# Patient Record
Sex: FEMALE | Race: WHITE | Employment: OTHER | URBAN - METROPOLITAN AREA
[De-identification: names, ages, dates, MRNs, and addresses within clinical notes are randomized per-mention and may not be internally consistent; named-entity substitution may affect disease eponyms.]

---

## 2018-05-31 ENCOUNTER — DOCTOR'S OFFICE (OUTPATIENT)
Dept: URBAN - METROPOLITAN AREA CLINIC 137 | Facility: CLINIC | Age: 78
Setting detail: OPHTHALMOLOGY
End: 2018-05-31
Payer: COMMERCIAL

## 2018-05-31 DIAGNOSIS — H35.3131: ICD-10-CM

## 2018-05-31 DIAGNOSIS — H25.12: ICD-10-CM

## 2018-05-31 DIAGNOSIS — Z96.1: ICD-10-CM

## 2018-05-31 PROCEDURE — 92004 COMPRE OPH EXAM NEW PT 1/>: CPT | Performed by: OPHTHALMOLOGY

## 2018-05-31 ASSESSMENT — REFRACTION_MANIFEST
OD_VA3: 20/
OD_VA1: 20/
OD_VA2: 20/
OS_VA1: 20/
OS_VA2: 20/
OD_VA3: 20/
OD_VA1: 20/
OU_VA: 20/
OS_VA2: 20/
OD_VA3: 20/
OD_VA2: 20/
OU_VA: 20/
OD_VA2: 20/
OS_VA3: 20/
OS_VA1: 20/
OS_VA3: 20/
OS_VA3: 20/
OU_VA: 20/
OD_VA1: 20/
OS_VA1: 20/
OS_VA2: 20/

## 2018-05-31 ASSESSMENT — REFRACTION_CURRENTRX
OD_OVR_VA: 20/
OD_OVR_VA: 20/
OS_OVR_VA: 20/
OD_OVR_VA: 20/

## 2018-05-31 ASSESSMENT — VISUAL ACUITY
OS_BCVA: 20/30+3
OD_BCVA: 20/200

## 2018-05-31 ASSESSMENT — CONFRONTATIONAL VISUAL FIELD TEST (CVF)
OD_FINDINGS: FULL
OS_FINDINGS: FULL

## 2018-10-11 RX ORDER — MULTIVIT-MIN/IRON/FOLIC ACID/K 18-600-40
CAPSULE ORAL 3 TIMES WEEKLY
COMMUNITY

## 2018-10-11 RX ORDER — CHLORAL HYDRATE 500 MG
1000 CAPSULE ORAL DAILY
COMMUNITY

## 2018-10-11 RX ORDER — METOPROLOL TARTRATE 50 MG/1
25 TABLET, FILM COATED ORAL EVERY 12 HOURS SCHEDULED
COMMUNITY
End: 2020-12-17

## 2018-10-11 RX ORDER — DIPHENOXYLATE HYDROCHLORIDE AND ATROPINE SULFATE 2.5; .025 MG/1; MG/1
1 TABLET ORAL DAILY
COMMUNITY

## 2018-10-11 RX ORDER — ESOMEPRAZOLE MAGNESIUM 40 MG/1
40 CAPSULE, DELAYED RELEASE ORAL AS NEEDED
COMMUNITY
End: 2021-01-04 | Stop reason: SDUPTHER

## 2018-10-11 RX ORDER — FOLIC ACID 0.8 MG
TABLET ORAL
COMMUNITY

## 2018-10-11 RX ORDER — LISINOPRIL 10 MG/1
10 TABLET ORAL EVERY MORNING
COMMUNITY
End: 2020-08-18 | Stop reason: SDUPTHER

## 2018-10-11 RX ORDER — ASPIRIN 81 MG/1
81 TABLET ORAL AS NEEDED
COMMUNITY
End: 2022-01-26

## 2018-10-11 RX ORDER — NIACIN 500 MG
500 TABLET ORAL
COMMUNITY

## 2018-10-11 RX ORDER — ANTIARTHRITIC COMBINATION NO.2 900 MG
TABLET ORAL EVERY MORNING
COMMUNITY

## 2018-10-11 NOTE — PRE-PROCEDURE INSTRUCTIONS
Pre-Surgery Instructions:   Medication Instructions    aspirin (ECOTRIN LOW STRENGTH) 81 mg EC tablet Patient was instructed by Physician and understands   Biotin 5000 MCG TABS Instructed patient per Anesthesia Guidelines   CALCIUM PO Instructed patient per Anesthesia Guidelines   Cholecalciferol (VITAMIN D) 2000 units CAPS Instructed patient per Anesthesia Guidelines   esomeprazole (NexIUM) 40 MG capsule Instructed patient per Anesthesia Guidelines   GLUCOSAMINE HCL PO Instructed patient per Anesthesia Guidelines   lisinopril (ZESTRIL) 10 mg tablet Instructed patient per Anesthesia Guidelines   Magnesium 500 MG CAPS Instructed patient per Anesthesia Guidelines   metoprolol tartrate (LOPRESSOR) 50 mg tablet Instructed patient per Anesthesia Guidelines   multivitamin (THERAGRAN) TABS Instructed patient per Anesthesia Guidelines   niacin 500 mg tablet Instructed patient per Anesthesia Guidelines   Omega-3 Fatty Acids (FISH OIL) 1,000 mg Instructed patient per Anesthesia Guidelines   Zolpidem Tartrate (AMBIEN PO) Instructed patient per Anesthesia Guidelines  Pre op instructions givens  Pt to take metoprolol tartrate, lisinopril,may take nexium if needed   ángel Coto [phone] Surgical Experience    The following information was developed to assist you to prepare for your operation  What do I need to do before coming to the hospital?   Arrange for a responsible person to drive you to and from the hospital    Arrange care for your children at home  Children are not allowed in the recovery areas of the hospital   Plan to wear clothing that is easy to put on and take off  If you are having shoulder surgery, wear a shirt that buttons or zippers in the front  Bathing  o Shower the evening before and the morning of your surgery with an antibacterial soap   Please refer to the Pre Op Showering Instructions for Surgery Patients Sheet   o Remove nail polish and all body piercing jewelry  o Do not shave any body part for at least 24 hours before surgery-this includes face, arms, legs and upper body  Food  o Nothing to eat or drink after midnight the night before your surgery  This includes candy and chewing gum  o Exception: If your surgery is after 12:00pm (noon), you may have clear liquids such as 7-Up®, ginger ale, apple or cranberry juice, Jell-O®, water, or clear broth until 8:00 am  o Do not drink milk or juice with pulp on the morning before surgery  o Do not drink alcohol 24 hours before surgery  Medicine  o Follow instructions you received from your surgeon about which medicines you may take on the day of surgery  o If instructed to take medicine on the morning of surgery, take pills with just a small sip of water  Call your prescribing doctor for specific infroamtion on what to do if you take insulin    What should I bring to the hospital?    Bring:  Kathee Friday or a walker, if you have them, for foot or knee surgery   A list of the daily medicines, vitamins, minerals, herbals and nutritional supplements you take  Include the dosages of medicines and the time you take them each day   Glasses, dentures or hearing aids   Minimal clothing; you will be wearing hospital sleepwear   Photo ID; required to verify your identity   If you have a Living Will or Power of , bring a copy of the documents   If you have an ostomy, bring an extra pouch and any supplies you use    Do not bring   Medicines or inhalers   Money, valuables or jewelry    What other information should I know about the day of surgery?  Notify your surgeons if you develop a cold, sore throat, cough, fever, rash or any other illness     Report to the Ambulatory Surgical/Same Day Surgery Unit   You will be instructed to stop at Registration only if you have not been pre-registered   Inform your  fi they do not stay that they will be asked by the staff to leave a phone number where they can be reached   Be available to be reached before surgery  In the event the operating room schedule changes, you may be asked to come in earlier or later than expected    *It is important to tell your doctor and others involved in your health care if you are taking or have been taking any non-prescription drugs, vitamins, minerals, herbals or other nutritional supplements   Any of these may interact with some food or medicines and cause a reaction

## 2018-10-17 ENCOUNTER — ANESTHESIA EVENT (OUTPATIENT)
Dept: PERIOP | Facility: AMBULARY SURGERY CENTER | Age: 78
End: 2018-10-17
Payer: MEDICARE

## 2018-10-17 PROBLEM — H25.812 COMBINED FORMS OF AGE-RELATED CATARACT OF LEFT EYE: Status: ACTIVE | Noted: 2018-10-17

## 2018-10-17 NOTE — H&P
Admit Note     Khushbu Gandara    The above female patient   [de-identified]y o   years old has been followed for cataract development in their Left eye  Due to the decrease in vision and the affect on their lifestyle, they have elected to have cataract surgery  The risks and benefits were discussed with the patient using verbal discussion and education material  The IOL option were also discussed  The patient was cleared by  their medical doctor and had an interview with the anesthesia department  No contraindications to the surgery were found  Informed consent was obtained for cataract surgery and possible intraocular lens implantation  Ophthalmic Examination:     A complete ophthalmic exam was performed  The best corrected visual acuity in each eye was  OD 20/25     and OS 20/100     The Snellen chart was used as well as glare testing if indicated to determine the level of vision function  Slit lamp was used to examine the cornea and anterior structures of the eye  No significant pathology was found as a contraindication to surgery  Intraocular pressures were checked and found to be within normal limits  Dilated fundus exam was performed and no significant pathology was found that would attribute to the decreased vision  Examination of the crystalline lens revealed significant cataract formation and the cataract was a significant cause of the patient's decrease in vision  The potential benefits of the cataract surgery out weighed the risks of the procedure and were reviewed with the patient during the pre-operative visit  In summary, it was determined that the patient's decrease in visual acuity was mainly attributable to the cataract formation  Cataract surgery was recommended to for visual rehabilitation and improvement in the patient's  lifestyle  The decision included the patient's ability to safely drive a motor vehicle as well as live independently   The patient had an A-scan to determine the power of the lens to be placed into the eye at the time of cataract surgery  The risks and benefits were also review again at this visit including total loss of the eye on rare occassions  The IOL options were also reviewed based on the patients lifestyle and ocular findings  The above patient was informed of the need to be cleared by their medical doctor prior to surgery  Any pre-operative labs would be determined by their primary care doctor and/or cardiologist      A potential Vision was checked and found to be  20/20 in the operative eye  The post operative plan and visits were reviewed with the patient and scheduled  Admitting Diagnosis:    Cataract Left Eye  Procedure Planned:  Cataract Extraction Left Eye with possible Intraocular lens Implant      Anesthesia: Local MAC    Surgeon: Jose Ozuna MD

## 2018-10-18 ENCOUNTER — HOSPITAL ENCOUNTER (OUTPATIENT)
Facility: AMBULARY SURGERY CENTER | Age: 78
Setting detail: OUTPATIENT SURGERY
Discharge: HOME | End: 2018-10-18
Attending: OPHTHALMOLOGY | Admitting: OPHTHALMOLOGY
Payer: MEDICARE

## 2018-10-18 ENCOUNTER — ANESTHESIA (OUTPATIENT)
Dept: PERIOP | Facility: AMBULARY SURGERY CENTER | Age: 78
End: 2018-10-18
Payer: MEDICARE

## 2018-10-18 VITALS
OXYGEN SATURATION: 97 % | BODY MASS INDEX: 23.99 KG/M2 | RESPIRATION RATE: 18 BRPM | HEART RATE: 75 BPM | TEMPERATURE: 97.7 F | WEIGHT: 144 LBS | DIASTOLIC BLOOD PRESSURE: 89 MMHG | SYSTOLIC BLOOD PRESSURE: 151 MMHG | HEIGHT: 65 IN

## 2018-10-18 PROBLEM — H25.812 COMBINED FORMS OF AGE-RELATED CATARACT OF LEFT EYE: Status: RESOLVED | Noted: 2018-10-17 | Resolved: 2018-10-18

## 2018-10-18 PROCEDURE — V2632 POST CHMBR INTRAOCULAR LENS: HCPCS | Performed by: OPHTHALMOLOGY

## 2018-10-18 DEVICE — IOL SN60WF 16.5: Type: IMPLANTABLE DEVICE | Site: EYE | Status: FUNCTIONAL

## 2018-10-18 RX ORDER — OFLOXACIN 3 MG/ML
SOLUTION/ DROPS OPHTHALMIC AS NEEDED
Status: DISCONTINUED | OUTPATIENT
Start: 2018-10-18 | End: 2018-10-18 | Stop reason: HOSPADM

## 2018-10-18 RX ORDER — TETRACAINE HYDROCHLORIDE 5 MG/ML
SOLUTION OPHTHALMIC AS NEEDED
Status: DISCONTINUED | OUTPATIENT
Start: 2018-10-18 | End: 2018-10-18 | Stop reason: HOSPADM

## 2018-10-18 RX ORDER — LIDOCAINE HYDROCHLORIDE 10 MG/ML
0.5 INJECTION, SOLUTION EPIDURAL; INFILTRATION; INTRACAUDAL; PERINEURAL ONCE AS NEEDED
Status: DISCONTINUED | OUTPATIENT
Start: 2018-10-18 | End: 2018-10-18 | Stop reason: HOSPADM

## 2018-10-18 RX ORDER — LIDOCAINE HYDROCHLORIDE 20 MG/ML
1 JELLY TOPICAL
Status: COMPLETED | OUTPATIENT
Start: 2018-10-18 | End: 2018-10-18

## 2018-10-18 RX ORDER — OFLOXACIN 3 MG/ML
1 SOLUTION/ DROPS OPHTHALMIC
Status: COMPLETED | OUTPATIENT
Start: 2018-10-18 | End: 2018-10-18

## 2018-10-18 RX ORDER — BALANCED SALT SOLUTION 6.4; .75; .48; .3; 3.9; 1.7 MG/ML; MG/ML; MG/ML; MG/ML; MG/ML; MG/ML
SOLUTION OPHTHALMIC AS NEEDED
Status: DISCONTINUED | OUTPATIENT
Start: 2018-10-18 | End: 2018-10-18 | Stop reason: HOSPADM

## 2018-10-18 RX ORDER — SODIUM CHLORIDE, SODIUM LACTATE, POTASSIUM CHLORIDE, CALCIUM CHLORIDE 600; 310; 30; 20 MG/100ML; MG/100ML; MG/100ML; MG/100ML
100 INJECTION, SOLUTION INTRAVENOUS CONTINUOUS
Status: DISCONTINUED | OUTPATIENT
Start: 2018-10-18 | End: 2018-10-18 | Stop reason: HOSPADM

## 2018-10-18 RX ORDER — TROPICAMIDE 10 MG/ML
1 SOLUTION/ DROPS OPHTHALMIC
Status: COMPLETED | OUTPATIENT
Start: 2018-10-18 | End: 2018-10-18

## 2018-10-18 RX ORDER — PHENYLEPHRINE HCL 2.5 %
1 DROPS OPHTHALMIC (EYE)
Status: COMPLETED | OUTPATIENT
Start: 2018-10-18 | End: 2018-10-18

## 2018-10-18 RX ORDER — PROPOFOL 10 MG/ML
INJECTION, EMULSION INTRAVENOUS AS NEEDED
Status: DISCONTINUED | OUTPATIENT
Start: 2018-10-18 | End: 2018-10-18 | Stop reason: SURG

## 2018-10-18 RX ORDER — CYCLOPENTOLATE HYDROCHLORIDE 10 MG/ML
1 SOLUTION/ DROPS OPHTHALMIC
Status: COMPLETED | OUTPATIENT
Start: 2018-10-18 | End: 2018-10-18

## 2018-10-18 RX ADMIN — CYCLOPENTOLATE HYDROCHLORIDE 1 DROP: 10 SOLUTION/ DROPS OPHTHALMIC at 09:05

## 2018-10-18 RX ADMIN — CYCLOPENTOLATE HYDROCHLORIDE 1 DROP: 10 SOLUTION/ DROPS OPHTHALMIC at 08:46

## 2018-10-18 RX ADMIN — PHENYLEPHRINE HYDROCHLORIDE 1 DROP: 25 SOLUTION/ DROPS OPHTHALMIC at 08:55

## 2018-10-18 RX ADMIN — OFLOXACIN 1 DROP: 3 SOLUTION OPHTHALMIC at 08:55

## 2018-10-18 RX ADMIN — OFLOXACIN 1 DROP: 3 SOLUTION OPHTHALMIC at 09:15

## 2018-10-18 RX ADMIN — PHENYLEPHRINE HYDROCHLORIDE 1 DROP: 25 SOLUTION/ DROPS OPHTHALMIC at 09:15

## 2018-10-18 RX ADMIN — LIDOCAINE HYDROCHLORIDE 1 APPLICATION: 20 JELLY TOPICAL at 09:05

## 2018-10-18 RX ADMIN — PROPOFOL 80 MG: 10 INJECTION, EMULSION INTRAVENOUS at 09:52

## 2018-10-18 RX ADMIN — LIDOCAINE HYDROCHLORIDE 1 APPLICATION: 20 JELLY TOPICAL at 08:45

## 2018-10-18 RX ADMIN — OFLOXACIN 1 DROP: 3 SOLUTION OPHTHALMIC at 08:45

## 2018-10-18 RX ADMIN — LIDOCAINE HYDROCHLORIDE 1 APPLICATION: 20 JELLY TOPICAL at 08:55

## 2018-10-18 RX ADMIN — OFLOXACIN 1 DROP: 3 SOLUTION OPHTHALMIC at 09:05

## 2018-10-18 RX ADMIN — LIDOCAINE HYDROCHLORIDE 1 APPLICATION: 20 JELLY TOPICAL at 09:15

## 2018-10-18 RX ADMIN — CYCLOPENTOLATE HYDROCHLORIDE 1 DROP: 10 SOLUTION/ DROPS OPHTHALMIC at 09:15

## 2018-10-18 RX ADMIN — PHENYLEPHRINE HYDROCHLORIDE 1 DROP: 25 SOLUTION/ DROPS OPHTHALMIC at 08:45

## 2018-10-18 RX ADMIN — TROPICAMIDE 1 DROP: 10 SOLUTION/ DROPS OPHTHALMIC at 08:45

## 2018-10-18 RX ADMIN — TROPICAMIDE 1 DROP: 10 SOLUTION/ DROPS OPHTHALMIC at 09:15

## 2018-10-18 RX ADMIN — TROPICAMIDE 1 DROP: 10 SOLUTION/ DROPS OPHTHALMIC at 08:55

## 2018-10-18 RX ADMIN — SODIUM CHLORIDE, SODIUM LACTATE, POTASSIUM CHLORIDE, AND CALCIUM CHLORIDE 100 ML/HR: .6; .31; .03; .02 INJECTION, SOLUTION INTRAVENOUS at 09:08

## 2018-10-18 RX ADMIN — CYCLOPENTOLATE HYDROCHLORIDE 1 DROP: 10 SOLUTION/ DROPS OPHTHALMIC at 08:55

## 2018-10-18 RX ADMIN — PHENYLEPHRINE HYDROCHLORIDE 1 DROP: 25 SOLUTION/ DROPS OPHTHALMIC at 09:05

## 2018-10-18 RX ADMIN — TROPICAMIDE 1 DROP: 10 SOLUTION/ DROPS OPHTHALMIC at 09:05

## 2018-10-18 NOTE — OP NOTE
20/100 Postoperative Note  PATIENT NAME: Jimmy Kaba  : 1940  MRN: [id number]  [facility] OR ROOM 01    Surgery Date: 10/18/2018    Combined forms of age-related cataract of left eye [H25 812]    Post-Op Diagnosis Codes:     * Combined forms of age-related cataract of left eye [H25 812]    Procedure(s):  EXTRACTION EXTRACAPSULAR CATARACT PHACO INTRAOCULAR LENS (IOL) LEFT EYE    Surgeon(s) and Role:     * Sean Pedersen MD - Primary    Assistants:  Circulator: Jimmy Ortiz RN; Jens Weahters RN  Scrub Person: Sharyle Lapping    Anesthesia Type:   IV Sedation with Anesthesia     Anesthesiologist: Murray Brown MD    Operative Indication:  Vision reduced to 20/100 in the left eye  secondary to progressive cataract formation  The cataract was interfering with the patient's daily activities  All risks and benefits to the surgical procedure were explained to the patient and family members that were present during the pre-operative visit  The risks included but were not limited to blindness, infection, choroidal hemorrhage, loss of the eye, glaucoma, corneal edema, macular edema, retinal detachment, the need for a corneal transplant and the need to wear glasses postoperatively  The rare possibility of having to remove/excahnage the IOL due to incorrect power or off axis (toric) was explained pre-oper to the patient  Educational materials, eye model and a video were all used to explain cataract surgery  The option of speciality IOL-lenses (Multifocal, toric IOL) were discussed pre-operatively as well as the risks and benefit of using the speciality IOL if indicated in this particular patient  The patient understood and signed the appropriate consent form  Operative Technique:  The patient was brought back to the operating suite and placed in the supine position  The patient was identified in from of the surgeon as well as the OR staff  The operative eye was confirmed and marked   The patient  was given a peribulbar block using  2% Lidocaine  The pressure of the eye was checked by digital massage  The operative eye was prepped and draped in the usual sterile fashion  A wire lid speculum was inserted  A clear corneal, temporal approach was used  A 2 75 Phaco blade was used to tunnel and enter the  cornea from the temporal side  The anterior chamber was entered and visco-elastic was used to deepen the anterior chamber  Side port paracentesis tracts were performed using a 1 0 mm paracentesis blade at approximately 12 and 6 position  A circular tear capsulotomy was performed using a 25 gauge bent needle and Utrata capsulotomy forceps  Goode dissection was carried out with balanced salt solution using a 27 gauge flat irrigating cannula  The nucleus was freely rotatable with in the capsular bag  The Nucleus was phacoemulsified   Using the Everardo CenturionPhaco machine and the phaco chop method  The cortical shell was removed using the bimanual I/A  The posterior capsule was polished as indicated  The posterior capsular bag was inflated using Provisc  The posterior chamber intraocular lens power+16 50 [id number] was taken from the package an  inspected and the power confirmed  The lens was then inserted  into the posterior capsular bag using the appropriate IOL folder and   The lens was well centered using the Sinsky hook  The Provisc was removed using the I/A unit  The side ports and the temporal incision were stromal hydrated until they were water tight  A 10-vicryl suture was placed to further secure the temporal incision if indicated after testing the incision  The pressure of the eye was adjusted accordingly using balance salt solution through the side ports  At the end of the case the patient was given a drop of Iopidine to prevent a pressure spike  Also, the patient was given a drop of antibiotic drop and antibiotic ointment to prevent infection   The patient's eye was then patched with an eye pad and covered with a plastic shield  The Patient was then taken to the recovery room  After vital signs were stable the patient was discharged with family/friend in satisfactory condition  Addendum:  Difficult cortical clean-up very adherent to PC  Capsule polished  No complications    Lorrie Lin MD

## 2018-10-18 NOTE — DISCHARGE INSTRUCTIONS
[facility]  Discharge Instructions    Dr Mansoor Guerrero and Dr Lázaro Esteban discharged in satisfactory condition  Post operative instructions were given  Follow-up Appointment was given for 9 AM on Friday 10/19/18 at the [facility]  Normal Symptoms: Foreign body sensation, scratchy, picky feeling  Try Extra Strength Tylenol for headache  Call Office if severe pain or discomfort  Keep patch on operative eye until 4 pm today  Bring sunglasses to office in the morning  Do not bring the 3 eye drops  NEW Instructions on how to use the 3 drops will be given in AM     Activity: Avoid any heavy,  bending, lifting or excessive activity until instructed  May walk around home with assistance  OK to watch TV  Avoid any excessive reading  No driving until told (Normally 2-4 days after surgery)  Avoid sleeping on operative eye  No water in the eye    Diet: Resume normal diet as tolerated  If experiencing nausea, try bland foods or liquid diet first      Resume normal medications unless otherwise instructed     If any Questions or Problems Please Call: [phone] [facility] take off [facility] and patch and start drops , Do not put shield back on during the day  Blurry Vision normal today  Pink/White Top                     [employer]               4:00PM                         4:05 PM               4:10 PM               8:00PM                         8: 05PM                8:10PM    BEDTIME:    Take Tan Top ONLY and then replace the plastic shield over eye  No gauze pad needed at bedtime    TOMORROW MORNING , before coming to office, take all THREE drops, then put on glasses  Example of times below  7:00AM                            7: 05AM                  7:10 AM            YOU MAY EXPERIENCE "DOUBLE VISION" - "Blurry Vision"  ONCE YOU TAKE OFF EYE PATCH/SHIELD THIS IS NORMAL

## 2018-10-18 NOTE — ANESTHESIA PREPROCEDURE EVALUATION
Review of Systems/Medical History  Patient summary reviewed  Chart reviewed      Cardiovascular  EKG reviewed, Exercise tolerance (METS): >4,  Hypertension on > 1 medication, Dysrhythmias , elton dysrhythmia,    Pulmonary  Negative pulmonary ROS        GI/Hepatic  Negative GI/hepatic ROS          Negative  ROS        Endo/Other  Negative endo/other ROS      GYN  Negative gynecology ROS          Hematology   Musculoskeletal    Arthritis     Neurology  Negative neurology ROS      Psychology   Negative psychology ROS              Physical Exam    Airway    Mallampati score: II  TM Distance: >3 FB  Neck ROM: full     Dental       Cardiovascular  Rhythm: regular, Rate: normal,     Pulmonary  Breath sounds clear to auscultation,     Other Findings        Anesthesia Plan  ASA Score- 2     Anesthesia Type- IV sedation with anesthesia with ASA Monitors  Additional Monitors:   Airway Plan:         Plan Factors-    Induction- intravenous  Postoperative Plan-     Informed Consent- Anesthetic plan and risks discussed with patient

## 2018-10-18 NOTE — DISCHARGE SUMMARY
Khushbu Gandara was discharged in satisfactory condition  Discharge instructions were reviewed and then given to the patient   Arrangements were made for follow up the next day with Francesca Ashley MD

## 2020-08-03 ENCOUNTER — TELEPHONE (OUTPATIENT)
Dept: FAMILY MEDICINE CLINIC | Facility: CLINIC | Age: 80
End: 2020-08-03

## 2020-08-03 NOTE — TELEPHONE ENCOUNTER
Patient requesting a routine lab order to have completed prior to appt, also requesting a med refill for the Zolpidem Tartrate (AMBIEN PO) 2 5mg takes one daily at bedtime   ND

## 2020-08-04 DIAGNOSIS — I10 HYPERTENSION, UNSPECIFIED TYPE: Primary | ICD-10-CM

## 2020-08-04 DIAGNOSIS — E78.5 HYPERLIPIDEMIA, UNSPECIFIED HYPERLIPIDEMIA TYPE: ICD-10-CM

## 2020-08-04 DIAGNOSIS — G47.00 INSOMNIA, UNSPECIFIED TYPE: Primary | ICD-10-CM

## 2020-08-04 DIAGNOSIS — E03.9 HYPOTHYROIDISM, UNSPECIFIED TYPE: ICD-10-CM

## 2020-08-04 RX ORDER — ZOLPIDEM TARTRATE 5 MG/1
5 TABLET ORAL
Qty: 30 TABLET | Refills: 1 | Status: SHIPPED | OUTPATIENT
Start: 2020-08-04 | End: 2020-09-02 | Stop reason: SDUPTHER

## 2020-08-05 ENCOUNTER — TELEPHONE (OUTPATIENT)
Dept: FAMILY MEDICINE CLINIC | Facility: CLINIC | Age: 80
End: 2020-08-05

## 2020-08-18 DIAGNOSIS — Z76.0 MEDICATION REFILL: Primary | ICD-10-CM

## 2020-08-18 RX ORDER — LISINOPRIL 10 MG/1
10 TABLET ORAL EVERY MORNING
Qty: 90 TABLET | Refills: 3 | Status: SHIPPED | OUTPATIENT
Start: 2020-08-18 | End: 2021-03-15

## 2020-08-19 RX ORDER — FUROSEMIDE 40 MG/1
40 TABLET ORAL DAILY
COMMUNITY
Start: 2020-08-02 | End: 2020-12-17

## 2020-09-02 ENCOUNTER — TELEPHONE (OUTPATIENT)
Dept: FAMILY MEDICINE CLINIC | Facility: CLINIC | Age: 80
End: 2020-09-02

## 2020-09-02 DIAGNOSIS — G47.00 INSOMNIA, UNSPECIFIED TYPE: ICD-10-CM

## 2020-09-02 RX ORDER — ZOLPIDEM TARTRATE 5 MG/1
5 TABLET ORAL
Qty: 30 TABLET | Refills: 1 | Status: SHIPPED | OUTPATIENT
Start: 2020-09-02 | End: 2020-09-10 | Stop reason: SDUPTHER

## 2020-09-10 DIAGNOSIS — G47.00 INSOMNIA, UNSPECIFIED TYPE: ICD-10-CM

## 2020-09-10 RX ORDER — ZOLPIDEM TARTRATE 5 MG/1
5 TABLET ORAL
Qty: 90 TABLET | Refills: 0 | Status: SHIPPED | OUTPATIENT
Start: 2020-09-10 | End: 2020-12-14 | Stop reason: SDUPTHER

## 2020-09-15 ENCOUNTER — OFFICE VISIT (OUTPATIENT)
Dept: FAMILY MEDICINE CLINIC | Facility: CLINIC | Age: 80
End: 2020-09-15
Payer: COMMERCIAL

## 2020-09-15 VITALS
WEIGHT: 142 LBS | SYSTOLIC BLOOD PRESSURE: 118 MMHG | TEMPERATURE: 97.9 F | DIASTOLIC BLOOD PRESSURE: 72 MMHG | OXYGEN SATURATION: 97 % | HEIGHT: 65 IN | BODY MASS INDEX: 23.66 KG/M2 | HEART RATE: 76 BPM | RESPIRATION RATE: 16 BRPM

## 2020-09-15 DIAGNOSIS — G47.09 OTHER INSOMNIA: ICD-10-CM

## 2020-09-15 DIAGNOSIS — Z78.9 HEAVY ALCOHOL USE: ICD-10-CM

## 2020-09-15 DIAGNOSIS — I10 ESSENTIAL HYPERTENSION: Primary | ICD-10-CM

## 2020-09-15 DIAGNOSIS — F41.9 ANXIETY: ICD-10-CM

## 2020-09-15 DIAGNOSIS — E78.5 DYSLIPIDEMIA: ICD-10-CM

## 2020-09-15 PROBLEM — F10.90 HEAVY ALCOHOL USE: Status: ACTIVE | Noted: 2020-09-15

## 2020-09-15 PROCEDURE — 1036F TOBACCO NON-USER: CPT | Performed by: INTERNAL MEDICINE

## 2020-09-15 PROCEDURE — 3288F FALL RISK ASSESSMENT DOCD: CPT | Performed by: INTERNAL MEDICINE

## 2020-09-15 PROCEDURE — 1101F PT FALLS ASSESS-DOCD LE1/YR: CPT | Performed by: INTERNAL MEDICINE

## 2020-09-15 PROCEDURE — 99214 OFFICE O/P EST MOD 30 MIN: CPT | Performed by: INTERNAL MEDICINE

## 2020-09-15 PROCEDURE — 1160F RVW MEDS BY RX/DR IN RCRD: CPT | Performed by: INTERNAL MEDICINE

## 2020-09-15 PROCEDURE — 3725F SCREEN DEPRESSION PERFORMED: CPT | Performed by: INTERNAL MEDICINE

## 2020-09-15 PROCEDURE — 3078F DIAST BP <80 MM HG: CPT | Performed by: INTERNAL MEDICINE

## 2020-09-15 NOTE — PROGRESS NOTES
Assessment/Plan:         Problem List Items Addressed This Visit        Cardiovascular and Mediastinum    Essential hypertension - Primary       Other    Other insomnia    Heavy alcohol use    Anxiety    Dyslipidemia      Went over labs done recently- showed dl and some impaired fasting glucose-discussed alcohol use and medications-she declines mammogram and flu shot at this point in time      Subjective:      Patient ID: Luzmaria Cisneros is a 78 y o  female  Khushbu here with a hx of heavy alcohol use, insomnia, HTN, HL, anxiety-doing well, no complaints-still drinks quite a bit of wine on a daily basis, does not smoke-declines flu shot and mammogram at this point in time      The following portions of the patient's history were reviewed and updated as appropriate:   She has a past medical history of Anxiety, Arthritis, Prajapati esophagus, Cataract, Continuous chronic alcoholism ([address] ), GERD (gastroesophageal reflux disease), Hyperlipidemia, Hypertension, Insomnia, and Palpitations  ,  does not have any pertinent problems on file  ,   has a past surgical history that includes Cataract extraction (Right); LASIK (Bilateral); Joint replacement (Bilateral); Colonoscopy; Tonsillectomy; Tubal ligation; Hysterectomy; Cholecystectomy; pr xcapsl ctrc rmvl insj io lens prosth w/o ecp (Left, 10/18/2018); and BREAST IMPLANT (Bilateral)  ,  family history includes Cancer in her mother and sister; Diabetes in her daughter; Heart disease (age of onset: 61) in her father; No Known Problems in her sister  ,   reports that she has never smoked  She has never used smokeless tobacco  She reports current alcohol use of about 12 0 standard drinks of alcohol per week  She reports that she does not use drugs  ,  has No Known Allergies     Current Outpatient Medications   Medication Sig Dispense Refill    aspirin (ECOTRIN LOW STRENGTH) 81 mg EC tablet Take 81 mg by mouth once a week Once a week      Biotin 5000 MCG TABS Take by mouth every morning  CALCIUM PO Take by mouth once a week      Cholecalciferol (VITAMIN D) 2000 units CAPS Take by mouth 3 (three) times a week      esomeprazole (NexIUM) 40 MG capsule Take 40 mg by mouth as needed      furosemide (LASIX) 40 mg tablet Take 40 mg by mouth daily       GLUCOSAMINE HCL PO Take 1,200 mg by mouth daily       IBUPROFEN IB PO 1 po daily      lisinopril (ZESTRIL) 10 mg tablet Take 1 tablet (10 mg total) by mouth every morning 90 tablet 3    Magnesium 500 MG CAPS Take by mouth 3 (three) times a week      metoprolol tartrate (LOPRESSOR) 50 mg tablet Take 25 mg by mouth every 12 (twelve) hours      multivitamin (THERAGRAN) TABS Take 1 tablet by mouth 3 (three) times a week      niacin 500 mg tablet Take 500 mg by mouth daily with breakfast      Omega-3 Fatty Acids (FISH OIL) 1,000 mg Take 1,000 mg by mouth once a week      zolpidem (AMBIEN) 5 mg tablet Take 1 tablet (5 mg total) by mouth daily at bedtime as needed for sleep 90 tablet 0     No current facility-administered medications for this visit  Review of Systems   Constitutional: Negative for fatigue  Respiratory: Negative for chest tightness and shortness of breath  Cardiovascular: Negative for chest pain  Endocrine: Negative for cold intolerance  Neurological: Negative for dizziness  Psychiatric/Behavioral: Positive for sleep disturbance  Objective:  Vitals:    09/15/20 1447   BP: 118/72   BP Location: Left arm   Patient Position: Sitting   Cuff Size: Adult   Pulse: 76   Resp: 16   Temp: 97 9 °F (36 6 °C)   TempSrc: Temporal   SpO2: 97%   Weight: 64 4 kg (142 lb)   Height: 5' 5" (1 651 m)     Body mass index is 23 63 kg/m²  Physical Exam  Vitals signs and nursing note reviewed  Constitutional:       Appearance: She is well-developed  HENT:      Head: Normocephalic and atraumatic        Right Ear: External ear normal       Left Ear: External ear normal       Nose: Nose normal    Eyes:      Conjunctiva/sclera: Conjunctivae normal       Pupils: Pupils are equal, round, and reactive to light  Neck:      Musculoskeletal: Normal range of motion  Thyroid: No thyromegaly  Cardiovascular:      Rate and Rhythm: Normal rate and regular rhythm  Heart sounds: Normal heart sounds  No murmur  Pulmonary:      Effort: Pulmonary effort is normal  No respiratory distress  Breath sounds: Normal breath sounds  Abdominal:      General: Bowel sounds are normal       Palpations: Abdomen is soft  Musculoskeletal: Normal range of motion  Lymphadenopathy:      Cervical: No cervical adenopathy  Skin:     General: Skin is warm  Findings: No rash  Neurological:      Mental Status: She is alert and oriented to person, place, and time  Psychiatric:         Behavior: Behavior normal          Thought Content:  Thought content normal          Judgment: Judgment normal

## 2020-10-07 DIAGNOSIS — I10 ESSENTIAL HYPERTENSION: Primary | ICD-10-CM

## 2020-10-07 RX ORDER — AMLODIPINE BESYLATE 2.5 MG/1
TABLET ORAL
Qty: 90 TABLET | Refills: 3 | Status: SHIPPED | OUTPATIENT
Start: 2020-10-07 | End: 2021-11-29

## 2020-11-18 ENCOUNTER — TELEPHONE (OUTPATIENT)
Dept: FAMILY MEDICINE CLINIC | Facility: CLINIC | Age: 80
End: 2020-11-18

## 2020-11-18 DIAGNOSIS — J01.80 OTHER ACUTE SINUSITIS, RECURRENCE NOT SPECIFIED: Primary | ICD-10-CM

## 2020-11-18 RX ORDER — AZITHROMYCIN 250 MG/1
TABLET, FILM COATED ORAL
Qty: 6 TABLET | Refills: 0 | Status: SHIPPED | OUTPATIENT
Start: 2020-11-18 | End: 2020-11-22

## 2020-12-14 ENCOUNTER — TELEPHONE (OUTPATIENT)
Dept: FAMILY MEDICINE CLINIC | Facility: CLINIC | Age: 80
End: 2020-12-14

## 2020-12-14 DIAGNOSIS — G47.00 INSOMNIA, UNSPECIFIED TYPE: ICD-10-CM

## 2020-12-14 RX ORDER — ZOLPIDEM TARTRATE 5 MG/1
5 TABLET ORAL
Qty: 90 TABLET | Refills: 3 | Status: SHIPPED | OUTPATIENT
Start: 2020-12-14 | End: 2021-01-17

## 2020-12-16 DIAGNOSIS — Z76.0 MEDICATION REFILL: Primary | ICD-10-CM

## 2020-12-17 RX ORDER — METOPROLOL TARTRATE 50 MG/1
TABLET, FILM COATED ORAL
Qty: 180 TABLET | Refills: 3 | Status: SHIPPED | OUTPATIENT
Start: 2020-12-17 | End: 2021-11-10

## 2020-12-17 RX ORDER — FUROSEMIDE 40 MG/1
TABLET ORAL
Qty: 90 TABLET | Refills: 3 | Status: SHIPPED | OUTPATIENT
Start: 2020-12-17 | End: 2021-11-10

## 2021-01-04 DIAGNOSIS — K21.9 GASTROESOPHAGEAL REFLUX DISEASE WITHOUT ESOPHAGITIS: Primary | ICD-10-CM

## 2021-01-04 NOTE — TELEPHONE ENCOUNTER
Patient would like to switch to the generic brand of Nexium 90 day supply   (Nexium brand is no longer covered under her insurance)  [address]ell

## 2021-01-05 RX ORDER — ESOMEPRAZOLE MAGNESIUM 40 MG/1
40 CAPSULE, DELAYED RELEASE ORAL DAILY
Qty: 90 CAPSULE | Refills: 3 | Status: SHIPPED | OUTPATIENT
Start: 2021-01-05 | End: 2022-01-13

## 2021-01-16 DIAGNOSIS — G47.00 INSOMNIA, UNSPECIFIED TYPE: ICD-10-CM

## 2021-01-17 RX ORDER — ZOLPIDEM TARTRATE 5 MG/1
TABLET ORAL
Qty: 30 TABLET | Refills: 1 | Status: SHIPPED | OUTPATIENT
Start: 2021-01-17 | End: 2021-05-26

## 2021-02-24 ENCOUNTER — TELEPHONE (OUTPATIENT)
Dept: FAMILY MEDICINE CLINIC | Facility: CLINIC | Age: 81
End: 2021-02-24

## 2021-02-24 DIAGNOSIS — I10 ESSENTIAL HYPERTENSION: Primary | ICD-10-CM

## 2021-02-24 DIAGNOSIS — E78.5 HYPERLIPIDEMIA, UNSPECIFIED HYPERLIPIDEMIA TYPE: ICD-10-CM

## 2021-02-24 DIAGNOSIS — E55.9 VITAMIN D DEFICIENCY: ICD-10-CM

## 2021-02-24 NOTE — TELEPHONE ENCOUNTER
Patient has an appt  Set up with Dr Livier Carreon on [address] but would like to do bloodwork before her appt  Please send normal routine bloodwork script to:  [facility] and there fax number is [phone]      Please call patient after you fax over the lab slip so she can go get her bloodwork done        Patient ph # [phone]

## 2021-03-08 ENCOUNTER — RA CDI HCC (OUTPATIENT)
Dept: OTHER | Facility: HOSPITAL | Age: 81
End: 2021-03-08

## 2021-03-08 NOTE — PROGRESS NOTES
[facility]  coding opportunities             Chart reviewed, (number of) suggestions sent to provider: 1         * Provider did not respond to IB suggestion and did not use suggestion based on flag           [place] [address]  coding oppertunities             Chart reviewed, (number of) suggestions sent to provider: 1                 F10  20 Alcohol dependence, uncomplicated ([place] [id number] )   * Based on 9/15/20 documentation     If this is correct, please document and assess at your next visit 3/15/21

## 2021-03-10 ENCOUNTER — TELEPHONE (OUTPATIENT)
Dept: FAMILY MEDICINE CLINIC | Facility: CLINIC | Age: 81
End: 2021-03-10

## 2021-03-10 NOTE — TELEPHONE ENCOUNTER
Pt called and left a VM stating she wants to get her blood work done before her apt Mon       FAX to [phone]

## 2021-03-11 ENCOUNTER — TELEPHONE (OUTPATIENT)
Dept: FAMILY MEDICINE CLINIC | Facility: CLINIC | Age: 81
End: 2021-03-11

## 2021-03-15 ENCOUNTER — OFFICE VISIT (OUTPATIENT)
Dept: FAMILY MEDICINE CLINIC | Facility: CLINIC | Age: 81
End: 2021-03-15
Payer: COMMERCIAL

## 2021-03-15 VITALS
TEMPERATURE: 96.8 F | HEIGHT: 65 IN | BODY MASS INDEX: 24.49 KG/M2 | WEIGHT: 147 LBS | SYSTOLIC BLOOD PRESSURE: 180 MMHG | DIASTOLIC BLOOD PRESSURE: 90 MMHG

## 2021-03-15 DIAGNOSIS — E78.5 DYSLIPIDEMIA: ICD-10-CM

## 2021-03-15 DIAGNOSIS — I10 ESSENTIAL HYPERTENSION: ICD-10-CM

## 2021-03-15 DIAGNOSIS — F41.9 ANXIETY: ICD-10-CM

## 2021-03-15 DIAGNOSIS — G47.09 OTHER INSOMNIA: ICD-10-CM

## 2021-03-15 DIAGNOSIS — Z78.9 HEAVY ALCOHOL USE: ICD-10-CM

## 2021-03-15 DIAGNOSIS — Z76.0 MEDICATION REFILL: ICD-10-CM

## 2021-03-15 DIAGNOSIS — Z00.00 MEDICARE ANNUAL WELLNESS VISIT, SUBSEQUENT: Primary | ICD-10-CM

## 2021-03-15 PROBLEM — K21.9 GASTROESOPHAGEAL REFLUX DISEASE WITHOUT ESOPHAGITIS: Status: ACTIVE | Noted: 2021-03-15

## 2021-03-15 PROCEDURE — 1170F FXNL STATUS ASSESSED: CPT | Performed by: INTERNAL MEDICINE

## 2021-03-15 PROCEDURE — 1036F TOBACCO NON-USER: CPT | Performed by: INTERNAL MEDICINE

## 2021-03-15 PROCEDURE — 3288F FALL RISK ASSESSMENT DOCD: CPT | Performed by: INTERNAL MEDICINE

## 2021-03-15 PROCEDURE — 1100F PTFALLS ASSESS-DOCD GE2>/YR: CPT | Performed by: INTERNAL MEDICINE

## 2021-03-15 PROCEDURE — 3725F SCREEN DEPRESSION PERFORMED: CPT | Performed by: INTERNAL MEDICINE

## 2021-03-15 PROCEDURE — 1160F RVW MEDS BY RX/DR IN RCRD: CPT | Performed by: INTERNAL MEDICINE

## 2021-03-15 PROCEDURE — 3077F SYST BP >= 140 MM HG: CPT | Performed by: INTERNAL MEDICINE

## 2021-03-15 PROCEDURE — 3080F DIAST BP >= 90 MM HG: CPT | Performed by: INTERNAL MEDICINE

## 2021-03-15 PROCEDURE — 1125F AMNT PAIN NOTED PAIN PRSNT: CPT | Performed by: INTERNAL MEDICINE

## 2021-03-15 PROCEDURE — G0439 PPPS, SUBSEQ VISIT: HCPCS | Performed by: INTERNAL MEDICINE

## 2021-03-15 PROCEDURE — 99214 OFFICE O/P EST MOD 30 MIN: CPT | Performed by: INTERNAL MEDICINE

## 2021-03-15 RX ORDER — LISINOPRIL 20 MG/1
20 TABLET ORAL EVERY MORNING
Qty: 90 TABLET | Refills: 3 | Status: SHIPPED | OUTPATIENT
Start: 2021-03-15 | End: 2022-01-12

## 2021-03-15 NOTE — PROGRESS NOTES
Assessment and Plan:     Problem List Items Addressed This Visit        Cardiovascular and Mediastinum    Essential hypertension       Other    Heavy alcohol use      Other Visit Diagnoses     Medicare annual wellness visit, subsequent    -  Primary    BMI 24 0-24 9, adult               Preventive health issues were discussed with patient, and age appropriate screening tests were ordered as noted in patient's After Visit Summary  Personalized health advice and appropriate referrals for health education or preventive services given if needed, as noted in patient's After Visit Summary  History of Present Illness:     Patient presents for Medicare Annual Wellness visit    Patient Care Team:  Rajesh Macias MD as PCP - General (Internal Medicine)     Problem List:     Patient Active Problem List   Diagnosis    Other insomnia    Heavy alcohol use    Anxiety    Essential hypertension    Dyslipidemia    Vitamin D deficiency      Past Medical and Surgical History:     Past Medical History:   Diagnosis Date    Anxiety     Arthritis     Prajapati esophagus     Cataract     Continuous chronic alcoholism ([address] )     GERD (gastroesophageal reflux disease)     Hyperlipidemia     Hypertension     Insomnia     Palpitations      Past Surgical History:   Procedure Laterality Date    BREAST IMPLANT Bilateral     CATARACT EXTRACTION Right     CHOLECYSTECTOMY      LAP    COLONOSCOPY      HYSTERECTOMY      age 67[place]    JOINT REPLACEMENT Bilateral     hips-2010 and 2017-left leg is shorter    LASIK Bilateral     in her 50's    SC XCAPSL CTRC RMVL INSJ IO LENS PROSTH W/O ECP Left 10/18/2018    Procedure: EXTRACTION EXTRACAPSULAR CATARACT PHACO INTRAOCULAR LENS (IOL);   Surgeon: Mary Smith MD;  Location: [facility] MAIN OR;  Service: Ophthalmology    TONSILLECTOMY      TUBAL LIGATION        Family History:     Family History   Problem Relation Age of Onset    Cancer Mother         breast,kidney,lung (smoker)    Heart disease Father 61        MI    Cancer Sister         liver,pancreatic(smoker,ETOH)    Diabetes Daughter     No Known Problems Sister       Social History:     E-Cigarette/Vaping    E-Cigarette Use Never User      E-Cigarette/Vaping Substances    Nicotine No     THC No     CBD No     Flavoring No     Other No     Unknown No      Social History     Socioeconomic History    Marital status: /Civil Union     Spouse name: None    Number of children: None    Years of education: None    Highest education level: None   Occupational History    Occupation: Retired   Social Needs    Financial resource strain: None    Food insecurity     Worry: None     Inability: None    Transportation needs     Medical: None     Non-medical: None   Tobacco Use    Smoking status: Never Smoker    Smokeless tobacco: Never Used   Substance and Sexual Activity    Alcohol use:  Yes     Alcohol/week: 12 0 standard drinks     Types: 12 Glasses of wine per week     Frequency: 4 or more times a week     Drinks per session: 7 to 9     Binge frequency: Daily or almost daily    Drug use: No    Sexual activity: None   Lifestyle    Physical activity     Days per week: None     Minutes per session: None    Stress: None   Relationships    Social connections     Talks on phone: None     Gets together: None     Attends [employer] service: None     Active member of club or organization: None     Attends meetings of clubs or organizations: None     Relationship status: None    Intimate partner violence     Fear of current or ex partner: None     Emotionally abused: None     Physically abused: None     Forced sexual activity: None   Other Topics Concern    None   Social History Narrative    Occupation: retired    Marital status:     Exercise level: Occasional    Diet: Regular    General stress level: Low    Alcohol intake: Occasional    Caffeine intake: Occasional    Seat belts used routinely: Yes    Sunscreen used routinely: Yes    Smoke alarm in home: Yes    Advance directive: Yes      Medications and Allergies:     Current Outpatient Medications   Medication Sig Dispense Refill    amLODIPine (NORVASC) 2 5 mg tablet TAKE 1 TABLET BY MOUTH EVERY DAY IF BP GOES OVER 150 90 tablet 3    aspirin (ECOTRIN LOW STRENGTH) 81 mg EC tablet Take 81 mg by mouth once a week Once a week      Biotin 5000 MCG TABS Take by mouth every morning      CALCIUM PO Take by mouth once a week      Cholecalciferol (VITAMIN D) 2000 units CAPS Take by mouth 3 (three) times a week      esomeprazole (NexIUM) 40 MG capsule Take 1 capsule (40 mg total) by mouth daily 90 capsule 3    furosemide (LASIX) 40 mg tablet TAKE 1 TABLET BY MOUTH  DAILY AS NEEDED FOR EDEMA 90 tablet 3    GLUCOSAMINE HCL PO Take 1,200 mg by mouth daily       IBUPROFEN IB PO 1 po daily      lisinopril (ZESTRIL) 10 mg tablet Take 1 tablet (10 mg total) by mouth every morning 90 tablet 3    Magnesium 500 MG CAPS Take by mouth 3 (three) times a week      metoprolol tartrate (LOPRESSOR) 50 mg tablet TAKE 1 TABLET BY MOUTH  TWICE A  tablet 3    multivitamin (THERAGRAN) TABS Take 1 tablet by mouth 3 (three) times a week      niacin 500 mg tablet Take 500 mg by mouth daily with breakfast      Omega-3 Fatty Acids (FISH OIL) 1,000 mg Take 1,000 mg by mouth once a week      zolpidem (AMBIEN) 5 mg tablet TAKE 1 TABLET BY MOUTH DAILY AT BEDTIME AS NEEDED FOR SLEEP 30 tablet 1     No current facility-administered medications for this visit  No Known Allergies   Immunizations:     Immunization History   Administered Date(s) Administered    INFLUENZA 10/06/2011, 10/16/2011, 10/10/2012, 10/09/2013, 10/21/2014, 10/15/2015, 10/01/2016, 11/06/2017, 10/09/2020    Pneumococcal Conjugate 13-Valent 10/15/2015    Pneumococcal Polysaccharide PPV23 10/09/2016    SARS-CoV-2 / COVID-19 mRNA IM (Bria Byrd) 02/11/2021, 03/11/2021      Health Maintenance:      There are no preventive care reminders to display for this patient  Topic Date Due    DTaP,Tdap,and Td Vaccines (1 - Tdap) 03/07/1961      Medicare Health Risk Assessment:     BP (!) 180/90   Temp (!) 96 8 °F (36 °C) (Temporal)   Ht 5' 5" (1 651 m)   Wt 66 7 kg (147 lb)   BMI 24 46 kg/m²      Last Medicare Wellness visit information reviewed, patient interviewed and updates made to the record today  Health Risk Assessment:   Patient rates overall health as good  Patient feels that their physical health rating is slightly worse  Patient is satisfied with their life  Eyesight was rated as same  Hearing was rated as same  Patient feels that their emotional and mental health rating is slightly worse  Patients states they are never, rarely angry  Patient states they are always unusually tired/fatigued  Pain experienced in the last 7 days has been some  Patient's pain rating has been 3/10  Patient states that she has experienced no weight loss or gain in last 6 months  Depression Screening:   PHQ-2 Score: 0      Fall Risk Screening: In the past year, patient has experienced: history of falling in past year    Number of falls: 1  Injured during fall?: Yes    Feels unsteady when standing or walking?: Yes    Worried about falling?: Yes      Urinary Incontinence Screening:   Patient has not leaked urine accidently in the last six months  Home Safety:  Patient does not have trouble with stairs inside or outside of their home  Patient has no working smoke alarms Home safety hazards include: none  Nutrition:   Current diet is Regular  Medications:   Patient is currently taking over-the-counter supplements  OTC medications include: see medication list  Patient is able to manage medications  Activities of Daily Living (ADLs)/Instrumental Activities of Daily Living (IADLs):   Walk and transfer into and out of bed and chair?: Yes  Dress and groom yourself?: Yes    Bathe or shower yourself?: Yes    Feed yourself?  Yes  Do your laundry/housekeeping?: Yes  Manage your money, pay your bills and track your expenses?: Yes  Make your own meals?: Yes    Do your own shopping?: Yes    Previous Hospitalizations:   Any hospitalizations or ED visits within the last 12 months?: No      Advance Care Planning:   Living will: Yes    Durable POA for healthcare: No    Advanced directive: Yes    Five wishes given: No    End of Life Decisions reviewed with patient: Yes    Provider agrees with end of life decisions: Yes      Cognitive Screening:   Provider or family/friend/caregiver concerned regarding cognition?: No    PREVENTIVE SCREENINGS      Cardiovascular Screening:    General: Screening Not Indicated and History Lipid Disorder      Diabetes Screening:     General: Screening Not Indicated      Colorectal Cancer Screening:     General: Patient Declines      Breast Cancer Screening:     General: Patient Declines      Cervical Cancer Screening:    General: Screening Not Indicated      Abdominal Aortic Aneurysm (AAA) Screening:        General: Screening Not Indicated      Lung Cancer Screening:     General: Screening Not Indicated      Hepatitis C Screening:    General: Patient Declines    Hep C Screening Accepted: Yes      Screening, Brief Intervention, and Referral to Treatment (SBIRT)    Screening  Typical number of drinks in a day: 6  Typical number of drinks in a week: 42  Interpretation: Risky drinking behavior  Single Item Drug Screening:  How often have you used an illegal drug (including marijuana) or a prescription medication for non-medical reasons in the past year? never    Single Item Drug Screen Score: 0  Interpretation: Negative screen for possible drug use disorder    Brief Intervention  Healthy alcohol use/limits discussed  Alcohol cessation counseling given  Juan Still MD     Falls Plan of Care: Balance, strength, and gait training instructions were provided

## 2021-03-15 NOTE — PATIENT INSTRUCTIONS
Medicare Preventive Visit Patient Instructions  Thank you for completing your Welcome to Medicare Visit or Medicare Annual Wellness Visit today  Your next wellness visit will be due in one year (3/16/2022)  The screening/preventive services that you may require over the next 5-10 years are detailed below  Some tests may not apply to you based off risk factors and/or age  Screening tests ordered at today's visit but not completed yet may show as past due  Also, please note that scanned in results may not display below  Preventive Screenings:  Service Recommendations Previous Testing/Comments   Colorectal Cancer Screening  * Colonoscopy    * Fecal Occult Blood Test (FOBT)/Fecal Immunochemical Test (FIT)  * Fecal DNA/Cologuard Test  * Flexible Sigmoidoscopy Age: 54-65 years old   Colonoscopy: every 10 years (may be performed more frequently if at higher risk)  OR  FOBT/FIT: every 1 year  OR  Cologuard: every 3 years  OR  Sigmoidoscopy: every 5 years  Screening may be recommended earlier than age 48 if at higher risk for colorectal cancer  Also, an individualized decision between you and your healthcare provider will decide whether screening between the ages of 74-80 would be appropriate  Colonoscopy: Not on file  FOBT/FIT: Not on file  Cologuard: Not on file  Sigmoidoscopy: Not on file          Breast Cancer Screening Age: 36 years old  Frequency: every 1-2 years  Not required if history of left and right mastectomy Mammogram: Not on file        Cervical Cancer Screening Between the ages of 21-29, pap smear recommended once every 3 years  Between the ages of 33-67, can perform pap smear with HPV co-testing every 5 years     Recommendations may differ for women with a history of total hysterectomy, cervical cancer, or abnormal pap smears in past  Pap Smear: Not on file        Hepatitis C Screening Once for adults born between [facility]  More frequently in patients at high risk for Hepatitis C Hep C Antibody: Not on file        Diabetes Screening 1-2 times per year if you're at risk for diabetes or have pre-diabetes Fasting glucose: No results in last 5 years   A1C: No results in last 5 years        Cholesterol Screening Once every 5 years if you don't have a lipid disorder  May order more often based on risk factors  Lipid panel: 03/12/2021          Other Preventive Screenings Covered by Medicare:  1  Abdominal Aortic Aneurysm (AAA) Screening: covered once if your at risk  You're considered to be at risk if you have a family history of AAA  2  Lung Cancer Screening: covers low dose CT scan once per year if you meet all of the following conditions: (1) Age 50-69; (2) No signs or symptoms of lung cancer; (3) Current smoker or have quit smoking within the last 15 years; (4) You have a tobacco smoking history of at least 30 pack years (packs per day multiplied by number of years you smoked); (5) You get a written order from a healthcare provider  3  Glaucoma Screening: covered annually if you're considered high risk: (1) You have diabetes OR (2) Family history of glaucoma OR (3)  aged 48 and older OR (3)  American aged 72 and older  3  Osteoporosis Screening: covered every 2 years if you meet one of the following conditions: (1) You're estrogen deficient and at risk for osteoporosis based off medical history and other findings; (2) Have a vertebral abnormality; (3) On glucocorticoid therapy for more than 3 months; (4) Have primary hyperparathyroidism; (5) On osteoporosis medications and need to assess response to drug therapy  · Last bone density test (DXA Scan): Not on file  5  HIV Screening: covered annually if you're between the age of 12-76  Also covered annually if you are younger than 13 and older than 72 with risk factors for HIV infection  For pregnant patients, it is covered up to 3 times per pregnancy      Immunizations:  Immunization Recommendations   Influenza Vaccine Annual influenza vaccination during flu season is recommended for all persons aged >= 6 months who do not have contraindications   Pneumococcal Vaccine (Prevnar and Pneumovax)  * Prevnar = PCV13  * Pneumovax = PPSV23   Adults 25-60 years old: 1-3 doses may be recommended based on certain risk factors  Adults 72 years old: Prevnar (PCV13) vaccine recommended followed by Pneumovax (PPSV23) vaccine  If already received PPSV23 since turning 65, then PCV13 recommended at least one year after PPSV23 dose  Hepatitis B Vaccine 3 dose series if at intermediate or high risk (ex: diabetes, end stage renal disease, liver disease)   Tetanus (Td) Vaccine - COST NOT COVERED BY MEDICARE PART B Following completion of primary series, a booster dose should be given every 10 years to maintain immunity against tetanus  Td may also be given as tetanus wound prophylaxis  Tdap Vaccine - COST NOT COVERED BY MEDICARE PART B Recommended at least once for all adults  For pregnant patients, recommended with each pregnancy  Shingles Vaccine (Shingrix) - COST NOT COVERED BY MEDICARE PART B  2 shot series recommended in those aged 48 and above     Health Maintenance Due:  There are no preventive care reminders to display for this patient  Immunizations Due:      Topic Date Due    COVID-19 Vaccine (1 of 2) 03/07/1956    DTaP,Tdap,and Td Vaccines (1 - Tdap) 03/[birth year]/1959     Advance Directives   What are advance directives? Advance directives are legal documents that state your wishes and plans for medical care  These plans are made ahead of time in case you lose your ability to make decisions for yourself  Advance directives can apply to any medical decision, such as the treatments you want, and if you want to donate organs  What are the types of advance directives? There are many types of advance directives, and each state has rules about how to use them  You may choose a combination of any of the following:  · Living will:   This is a written record of the treatment you want  You can also choose which treatments you do not want, which to limit, and which to stop at a certain time  This includes surgery, medicine, IV fluid, and tube feedings  · Durable power of  for healthcare [facility]): This is a written record that states who you want to make healthcare choices for you when you are unable to make them for yourself  This person, called a proxy, is usually a family member or a friend  You may choose more than 1 proxy  · Do not resuscitate (DNR) order:  A DNR order is used in case your heart stops beating or you stop breathing  It is a request not to have certain forms of treatment, such as CPR  A DNR order may be included in other types of advance directives  · Medical directive: This covers the care that you want if you are in a coma, near death, or unable to make decisions for yourself  You can list the treatments you want for each condition  Treatment may include pain medicine, surgery, blood transfusions, dialysis, IV or tube feedings, and a ventilator (breathing machine)  · Values history: This document has questions about your views, beliefs, and how you feel and think about life  This information can help others choose the care that you would choose  Why are advance directives important? An advance directive helps you control your care  Although spoken wishes may be used, it is better to have your wishes written down  Spoken wishes can be misunderstood, or not followed  Treatments may be given even if you do not want them  An advance directive may make it easier for your family to make difficult choices about your care  © Copyright [employer] 2018 Information is for End User's use only and may not be sold, redistributed or otherwise used for commercial purposes   All illustrations and images included in CareNotes® are the copyrighted property of [employer]  or [facility] Guilherme Madera     Fall Prevention   AMBULATORY CARE:   Fall prevention  includes ways to make your home and other areas safer  It also includes ways you can move more carefully to prevent a fall  Health conditions that cause changes in your blood pressure, vision, or muscle strength and coordination may increase your risk for falls  Medicines may also increase your risk for falls if they make you dizzy, weak, or sleepy  Call 911 or have someone else call if:   · You have fallen and are unconscious  · You have fallen and cannot move part of your body  Contact your healthcare provider if:   · You have fallen and have pain or a headache  · You have questions or concerns about your condition or care  Fall prevention tips:   · Stand or sit up slowly  This may help you keep your balance and prevent falls  · Use assistive devices as directed  Your healthcare provider may suggest that you use a cane or walker to help you keep your balance  You may need to have grab bars put in your bathroom near the toilet or in the shower  · Wear shoes that fit well and have soles that   Wear shoes both inside and outside  Use slippers with good   Do not wear shoes with high heels  · Wear a personal alarm  This is a device that allows you to call 911 if you fall and need help  Ask your healthcare provider for more information  · Stay active  Exercise can help strengthen your muscles and improve your balance  Your healthcare provider may recommend water aerobics or walking  He or she may also recommend physical therapy to improve your coordination  Never start an exercise program without talking to your healthcare provider first          · Manage your medical conditions  Keep all appointments with your healthcare providers  Visit your eye doctor as directed  Home safety tips:       · Add items to prevent falls in the bathroom  Put nonslip strips on your bath or shower floor to prevent you from slipping   Use a bath mat if you do not have carpet in the bathroom  This will prevent you from falling when you step out of the bath or shower  Use a shower seat so you do not need to stand while you shower  Sit on the toilet or a chair in your bathroom to dry yourself and put on clothing  This will prevent you from losing your balance from drying or dressing yourself while you are standing  · Keep paths clear  Remove books, shoes, and other objects from walkways and stairs  Place cords for telephones and lamps out of the way so that you do not need to walk over them  Tape them down if you cannot move them  Remove small rugs  If you cannot remove a rug, secure it with double-sided tape  This will prevent you from tripping  · Install bright lights in your home  Use night lights to help light paths to the bathroom or kitchen  Always turn on the light before you start walking  · Keep items you use often on shelves within reach  Do not use a step stool to help you reach an item  · Paint or place reflective tape on the edges of your stairs  This will help you see the stairs better  Follow up with your healthcare provider as directed:  Write down your questions so you remember to ask them during your visits  © Copyright [address] Information is for End User's use only and may not be sold, redistributed or otherwise used for commercial purposes  All illustrations and images included in CareNotes® are the copyrighted property of [employer]  or [employer]  The above information is an  only  It is not intended as medical advice for individual conditions or treatments  Talk to your doctor, nurse or pharmacist before following any medical regimen to see if it is safe and effective for you

## 2021-03-15 NOTE — PROGRESS NOTES
Assessment/Plan:         Problem List Items Addressed This Visit        Cardiovascular and Mediastinum    Essential hypertension     On metoprolol and lisinopril-will bump up lisinopril to 20 mg daily-counseled on low sodium diet and cutting way back on alcohol consumption         Relevant Medications    lisinopril (ZESTRIL) 20 mg tablet       Other    Other insomnia     Told Khushbu I don't love the [place] she's on for insomnia but for now she wants to keep taking it          Heavy alcohol use     Counseled yet again on cessation         Anxiety    Dyslipidemia      Other Visit Diagnoses     Medicare annual wellness visit, subsequent    -  Primary    BMI 24 0-24 9, adult        Medication refill        Relevant Medications    lisinopril (ZESTRIL) 20 mg tablet            Subjective:      Patient ID: Margy Soto is a 78 y o  female  Humberto Fountain is here for her Medicare AWV but also has a history of HTN, insomnia, heavy alcohol use, just had labwork done and it was ok-still drinking about 6 glasses of wine daily-her bp is running high at home and here      The following portions of the patient's history were reviewed and updated as appropriate:   Past Medical History:  She has a past medical history of Anxiety, Arthritis, Prajapati esophagus, Cataract, Continuous chronic alcoholism ([address] ), GERD (gastroesophageal reflux disease), Hyperlipidemia, Hypertension, Insomnia, and Palpitations  ,  _______________________________________________________________________  Medical Problems:  does not have any pertinent problems on file ,  _______________________________________________________________________  Past Surgical History:   has a past surgical history that includes Cataract extraction (Right); LASIK (Bilateral); Joint replacement (Bilateral); Colonoscopy; Tonsillectomy; Tubal ligation; Hysterectomy;  Cholecystectomy; pr xcapsl ctrc rmvl insj io lens prosth w/o ecp (Left, 10/18/2018); and BREAST IMPLANT (Bilateral)  ,  _______________________________________________________________________  Family History:  family history includes Cancer in her mother and sister; Diabetes in her daughter; Heart disease (age of onset: 61) in her father; No Known Problems in her sister  ,  _______________________________________________________________________  Social History:   reports that she has never smoked  She has never used smokeless tobacco  She reports current alcohol use of about 12 0 standard drinks of alcohol per week  She reports that she does not use drugs  ,  _______________________________________________________________________  Allergies:  has No Known Allergies     _______________________________________________________________________  Current Outpatient Medications   Medication Sig Dispense Refill    amLODIPine (NORVASC) 2 5 mg tablet TAKE 1 TABLET BY MOUTH EVERY DAY IF BP GOES OVER 150 90 tablet 3    aspirin (ECOTRIN LOW STRENGTH) 81 mg EC tablet Take 81 mg by mouth once a week Once a week      Biotin 5000 MCG TABS Take by mouth every morning      CALCIUM PO Take by mouth once a week      Cholecalciferol (VITAMIN D) 2000 units CAPS Take by mouth 3 (three) times a week      esomeprazole (NexIUM) 40 MG capsule Take 1 capsule (40 mg total) by mouth daily 90 capsule 3    furosemide (LASIX) 40 mg tablet TAKE 1 TABLET BY MOUTH  DAILY AS NEEDED FOR EDEMA 90 tablet 3    GLUCOSAMINE HCL PO Take 1,200 mg by mouth daily       IBUPROFEN IB PO 1 po daily      lisinopril (ZESTRIL) 20 mg tablet Take 1 tablet (20 mg total) by mouth every morning 90 tablet 3    Magnesium 500 MG CAPS Take by mouth 3 (three) times a week      metoprolol tartrate (LOPRESSOR) 50 mg tablet TAKE 1 TABLET BY MOUTH  TWICE A  tablet 3    multivitamin (THERAGRAN) TABS Take 1 tablet by mouth 3 (three) times a week      niacin 500 mg tablet Take 500 mg by mouth daily with breakfast      Omega-3 Fatty Acids (FISH OIL) 1,000 mg Take 1,000 mg by mouth once a week      zolpidem (AMBIEN) 5 mg tablet TAKE 1 TABLET BY MOUTH DAILY AT BEDTIME AS NEEDED FOR SLEEP 30 tablet 1     No current facility-administered medications for this visit       _______________________________________________________________________  Review of Systems   Constitutional: Positive for fatigue  Respiratory: Negative  Cardiovascular: Negative  Gastrointestinal:        Burps alot   Genitourinary: Negative  Neurological: Positive for dizziness  Objective:  Vitals:    03/15/21 1509   BP: (!) 180/90   Temp: (!) 96 8 °F (36 °C)   TempSrc: Temporal   Weight: 66 7 kg (147 lb)   Height: 5' 5" (1 651 m)     Body mass index is 24 46 kg/m²  Physical Exam  Vitals signs and nursing note reviewed  Constitutional:       Appearance: She is well-developed  HENT:      Head: Normocephalic and atraumatic  Right Ear: External ear normal       Left Ear: External ear normal       Nose: Nose normal    Eyes:      General: No scleral icterus  Conjunctiva/sclera: Conjunctivae normal       Pupils: Pupils are equal, round, and reactive to light  Neck:      Musculoskeletal: Normal range of motion  Thyroid: No thyromegaly  Cardiovascular:      Rate and Rhythm: Normal rate and regular rhythm  Heart sounds: Normal heart sounds  No murmur  Pulmonary:      Effort: Pulmonary effort is normal  No respiratory distress  Breath sounds: Normal breath sounds  Abdominal:      General: Bowel sounds are normal       Palpations: Abdomen is soft  Musculoskeletal: Normal range of motion  Lymphadenopathy:      Cervical: No cervical adenopathy  Skin:     General: Skin is warm  Findings: No rash  Neurological:      Mental Status: She is alert and oriented to person, place, and time  Psychiatric:         Behavior: Behavior normal          Thought Content:  Thought content normal          Judgment: Judgment normal

## 2021-03-15 NOTE — ASSESSMENT & PLAN NOTE
On metoprolol and lisinopril-will bump up lisinopril to 20 mg daily-counseled on low sodium diet and cutting way back on alcohol consumption

## 2021-05-26 DIAGNOSIS — G47.00 INSOMNIA, UNSPECIFIED TYPE: ICD-10-CM

## 2021-05-26 RX ORDER — ZOLPIDEM TARTRATE 5 MG/1
TABLET ORAL
Qty: 90 TABLET | Refills: 0 | Status: SHIPPED | OUTPATIENT
Start: 2021-05-26 | End: 2021-06-08 | Stop reason: SDUPTHER

## 2021-06-08 DIAGNOSIS — G47.00 INSOMNIA, UNSPECIFIED TYPE: ICD-10-CM

## 2021-06-08 RX ORDER — ZOLPIDEM TARTRATE 5 MG/1
5 TABLET ORAL
Qty: 90 TABLET | Refills: 0 | Status: SHIPPED | OUTPATIENT
Start: 2021-06-08 | End: 2022-03-14 | Stop reason: SDUPTHER

## 2021-06-08 RX ORDER — ZOLPIDEM TARTRATE 5 MG/1
5 TABLET ORAL
Qty: 30 TABLET | Refills: 0 | Status: SHIPPED | OUTPATIENT
Start: 2021-06-08 | End: 2022-01-26 | Stop reason: SDUPTHER

## 2021-09-21 DIAGNOSIS — Z76.0 MEDICATION REFILL: Primary | ICD-10-CM

## 2021-09-21 RX ORDER — ZOLPIDEM TARTRATE 5 MG/1
TABLET ORAL
Qty: 90 TABLET | Refills: 0 | Status: SHIPPED | OUTPATIENT
Start: 2021-09-21 | End: 2022-01-26 | Stop reason: SDUPTHER

## 2021-10-22 ENCOUNTER — TELEPHONE (OUTPATIENT)
Dept: FAMILY MEDICINE CLINIC | Facility: CLINIC | Age: 81
End: 2021-10-22

## 2021-10-25 DIAGNOSIS — E78.5 DYSLIPIDEMIA: ICD-10-CM

## 2021-10-25 DIAGNOSIS — I10 ESSENTIAL HYPERTENSION: Primary | ICD-10-CM

## 2021-11-01 ENCOUNTER — TELEPHONE (OUTPATIENT)
Dept: FAMILY MEDICINE CLINIC | Facility: CLINIC | Age: 81
End: 2021-11-01

## 2021-11-03 ENCOUNTER — OFFICE VISIT (OUTPATIENT)
Dept: FAMILY MEDICINE CLINIC | Facility: CLINIC | Age: 81
End: 2021-11-03
Payer: COMMERCIAL

## 2021-11-03 VITALS
DIASTOLIC BLOOD PRESSURE: 80 MMHG | OXYGEN SATURATION: 99 % | SYSTOLIC BLOOD PRESSURE: 132 MMHG | HEART RATE: 49 BPM | RESPIRATION RATE: 16 BRPM | BODY MASS INDEX: 23.66 KG/M2 | TEMPERATURE: 97.8 F | WEIGHT: 142 LBS | HEIGHT: 65 IN

## 2021-11-03 DIAGNOSIS — G47.09 OTHER INSOMNIA: ICD-10-CM

## 2021-11-03 DIAGNOSIS — Z78.9 HEAVY ALCOHOL USE: ICD-10-CM

## 2021-11-03 DIAGNOSIS — E78.5 DYSLIPIDEMIA: ICD-10-CM

## 2021-11-03 DIAGNOSIS — Z53.20 MAMMOGRAM DECLINED: ICD-10-CM

## 2021-11-03 DIAGNOSIS — I10 ESSENTIAL HYPERTENSION: ICD-10-CM

## 2021-11-03 DIAGNOSIS — H53.9 VISION CHANGES: Primary | ICD-10-CM

## 2021-11-03 DIAGNOSIS — F41.9 ANXIETY: ICD-10-CM

## 2021-11-03 PROCEDURE — 3075F SYST BP GE 130 - 139MM HG: CPT | Performed by: INTERNAL MEDICINE

## 2021-11-03 PROCEDURE — 99214 OFFICE O/P EST MOD 30 MIN: CPT | Performed by: INTERNAL MEDICINE

## 2021-11-03 PROCEDURE — 1036F TOBACCO NON-USER: CPT | Performed by: INTERNAL MEDICINE

## 2021-11-03 PROCEDURE — 3079F DIAST BP 80-89 MM HG: CPT | Performed by: INTERNAL MEDICINE

## 2021-11-03 PROCEDURE — 1160F RVW MEDS BY RX/DR IN RCRD: CPT | Performed by: INTERNAL MEDICINE

## 2021-11-09 DIAGNOSIS — Z76.0 MEDICATION REFILL: ICD-10-CM

## 2021-11-10 RX ORDER — FUROSEMIDE 40 MG/1
TABLET ORAL
Qty: 90 TABLET | Refills: 3 | Status: SHIPPED | OUTPATIENT
Start: 2021-11-10

## 2021-11-10 RX ORDER — METOPROLOL TARTRATE 50 MG/1
TABLET, FILM COATED ORAL
Qty: 180 TABLET | Refills: 3 | Status: SHIPPED | OUTPATIENT
Start: 2021-11-10

## 2021-11-27 DIAGNOSIS — I10 ESSENTIAL HYPERTENSION: ICD-10-CM

## 2021-11-29 RX ORDER — AMLODIPINE BESYLATE 2.5 MG/1
TABLET ORAL
Qty: 90 TABLET | Refills: 3 | Status: SHIPPED | OUTPATIENT
Start: 2021-11-29 | End: 2022-01-26

## 2021-12-04 DIAGNOSIS — Z76.0 MEDICATION REFILL: Primary | ICD-10-CM

## 2021-12-06 RX ORDER — ZOLPIDEM TARTRATE 5 MG/1
TABLET ORAL
Qty: 90 TABLET | Refills: 0 | Status: SHIPPED | OUTPATIENT
Start: 2021-12-06 | End: 2022-01-26 | Stop reason: SDUPTHER

## 2021-12-28 ENCOUNTER — VBI (OUTPATIENT)
Dept: ADMINISTRATIVE | Facility: OTHER | Age: 81
End: 2021-12-28

## 2022-01-11 DIAGNOSIS — Z76.0 MEDICATION REFILL: ICD-10-CM

## 2022-01-12 RX ORDER — LISINOPRIL 20 MG/1
TABLET ORAL
Qty: 90 TABLET | Refills: 3 | Status: SHIPPED | OUTPATIENT
Start: 2022-01-12

## 2022-01-13 DIAGNOSIS — K21.9 GASTROESOPHAGEAL REFLUX DISEASE WITHOUT ESOPHAGITIS: ICD-10-CM

## 2022-01-13 RX ORDER — ESOMEPRAZOLE MAGNESIUM 40 MG/1
CAPSULE, DELAYED RELEASE ORAL
Qty: 90 CAPSULE | Refills: 3 | Status: SHIPPED | OUTPATIENT
Start: 2022-01-13

## 2022-01-20 ENCOUNTER — RA CDI HCC (OUTPATIENT)
Dept: OTHER | Facility: HOSPITAL | Age: 82
End: 2022-01-20

## 2022-01-20 NOTE — PROGRESS NOTES
[facility]  coding opportunities             Chart Reviewed * (Number of) Inbasket suggestions sent to Provider: 1      F10 20 Alcohol dependence, uncomplicated            Patients insurance company: [address]  (Medicare Advantage and Commercial)

## 2022-01-26 ENCOUNTER — OFFICE VISIT (OUTPATIENT)
Dept: FAMILY MEDICINE CLINIC | Facility: CLINIC | Age: 82
End: 2022-01-26
Payer: COMMERCIAL

## 2022-01-26 VITALS
HEART RATE: 59 BPM | HEIGHT: 65 IN | WEIGHT: 141 LBS | SYSTOLIC BLOOD PRESSURE: 116 MMHG | BODY MASS INDEX: 23.49 KG/M2 | RESPIRATION RATE: 16 BRPM | TEMPERATURE: 97.1 F | OXYGEN SATURATION: 99 % | DIASTOLIC BLOOD PRESSURE: 80 MMHG

## 2022-01-26 DIAGNOSIS — G47.09 OTHER INSOMNIA: ICD-10-CM

## 2022-01-26 DIAGNOSIS — Z78.9 HEAVY ALCOHOL USE: ICD-10-CM

## 2022-01-26 DIAGNOSIS — E78.5 DYSLIPIDEMIA: ICD-10-CM

## 2022-01-26 DIAGNOSIS — F10.20 UNCOMPLICATED ALCOHOL DEPENDENCE (HCC): ICD-10-CM

## 2022-01-26 DIAGNOSIS — I10 ESSENTIAL HYPERTENSION: ICD-10-CM

## 2022-01-26 DIAGNOSIS — D22.9 NEVUS: Primary | ICD-10-CM

## 2022-01-26 DIAGNOSIS — F41.9 ANXIETY: ICD-10-CM

## 2022-01-26 PROCEDURE — 3079F DIAST BP 80-89 MM HG: CPT | Performed by: INTERNAL MEDICINE

## 2022-01-26 PROCEDURE — 3074F SYST BP LT 130 MM HG: CPT | Performed by: INTERNAL MEDICINE

## 2022-01-26 PROCEDURE — 1160F RVW MEDS BY RX/DR IN RCRD: CPT | Performed by: INTERNAL MEDICINE

## 2022-01-26 PROCEDURE — 99214 OFFICE O/P EST MOD 30 MIN: CPT | Performed by: INTERNAL MEDICINE

## 2022-01-26 PROCEDURE — 1036F TOBACCO NON-USER: CPT | Performed by: INTERNAL MEDICINE

## 2022-01-26 NOTE — PROGRESS NOTES
Assessment/Plan:         Problem List Items Addressed This Visit        Other    Uncomplicated alcohol dependence ([facility] )      Other Visit Diagnoses     Nevus    -  Primary    Looks like keratoses on hand and wrist but are dark and have gotten bigger-will refer to Derm    Relevant Orders    Ambulatory Referral to Dermatology            Subjective:      Patient ID: Daquan Matute is a 78 y o  female  Ethyl Mary here today to touch base-hx of heavy alcohol use, htn, cataracts, insomnia-her daughter was mentioning to her that she seems a bit forgetful at times-she's not driving any more-we did the MMSE and clock draw today and she scored 30/30 on the MMSE and did a decent job on the clock draw-she did start to tell me that she has "ghosts" in her house that she sees walking through walls and doors etc so I don't know if she is really seeing spirits or if they are hallucinations  She has been on [place] for years and doesn't want to go off of it-she is still a heavy drinker on a daily basis, having several glasses of wine      The following portions of the patient's history were reviewed and updated as appropriate:   Past Medical History:  She has a past medical history of Anxiety, Arthritis, Prajapati esophagus, Cataract, Continuous chronic alcoholism ([facility] ), GERD (gastroesophageal reflux disease), Hyperlipidemia, Hypertension, Insomnia, and Palpitations  ,  _______________________________________________________________________  Medical Problems:  does not have any pertinent problems on file ,  _______________________________________________________________________  Past Surgical History:   has a past surgical history that includes Cataract extraction (Right); LASIK (Bilateral); Joint replacement (Bilateral); Colonoscopy; Tonsillectomy; Tubal ligation; Hysterectomy;  Cholecystectomy; pr xcapsl ctrc rmvl insj io lens prosth w/o ecp (Left, 10/18/2018); and BREAST IMPLANT (Bilateral)  ,  _______________________________________________________________________  Family History:  family history includes Cancer in her mother and sister; Diabetes in her daughter; Heart disease (age of onset: 61) in her father; No Known Problems in her sister  ,  _______________________________________________________________________  Social History:   reports that she has never smoked  She has never used smokeless tobacco  She reports current alcohol use of about 12 0 standard drinks of alcohol per week  She reports that she does not use drugs  ,  _______________________________________________________________________  Allergies:  has No Known Allergies     _______________________________________________________________________  Current Outpatient Medications   Medication Sig Dispense Refill    Biotin 5000 MCG TABS Take by mouth every morning      CALCIUM PO Take by mouth once a week      Cholecalciferol (VITAMIN D) 2000 units CAPS Take by mouth 3 (three) times a week      esomeprazole (NexIUM) 40 MG capsule TAKE 1 CAPSULE BY MOUTH  DAILY 90 capsule 3    furosemide (LASIX) 40 mg tablet TAKE 1 TABLET BY MOUTH  DAILY AS NEEDED FOR EDEMA 90 tablet 3    GLUCOSAMINE HCL PO Take 1,200 mg by mouth daily       IBUPROFEN IB PO 1 po daily PRN      lisinopril (ZESTRIL) 20 mg tablet TAKE 1 TABLET BY MOUTH IN  THE MORNING 90 tablet 3    Magnesium 500 MG CAPS TAKES HERE AND THERE      metoprolol tartrate (LOPRESSOR) 50 mg tablet TAKE 1 TABLET BY MOUTH  TWICE DAILY 180 tablet 3    multivitamin (THERAGRAN) TABS Take 1 tablet by mouth daily        niacin 500 mg tablet Take 500 mg by mouth daily with breakfast      Omega-3 Fatty Acids (FISH OIL) 1,000 mg Take 1,000 mg by mouth daily        zolpidem (AMBIEN) 5 mg tablet Take 1 tablet (5 mg total) by mouth daily at bedtime as needed for sleep 90 tablet 0     No current facility-administered medications for this visit  _______________________________________________________________________  Review of Systems   HENT: Negative  Respiratory: Negative  Cardiovascular: Negative  Gastrointestinal: Negative  Musculoskeletal: Negative  Neurological: Negative  Psychiatric/Behavioral: Positive for decreased concentration  Objective:  Vitals:    01/26/22 1308   BP: 116/80   BP Location: Left arm   Patient Position: Sitting   Cuff Size: Adult   Pulse: 59   Resp: 16   Temp: (!) 97 1 °F (36 2 °C)   TempSrc: Temporal   SpO2: 99%   Weight: 64 kg (141 lb)   Height: 5' 5" (1 651 m)     Body mass index is 23 46 kg/m²  Physical Exam  Vitals reviewed  Constitutional:       Appearance: Normal appearance  HENT:      Head: Normocephalic and atraumatic  Right Ear: External ear normal       Left Ear: External ear normal       Nose: Nose normal       Mouth/Throat:      Mouth: Mucous membranes are moist    Eyes:      Pupils: Pupils are equal, round, and reactive to light  Neck:      Vascular: No carotid bruit  Cardiovascular:      Rate and Rhythm: Normal rate and regular rhythm  Heart sounds: Normal heart sounds  Pulmonary:      Effort: Pulmonary effort is normal       Breath sounds: Normal breath sounds  Abdominal:      General: Abdomen is flat  Palpations: Abdomen is soft  Musculoskeletal:         General: Normal range of motion  Cervical back: Normal range of motion and neck supple  Skin:     Findings: Lesion present  Comments: 2 very dark colored nevi that pt says have gotten bigger-feel like keratoses but will refer to derm to see if a biopsy is necessary   Neurological:      General: No focal deficit present  Mental Status: She is alert and oriented to person, place, and time  Mental status is at baseline  Psychiatric:         Mood and Affect: Mood normal          Thought Content:  Thought content normal

## 2022-02-25 ENCOUNTER — APPOINTMENT (EMERGENCY)
Dept: CT IMAGING | Facility: HOSPITAL | Age: 82
End: 2022-02-25
Payer: COMMERCIAL

## 2022-02-25 ENCOUNTER — HOSPITAL ENCOUNTER (OUTPATIENT)
Facility: HOSPITAL | Age: 82
Setting detail: OBSERVATION
Discharge: HOME | End: 2022-02-26
Attending: EMERGENCY MEDICINE | Admitting: INTERNAL MEDICINE
Payer: COMMERCIAL

## 2022-02-25 DIAGNOSIS — R20.2 PARESTHESIA: ICD-10-CM

## 2022-02-25 DIAGNOSIS — R20.2 PARESTHESIA OF BOTH HANDS: ICD-10-CM

## 2022-02-25 DIAGNOSIS — I10 HYPERTENSION: Primary | ICD-10-CM

## 2022-02-25 DIAGNOSIS — N63.10 BREAST MASS, RIGHT: ICD-10-CM

## 2022-02-25 LAB
2HR DELTA HS TROPONIN: 4 NG/L
ALBUMIN SERPL BCP-MCNC: 4.6 G/DL (ref 3.4–4.8)
ALP SERPL-CCNC: 70.2 U/L (ref 35–140)
ALT SERPL W P-5'-P-CCNC: 21 U/L (ref 5–54)
ANION GAP SERPL CALCULATED.3IONS-SCNC: 10 MMOL/L (ref 4–13)
AST SERPL W P-5'-P-CCNC: 19 U/L (ref 15–41)
BASOPHILS # BLD AUTO: 0.02 THOUSANDS/ΜL (ref 0–0.1)
BASOPHILS NFR BLD AUTO: 0 % (ref 0–1)
BILIRUB SERPL-MCNC: 0.57 MG/DL (ref 0.3–1.2)
BUN SERPL-MCNC: 10 MG/DL (ref 6–20)
CALCIUM SERPL-MCNC: 9.7 MG/DL (ref 8.4–10.2)
CARDIAC TROPONIN I PNL SERPL HS: 12 NG/L
CARDIAC TROPONIN I PNL SERPL HS: 8 NG/L
CHLORIDE SERPL-SCNC: 94 MMOL/L (ref 96–108)
CO2 SERPL-SCNC: 27 MMOL/L (ref 22–33)
CREAT SERPL-MCNC: 0.71 MG/DL (ref 0.4–1.1)
EOSINOPHIL # BLD AUTO: 0.01 THOUSAND/ΜL (ref 0–0.61)
EOSINOPHIL NFR BLD AUTO: 0 % (ref 0–6)
ERYTHROCYTE [DISTWIDTH] IN BLOOD BY AUTOMATED COUNT: 11.7 % (ref 11.6–15.1)
ETHANOL SERPL-MCNC: <10 MG/DL
GFR SERPL CREATININE-BSD FRML MDRD: 79 ML/MIN/1.73SQ M
GLUCOSE SERPL-MCNC: 137 MG/DL (ref 65–140)
GLUCOSE SERPL-MCNC: 144 MG/DL (ref 65–140)
HCT VFR BLD AUTO: 39.1 % (ref 34.8–46.1)
HGB BLD-MCNC: 13.8 G/DL (ref 11.5–15.4)
IMM GRANULOCYTES # BLD AUTO: 0.02 THOUSAND/UL (ref 0–0.2)
IMM GRANULOCYTES NFR BLD AUTO: 0 % (ref 0–2)
LYMPHOCYTES # BLD AUTO: 1.3 THOUSANDS/ΜL (ref 0.6–4.47)
LYMPHOCYTES NFR BLD AUTO: 21 % (ref 14–44)
MCH RBC QN AUTO: 33.6 PG (ref 26.8–34.3)
MCHC RBC AUTO-ENTMCNC: 35.3 G/DL (ref 31.4–37.4)
MCV RBC AUTO: 95 FL (ref 82–98)
MONOCYTES # BLD AUTO: 0.37 THOUSAND/ΜL (ref 0.17–1.22)
MONOCYTES NFR BLD AUTO: 6 % (ref 4–12)
NEUTROPHILS # BLD AUTO: 4.46 THOUSANDS/ΜL (ref 1.85–7.62)
NEUTS SEG NFR BLD AUTO: 73 % (ref 43–75)
NRBC BLD AUTO-RTO: 0 /100 WBCS
PLATELET # BLD AUTO: 218 THOUSANDS/UL (ref 149–390)
PMV BLD AUTO: 8.9 FL (ref 8.9–12.7)
POTASSIUM SERPL-SCNC: 3.4 MMOL/L (ref 3.5–5)
PROT SERPL-MCNC: 7.4 G/DL (ref 6.4–8.3)
RBC # BLD AUTO: 4.11 MILLION/UL (ref 3.81–5.12)
SODIUM SERPL-SCNC: 131 MMOL/L (ref 133–145)
TSH SERPL DL<=0.05 MIU/L-ACNC: 5.38 UIU/ML (ref 0.34–5.6)
WBC # BLD AUTO: 6.18 THOUSAND/UL (ref 4.31–10.16)

## 2022-02-25 PROCEDURE — 70498 CT ANGIOGRAPHY NECK: CPT

## 2022-02-25 PROCEDURE — 80053 COMPREHEN METABOLIC PANEL: CPT | Performed by: EMERGENCY MEDICINE

## 2022-02-25 PROCEDURE — 99285 EMERGENCY DEPT VISIT HI MDM: CPT | Performed by: EMERGENCY MEDICINE

## 2022-02-25 PROCEDURE — 96365 THER/PROPH/DIAG IV INF INIT: CPT

## 2022-02-25 PROCEDURE — 83735 ASSAY OF MAGNESIUM: CPT

## 2022-02-25 PROCEDURE — 82948 REAGENT STRIP/BLOOD GLUCOSE: CPT

## 2022-02-25 PROCEDURE — G1004 CDSM NDSC: HCPCS

## 2022-02-25 PROCEDURE — 82077 ASSAY SPEC XCP UR&BREATH IA: CPT | Performed by: EMERGENCY MEDICINE

## 2022-02-25 PROCEDURE — 84443 ASSAY THYROID STIM HORMONE: CPT | Performed by: EMERGENCY MEDICINE

## 2022-02-25 PROCEDURE — 84484 ASSAY OF TROPONIN QUANT: CPT | Performed by: EMERGENCY MEDICINE

## 2022-02-25 PROCEDURE — 85025 COMPLETE CBC W/AUTO DIFF WBC: CPT | Performed by: EMERGENCY MEDICINE

## 2022-02-25 PROCEDURE — 99220 PR INITIAL OBSERVATION CARE/DAY 70 MINUTES: CPT

## 2022-02-25 PROCEDURE — 36415 COLL VENOUS BLD VENIPUNCTURE: CPT | Performed by: EMERGENCY MEDICINE

## 2022-02-25 PROCEDURE — 99285 EMERGENCY DEPT VISIT HI MDM: CPT

## 2022-02-25 PROCEDURE — 93005 ELECTROCARDIOGRAM TRACING: CPT

## 2022-02-25 PROCEDURE — 70496 CT ANGIOGRAPHY HEAD: CPT

## 2022-02-25 RX ORDER — PANTOPRAZOLE SODIUM 40 MG/1
40 TABLET, DELAYED RELEASE ORAL
Status: DISCONTINUED | OUTPATIENT
Start: 2022-02-26 | End: 2022-02-26 | Stop reason: HOSPADM

## 2022-02-25 RX ORDER — FOLIC ACID 1 MG/1
1 TABLET ORAL DAILY
Status: DISCONTINUED | OUTPATIENT
Start: 2022-02-26 | End: 2022-02-26 | Stop reason: HOSPADM

## 2022-02-25 RX ORDER — NIACIN 100 MG
500 TABLET ORAL
Status: DISCONTINUED | OUTPATIENT
Start: 2022-02-26 | End: 2022-02-26 | Stop reason: HOSPADM

## 2022-02-25 RX ORDER — LISINOPRIL 20 MG/1
20 TABLET ORAL EVERY MORNING
Status: DISCONTINUED | OUTPATIENT
Start: 2022-02-26 | End: 2022-02-26 | Stop reason: HOSPADM

## 2022-02-25 RX ORDER — ACETAMINOPHEN 325 MG/1
650 TABLET ORAL EVERY 4 HOURS PRN
Status: DISCONTINUED | OUTPATIENT
Start: 2022-02-25 | End: 2022-02-26 | Stop reason: HOSPADM

## 2022-02-25 RX ORDER — ONDANSETRON 2 MG/ML
4 INJECTION INTRAMUSCULAR; INTRAVENOUS EVERY 4 HOURS PRN
Status: DISCONTINUED | OUTPATIENT
Start: 2022-02-25 | End: 2022-02-26 | Stop reason: HOSPADM

## 2022-02-25 RX ORDER — FUROSEMIDE 40 MG/1
40 TABLET ORAL DAILY
Status: DISCONTINUED | OUTPATIENT
Start: 2022-02-26 | End: 2022-02-26 | Stop reason: HOSPADM

## 2022-02-25 RX ORDER — METOPROLOL TARTRATE 50 MG/1
50 TABLET, FILM COATED ORAL 2 TIMES DAILY
Status: DISCONTINUED | OUTPATIENT
Start: 2022-02-26 | End: 2022-02-26 | Stop reason: HOSPADM

## 2022-02-25 RX ORDER — ZOLPIDEM TARTRATE 5 MG/1
5 TABLET ORAL
Status: DISCONTINUED | OUTPATIENT
Start: 2022-02-25 | End: 2022-02-26 | Stop reason: HOSPADM

## 2022-02-25 RX ORDER — LANOLIN ALCOHOL/MO/W.PET/CERES
100 CREAM (GRAM) TOPICAL DAILY
Status: DISCONTINUED | OUTPATIENT
Start: 2022-02-26 | End: 2022-02-26 | Stop reason: HOSPADM

## 2022-02-25 RX ORDER — POTASSIUM CHLORIDE 20 MEQ/1
40 TABLET, EXTENDED RELEASE ORAL ONCE
Status: COMPLETED | OUTPATIENT
Start: 2022-02-25 | End: 2022-02-25

## 2022-02-25 RX ADMIN — POTASSIUM CHLORIDE 40 MEQ: 1500 TABLET, EXTENDED RELEASE ORAL at 23:55

## 2022-02-25 RX ADMIN — THIAMINE HYDROCHLORIDE 100 MG: 100 INJECTION, SOLUTION INTRAMUSCULAR; INTRAVENOUS at 20:30

## 2022-02-25 RX ADMIN — IOHEXOL 100 ML: 350 INJECTION, SOLUTION INTRAVENOUS at 21:52

## 2022-02-25 RX ADMIN — SODIUM CHLORIDE 1000 ML: 0.9 INJECTION, SOLUTION INTRAVENOUS at 23:57

## 2022-02-25 RX ADMIN — ZOLPIDEM TARTRATE 5 MG: 5 TABLET, FILM COATED ORAL at 23:55

## 2022-02-26 VITALS
RESPIRATION RATE: 16 BRPM | OXYGEN SATURATION: 98 % | SYSTOLIC BLOOD PRESSURE: 143 MMHG | DIASTOLIC BLOOD PRESSURE: 72 MMHG | WEIGHT: 141 LBS | HEIGHT: 65 IN | HEART RATE: 77 BPM | BODY MASS INDEX: 23.49 KG/M2 | TEMPERATURE: 97.4 F

## 2022-02-26 LAB
ANION GAP SERPL CALCULATED.3IONS-SCNC: 8 MMOL/L (ref 4–13)
ATRIAL RATE: 84 BPM
ATRIAL RATE: 90 BPM
ATRIAL RATE: 90 BPM
BUN SERPL-MCNC: 9 MG/DL (ref 6–20)
CALCIUM SERPL-MCNC: 8.8 MG/DL (ref 8.4–10.2)
CHLORIDE SERPL-SCNC: 103 MMOL/L (ref 96–108)
CHOLEST SERPL-MCNC: 168 MG/DL
CO2 SERPL-SCNC: 25 MMOL/L (ref 22–33)
CREAT SERPL-MCNC: 0.61 MG/DL (ref 0.4–1.1)
ERYTHROCYTE [DISTWIDTH] IN BLOOD BY AUTOMATED COUNT: 11.6 % (ref 11.6–15.1)
EST. AVERAGE GLUCOSE BLD GHB EST-MCNC: 97 MG/DL
GFR SERPL CREATININE-BSD FRML MDRD: 84 ML/MIN/1.73SQ M
GLUCOSE SERPL-MCNC: 114 MG/DL (ref 65–140)
HBA1C MFR BLD: 5 %
HCT VFR BLD AUTO: 31 % (ref 34.8–46.1)
HDLC SERPL-MCNC: 56 MG/DL
HGB BLD-MCNC: 10.9 G/DL (ref 11.5–15.4)
LDLC SERPL CALC-MCNC: 97 MG/DL (ref 0–100)
MAGNESIUM SERPL-MCNC: 1.7 MG/DL (ref 1.6–2.6)
MCH RBC QN AUTO: 33.5 PG (ref 26.8–34.3)
MCHC RBC AUTO-ENTMCNC: 35.2 G/DL (ref 31.4–37.4)
MCV RBC AUTO: 95 FL (ref 82–98)
NONHDLC SERPL-MCNC: 112 MG/DL
P AXIS: -7 DEGREES
P AXIS: 30 DEGREES
P AXIS: 41 DEGREES
PLATELET # BLD AUTO: 184 THOUSANDS/UL (ref 149–390)
PMV BLD AUTO: 9.7 FL (ref 8.9–12.7)
POTASSIUM SERPL-SCNC: 4 MMOL/L (ref 3.5–5)
PR INTERVAL: 147 MS
PR INTERVAL: 148 MS
PR INTERVAL: 161 MS
QRS AXIS: -10 DEGREES
QRS AXIS: -4 DEGREES
QRS AXIS: 3 DEGREES
QRSD INTERVAL: 94 MS
QRSD INTERVAL: 95 MS
QRSD INTERVAL: 95 MS
QT INTERVAL: 380 MS
QT INTERVAL: 382 MS
QT INTERVAL: 386 MS
QTC INTERVAL: 447 MS
QTC INTERVAL: 463 MS
QTC INTERVAL: 467 MS
RBC # BLD AUTO: 3.25 MILLION/UL (ref 3.81–5.12)
SODIUM SERPL-SCNC: 136 MMOL/L (ref 133–145)
T WAVE AXIS: 11 DEGREES
T WAVE AXIS: 14 DEGREES
T WAVE AXIS: 8 DEGREES
TRIGL SERPL-MCNC: 76.8 MG/DL
VENTRICULAR RATE: 83 BPM
VENTRICULAR RATE: 88 BPM
VENTRICULAR RATE: 88 BPM
VIT B12 SERPL-MCNC: 369 PG/ML (ref 100–900)
WBC # BLD AUTO: 5.84 THOUSAND/UL (ref 4.31–10.16)

## 2022-02-26 PROCEDURE — 85027 COMPLETE CBC AUTOMATED: CPT

## 2022-02-26 PROCEDURE — 93010 ELECTROCARDIOGRAM REPORT: CPT | Performed by: INTERNAL MEDICINE

## 2022-02-26 PROCEDURE — 80048 BASIC METABOLIC PNL TOTAL CA: CPT

## 2022-02-26 PROCEDURE — 82607 VITAMIN B-12: CPT

## 2022-02-26 PROCEDURE — 83036 HEMOGLOBIN GLYCOSYLATED A1C: CPT

## 2022-02-26 PROCEDURE — 99217 PR OBSERVATION CARE DISCHARGE MANAGEMENT: CPT | Performed by: INTERNAL MEDICINE

## 2022-02-26 PROCEDURE — 80061 LIPID PANEL: CPT

## 2022-02-26 RX ORDER — GABAPENTIN 100 MG/1
100 CAPSULE ORAL 2 TIMES DAILY
Qty: 60 CAPSULE | Refills: 0 | Status: SHIPPED | OUTPATIENT
Start: 2022-02-26 | End: 2022-03-29 | Stop reason: SDUPTHER

## 2022-02-26 RX ORDER — LANOLIN ALCOHOL/MO/W.PET/CERES
100 CREAM (GRAM) TOPICAL DAILY
Qty: 30 TABLET | Refills: 0 | Status: SHIPPED | OUTPATIENT
Start: 2022-02-27 | End: 2022-03-31

## 2022-02-26 RX ORDER — FOLIC ACID 1 MG/1
1 TABLET ORAL DAILY
Qty: 30 TABLET | Refills: 0 | Status: SHIPPED | OUTPATIENT
Start: 2022-02-27 | End: 2022-03-29 | Stop reason: SDUPTHER

## 2022-02-26 RX ORDER — GABAPENTIN 100 MG/1
100 CAPSULE ORAL 2 TIMES DAILY
Status: DISCONTINUED | OUTPATIENT
Start: 2022-02-26 | End: 2022-02-26 | Stop reason: HOSPADM

## 2022-02-26 RX ADMIN — Medication 500 MG: at 09:29

## 2022-02-26 RX ADMIN — GABAPENTIN 100 MG: 100 CAPSULE ORAL at 09:40

## 2022-02-26 RX ADMIN — METOPROLOL TARTRATE 50 MG: 50 TABLET, FILM COATED ORAL at 08:27

## 2022-02-26 RX ADMIN — PANTOPRAZOLE SODIUM 40 MG: 40 TABLET, DELAYED RELEASE ORAL at 06:24

## 2022-02-26 RX ADMIN — LISINOPRIL 20 MG: 20 TABLET ORAL at 08:27

## 2022-02-26 RX ADMIN — FOLIC ACID 1 MG: 1 TABLET ORAL at 08:27

## 2022-02-26 RX ADMIN — ENOXAPARIN SODIUM 40 MG: 40 INJECTION SUBCUTANEOUS at 08:28

## 2022-02-26 RX ADMIN — CYANOCOBALAMIN TAB 500 MCG 500 MCG: 500 TAB at 09:40

## 2022-02-26 RX ADMIN — FUROSEMIDE 40 MG: 40 TABLET ORAL at 08:27

## 2022-02-26 RX ADMIN — Medication 1 TABLET: at 08:27

## 2022-02-26 RX ADMIN — THIAMINE HCL TAB 100 MG 100 MG: 100 TAB at 08:28

## 2022-02-26 NOTE — INCIDENTAL FINDINGS
The following findings require follow up:  Radiographic finding   Finding: Partially visualized right breast mass   Follow up required: non-emergent outpatient women's imaging   Follow up should be done within one month(s)    Please notify the following clinician to assist with the follow up:   Dr Beatriz Fitzgerald, PCP

## 2022-02-26 NOTE — ASSESSMENT & PLAN NOTE
· Patient presented to ED with elevated BP reading and paresthesia in both hands extending to elbows  · Right arm started approximately one week ago  · Left arm started approximately 2 days ago  · Other symptoms include increase in confusion/difficutly with word finding  · CTA head/neck completed to rule out CVA  · Findings: No evidence of acute intracranial hemorrhage  No evidence of hemodynamic significant stenosis, aneurysm or dissection   Partially visualized right breast mass  · Monitor overnight on stroke pathway to rule out TIA although unlikely given patient's timeline of symptoms  · Chronic alcohol drinker - 4-5 glasses of wine per day +/- cocktails  · Not on thiamine or folate    · Differentials include: thiamine deficiency, TIA, hyperthyroidism  · Given IV thiamine in ED  · Continue daily PO thiamine  · Monitor overnight for TIA  · Check TSH and T4 for hyperthyroidism  · Check A1c, lipids, B12 for other causes

## 2022-02-26 NOTE — UTILIZATION REVIEW
Initial Clinical Review    Admission: Date/Time/Statement:   Admission Orders (From admission, onward)     Ordered        02/25/22 2247  Place in Observation  Once                      Orders Placed This Encounter   Procedures    Place in Observation     Standing Status:   Standing     Number of Occurrences:   1     Order Specific Question:   Level of Care     Answer:   Med Surg [16]     ED Arrival Information     Expected Arrival Acuity    - 2/25/2022 19:33 Urgent         Means of arrival Escorted by Service Admission type    Walk-In Self Hospitalist Urgent         Arrival complaint    tinglin arm shakiness blood pressure high         Chief Complaint   Patient presents with    Hypertension     patient reports HTN starting PTA with systolic BP at 987  Initial Presentation:   78 y o  female with a PMH of chronic alcohol use, hypertension, GERD who presents with elevated blood pressure  She took her blood pressure at home and noted it was 190/100  She has been dealing with paresthesia in her right hand x 1 week and in her left hand x 2 days and decided to come to ED to rule out heart attack or stroke  She reports drinking 4-5 glasses of wine a day starting around noon  Her last reported drink was a cocktail before dinner this afternoon around 1700  She does report that she has been having some confusion and difficulty of word finding over the past month  In the ED, a CT head/neck was completed that showed no acute intracranial findings  Her EKG and troponin were not concerning for any ischemic changes  She was given IV thiamine for her chronic alcohol use  She was admitted for TIA work-up with an initial NIHSS of 0    Date:    Day 2:     ED Triage Vitals   Temperature Pulse Respirations Blood Pressure SpO2   02/25/22 1951 02/25/22 1951 02/25/22 1951 02/25/22 1951 02/25/22 1951   98 8 °F (37 1 °C) 98 20 (!) 182/94 100 %      Temp Source Heart Rate Source Patient Position - Orthostatic VS BP Location FiO2 (%)   02/25/22 1951 02/25/22 1951 02/25/22 1951 02/25/22 1951 --   Oral Monitor Sitting Left arm       Pain Score       02/26/22 0045       No Pain          Wt Readings from Last 1 Encounters:   02/25/22 64 kg (141 lb)     Additional Vital Signs:   02/26/22 0749 97 4 °F (36 3 °C) Abnormal  77 16 143/72 98 % None (Room air) Lying   02/26/22 0100 -- 86 -- 153/91 -- -- --   02/25/22 2100 -- 87 16 142/68 99 % None (Room air) Sitting   02/25/22 1956 -- 93 20 171/95 Abnormal  100 %         Pertinent Labs/Diagnostic Test Results:   CTA head and neck with and without contrast   Final Result by Cristi Paul MD (02/25 2231)      No evidence of acute intracranial hemorrhage  No evidence of hemodynamic significant stenosis, aneurysm or dissection  Partially visualized right breast mass, correlate with nonemergent outpatient women's imaging                       Workstation performed: [id number]               Results from last 7 days   Lab Units 02/26/22 0530 02/25/22 2018   WBC Thousand/uL 5 84 6 18   HEMOGLOBIN g/dL 10 9* 13 8   HEMATOCRIT % 31 0* 39 1   PLATELETS Thousands/uL 184 218   NEUTROS ABS Thousands/µL  --  4 46         Results from last 7 days   Lab Units 02/26/22 0530 02/25/22 2018   SODIUM mmol/L 136 131*   POTASSIUM mmol/L 4 0 3 4*   CHLORIDE mmol/L 103 94*   CO2 mmol/L 25 27   ANION GAP mmol/L 8 10   BUN mg/dL 9 10   CREATININE mg/dL 0 61 0 71   EGFR ml/min/1 73sq m 84 79   CALCIUM mg/dL 8 8 9 7   MAGNESIUM mg/dL  --  1 7     Results from last 7 days   Lab Units 02/25/22 2018   AST U/L 19   ALT U/L 21   ALK PHOS U/L 70 2   TOTAL PROTEIN g/dL 7 4   ALBUMIN g/dL 4 6   TOTAL BILIRUBIN mg/dL 0 57     Results from last 7 days   Lab Units 02/25/22 2011   POC GLUCOSE mg/dl 144*     Results from last 7 days   Lab Units 02/26/22 0530 02/25/22 2018   GLUCOSE RANDOM mg/dL 114 137             No results found for: BETA-HYDROXYBUTYRATE                   Results from last 7 days   Lab Units 02/25/22  4139 02/25/22 2018   HS TNI 0HR ng/L  --  8   HS TNI 2HR ng/L 12  --    HSTNI D2 ng/L 4  --              Results from last 7 days   Lab Units 02/25/22 2018   TSH 3RD GENERATON uIU/mL 5 380                                                                 Results from last 7 days   Lab Units 02/25/22 2018   ETHANOL LVL mg/dL <10                               ED Treatment:   Medication Administration from 02/25/2022 1933 to 02/25/2022 2334       Date/Time Order Dose Route Action Action by Comments     02/25/2022 2100 thiamine (VITAMIN B1) 100 mg in sodium chloride 0 9 % 50 mL IVPB 0 mg Intravenous Stopped Jesus Liu, SUKUMAR      02/25/2022 2030 thiamine (VITAMIN B1) 100 mg in sodium chloride 0 9 % 50 mL IVPB 100 mg Intravenous Naun Schumacheritiessence [address], SUKUMAR      02/25/2022 2152 iohexol (OMNIPAQUE) 350 MG/ML injection (SINGLE-DOSE) 100 mL 100 mL Intravenous Given Nikia Garcia         Past Medical History:   Diagnosis Date    Anxiety     Arthritis     Prajapati esophagus     Cataract     Continuous chronic alcoholism ([address] )     GERD (gastroesophageal reflux disease)     Hyperlipidemia     Hypertension     Insomnia     Palpitations      Present on Admission:   Anxiety   Essential hypertension   Gastroesophageal reflux disease without esophagitis   Heavy alcohol use   Paresthesia of both hands      Admitting Diagnosis: Paresthesia [R20 2]  High blood pressure [I10]  Hypertension [I10]  Breast mass, right [N63 10]  Age/Sex: 78 y o  female  Admission Orders:  Tele mon  Neuro checks  hgba1c  Troponin   plt ct T4 free vit b12  ciwa  is  Scheduled Medications:  enoxaparin, 40 mg, Subcutaneous, Daily  folic acid, 1 mg, Oral, Daily  furosemide, 40 mg, Oral, Daily  lisinopril, 20 mg, Oral, QAM  metoprolol tartrate, 50 mg, Oral, BID  multivitamin-minerals, 1 tablet, Oral, Daily  niacin, 500 mg, Oral, Daily With Breakfast  pantoprazole, 40 mg, Oral, Early Morning  thiamine, 100 mg, Oral, Daily  zolpidem, 5 mg, Oral, HS      Continuous IV Infusions:     PRN Meds:  acetaminophen, 650 mg, Oral, Q4H PRN  ondansetron, 4 mg, Intravenous, Q4H PRN        None    Network Utilization Review Department  ATTENTION: Please call with any questions or concerns to [phone] and carefully listen to the prompts so that you are directed to the right person  All voicemails are confidential   Candia Siemens all requests for admission clinical reviews, approved or denied determinations and any other requests to dedicated fax number below belonging to the campus where the patient is receiving treatment   List of dedicated fax numbers for the Facilities:  [address] DENIALS (Administrative/Medical Necessity) [phone]   [address] (Maternity/NICU/Pediatrics) [phone]   [address]  [address] [facility] [place] [address] [id number] [place] [address] [address] [place] [address] [phone]

## 2022-02-26 NOTE — PLAN OF CARE
Problem: Potential for Falls  Goal: Patient will remain free of falls  Description: INTERVENTIONS:  - Educate patient/family on patient safety including physical limitations  - Instruct patient to call for assistance with activity   - Consult OT/PT to assist with strengthening/mobility   - Keep Call bell within reach  - Keep bed low and locked with side rails adjusted as appropriate  - Keep care items and personal belongings within reach  - Initiate and maintain comfort rounds  - Make Fall Risk Sign visible to staff  - Offer Toileting every 2 Hours, in advance of need  - Initiate/Maintain bed alarm  - Obtain necessary fall risk management equipment:   - Apply yellow socks and bracelet for high fall risk patients  - Consider moving patient to room near nurses station  Outcome: Progressing     Problem: PAIN - ADULT  Goal: Verbalizes/displays adequate comfort level or baseline comfort level  Description: Interventions:  - Encourage patient to monitor pain and request assistance  - Assess pain using appropriate pain scale  - Administer analgesics based on type and severity of pain and evaluate response  - Implement non-pharmacological measures as appropriate and evaluate response  - Consider cultural and social influences on pain and pain management  - Notify physician/advanced practitioner if interventions unsuccessful or patient reports new pain  Outcome: Progressing     Problem: INFECTION - ADULT  Goal: Absence or prevention of progression during hospitalization  Description: INTERVENTIONS:  - Assess and monitor for signs and symptoms of infection  - Monitor lab/diagnostic results  - Monitor all insertion sites, i e  indwelling lines, tubes, and drains  - Monitor endotracheal if appropriate and nasal secretions for changes in amount and color  - Hannah appropriate cooling/warming therapies per order  - Administer medications as ordered  - Instruct and encourage patient and family to use good hand hygiene technique  - Identify and instruct in appropriate isolation precautions for identified infection/condition  Outcome: Progressing  Goal: Absence of fever/infection during neutropenic period  Description: INTERVENTIONS:  - Monitor WBC    Outcome: Progressing     Problem: SAFETY ADULT  Goal: Patient will remain free of falls  Description: INTERVENTIONS:  - Educate patient/family on patient safety including physical limitations  - Instruct patient to call for assistance with activity   - Consult OT/PT to assist with strengthening/mobility   - Keep Call bell within reach  - Keep bed low and locked with side rails adjusted as appropriate  - Keep care items and personal belongings within reach  - Initiate and maintain comfort rounds  - Make Fall Risk Sign visible to staff  - Offer Toileting every 2 Hours, in advance of need  - Initiate/Maintain bed alarm  - Obtain necessary fall risk management equipment:   - Apply yellow socks and bracelet for high fall risk patients  - Consider moving patient to room near nurses station  Outcome: Progressing  Goal: Maintain or return to baseline ADL function  Description: INTERVENTIONS:  -  Assess patient's ability to carry out ADLs; assess patient's baseline for ADL function and identify physical deficits which impact ability to perform ADLs (bathing, care of mouth/teeth, toileting, grooming, dressing, etc )  - Assess/evaluate cause of self-care deficits   - Assess range of motion  - Assess patient's mobility; develop plan if impaired  - Assess patient's need for assistive devices and provide as appropriate  - Encourage maximum independence but intervene and supervise when necessary  - Involve family in performance of ADLs  - Assess for home care needs following discharge   - Consider OT consult to assist with ADL evaluation and planning for discharge  - Provide patient education as appropriate  Outcome: Progressing  Goal: Maintains/Returns to pre admission functional level  Description: INTERVENTIONS:  - Perform BMAT or MOVE assessment daily    - Set and communicate daily mobility goal to care team and patient/family/caregiver  - Collaborate with rehabilitation services on mobility goals if consulted  - Perform Range of Motion 2 times a day  - Reposition patient every 2 hours  - Dangle patient 2 times a day  - Stand patient 2 times a day  - Ambulate patient 2 times a day  - Out of bed to chair 2 times a day   - Out of bed for meals 2 times a day  - Out of bed for toileting  - Record patient progress and toleration of activity level   Outcome: Progressing     Problem: DISCHARGE PLANNING  Goal: Discharge to home or other facility with appropriate resources  Description: INTERVENTIONS:  - Identify barriers to discharge w/patient and caregiver  - Arrange for needed discharge resources and transportation as appropriate  - Identify discharge learning needs (meds, wound care, etc )  - Arrange for interpretive services to assist at discharge as needed  - Refer to Case Management Department for coordinating discharge planning if the patient needs post-hospital services based on physician/advanced practitioner order or complex needs related to functional status, cognitive ability, or social support system  Outcome: Progressing     Problem: Knowledge Deficit  Goal: Patient/family/caregiver demonstrates understanding of disease process, treatment plan, medications, and discharge instructions  Description: Complete learning assessment and assess knowledge base    Interventions:  - Provide teaching at level of understanding  - Provide teaching via preferred learning methods  Outcome: Progressing

## 2022-02-26 NOTE — DISCHARGE INSTR - AVS FIRST PAGE
Please follow up with your primary care doctor  Please take your medication as indicated  Please come back to ED if your symptoms get worst  Please decreased alcohol intake as we talk

## 2022-02-26 NOTE — DISCHARGE SUMMARY
Annette U  64   Discharge- Fina Masters 1940, 78 y o  female MRN: [id number]  Unit/Bed#: -01 Encounter: [id number]  Primary Care Provider: Arline Wilcox MD   Date and time admitted to hospital: 2/25/2022  7:50 PM    Gastroesophageal reflux disease without esophagitis  Assessment & Plan  · Continue PPI    Essential hypertension  Assessment & Plan  · Presented to ED with home BP readings of 190/100  · BP elevated in ED: 182/94 --> 142/68 with no intervention  · Patient reports she was more anxious than normal tonight after dinner which correlates with time BP was measured at home  · Elevated reading most likely due to anxiety  · Hold home Lopressor tonight while monitoring for TIA  · Ok with permissive hypertension  · Restart home Lisinopril, Lopressor, Lasix in AM    Anxiety  Assessment & Plan  · Patient reports dealing with anxiety at home  · Today was the worst it has ever been  · Most likely cause of her elevated blood pressure reading  · Unable to determine why it was elevated  · Patient is not interested in taking an anti-anxiety medication at this time    Heavy alcohol use  Assessment & Plan  · Patient reports drinking 4 - 5 glasses of wine a day  · Last drink was today before dinner around 1700  · Takes a multivitimain outpatient does not appear to be on folate or thiamine  · In ED, given 100 mg IV thiamine  · Continue thiamine and folate in hospital and on discharge  · Patient complaining of worsening paresthesia in bilateral arms and increasing confusion/word-finding  · Concern for thiamine deficiency - continue thiamine on discharge    * Paresthesia of both hands  Assessment & Plan  · Patient presented to ED with elevated BP reading and paresthesia in both hands extending to elbows  · Right arm started approximately one week ago  · Left arm started approximately 2 days ago  · Other symptoms include increase in confusion/difficutly with word finding  · CTA head/neck completed to rule out CVA  · Findings: No evidence of acute intracranial hemorrhage  No evidence of hemodynamic significant stenosis, aneurysm or dissection  Partially visualized right breast mass  · Monitor overnight on stroke pathway to rule out TIA although unlikely given patient's timeline of symptoms  · Chronic alcohol drinker - 4-5 glasses of wine per day +/- cocktails  · Not on thiamine or folate    · Differentials include: thiamine deficiency, peripheral neuropathy 2/2 Etoh  · Given IV thiamine in ED  · Continue daily PO thiamine  · Check TSH and T4 for hyperthyroidism-->normal  · Check A1c,  B12 for other causes-->needs to be follow  · Normal lipid panel  · Started with multiple vitamines      Discharging Resident Physician: Philly William MD  Attending: Leeanna Pinto MD  PCP: Nnamdi Roque MD  Admission Date: 2/25/2022  Discharge Date: 02/26/22    Disposition:     Home  Hospital Course:     Mago Martinez is a 78 y o  female patient with a past medical history significant for alcohol abuse, hypertension, GERD, who originally presented to the hospital on 2/25/2022 due to bilateral hand paresthesia that has been worsening for 1 week  Patient reports having bilateral hand paresthesia associated with some difficulty finding words  She underwent CT head and CTA neck that was unremarkable with no acute intracranial pathology or condition that needed to be treated at this point  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  Patient reports drinking 5 glasses of wine daily  She was started on B12, thiamine and folic acid replacement since she was not taking that at home  She was recommended to follow with primary care physician if this does not improve and she was given a trial of gabapentin to trying to improve the paresthesias from the neuropathy  TSH was unremarkable, pending follow-up B12 level      At discharge patient was hemodynamically stable ans all corresponding follow-up recommendations were given    Condition at Discharge: good     Discharge Day Visit / Exam:     Subjective:  No acute complains  Vitals: Blood Pressure: 143/72 (02/26/22 0800)  Pulse: 77 (02/26/22 0800)  Temperature: (!) 97 4 °F (36 3 °C) (02/26/22 0749)  Temp Source: Tympanic (02/26/22 0749)  Respirations: 16 (02/26/22 0749)  Height: 5' 5" (165 1 cm) (02/25/22 1951)  Weight - Scale: 64 kg (141 lb) (02/25/22 1951)  SpO2: 98 % (02/26/22 0749)  Exam:   Physical Exam  Constitutional:       General: She is not in acute distress  Cardiovascular:      Rate and Rhythm: Normal rate  Pulses: Normal pulses  Pulmonary:      Effort: Pulmonary effort is normal       Breath sounds: Normal breath sounds  Abdominal:      General: Abdomen is flat  Bowel sounds are normal       Palpations: Abdomen is soft  Musculoskeletal:         General: Normal range of motion  Cervical back: Normal range of motion  Skin:     General: Skin is warm  Capillary Refill: Capillary refill takes less than 2 seconds  Neurological:      General: No focal deficit present  Mental Status: She is alert and oriented to person, place, and time  Mental status is at baseline  Cranial Nerves: No cranial nerve deficit  Sensory: No sensory deficit  Motor: No weakness  Coordination: Coordination normal       Gait: Gait normal       Deep Tendon Reflexes: Reflexes normal    Psychiatric:         Mood and Affect: Mood normal        Discharge instructions/Information to patient and family:   See after visit summary for information provided to patient and family  Provisions for Follow-Up Care:  See after visit summary for information related to follow-up care and any pertinent home health orders  Discharge Medications:  See after visit summary for reconciled discharge medications provided to patient and family        ** Please Note: This note has been constructed using a voice recognition system **

## 2022-02-26 NOTE — H&P
[address]  H&P- Gita Chang 1940, 78 y o  female MRN: [id number]  Unit/Bed#: -Nicole Encounter: [id number]  Primary Care Provider: Laura Troncoso MD   Date and time admitted to hospital: 2/25/2022  7:50 PM    * Paresthesia of both hands  Assessment & Plan  · Patient presented to ED with elevated BP reading and paresthesia in both hands extending to elbows  · Right arm started approximately one week ago  · Left arm started approximately 2 days ago  · Other symptoms include increase in confusion/difficutly with word finding  · CTA head/neck completed to rule out CVA  · Findings: No evidence of acute intracranial hemorrhage  No evidence of hemodynamic significant stenosis, aneurysm or dissection   Partially visualized right breast mass  · Monitor overnight on stroke pathway to rule out TIA although unlikely given patient's timeline of symptoms  · Chronic alcohol drinker - 4-5 glasses of wine per day +/- cocktails  · Not on thiamine or folate    · Differentials include: thiamine deficiency, TIA, hyperthyroidism  · Given IV thiamine in ED  · Continue daily PO thiamine  · Monitor overnight for TIA  · Check TSH and T4 for hyperthyroidism  · Check A1c, lipids, B12 for other causes    Essential hypertension  Assessment & Plan  · Presented to ED with home BP readings of 190/100  · BP elevated in ED: 182/94 --> 142/68 with no intervention  · Patient reports she was more anxious than normal tonight after dinner which correlates with time BP was measured at home  · Elevated reading most likely due to anxiety  · Hold home Lopressor tonight while monitoring for TIA  · Ok with permissive hypertension  · Restart home Lisinopril, Lopressor, Lasix in AM    Anxiety  Assessment & Plan  · Patient reports dealing with anxiety at home  · Today was the worst it has ever been  · Most likely cause of her elevated blood pressure reading  · Unable to determine why it was elevated  · Patient is not interested in taking an anti-anxiety medication at this time    Heavy alcohol use  Assessment & Plan  · Patient reports drinking 4 - 5 glasses of wine a day  · Last drink was today before dinner around 1700  · Takes a multivitimain outpatient does not appear to be on folate or thiamine  · In ED, given 100 mg IV thiamine  · Continue thiamine and folate in hospital and on discharge  · Patient complaining of worsening paresthesia in bilateral arms and increasing confusion/word-finding  · Concern for thiamine deficiency - continue thiamine on discharge    Gastroesophageal reflux disease without esophagitis  Assessment & Plan  · Continue PPI    VTE Pharmacologic Prophylaxis: VTE Score: 3 Moderate Risk (Score 3-4) - Pharmacological DVT Prophylaxis Ordered: enoxaparin (Lovenox)  Code Status: Level 1 - Full Code   Discussion with family: Patient declined call to   Anticipated Length of Stay: Patient will be admitted on an observation basis with an anticipated length of stay of less than 2 midnights secondary to rule out TIA  Total Time for Visit, including Counseling / Coordination of Care: 60 minutes Greater than 50% of this total time spent on direct patient counseling and coordination of care  Chief Complaint: hypertension    History of Present Illness:  Jimmy Kaba is a 78 y o  female with a PMH of chronic alcohol use, hypertension, GERD who presents with elevated blood pressure  She took her blood pressure at home and noted it was 190/100  She has been dealing with paresthesia in her right hand x 1 week and in her left hand x 2 days and decided to come to ED to rule out heart attack or stroke  She reports that she was feeling anxious today and noted it got worse after she had dinner with her daughter and son-in-law tonight  She denies any cause of worsening anxiety and reports that "it just happens sometimes"  She reports drinking 4-5 glasses of wine a day starting around noon    Her last reported drink was a cocktail before dinner this afternoon around 1700  She denies any headache, chest pain, jaw pain, dizziness, loss of strength, arm pain, or slurred speech  She does report that she has been having some confusion and difficulty of word finding over the past month  She denies any shortness of breath, abdominal pain, urinary symptoms, seizures, LOC, or any other complaints  In the ED, a CT head/neck was completed that showed no acute intracranial findings  Her EKG and troponin were not concerning for any ischemic changes  She was given IV thiamine for her chronic alcohol use  She was admitted for TIA work-up with an initial NIHSS of 0  Review of Systems:  Review of Systems   Constitutional: Negative for activity change, appetite change, diaphoresis and fatigue  HENT: Negative for congestion, ear pain, postnasal drip, sinus pain, sore throat and trouble swallowing  Eyes: Negative for photophobia, pain and visual disturbance  Respiratory: Negative for chest tightness, shortness of breath and wheezing  Cardiovascular: Negative for chest pain, palpitations and leg swelling  Gastrointestinal: Negative for abdominal pain, diarrhea, nausea and vomiting  Genitourinary: Negative for difficulty urinating, dysuria and hematuria  Musculoskeletal: Negative for arthralgias, gait problem, myalgias and neck pain  Skin: Negative for rash and wound  Neurological: Positive for numbness  Negative for dizziness, tremors, seizures, syncope, facial asymmetry, speech difficulty, weakness, light-headedness and headaches  Psychiatric/Behavioral: Negative for confusion and decreased concentration  The patient is nervous/anxious          Past Medical and Surgical History:   Past Medical History:   Diagnosis Date    Anxiety     Arthritis     Prajapati esophagus     Cataract     Continuous chronic alcoholism ([facility] [address] )     GERD (gastroesophageal reflux disease)     Hyperlipidemia     Hypertension     Insomnia     Palpitations        Past Surgical History:   Procedure Laterality Date    BREAST IMPLANT Bilateral     CATARACT EXTRACTION Right     CHOLECYSTECTOMY      LAP    COLONOSCOPY      HYSTERECTOMY      age 67-[place]    JOINT REPLACEMENT Bilateral     hips-2010 and 2017-left leg is shorter    LASIK Bilateral     in her 50's    VT XCAPSL CTRC RMVL INSJ IO LENS PROSTH W/O ECP Left 10/18/2018    Procedure: EXTRACTION EXTRACAPSULAR CATARACT PHACO INTRAOCULAR LENS (IOL); Surgeon: Jose Alberto Lozoya MD;  [facility] MAIN OR;  Service: Ophthalmology    TONSILLECTOMY      TUBAL LIGATION         Meds/Allergies:  Prior to Admission medications    Medication Sig Start Date End Date Taking?  Authorizing Provider   Biotin 5000 MCG TABS Take by mouth every morning    Historical Provider, MD   CALCIUM PO Take by mouth once a week    Historical Provider, MD   Cholecalciferol (VITAMIN D) 2000 units CAPS Take by mouth 3 (three) times a week    Historical Provider, MD   esomeprazole (NexIUM) 40 MG capsule TAKE 1 CAPSULE BY MOUTH  DAILY 1/13/22   Opal Gray MD   furosemide (LASIX) 40 mg tablet TAKE 1 TABLET BY MOUTH  DAILY AS NEEDED FOR EDEMA 11/10/21   Opal Gray MD   GLUCOSAMINE HCL PO Take 1,200 mg by mouth daily     Historical Provider, MD   IBUPROFEN IB PO 1 po daily PRN    Historical Provider, MD   lisinopril (ZESTRIL) 20 mg tablet TAKE 1 TABLET BY MOUTH IN  THE MORNING 1/12/22   Opal Gray MD   Magnesium 500 MG CAPS TAKES HERE AND THERE    Historical Provider, MD   metoprolol tartrate (LOPRESSOR) 50 mg tablet TAKE 1 TABLET BY MOUTH  TWICE DAILY 11/10/21   Opal Gray MD   multivitamin (THERAGRAN) TABS Take 1 tablet by mouth daily      Historical Provider, MD   niacin 500 mg tablet Take 500 mg by mouth daily with breakfast    Historical Provider, MD   Omega-3 Fatty Acids (FISH OIL) 1,000 mg Take 1,000 mg by mouth daily      Historical Provider, MD   zolpidem (AMBIEN) 5 mg tablet Take 1 tablet (5 mg total) by mouth daily at bedtime as needed for sleep 6/8/21   Yolanda Irizarry MD     I have reviewed home medications with patient personally  Allergies: No Known Allergies    Social History:  Marital Status: /Civil Union   Occupation: retired  Patient Pre-hospital Living Situation: Home, With spouse  Patient Pre-hospital Level of Mobility: walks  Patient Pre-hospital Diet Restrictions: none  Substance Use History:   Social History     Substance and Sexual Activity   Alcohol Use Yes    Alcohol/week: 4 0 standard drinks    Types: 4 Glasses of wine per week     Social History     Tobacco Use   Smoking Status Never Smoker   Smokeless Tobacco Never Used     Social History     Substance and Sexual Activity   Drug Use No       Family History:  Family History   Problem Relation Age of Onset    Cancer Mother         breast,kidney,lung (smoker)    Heart disease Father 61        MI    Cancer Sister         liver,pancreatic(smoker,ETOH)    Diabetes Daughter     No Known Problems Sister        Physical Exam:     Vitals:   Blood Pressure: 142/68 (02/25/22 2100)  Pulse: 87 (02/25/22 2100)  Temperature: 98 8 °F (37 1 °C) (02/25/22 1951)  Temp Source: Oral (02/25/22 1951)  Respirations: 16 (02/25/22 2100)  Height: 5' 5" (165 1 cm) (02/25/22 1951)  Weight - Scale: 64 kg (141 lb) (02/25/22 1951)  SpO2: 99 % (02/25/22 2100)    Physical Exam  Vitals reviewed  Constitutional:       General: She is not in acute distress  Appearance: Normal appearance  She is not ill-appearing or diaphoretic  HENT:      Head: Normocephalic and atraumatic  Nose: Nose normal       Mouth/Throat:      Mouth: Mucous membranes are moist       Pharynx: Oropharynx is clear  Eyes:      General: No scleral icterus  Extraocular Movements: Extraocular movements intact  Conjunctiva/sclera: Conjunctivae normal       Pupils: Pupils are equal, round, and reactive to light     Cardiovascular:      Rate and Rhythm: Normal rate and regular rhythm  Pulses: Normal pulses  Heart sounds: Normal heart sounds  No murmur heard  Pulmonary:      Effort: Pulmonary effort is normal  No respiratory distress  Breath sounds: Normal breath sounds  No wheezing, rhonchi or rales  Abdominal:      General: Bowel sounds are normal  There is distension  Palpations: Abdomen is soft  Tenderness: There is no abdominal tenderness  There is no guarding or rebound  Musculoskeletal:         General: Normal range of motion  Cervical back: Normal range of motion and neck supple  No tenderness  Right lower leg: No edema  Left lower leg: No edema  Skin:     General: Skin is warm and dry  Capillary Refill: Capillary refill takes less than 2 seconds  Coloration: Skin is not jaundiced or pale  Neurological:      General: No focal deficit present  Mental Status: She is alert and oriented to person, place, and time  Cranial Nerves: No cranial nerve deficit  Sensory: No sensory deficit  Motor: No weakness  Gait: Gait normal       Comments: NIHSS 0  AOx4   PHIPPS with equal strength   Psychiatric:         Mood and Affect: Mood normal          Behavior: Behavior normal           Additional Data:     Lab Results:  Results from last 7 days   Lab Units 02/25/22 2018   WBC Thousand/uL 6 18   HEMOGLOBIN g/dL 13 8   HEMATOCRIT % 39 1   PLATELETS Thousands/uL 218   NEUTROS PCT % 73   LYMPHS PCT % 21   MONOS PCT % 6   EOS PCT % 0     Results from last 7 days   Lab Units 02/25/22 2018   SODIUM mmol/L 131*   POTASSIUM mmol/L 3 4*   CHLORIDE mmol/L 94*   CO2 mmol/L 27   BUN mg/dL 10   CREATININE mg/dL 0 71   ANION GAP mmol/L 10   CALCIUM mg/dL 9 7   ALBUMIN g/dL 4 6   TOTAL BILIRUBIN mg/dL 0 57   ALK PHOS U/L 70 2   ALT U/L 21   AST U/L 19   GLUCOSE RANDOM mg/dL 137         Results from last 7 days   Lab Units 02/25/22 2011   POC GLUCOSE mg/dl 144*               Imaging: Reviewed radiology reports from this admission including: CT head  CTA head and neck with and without contrast   Final Result by Junie Blum MD (02/25 2231)      No evidence of acute intracranial hemorrhage  No evidence of hemodynamic significant stenosis, aneurysm or dissection  Partially visualized right breast mass, correlate with nonemergent outpatient women's imaging  Workstation performed: [id number]             EKG and Other Studies Reviewed on Admission:   · EKG: NSR  HR 88     ** Please Note: This note has been constructed using a voice recognition system   **

## 2022-02-26 NOTE — ASSESSMENT & PLAN NOTE
· Presented to ED with home BP readings of 190/100  · BP elevated in ED: 182/94 --> 142/68 with no intervention  · Patient reports she was more anxious than normal tonight after dinner which correlates with time BP was measured at home  · Elevated reading most likely due to anxiety  · Hold home Lopressor tonight while monitoring for TIA  · Ok with permissive hypertension  · Restart home Lisinopril, Lopressor, Lasix in AM

## 2022-02-26 NOTE — ED NOTES
Patient ambulatory to the bathroom with her  at this time, steady gait     Seamus Davis RN  02/25/22 2488

## 2022-02-26 NOTE — PLAN OF CARE
Problem: Potential for Falls  Goal: Patient will remain free of falls  Description: INTERVENTIONS:  - Educate patient/family on patient safety including physical limitations  - Instruct patient to call for assistance with activity   - Consult OT/PT to assist with strengthening/mobility   - Keep Call bell within reach  - Keep bed low and locked with side rails adjusted as appropriate  - Keep care items and personal belongings within reach  - Initiate and maintain comfort rounds  - Make Fall Risk Sign visible to staff  - Offer Toileting every 3 Hours, in advance of need  - Initiate/Maintain bed alarm  - Obtain necessary fall risk management equipment:   - Apply yellow socks and bracelet for high fall risk patients  - Consider moving patient to room near nurses station  Outcome: Progressing     Problem: INFECTION - ADULT  Goal: Absence or prevention of progression during hospitalization  Description: INTERVENTIONS:  - Assess and monitor for signs and symptoms of infection  - Monitor lab/diagnostic results  - Monitor all insertion sites, i e  indwelling lines, tubes, and drains  - Monitor endotracheal if appropriate and nasal secretions for changes in amount and color  - [place] appropriate cooling/warming therapies per order  - Administer medications as ordered  - Instruct and encourage patient and family to use good hand hygiene technique  - Identify and instruct in appropriate isolation precautions for identified infection/condition  Outcome: Progressing     Problem: SAFETY ADULT  Goal: Maintain or return to baseline ADL function  Description: INTERVENTIONS:  -  Assess patient's ability to carry out ADLs; assess patient's baseline for ADL function and identify physical deficits which impact ability to perform ADLs (bathing, care of mouth/teeth, toileting, grooming, dressing, etc )  - Assess/evaluate cause of self-care deficits   - Assess range of motion  - Assess patient's mobility; develop plan if impaired  - Assess patient's need for assistive devices and provide as appropriate  - Encourage maximum independence but intervene and supervise when necessary  - Involve family in performance of ADLs  - Assess for home care needs following discharge   - Consider OT consult to assist with ADL evaluation and planning for discharge  - Provide patient education as appropriate  Outcome: Progressing

## 2022-02-26 NOTE — ED PROVIDER NOTES
History  Chief Complaint   Patient presents with    Hypertension     patient reports HTN starting PTA with systolic BP at 038  This an 57-year-old female who presents emergency department today with complaint of elevated blood pressure at home tonight  Patient states her blood pressure was 190/100 at home  Normally runs around 426 systolic  She did take her blood pressure medications this morning  She normally takes her 2nd dose prior to bed and this is not her usual time for taking it yet  Patient states that she has had approximately 1 week of right arm tingling  Since yesterday afternoon she has been having some left arm tingling as well  No weakness  No difficulty walking  No slurred speech  No vision changes  Patient does admit to daily drinking  She normally has 4-5 glasses of wine a day  She states today she had 1 glass of wine and a small mouth full this morning  No history of seizures  The patient has no chest pain or shortness of breath at this time  She states she has just feels generally shaky at this time  She is very anxious  Does have a history of anxiety  No difficulty ambulating  No headache  Differential diagnosis includes symptomatic hypertension, well eval for CVA, alcohol withdrawal, will give dose of thiamine due to chronic alcohol abuse  Prior to Admission Medications   Prescriptions Last Dose Informant Patient Reported? Taking?    Biotin 5000 MCG TABS   Yes No   Sig: Take by mouth every morning   CALCIUM PO 2022 at Unknown time  Yes Yes   Sig: Take by mouth once a week   Cholecalciferol (VITAMIN D) 2000 units CAPS   Yes No   Sig: Take by mouth 3 (three) times a week   GLUCOSAMINE HCL PO Unknown at Unknown time  Yes No   Sig: Take 1,200 mg by mouth daily    IBUPROFEN IB PO Unknown at Unknown time  Yes No   Si po daily PRN   Magnesium 500 MG CAPS   Yes No   Sig: TAKES HERE AND THERE   Omega-3 Fatty Acids (FISH OIL) 1,000 mg Unknown at Unknown time Yes No   Sig: Take 1,000 mg by mouth daily     esomeprazole (NexIUM) 40 MG capsule   No No   Sig: TAKE 1 CAPSULE BY MOUTH  DAILY   furosemide (LASIX) 40 mg tablet 2/24/2022 at Unknown time  No Yes   Sig: TAKE 1 TABLET BY MOUTH  DAILY AS NEEDED FOR EDEMA   lisinopril (ZESTRIL) 20 mg tablet 2/24/2022 at Unknown time  No Yes   Sig: TAKE 1 TABLET BY MOUTH IN  THE MORNING   metoprolol tartrate (LOPRESSOR) 50 mg tablet 2/24/2022 at Unknown time  No Yes   Sig: TAKE 1 TABLET BY MOUTH  TWICE DAILY   multivitamin (THERAGRAN) TABS Unknown at Unknown time  Yes No   Sig: Take 1 tablet by mouth daily     niacin 500 mg tablet Unknown at Unknown time  Yes No   Sig: Take 500 mg by mouth daily with breakfast   zolpidem (AMBIEN) 5 mg tablet 2/25/2022 at Unknown time  No Yes   Sig: Take 1 tablet (5 mg total) by mouth daily at bedtime as needed for sleep      Facility-Administered Medications: None       Past Medical History:   Diagnosis Date    Anxiety     Arthritis     Prajapati esophagus     Cataract     Continuous chronic alcoholism (HCC)     GERD (gastroesophageal reflux disease)     Hyperlipidemia     Hypertension     Insomnia     Palpitations        Past Surgical History:   Procedure Laterality Date    BREAST IMPLANT Bilateral     CATARACT EXTRACTION Right     CHOLECYSTECTOMY      LAP    COLONOSCOPY      HYSTERECTOMY      age 67[place]    JOINT REPLACEMENT Bilateral     hips-2010 and 2017-left leg is shorter    LASIK Bilateral     in her 50's    SC XCAPSL CTRC RMVL INSJ IO LENS PROSTH W/O ECP Left 10/18/2018    Procedure: EXTRACTION EXTRACAPSULAR CATARACT PHACO INTRAOCULAR LENS (IOL);   Surgeon: Sean Pedersen MD;  Location: [facility] MAIN OR;  Service: Ophthalmology    TONSILLECTOMY      TUBAL LIGATION         Family History   Problem Relation Age of Onset    Cancer Mother         breast,kidney,lung (smoker)    Heart disease Father 61        MI    Cancer Sister         liver,pancreatic(smoker,ETOH)    Diabetes Daughter     No Known Problems Sister      I have reviewed and agree with the history as documented  E-Cigarette/Vaping    E-Cigarette Use Never User      E-Cigarette/Vaping Substances    Nicotine No     THC No     CBD No     Flavoring No     Other No     Unknown No      Social History     Tobacco Use    Smoking status: Never Smoker    Smokeless tobacco: Never Used   Vaping Use    Vaping Use: Never used   Substance Use Topics    Alcohol use: Yes     Alcohol/week: 4 0 standard drinks     Types: 4 Glasses of wine per week    Drug use: No       Review of Systems   Constitutional: Negative for activity change, appetite change and fever  HENT: Negative for congestion, ear pain, rhinorrhea and sore throat  Eyes: Negative for photophobia, pain and visual disturbance  Respiratory: Negative for cough, shortness of breath and wheezing  Cardiovascular: Negative for chest pain and palpitations  Gastrointestinal: Negative for abdominal pain, diarrhea, nausea and vomiting  Endocrine: Negative for polyuria  Genitourinary: Negative for difficulty urinating, dysuria, frequency and urgency  Musculoskeletal: Negative for arthralgias and myalgias  Skin: Negative for color change and rash  Allergic/Immunologic: Negative for immunocompromised state  Neurological: Positive for numbness  Negative for dizziness, syncope, facial asymmetry, speech difficulty, weakness, light-headedness and headaches  Hematological: Does not bruise/bleed easily  Psychiatric/Behavioral: Negative for confusion  All other systems reviewed and are negative  Physical Exam  Physical Exam  Vitals and nursing note reviewed  Constitutional:       General: She is not in acute distress  Appearance: She is well-developed  HENT:      Head: Normocephalic and atraumatic  Nose: Nose normal    Eyes:      General: No scleral icterus       Conjunctiva/sclera: Conjunctivae normal    Cardiovascular:      Rate and Rhythm: Normal rate and regular rhythm  Heart sounds: Normal heart sounds  Pulmonary:      Effort: Pulmonary effort is normal  No respiratory distress  Breath sounds: Normal breath sounds  No stridor  No wheezing  Abdominal:      General: There is no distension  Palpations: Abdomen is soft  Tenderness: There is no abdominal tenderness  There is no guarding or rebound  Musculoskeletal:         General: No deformity  Cervical back: Normal range of motion and neck supple  Skin:     General: Skin is warm and dry  Findings: No rash  Neurological:      General: No focal deficit present  Mental Status: She is alert and oriented to person, place, and time  Cranial Nerves: No cranial nerve deficit  Sensory: No sensory deficit  Motor: No weakness  Coordination: Coordination normal       Gait: Gait normal    Psychiatric:         Mood and Affect: Mood normal          Thought Content:  Thought content normal          Vital Signs  ED Triage Vitals   Temperature Pulse Respirations Blood Pressure SpO2   02/25/22 1951 02/25/22 1951 02/25/22 1951 02/25/22 1951 02/25/22 1951   98 8 °F (37 1 °C) 98 20 (!) 182/94 100 %      Temp Source Heart Rate Source Patient Position - Orthostatic VS BP Location FiO2 (%)   02/25/22 1951 02/25/22 1951 02/25/22 1951 02/25/22 1951 --   Oral Monitor Sitting Left arm       Pain Score       02/26/22 0045       No Pain           Vitals:    02/25/22 2100 02/26/22 0100 02/26/22 0749 02/26/22 0800   BP: 142/68 153/91 143/72 143/72   Pulse: 87 86 77 77   Patient Position - Orthostatic VS: Sitting  Lying          Visual Acuity  Visual Acuity      Most Recent Value   L Pupil Size (mm) 3   R Pupil Size (mm) 3   L Pupil Shape Round   R Pupil Shape Round          ED Medications  Medications   thiamine (VITAMIN B1) 100 mg in sodium chloride 0 9 % 50 mL IVPB (0 mg Intravenous Stopped 2/25/22 2100)   iohexol (OMNIPAQUE) 350 MG/ML injection (SINGLE-DOSE) 100 mL (100 mL Intravenous Given 2/25/22 2152)   potassium chloride (K-DUR,KLOR-CON) CR tablet 40 mEq (40 mEq Oral Given 2/25/22 2355)   sodium chloride 0 9 % bolus 1,000 mL (0 mL Intravenous Stopped 2/26/22 1242)       Diagnostic Studies  Results Reviewed     Procedure Component Value Units Date/Time    Magnesium [[id number]]  (Normal) Collected: 02/25/22 2018    Lab Status: Final result Specimen: Blood from Arm, Right Updated: 02/26/22 0312     Magnesium 1 7 mg/dL     HS Troponin I 2hr [[id number]]  (Normal) Collected: 02/25/22 2316    Lab Status: Final result Specimen: Blood from Arm, Right Updated: 02/25/22 2355     hs TnI 2hr 12 ng/L      Delta 2hr hsTnI 4 ng/L     TSH [[id number]]  (Normal) Collected: 02/25/22 2018    Lab Status: Final result Specimen: Blood from Arm, Right Updated: 02/25/22 2121     TSH 3RD GENERATON 5 380 uIU/mL     Narrative:      Patients undergoing fluorescein dye angiography may retain small amounts of fluorescein in the body for 48-72 hours post procedure  Samples containing fluorescein can produce falsely depressed TSH values  If the patient had this procedure,a specimen should be resubmitted post fluorescein clearance        HS Troponin 0hr (reflex protocol) [[id number]]  (Normal) Collected: 02/25/22 2018    Lab Status: Final result Specimen: Blood from Arm, Right Updated: 02/25/22 2112     hs TnI 0hr 8 ng/L     Ethanol [[id number]]  (Normal) Collected: 02/25/22 2018    Lab Status: Final result Specimen: Blood from Arm, Right Updated: 02/25/22 2047     Ethanol Lvl <10 mg/dL     Comprehensive metabolic panel [[id number]]  (Abnormal) Collected: 02/25/22 2018    Lab Status: Final result Specimen: Blood from Arm, Right Updated: 02/25/22 2047     Sodium 131 mmol/L      Potassium 3 4 mmol/L      Chloride 94 mmol/L      CO2 27 mmol/L      ANION GAP 10 mmol/L      BUN 10 mg/dL      Creatinine 0 71 mg/dL      Glucose 137 mg/dL      Calcium 9 7 mg/dL      AST 19 U/L      ALT 21 U/L      Alkaline Phosphatase 70 2 U/L      Total Protein 7 4 g/dL      Albumin 4 6 g/dL      Total Bilirubin 0 57 mg/dL      eGFR 79 ml/min/1 73sq m     Narrative:      [place] guidelines for Chronic Kidney Disease (CKD):     Stage 1 with normal or high GFR (GFR > 90 mL/min/1 73 square meters)    Stage 2 Mild CKD (GFR = 60-89 mL/min/1 73 square meters)    Stage 3A Moderate CKD (GFR = 45-59 mL/min/1 73 square meters)    Stage 3B Moderate CKD (GFR = 30-44 mL/min/1 73 square meters)    Stage 4 Severe CKD (GFR = 15-29 mL/min/1 73 square meters)    Stage 5 End Stage CKD (GFR <15 mL/min/1 73 square meters)  Note: GFR calculation is accurate only with a steady state creatinine    CBC and differential [[id number]] Collected: 02/25/22 2018    Lab Status: Final result Specimen: Blood from Arm, Right Updated: 02/25/22 2025     WBC 6 18 Thousand/uL      RBC 4 11 Million/uL      Hemoglobin 13 8 g/dL      Hematocrit 39 1 %      MCV 95 fL      MCH 33 6 pg      MCHC 35 3 g/dL      RDW 11 7 %      MPV 8 9 fL      Platelets 466 Thousands/uL      nRBC 0 /100 WBCs      Neutrophils Relative 73 %      Immat GRANS % 0 %      Lymphocytes Relative 21 %      Monocytes Relative 6 %      Eosinophils Relative 0 %      Basophils Relative 0 %      Neutrophils Absolute 4 46 Thousands/µL      Immature Grans Absolute 0 02 Thousand/uL      Lymphocytes Absolute 1 30 Thousands/µL      Monocytes Absolute 0 37 Thousand/µL      Eosinophils Absolute 0 01 Thousand/µL      Basophils Absolute 0 02 Thousands/µL     Fingerstick Glucose (POCT) [[id number]]  (Abnormal) Collected: 02/25/22 2011    Lab Status: Final result Updated: 02/25/22 2020     POC Glucose 144 mg/dl                  CTA head and neck with and without contrast   Final Result by Arsalan Kiser MD (02/25 2231)      No evidence of acute intracranial hemorrhage  No evidence of hemodynamic significant stenosis, aneurysm or dissection        Partially visualized right breast mass, correlate with nonemergent outpatient women's imaging  Workstation performed: [id number]                    Procedures  ECG 12 Lead Documentation Only    Date/Time: 2/25/2022 9:15 PM  Performed by: Aylin Hernandez MD  Authorized by: Aylin Hernandez MD     Indications / Diagnosis:  Htn, paresthesia  ECG reviewed by me, the ED Provider: yes    Rate:     ECG rate assessment: normal    Rhythm:     Rhythm: sinus rhythm    Ectopy:     Ectopy: none    QRS:     QRS axis:  Normal    QRS intervals:  Normal  Conduction:     Conduction: normal    ST segments:     ST segments:  Normal  T waves:     T waves: normal    ECG 12 Lead Documentation Only    Date/Time: 2/25/2022 9:47 PM  Performed by: Aylin Hernandez MD  Authorized by: Aylin Hernandez MD     Indications / Diagnosis:  Paresthesia, HTN  ECG reviewed by me, the ED Provider: yes    Patient location:  ED  Rate:     ECG rate assessment: normal    Rhythm:     Rhythm: sinus rhythm    Ectopy:     Ectopy: none    QRS:     QRS axis:  Normal    QRS intervals:  Normal  Conduction:     Conduction: normal    ST segments:     ST segments:  Normal  T waves:     T waves: normal               ED Course  ED Course as of 03/01/22 1410   Fri Feb 25, 2022 2010 Patient has been having left arm tingling for greater than 24 hours and right arm tingling for 1 week  Patient is not a tPA candidate due to symptoms greater than 4 5 hours  She is out of the window for any endovascular intervention at this time  2247 Patient and  were advised of the CT findings specifically of the right breast mass that was partially visualized on CT today  They were specifically told that this will need further follow-up for evaluation for possible tumor and specifically breast cancer  Patient advised me that she did have a fall approximately 5 years ago and felt that she possibly ruptured 1 of her breast implants on that side    These are saline implants per her report  I advised her that there is potential that is related to that however breast cancer needs to be ruled out and further imaging is necessary as soon as possible that can be addressed through her PCP  Both patient and  verbalized understanding of this discussion  Stroke Assessment     Row Name 02/25/22 2124             NIH Stroke Scale    Interval Baseline      Level of Consciousness (1a ) 0      LOC Questions (1b ) 0      LOC Commands (1c ) 0      Best Gaze (2 ) 0      Visual (3 ) 0      Facial Palsy (4 ) 0      Motor Arm, Left (5a ) 0      Motor Arm, Right (5b ) 0      Motor Leg, Left (6a ) 0      Motor Leg, Right (6b ) 0      Limb Ataxia (7 ) 0      Sensory (8 ) 0      Best Language (9 ) 0      Dysarthria (10 ) 0      Extinction and Inattention (11 ) (Formerly Neglect) 0      Total 0                Most Recent Value   TPA Decision Options    TPA Decision Patient not a TPA candidate  Patient is not a candidate options Unclear time of onset outside appropriate time window                                    MDM  Number of Diagnoses or Management Options     Amount and/or Complexity of Data Reviewed  Clinical lab tests: reviewed and ordered  Tests in the radiology section of CPT®: ordered and reviewed  Discuss the patient with other providers: yes  Independent visualization of images, tracings, or specimens: yes        Disposition  Final diagnoses:   Hypertension   Paresthesia   Breast mass, right     Time reflects when diagnosis was documented in both MDM as applicable and the Disposition within this note     Time User Action Codes Description Comment    2/25/2022 10:45 PM Dorla Elian Add [I10] Hypertension     2/25/2022 10:45 PM Dorla Elian Add [R20 2] Paresthesia     2/25/2022 10:45 PM Dorla Elian Add [N63 10] Breast mass, right     2/26/2022 10:18 AM Laurel Meier Add [R20 2] Paresthesia of both hands       ED Disposition     ED Disposition Condition Date/Time Comment    Admit Stable Fri Feb 25, 2022 10:46 PM Case was discussed with SLIM NP and the patient's admission status was agreed to be Admission Status: observation status to the service of Dr Antolin Langford  Follow-up Information     Follow up With Specialties Details Why [address], MD Internal Medicine, Pediatrics In 3 days for additional evaluation for right sided breast mass which was partially seen on CAT scan   [address]  [address] [place] [address]            Discharge Medication List as of 2/26/2022 10:53 AM      START taking these medications    Details   cyanocobalamin (VITAMIN B-12) 500 MCG tablet Take 1 tablet (500 mcg total) by mouth daily, Starting Sun 5/75/6741, Normal      folic acid (FOLVITE) 1 mg tablet Take 1 tablet (1 mg total) by mouth daily, Starting Sun 2/27/2022, Normal      gabapentin (NEURONTIN) 100 mg capsule Take 1 capsule (100 mg total) by mouth 2 (two) times a day, Starting Sat 2/26/2022, Normal      thiamine 100 MG tablet Take 1 tablet (100 mg total) by mouth daily, Starting Sun 2/27/2022, Normal         CONTINUE these medications which have NOT CHANGED    Details   CALCIUM PO Take by mouth once a week, Historical Med      furosemide (LASIX) 40 mg tablet TAKE 1 TABLET BY MOUTH  DAILY AS NEEDED FOR EDEMA, Normal      lisinopril (ZESTRIL) 20 mg tablet TAKE 1 TABLET BY MOUTH IN  THE MORNING, Normal      metoprolol tartrate (LOPRESSOR) 50 mg tablet TAKE 1 TABLET BY MOUTH  TWICE DAILY, Normal      zolpidem (AMBIEN) 5 mg tablet Take 1 tablet (5 mg total) by mouth daily at bedtime as needed for sleep, Starting Tue 6/8/2021, Normal      Biotin 5000 MCG TABS Take by mouth every morning, Historical Med      Cholecalciferol (VITAMIN D) 2000 units CAPS Take by mouth 3 (three) times a week, Historical Med      esomeprazole (NexIUM) 40 MG capsule TAKE 1 CAPSULE BY MOUTH  DAILY, Normal      GLUCOSAMINE HCL PO Take 1,200 mg by mouth daily , Historical Med      IBUPROFEN IB PO 1 po daily PRN, Historical Med      Magnesium 500 MG CAPS TAKES HERE AND THERE, Historical Med      multivitamin (THERAGRAN) TABS Take 1 tablet by mouth daily  , Historical Med      niacin 500 mg tablet Take 500 mg by mouth daily with breakfast, Historical Med      Omega-3 Fatty Acids (FISH OIL) 1,000 mg Take 1,000 mg by mouth daily  , Historical Med             No discharge procedures on file      PDMP Review     None          ED Provider  Electronically Signed by           Felicia Paz MD  02/25/22 3210       Felicia Paz MD  03/01/22 1957

## 2022-02-26 NOTE — DISCHARGE INSTRUCTIONS
Paresthesia   AMBULATORY CARE:   Paresthesia  is numbness, tingling, or burning  It can happen in any part of your body, but usually occurs in your legs, feet, arms, or hands  Common signs and symptoms:   · No feeling in the affected area    · A feeling of pins and needles    · An electric shock feeling    · Heaviness    · Trouble moving the affected area    · A feeling that something is crawling under your skin    · A feeling of burning or of cold in the affected area    Seek care immediately if:   · You have severe pain along with numbness and tingling  · Your legs suddenly become cold  You have trouble moving your legs, and they ache  · You have increased weakness in a part of your body  · You have uncontrolled movements  Contact your healthcare provider if:   · Your symptoms do not improve  · You have symptoms in more than one part of your body  · You have questions or concerns about your condition or care  Treatment  will depend on what is causing your paresthesia  You may need to increase the amount of vitamin B in your blood  Your healthcare provider may change or stop a medicine you are taking that is causing your symptoms  Permanent paresthesia may be helped with nerve medicine  If you have diabetes, your healthcare provider or diabetes specialist can help you control your blood sugar levels  Your provider may recommend a splint or surgery if you have paresthesia caused by carpal tunnel syndrome  Manage paresthesia:   · Protect the area from injury  You may injure or burn yourself if you lose feeling in the area  Be careful when you touch anything that could be hot  Wear sturdy shoes to protect your feet  Ask about other ways to protect yourself  · Go to physical or occupational therapy if directed  Your provider may recommend therapy if you have a condition such as carpal tunnel syndrome   A physical therapist can teach you exercises to help strengthen the area or increase your ability to move it  An occupational therapist can help you find new ways to do your daily activities  · Manage health conditions that can cause paresthesia  Work with your diabetes specialist if you have uncontrolled diabetes  A dietitian or your healthcare provider can help you create a meal plan if you have low vitamin B levels  Your provider can help you manage your health if you have multiple sclerosis or you had a stroke  It is important to manage health conditions to stop paresthesia or prevent it from getting worse  Follow up with your healthcare provider or neurologist as directed: Your healthcare provider may refer you to a specialist  Write down your questions so you remember to ask them during your visits  © Copyright [employer] 2022 Information is for End User's use only and may not be sold, redistributed or otherwise used for commercial purposes  All illustrations and images included in CareNotes® are the copyrighted property of A D A M , Inc  or [employer]   The above information is an  only  It is not intended as medical advice for individual conditions or treatments  Talk to your doctor, nurse or pharmacist before following any medical regimen to see if it is safe and effective for you  Paresthesia   Geovani Chiu Mazziotta JC , et al: Diagnosis of Neurological Disease  In: Brodys Neurology in [facility], 7th ed  [place], [place], 2016  Soni SA , Leo A , & Gavino B : Paresthesia: a review of its definition, etiology and treatments in view of the traditional medicine  Curr Pharm Gino 2016; 22(3):321-327  Joseph Arauz RC, Pavel RF, & Stephanie MJ: Approach to the Patient with Neurologic Disease  In: Emelina Bradley  [facility], 25th ed  [address] , [place], [place], 2016  Corin TOMLINSON & Roderick EL : Sensory Abnormalities of the Limbs, Trunk, and Face   In: Renea RUCKER, Geovani BUSTOS, Laura STUBBS, et al, eds  Brody's Neurology in [facility], 7th ed  [place], [place], [place], 2016  Alyssa LI : Paraesthesia and peripheral neuropathy  Andrew Matthews Physician 4333; 44(3):92-95  © Copyright [employer] 2022 Information is for End User's use only and may not be sold, redistributed or otherwise used for commercial purposes  All illustrations and images included in CareNotes® are the copyrighted property of A D A M , Inc  or [facility] Guilherme [place]   The above information is an  only  It is not intended as medical advice for individual conditions or treatments  Talk to your doctor, nurse or pharmacist before following any medical regimen to see if it is safe and effective for you  Paresthesia   WHAT YOU NEED TO KNOW:   What is paresthesia? Paresthesia is numbness, tingling, or burning  It can happen in any part of your body, but usually occurs in your legs, feet, arms, or hands  What causes paresthesia? A large number of conditions can cause paresthesia  Nerves that provide sensation are affected  Paresthesia happens because of changes in these nerves, or in nerve pathways  The changes can be temporary, such as if you take certain medicines or you are not getting enough vitamin B  Nerve damage can lead to permanent paresthesia  Conditions that may cause nerve damage include diabetes, carpel tunnel syndrome, stroke, and multiple sclerosis  The exact cause of your paresthesia may not be known  What should I tell my healthcare provider about what I feel? You can help your healthcare provider by describing anything you feel, such as the following:  · No feeling in the affected area    · A feeling of pins and needles    · An electric shock feeling    · Heaviness    · Trouble moving the affected area    · A feeling that something is crawling under your skin    · A feeling of burning or of cold in the affected area    How is paresthesia diagnosed?   Your healthcare provider will examine you and ask about your symptoms  Tell your provider when the symptoms began  Include anything that makes your symptoms worse or better  Your provider will also need to know if you have a disease or condition that could be causing your symptoms  Tell him or her about the medicines you take  Include the amounts you take and when you take each medicine  You may also need any of the following:  · Blood tests  may show low levels of vitamin B or a high blood sugar level  · X-ray, MRI, or CT scan  pictures may show damage to the area where you have paresthesia  You may be given contrast liquid to help the area show up better in the pictures  Tell the healthcare provider if you have ever had an allergic reaction to contrast liquid  Do not enter the MRI room with anything metal  Metal can cause severe injury  Tell the healthcare provider if you have any metal in or on your body  · Nerve conduction studies  may be done to test your nerve function  How is paresthesia treated? Treatment will depend on what is causing your paresthesia  You may need to increase the amount of vitamin B in your blood  Your healthcare provider may change or stop a medicine you are taking that is causing your symptoms  Permanent paresthesia may be helped with nerve medicine  If you have diabetes, your healthcare provider or diabetes specialist can help you control your blood sugar levels  Your provider may recommend a splint or surgery if you have paresthesia caused by carpal tunnel syndrome  What can I do to manage paresthesia? · Protect the area from injury  You may injure or burn yourself if you lose feeling in the area  Be careful when you touch anything that could be hot  Wear sturdy shoes to protect your feet  Ask about other ways to protect yourself  · Go to physical or occupational therapy if directed  Your provider may recommend therapy if you have a condition such as carpal tunnel syndrome   A physical therapist can teach you exercises to help strengthen the area or increase your ability to move it  An occupational therapist can help you find new ways to do your daily activities  · Manage health conditions that can cause paresthesia  Work with your diabetes specialist if you have uncontrolled diabetes  A dietitian or your healthcare provider can help you create a meal plan if you have low vitamin B levels  Your provider can help you manage your health if you have multiple sclerosis or you had a stroke  It is important to manage health conditions to stop paresthesia or prevent it from getting worse  When should I seek immediate care? · You have severe pain along with numbness and tingling  · Your legs suddenly become cold  You have trouble moving your legs, and they ache  · You have increased weakness in a part of your body  · You have uncontrolled movements  When should I contact my healthcare provider? · Your symptoms do not improve  · You have symptoms in more than one part of your body  · You have questions or concerns about your condition or care  CARE AGREEMENT:   You have the right to help plan your care  Learn about your health condition and how it may be treated  Discuss treatment options with your healthcare providers to decide what care you want to receive  You always have the right to refuse treatment  The above information is an  only  It is not intended as medical advice for individual conditions or treatments  Talk to your doctor, nurse or pharmacist before following any medical regimen to see if it is safe and effective for you  © Copyright [employer] 2022 Information is for End User's use only and may not be sold, redistributed or otherwise used for commercial purposes   All illustrations and images included in CareNotes® are the copyrighted property of A D A M , Inc  or Sanjiv Pham  Your CAT scan today showed a right sided  breast mass (which needs to be further evaluated for concern for cancer versus other cause for the findings) that needs further imaging and follow-up  Please see your primary care provider as soon as possible to schedule outpatient imaging  CT findings today: No evidence of acute intracranial hemorrhage  No evidence of hemodynamic significant stenosis, aneurysm or dissection  Partially visualized right breast mass, correlate with nonemergent outpatient women's imaging  Abuse of Alcohol   WHAT YOU NEED TO KNOW:   Alcohol abuse means you drink more than the recommended daily or weekly limits  You may be drinking alcohol regularly or drinking large amounts in a short period of time (binge drinking)  You continue to drink even when it causes legal, work, or relationship problems  DISCHARGE INSTRUCTIONS:   Call your local emergency number (911 in the [address]), or have someone call if:   · You have sudden chest pain or trouble breathing  · You want to harm yourself or others  · You have a seizure  Seek care immediately if:   · You cannot stop vomiting or you vomit blood  Call your doctor if:   · You have hallucinations (you see or hear things that are not real)  · You have questions or concerns about your condition or care  Medicines:   · Vitamin supplements  may be given to treat low vitamin levels  Alcohol can make it hard for your body to absorb enough vitamins such as B1  Vitamin supplements may also be given to prevent alcohol-related brain damage  · Take your medicine as directed  Contact your healthcare provider if you think your medicine is not helping or if you have side effects  Tell him or her if you are allergic to any medicine  Keep a list of the medicines, vitamins, and herbs you take  Include the amounts, and when and why you take them  Bring the list or the pill bottles to follow-up visits  Carry your medicine list with you in case of an emergency      Health problems alcohol abuse can cause:   · Cancer in your liver, pancreas, stomach, colon, kidney, or breast    · Stroke or a heart attack    · Liver, kidney, or lung disease    · Blackouts, memory loss, brain damage, or dementia    · Diabetes, immune system problems, or thiamine (vitamin B1) deficiency    · Problems for you and your baby if you drink while pregnant    Recommended alcohol limits:  One drink is defined as 12 oz of beer, 5 oz of wine, or 1 5 oz of liquor such as whiskey  · Men 21 to 64 years  should limit alcohol to 2 drinks a day  Do not have more than 4 drinks in 1 day or more than 14 in 1 week  · All women, and men 72 or older  should limit alcohol to 1 drink in a day  Do not have more than 3 drinks in 1 day or more than 7 in 1 week  Do not drink alcohol if you are pregnant  Manage alcohol use:   · Work with healthcare providers on goals to drink less  This can help prevent health problems  For example, you may start by planning your weekly alcohol use  It will be easier to have fewer drinks if you plan ahead  · Have food when you drink alcohol  Food will prevent alcohol from getting into your system too quickly  Eat before you have your first alcohol drink  · Time your drinks carefully  Have no more than 1 drink in an hour  Have a liquid such as water, coffee, or a soft drink between alcohol drinks  · Do not drive if you have had alcohol  Plan ahead so you have a safe ride home  Make sure someone who has not been drinking can help you get home safely  Plan to use a taxi or other ride service, if needed  · Do not drink alcohol if you are taking medicine  Alcohol is dangerous when you combine it with certain medicines, such as acetaminophen or blood pressure medicine  Talk to your healthcare provider about all the medicines you currently take  Follow up with your doctor as directed:  Write down your questions so you remember to ask them during your visits    For support and more information:   · [employer] Address: http://www [employer]/    · Substance Abuse and [address] [place]  Web Address: https://[employer]/    © [facility] 2022 Information is for End User's use only and may not be sold, redistributed or otherwise used for commercial purposes  All illustrations and images included in CareNotes® are the copyrighted property of A [employer] A M , Inc  or Sanjiv Madera   The above information is an  only  It is not intended as medical advice for individual conditions or treatments  Talk to your doctor, nurse or pharmacist before following any medical regimen to see if it is safe and effective for you  Abuse of Alcohol   WHAT YOU NEED TO KNOW:   Alcohol abuse means you drink more than the recommended daily or weekly limits  You may be drinking alcohol regularly or drinking large amounts in a short period of time (binge drinking)  You continue to drink even when it causes legal, work, or relationship problems  DISCHARGE INSTRUCTIONS:   Call your local emergency number (911 in the [address]), or have someone call if:   · You have sudden chest pain or trouble breathing  · You want to harm yourself or others  · You have a seizure  Seek care immediately if:   · You cannot stop vomiting or you vomit blood  Call your doctor if:   · You have hallucinations (you see or hear things that are not real)  · You have questions or concerns about your condition or care  Medicines:   · Vitamin supplements  may be given to treat low vitamin levels  Alcohol can make it hard for your body to absorb enough vitamins such as B1  Vitamin supplements may also be given to prevent alcohol-related brain damage  · Take your medicine as directed  Contact your healthcare provider if you think your medicine is not helping or if you have side effects  Tell him or her if you are allergic to any medicine   Keep a list of the medicines, vitamins, and herbs you take  Include the amounts, and when and why you take them  Bring the list or the pill bottles to follow-up visits  Carry your medicine list with you in case of an emergency  Recommended alcohol limits:  One drink is defined as 12 ounces (oz) of beer, 5 oz of wine, or 1 5 oz of liquor such as whiskey  · Men 21 to 64 years  should limit alcohol to 2 drinks a day  Do not have more than 4 drinks in 1 day or more than 14 in 1 week  · All women, and men 72 or older  should limit alcohol to 1 drink in a day  Do not have more than 3 drinks in 1 day or more than 7 in 1 week  Do not drink alcohol if you are pregnant  Health problems alcohol abuse can cause:   · Cancer in your liver, pancreas, stomach, colon, kidney, or breast    · Stroke or a heart attack    · Liver, kidney, or lung disease    · Blackouts, memory loss, brain damage, or dementia    · Diabetes, immune system problems, or thiamine (vitamin B1) deficiency    · Problems for you and your baby if you drink while pregnant    Manage alcohol use:   · Work with healthcare providers on goals to drink less  This can help prevent health problems  For example, you may start by planning your weekly alcohol use  It will be easier to have fewer drinks if you plan ahead  · Have food when you drink alcohol  Food will prevent alcohol from getting into your system too quickly  Eat before you have your first alcohol drink  · Time your drinks carefully  Have no more than 1 drink in an hour  Have a liquid such as water, coffee, or a soft drink between alcohol drinks  · Do not drive if you have had alcohol  Plan ahead so you have a safe ride home  Make sure someone who has not been drinking can help you get home safely  Plan to use a taxi or other ride service, if needed  · Do not drink alcohol if you are taking medicine  Alcohol is dangerous when you combine it with certain medicines, such as acetaminophen or blood pressure medicine   Talk to your healthcare provider about all the medicines you currently take  Follow up with your doctor as directed:  Write down your questions so you remember to ask them during your visits  For support and more information:   · Alcoholics Anonymous  Web Address: http://www [employer]/    · Substance Abuse and [address] , [address] [place]  Web Address: https://[employer]/    © [facility] 2022 Information is for End User's use only and may not be sold, redistributed or otherwise used for commercial purposes  All illustrations and images included in CareNotes® are the copyrighted property of A D A M , Inc  or [facility] [employer]   The above information is an  only  It is not intended as medical advice for individual conditions or treatments  Talk to your doctor, nurse or pharmacist before following any medical regimen to see if it is safe and effective for you  Abuse of Alcohol   WHAT YOU NEED TO KNOW:   What is alcohol abuse? Alcohol abuse means you drink more than the recommended daily or weekly limits  You may be drinking alcohol regularly or drinking large amounts in a short period of time (binge drinking)  You continue to drink even though it causes legal, work, or relationship problems  What do I need to know about recommended alcohol limits? One drink is defined as 12 ounces (oz) of beer, 5 oz of wine, or 1 5 oz of liquor such as whiskey  · Men 21 to 64 years  should limit alcohol to 2 drinks a day  Do not have more than 4 drinks in 1 day or more than 14 in 1 week  · All women, and men 72 or older  should limit alcohol to 1 drink in a day  Do not have more than 3 drinks in 1 day or more than 7 in 1 week  Do not drink alcohol if you are pregnant  What are the signs and symptoms of alcohol abuse?    · Loss of interest in activities, work, and school    · Hiding alcohol, or drinking in private    · Depression, or guilt about drinking    · Constant thoughts about alcohol    · Drinking in the morning to relieve the effects of a hangover    · Not being able to control the amount you drink    · Restlessness, or erratic and violent behavior    What health problems can alcohol abuse cause? · Cancer in your liver, pancreas, stomach, colon, kidney, or breast    · Stroke or a heart attack    · Liver, kidney, or lung disease    · Blackouts, memory loss, brain damage, or dementia    · Diabetes, immune system problems, or thiamine (vitamin B1) deficiency    · Problems for you and your baby if you drink while pregnant    How is alcohol abuse treated? Treatment can help you understand the reasons you abuse alcohol  Counselors and therapists provide you with support and help you find ways to cope instead of drinking  You may need inpatient treatment to provide a controlled environment  You may need outpatient treatment after your inpatient treatment is complete  · Detoxification (detox)  is a program used to flush alcohol from your body  During detox, medicines are given to help prevent withdrawal symptoms when you stop drinking alcohol  · Brief intervention therapy  helps you think about your alcohol use differently  A healthcare provider helps you set goals to decrease the amount of alcohol you drink  Therapy may continue after you leave the hospital     · Vitamin supplements  such as B1 may be needed  Alcohol can make it hard for your body to absorb enough vitamin B1  You may be given vitamin B1 if you have low levels  It is also given to prevent brain damage from alcohol use  What can I do to manage my alcohol use? · Work with healthcare providers on goals to drink less  This can help prevent health problems  For example, you may start by planning your weekly alcohol use  It will be easier to have fewer drinks if you plan ahead  · Have food when you drink alcohol    Food will prevent alcohol from getting into your system too quickly  Eat before you have your first alcohol drink  · Time your drinks carefully  Have no more than 1 drink in an hour  Have a liquid such as water, coffee, or a soft drink between alcohol drinks  · Do not drive if you have had alcohol  Plan ahead so you have a safe ride home  Make sure someone who has not been drinking can help you get home safely  Plan to use a taxi or other ride service, if needed  · Do not drink alcohol if you are taking medicine  Alcohol is dangerous when you combine it with certain medicines, such as acetaminophen or blood pressure medicine  Talk to your healthcare provider about all the medicines you currently take  Where can I find support and more information? · Alcoholics Anonymous  Web Address: http://[employer]/    · Substance Abuse and [address] [address]  Web Address: https://[employer]/    Call your local emergency number (911 in the [address] [facility]), or have someone call if:   · You have sudden chest pain or trouble breathing  · You want to harm yourself or others  · You have a seizure  When should I seek care immediately? · You cannot stop vomiting or you vomit blood  When should I call my doctor? · You have hallucinations (you see or hear things that are not real)  · You have questions or concerns about your condition or care  CARE AGREEMENT:   You have the right to help plan your care  Learn about your health condition and how it may be treated  Discuss treatment options with your healthcare providers to decide what care you want to receive  You always have the right to refuse treatment  The above information is an  only  It is not intended as medical advice for individual conditions or treatments  Talk to your doctor, nurse or pharmacist before following any medical regimen to see if it is safe and effective for you    © Copyright [employer] 2022 Information is for End User's use only and may not be sold, redistributed or otherwise used for commercial purposes  All illustrations and images included in CareNotes® are the copyrighted property of A D A M , Inc  or Sanjiv Pham                      Gabapentin (By mouth)   Gabapentin (hal-a-PEN-tin)  Treats seizures and pain caused by shingles  Brand Name(s): FusePaq Fanatrex, Neurontin   There may be other brand names for this medicine  When This Medicine Should Not Be Used: This medicine is not right for everyone  Do not use it if you had an allergic reaction to gabapentin  How to Use This Medicine:   Capsule, Liquid, Tablet  · Take your medicine as directed  Your dose may need to be changed several times to find what works best for you  If you have epilepsy, do not allow more than 12 hours to pass between doses  · Capsule: Swallow the capsule whole with plenty of water  Do not open, crush, or chew it  · Gralise® tablet: Swallow the tablet whole   Do not crush, break, or chew it  · Neurontin® tablet: If you break a tablet into 2 pieces, use the second half as your next dose  Do not use the half-tablet if the whole tablet has been cut or broken after 28 days  · Oral liquid: Measure the oral liquid medicine with a marked measuring spoon, oral syringe, or medicine cup  · This medicine should come with a Medication Guide  Ask your pharmacist for a copy if you do not have one  · Missed dose: Take a dose as soon as you remember  If it is almost time for your next dose, wait until then and take a regular dose  Do not take extra medicine to make up for a missed dose  · Store the medicine in a closed container at room temperature, away from heat, moisture, and direct light  Store the Neurontin® oral liquid in the refrigerator  Do not freeze  Drugs and Foods to Avoid:   Ask your doctor or pharmacist before using any other medicine, including over-the-counter medicines, vitamins, and herbal products    · Some medicines can affect how gabapentin works  Tell your doctor if you also using hydrocodone or morphine  · If you take an antacid, wait at least 2 hours before you take gabapentin  · Do not drink alcohol while you are using this medicine  · Tell your doctor if you use anything else that makes you sleepy  Some examples are allergy medicine, narcotic pain medicine, and alcohol  Tell your doctor if you are also using lorazepam, oxycodone, or zolpidem  Warnings While Using This Medicine:   · Tell your doctor if you are pregnant or breastfeeding, or if you have kidney problems (including patients receiving dialysis) or lung problems  Tell your doctor if you have a history of depression or mental health problems  · This medicine may cause the following problems:  ? Drug reaction with eosinophilia and systemic symptoms (DRESS) or multiorgan hypersensitivity, which may damage the liver, kidney, blood, heart, or muscles  ? Changes in mood or behavior, including suicidal thoughts or behavior  ? Respiratory depression (serious breathing problem that can be life-threatening), when used with narcotic pain medicines  · Do not stop using this medicine suddenly  Your doctor will need to slowly decrease your dose before you stop it completely  · This medicine may make you dizzy or drowsy  Do not drive or do anything else that could be dangerous until you know how this medicine affects you  · Tell any doctor or dentist who treats you that you are using this medicine  This medicine may affect certain medical test results  · Your doctor will check your progress and the effects of this medicine at regular visits  Keep all appointments  · Keep all medicine out of the reach of children  Never share your medicine with anyone    Possible Side Effects While Using This Medicine:   Call your doctor right away if you notice any of these side effects:  · Allergic reaction: Itching or hives, swelling in your face or hands, swelling or tingling in your mouth or throat, chest tightness, trouble breathing  · Behavior problems, aggression, restlessness, trouble concentrating, moodiness (especially in children)  · Blistering, peeling, red skin rash  · Blue lips, fingernails, or skin, chest pain, fast heartbeat, trouble breathing  · Change in how much or how often you urinate, bloody or cloudy urine  · Dark urine or pale stools, nausea, vomiting, loss of appetite, stomach pain, yellow skin or eyes  · Fever, chills, cough, sore throat, body aches  · Problems with coordination, shakiness, unsteadiness, unusual eye movement  · Rapid weight gain, swelling in your hands, ankles, or feet  · Rash, swollen or tender glands in the neck, armpit, or groin  · Unusual moods or behaviors, thoughts of hurting yourself, feeling depressed  If you notice these less serious side effects, talk with your doctor:   · Dizziness, drowsiness, sleepiness, tiredness  If you notice other side effects that you think are caused by this medicine, tell your doctor  Call your doctor for medical advice about side effects  You may report side effects to FDA at 4-720-FDA-6449    © Copyright [employer] 2022 Information is for End User's use only and may not be sold, redistributed or otherwise used for commercial purposes  The above information is an  only  It is not intended as medical advice for individual conditions or treatments  Talk to your doctor, nurse or pharmacist before following any medical regimen to see if it is safe and effective for you  Thiamine (Vitamin B-1) (By mouth)   Thiamine (THYE-a-min)  Thiamine, another name for Vitamin B-1, is used to treat thiamine-deficiency (not enough thiamine in the body)  Brand Name(s): Optimum Vitamin B-1, [employer] B1   There may be other brand names for this medicine  When This Medicine Should Not Be Used: You should not use this medicine if you have had an allergic reaction to thiamine (Vitamin B-1)    How to Use This Medicine:   Tablet, Capsule, Liquid  · Your doctor will tell you how much and when to take your medicine  Keep all medicine away from children  · You may take your medicine with or without food  · Measure your oral liquid medicine using a marked measuring spoon or medicine cup  If a dose is missed:   · Take the missed dose as soon as possible  · Skip the missed dose if it is almost time for your next dose  · You should not use two doses at the same time  · Missing a dose is generally not a cause for concern  How to Store and Dispose of This Medicine:   · Store the tablets at room temperature in a closed container  Keep away from heat, moisture, and direct light  · Store the oral liquid at room temperature, away from heat and light  Do not freeze  Drugs and Foods to Avoid:   Ask your doctor or pharmacist before using any other medicine, including over-the-counter medicines, vitamins, and herbal products  · Follow your doctor's orders if he or she has given you a special diet  Warnings While Using This Medicine:   · Tell your doctor if you are pregnant or breastfeeding, or if you become pregnant  Possible Side Effects While Using This Medicine:   Call your doctor right away if you notice any of these side effects:  · Itching or trouble breathing  · Swelling of face, lips or eyelids  If you notice other side effects that you think are caused by this medicine, tell your doctor  Call your doctor for medical advice about side effects  You may report side effects to FDA at 4-755-FDA-4214    © Copyright [employer] [facility] 2022 Information is for End User's use only and may not be sold, redistributed or otherwise used for commercial purposes  The above information is an  only  It is not intended as medical advice for individual conditions or treatments  Talk to your doctor, nurse or pharmacist before following any medical regimen to see if it is safe and effective for you        Folic Acid (By mouth)   Folic Acid (FOE-lik AS-id)  Treats certain types of anemia (not enough red blood cells)  Is also given as a supplement to women who are pregnant or planning on getting pregnant  Folic acid is a B vitamin  Brand Name(s): FA-8, Folacin-800, Methylfolate, Nature's Blend Folic Acid, Optimum Folic Acid, PharmAssure Folic Acid, Sunmark Folic Acid   There may be other brand names for this medicine  When This Medicine Should Not Be Used: You should not use this medicine if you have ever had an allergic reaction to folic acid  How to Use This Medicine:   Capsule, Tablet  · Your doctor will tell you how much to take and how often  · Keep taking this medicine for as long as your doctor tells you to, even if you feel better  If a dose is missed:   · Take the missed dose as soon as possible  · If it is almost time for your next regular dose, wait until then to take your medicine and skip the missed dose  · You should not use two doses at the same time  How to Store and Dispose of This Medicine:   · Store at room temperature  Protect from heat, direct light, and moisture  · Ask your pharmacist, doctor, or health caregiver about the best way to dispose of any outdated medicine or medicine no longer needed  · Keep all medicine out of the reach of children  Drugs and Foods to Avoid:   Ask your doctor or pharmacist before using any other medicine, including over-the-counter medicines, vitamins, and herbal products  · Make sure your doctor knows if you are taking Dilantin®  · If you are taking Questran® or Colestid®, take your folic acid dose 1 hour before or at least 4 hours after the other medicine  · Your doctor may ask you to eat more foods that contain folic acid  Good sources of folic acid are fruits, green leafy vegetables, liver, kidney, and breads made with dried yeast   Warnings While Using This Medicine:   · If you are pregnant or breastfeeding, tell your doctor   It is very important during this time to take the correct vitamins in the right amounts  Possible Side Effects While Using This Medicine:   Call your doctor right away if you notice any of these side effects:  · Skin rash, itching, and redness  If you notice these less serious side effects, talk with your doctor:   · Nausea, bloating, gas  · Bitter taste in the mouth  · Irritability  · Trouble sleeping  If you notice other side effects that you think are caused by this medicine, tell your doctor  Call your doctor for medical advice about side effects  You may report side effects to FDA at 6-738-AFF-5924    © Copyright [employer] 2022 Information is for End User's use only and may not be sold, redistributed or otherwise used for commercial purposes  The above information is an  only  It is not intended as medical advice for individual conditions or treatments  Talk to your doctor, nurse or pharmacist before following any medical regimen to see if it is safe and effective for you  Vitamin B12 Deficiency   WHAT YOU NEED TO KNOW:   Vitamin B12 deficiency is a low level of vitamin B12 in your body  Vitamin B12 is only found in foods that come from animal sources such as fish, beef, dairy products, and eggs  Vitamin B12 deficiency should be treated as early as possible  Without treatment, it can cause permanent nerve damage over time  DISCHARGE INSTRUCTIONS:   Call your doctor or dietitian if:   · You continue to have symptoms, or your symptoms get worse  · You have questions or concerns about your condition or care  Medicines:   · Vitamin B12 supplements  may be needed to increase your levels back to normal     · Take your medicine as directed  Contact your healthcare provider if you think your medicine is not helping or if you have side effects  Tell him or her if you are allergic to any medicine  Keep a list of the medicines, vitamins, and herbs you take  Include the amounts, and when and why you take them   Bring the list or the pill bottles to follow-up visits  Carry your medicine list with you in case of an emergency  Good sources of vitamin B12:       · 3 ounces of cooked clams, 84 1 mcg    · 3 ounces of cooked beef liver, 70 7 mcg    · Fortified breakfast cereals, 1 5 to 6 mcg per serving    · 3 ounces of salmon, rainbow trout, or canned tuna fish, 2 5 to 4 8 mcg    · 3 ounces of top sirloin beef, 1 4 mcg    · 1 cup of milk or yogurt, 1 1 to 1 2 mcg    · 1 cup of a soy milk product, 0 9 to 3 3 mcg    · 1 ounce of a meat substitute, 0 5 to 1 2 mcg    · 1 ounce Swiss cheese, 0 9 mcg    · 1 large egg, 0 6 mcg    Amount of vitamin B12 you need each day:   · Infants 0 to 12 months: 0 4 micrograms (mcg) to 0 5 mcg    · Children 1 to 3 years: 0 9 mcg    · Children 4 to 8 years: 1 2 mcg    · Children 9 to 13 years: 1 8 mcg    · Children over 14 years and adults: 1 8 mcg    · Pregnant women and adolescents (over 14 years): 2 6 mcg    · Breastfeeding women and adolescents (over 14 years): 2 8 mcg    Follow up with your doctor or dietitian as directed:  Write down your questions so you remember to ask them during your visits  © Copyright [employer] 2022 Information is for End User's use only and may not be sold, redistributed or otherwise used for commercial purposes  All illustrations and images included in CareNotes® are the copyrighted property of A D A M , Inc  or [facility]   The above information is an  only  It is not intended as medical advice for individual conditions or treatments  Talk to your doctor, nurse or pharmacist before following any medical regimen to see if it is safe and effective for you

## 2022-02-26 NOTE — ASSESSMENT & PLAN NOTE
· Patient reports dealing with anxiety at home  · Today was the worst it has ever been  · Most likely cause of her elevated blood pressure reading  · Unable to determine why it was elevated  · Patient is not interested in taking an anti-anxiety medication at this time

## 2022-02-26 NOTE — ASSESSMENT & PLAN NOTE
· Patient presented to ED with elevated BP reading and paresthesia in both hands extending to elbows  · Right arm started approximately one week ago  · Left arm started approximately 2 days ago  · Other symptoms include increase in confusion/difficutly with word finding  · CTA head/neck completed to rule out CVA  · Findings: No evidence of acute intracranial hemorrhage  No evidence of hemodynamic significant stenosis, aneurysm or dissection   Partially visualized right breast mass  · Monitor overnight on stroke pathway to rule out TIA although unlikely given patient's timeline of symptoms  · Chronic alcohol drinker - 4-5 glasses of wine per day +/- cocktails  · Not on thiamine or folate    · Differentials include: thiamine deficiency, peripheral neuropathy 2/2 Etoh  · Given IV thiamine in ED  · Continue daily PO thiamine  · Check TSH and T4 for hyperthyroidism-->normal  · Check A1c,  B12 for other causes-->needs to be follow  · Normal lipid panel  · Started with multiple vitamines

## 2022-02-26 NOTE — ASSESSMENT & PLAN NOTE
· Patient reports drinking 4 - 5 glasses of wine a day  · Last drink was today before dinner around 1700  · Takes a multivitimain outpatient does not appear to be on folate or thiamine  · In ED, given 100 mg IV thiamine  · Continue thiamine and folate in hospital and on discharge  · Patient complaining of worsening paresthesia in bilateral arms and increasing confusion/word-finding  · Concern for thiamine deficiency - continue thiamine on discharge

## 2022-03-01 ENCOUNTER — TRANSITIONAL CARE MANAGEMENT (OUTPATIENT)
Dept: FAMILY MEDICINE CLINIC | Facility: CLINIC | Age: 82
End: 2022-03-01

## 2022-03-03 ENCOUNTER — OFFICE VISIT (OUTPATIENT)
Dept: FAMILY MEDICINE CLINIC | Facility: CLINIC | Age: 82
End: 2022-03-03
Payer: COMMERCIAL

## 2022-03-03 VITALS
BODY MASS INDEX: 23.82 KG/M2 | HEIGHT: 65 IN | OXYGEN SATURATION: 99 % | HEART RATE: 68 BPM | DIASTOLIC BLOOD PRESSURE: 80 MMHG | WEIGHT: 143 LBS | TEMPERATURE: 97.3 F | RESPIRATION RATE: 16 BRPM | SYSTOLIC BLOOD PRESSURE: 130 MMHG

## 2022-03-03 DIAGNOSIS — Z78.9 HEAVY ALCOHOL USE: ICD-10-CM

## 2022-03-03 DIAGNOSIS — N63.0 BREAST MASS: ICD-10-CM

## 2022-03-03 DIAGNOSIS — I10 ESSENTIAL HYPERTENSION: ICD-10-CM

## 2022-03-03 DIAGNOSIS — Z53.20 OSTEOPOROSIS SCREENING DECLINED: ICD-10-CM

## 2022-03-03 DIAGNOSIS — E55.9 VITAMIN D DEFICIENCY: ICD-10-CM

## 2022-03-03 DIAGNOSIS — R20.2 PARESTHESIA OF BOTH HANDS: ICD-10-CM

## 2022-03-03 DIAGNOSIS — Z76.89 ENCOUNTER FOR SUPPORT AND COORDINATION OF TRANSITION OF CARE: Primary | ICD-10-CM

## 2022-03-03 DIAGNOSIS — N63.10 BREAST MASS, RIGHT: ICD-10-CM

## 2022-03-03 DIAGNOSIS — F41.9 ANXIETY: ICD-10-CM

## 2022-03-03 PROCEDURE — 99495 TRANSJ CARE MGMT MOD F2F 14D: CPT | Performed by: INTERNAL MEDICINE

## 2022-03-03 NOTE — PROGRESS NOTES
TCM Call (since 1/31/2022)     Date and time call was made  3/1/2022  1:45 PM    Hospital care reviewed  Records reviewed        Patient was hospitialized at  [address]        Date of Admission  02/25/22    Date of discharge  02/26/22    Diagnosis  Paresthesia of both hands    Disposition  Home    Were the patients medications reviewed and updated  Yes    Current Symptoms  None      TCM Call (since 1/31/2022)     Post hospital issues  None    Should patient be enrolled in anticoag monitoring? No    Scheduled for follow up? Yes    Referrals needed  Not at this time     Did you obtain your prescribed medications  Yes    Do you need help managing your prescriptions or medications  No    Is transportation to your appointment needed  No    I have advised the patient to call PCP with any new or worsening symptoms  Purnima Womack, Joe Salmon [address] or Significiant other    Support System  Spouse    The type of support provided  Emotional    Do you have social support  Yes, as much as I need    Are you recieving any outpatient services  No    Are you recieving home care services  No    Are you using any community resources  No    Current waiver services  No    Have you fallen in the last 12 months  Yes    How many times  1 or 2     Interperter language line needed  No    Counseling  Patient    Counseling topics  Diagnostic results    Comments  Needs follow up for additional evaluation for right sided breast mass which was partially seen on CAT scan              Assessment/Plan:    Essential hypertension  On lisinopril, lopressor, and furosemide-try to watch sodium in her diet and really should cut back on alcohol, but we've discussed this many many times and Concepción Clarke does not feel this is an issue    Heavy alcohol use  Counseled on cutting back, is taking MVI and thiamine and folate    Anxiety  She does not want any anti anxiety medication        Diagnoses and all orders for this visit:    Encounter for support and coordination of transition of care    Osteoporosis screening declined    Breast mass  -     Mammo screening bilateral w 3d & cad; Future  -     US breast right limited (diagnostic); Future    Essential hypertension    Heavy alcohol use    Anxiety    Vitamin D deficiency    Paresthesia of both hands    Breast mass, right          Subjective:      Patient ID: Gita Chang is a 78 y o  female  Khushbu here for TCM s/p hospitalization for accelerated HTN, parasthesias, monitoring for stroke like symptoms/TIA--had gone to ER with sensation of fluttering under her skin and found to have bp 190/100-also had a lot of stress/anxiety-pt is a known heavy drinker-had CT/CTA head and neck done which were essentially normal with the exception of some atherosclerotic changes of the aortic arch-she was put on thiamine and folate secondary to her drinking hx -also, a partially visualized right breast mass was seen on the CT head/neck-over the years, Pieter Puri has always refused mammographic screening for breast cancer  In large part this is due to her concern over her breast implants which she's had in place since the age of 36-she said that until she had them she thought of herself as "ugly" and that having those implants is most important to her self concept  Her daughter would like for her to get follow up imaging but Pieter Puri is not too keen on it-she said she's had a lump there for 5-6 years, ever since she fell and that it doesn't bother her-she also says at 78years of age she wouldn't do much about it anyway      The following portions of the patient's history were reviewed and updated as appropriate: allergies, current medications, past family history, past medical history, past social history, past surgical history and problem list        Review of Systems   Constitutional: Negative for fatigue and fever  Respiratory: Negative  Cardiovascular: Negative  Gastrointestinal: Negative  Psychiatric/Behavioral: The patient is nervous/anxious  Objective:      /80   Pulse 68   Temp (!) 97 3 °F (36 3 °C) (Temporal)   Resp 16   Ht 5' 5" (1 651 m)   Wt 64 9 kg (143 lb)   SpO2 99%   BMI 23 80 kg/m²          Physical Exam  Constitutional:       Appearance: Normal appearance  HENT:      Head: Normocephalic and atraumatic  Right Ear: External ear normal       Left Ear: External ear normal       Nose: Nose normal       Mouth/Throat:      Mouth: Mucous membranes are moist    Eyes:      Extraocular Movements: Extraocular movements intact  Neck:      Vascular: No carotid bruit  Cardiovascular:      Rate and Rhythm: Normal rate and regular rhythm  Pulses: Normal pulses  Comments: Right breast with erythema and large lump, hard to touch-no axillary lymphadenopathy  Pulmonary:      Effort: Pulmonary effort is normal       Breath sounds: Normal breath sounds  Abdominal:      General: Abdomen is flat  Palpations: Abdomen is soft  Lymphadenopathy:      Cervical: No cervical adenopathy  Neurological:      General: No focal deficit present  Mental Status: She is alert and oriented to person, place, and time     Psychiatric:         Mood and Affect: Mood normal          Behavior: Behavior normal

## 2022-03-03 NOTE — ASSESSMENT & PLAN NOTE
On lisinopril, lopressor, and furosemide-try to watch sodium in her diet and really should cut back on alcohol, but we've discussed this many many times and Natalio Bhatti does not feel this is an issue

## 2022-03-07 NOTE — TELEPHONE ENCOUNTER
HIGHLIGHTS  Kidney function - the good news--kidney function is stable 65%  Protein in urine - the bad news--you are leaking 7 times a normal amount of protein when in the past your leaking minimal protein--this is a sign that your kidneys will cave in if we do not put up a good fight--this is most likely diabetes but we will sorted out with blood test soon  Urine under microscope - looks okay under microscope    Potassium balance - normal balance  Acid - base balance - normal balance  Calcium balance - normal balance  Phosphorus balance - normal balance  Vitamin D status - too low presently--increase vitamin-D as noted below  Parathyroid status - adequate status  Anemia status - no evidence for anemia    Blood pressure control - reasonable control  Diabetes control - horrible control--goal hemoglobin A1 c less than 6.5; present hemoglobin A1c consistently greater than 8.5-9.5--this will do in your kidneys quickly--the kidneys could go downhill as quick as 12% per year which would mean that your need for dialysis could be as soon as 3-1/2 years; if you get your hemoglobin A1 c less than 6.5 your kidneys will go downhill at approximately 3% per year and would take 15 day 18 years till you need dialysis--you get the picture  Cholesterol control - excellent control    Fluid issues - no issues  Weight issues - weight reduction is a must--the natural tendency would be for you to gain 25-35 lb when getting your sugars under good control--that means activity and dietary changes are of significant importance, bariatric surgery may be a necessary consideration depending on your initial trends  Nutrition recommendations - as above    Additional issues - none    MEDICATIONS TO STOP  None    MEDICATIONS TO ADD  None    MEDICATION DOSE CHANGES  Increase cholecalciferol to 5000 units daily    BLOOD TESTS ORDERED  March 2022-will contact you with results--if they are abnormal, we would consider kidney biopsy--if they are normal no  zolpidem (AMBIEN) 5 mg tablet takes 1 daily needs a 30 day supply send to local pharmacy [facility] and a 90 day to [facility] mail order, thanks   ND kidney biopsy is considered    September 2022-will discuss at follow    STUDIES/TESTS ORDERED  None    REFERRALS MADE:  None    ADDITIONAL INSTRUCTIONS  If you notice blood in the urine, bring a urine sample in for us to review under the microscope--otherwise we will check your urine on 3 occasions to see if there is microscopic blood that you can see    FOLLOW UP VISIT  September 2022

## 2022-03-14 DIAGNOSIS — G47.00 INSOMNIA, UNSPECIFIED TYPE: ICD-10-CM

## 2022-03-14 RX ORDER — ZOLPIDEM TARTRATE 5 MG/1
5 TABLET ORAL
Qty: 90 TABLET | Refills: 0 | Status: SHIPPED | OUTPATIENT
Start: 2022-03-14 | End: 2022-06-03

## 2022-03-21 DIAGNOSIS — R20.2 PARESTHESIA OF BOTH HANDS: ICD-10-CM

## 2022-03-29 DIAGNOSIS — R20.2 PARESTHESIA OF BOTH HANDS: ICD-10-CM

## 2022-03-29 RX ORDER — GABAPENTIN 100 MG/1
100 CAPSULE ORAL 2 TIMES DAILY
Qty: 60 CAPSULE | Refills: 5 | Status: SHIPPED | OUTPATIENT
Start: 2022-03-29

## 2022-03-29 RX ORDER — FOLIC ACID 1 MG/1
1 TABLET ORAL DAILY
Qty: 30 TABLET | Refills: 5 | Status: SHIPPED | OUTPATIENT
Start: 2022-03-29 | End: 2022-07-20 | Stop reason: SDUPTHER

## 2022-03-31 RX ORDER — FOLIC ACID 1 MG/1
TABLET ORAL
Qty: 90 TABLET | Refills: 1 | Status: SHIPPED | OUTPATIENT
Start: 2022-03-31

## 2022-03-31 RX ORDER — LANOLIN ALCOHOL/MO/W.PET/CERES
CREAM (GRAM) TOPICAL
Qty: 30 TABLET | Refills: 0 | Status: SHIPPED | OUTPATIENT
Start: 2022-03-31

## 2022-06-03 DIAGNOSIS — G47.00 INSOMNIA, UNSPECIFIED TYPE: ICD-10-CM

## 2022-06-03 RX ORDER — ZOLPIDEM TARTRATE 5 MG/1
TABLET ORAL
Qty: 90 TABLET | Refills: 1 | Status: SHIPPED | OUTPATIENT
Start: 2022-06-03

## 2022-07-20 ENCOUNTER — OFFICE VISIT (OUTPATIENT)
Dept: FAMILY MEDICINE CLINIC | Facility: CLINIC | Age: 82
End: 2022-07-20
Payer: COMMERCIAL

## 2022-07-20 VITALS
HEART RATE: 78 BPM | DIASTOLIC BLOOD PRESSURE: 90 MMHG | HEIGHT: 65 IN | RESPIRATION RATE: 16 BRPM | OXYGEN SATURATION: 98 % | TEMPERATURE: 97.3 F | WEIGHT: 145 LBS | BODY MASS INDEX: 24.16 KG/M2 | SYSTOLIC BLOOD PRESSURE: 142 MMHG

## 2022-07-20 DIAGNOSIS — Z00.00 MEDICARE ANNUAL WELLNESS VISIT, SUBSEQUENT: Primary | ICD-10-CM

## 2022-07-20 DIAGNOSIS — G47.09 OTHER INSOMNIA: ICD-10-CM

## 2022-07-20 DIAGNOSIS — I10 ESSENTIAL HYPERTENSION: ICD-10-CM

## 2022-07-20 DIAGNOSIS — Z13.31 STANDARDIZED ADULT DEPRESSION SCREENING TOOL COMPLETED: ICD-10-CM

## 2022-07-20 DIAGNOSIS — Z13.820 SCREENING FOR OSTEOPOROSIS: ICD-10-CM

## 2022-07-20 DIAGNOSIS — K21.9 GASTROESOPHAGEAL REFLUX DISEASE WITHOUT ESOPHAGITIS: ICD-10-CM

## 2022-07-20 DIAGNOSIS — F10.90 HEAVY ALCOHOL USE: ICD-10-CM

## 2022-07-20 DIAGNOSIS — F41.9 ANXIETY: ICD-10-CM

## 2022-07-20 DIAGNOSIS — N63.11 MASS OF UPPER OUTER QUADRANT OF RIGHT BREAST: ICD-10-CM

## 2022-07-20 DIAGNOSIS — E78.5 DYSLIPIDEMIA: ICD-10-CM

## 2022-07-20 PROCEDURE — 3725F SCREEN DEPRESSION PERFORMED: CPT | Performed by: INTERNAL MEDICINE

## 2022-07-20 PROCEDURE — 99497 ADVNCD CARE PLAN 30 MIN: CPT | Performed by: INTERNAL MEDICINE

## 2022-07-20 PROCEDURE — 1160F RVW MEDS BY RX/DR IN RCRD: CPT | Performed by: INTERNAL MEDICINE

## 2022-07-20 PROCEDURE — G0439 PPPS, SUBSEQ VISIT: HCPCS | Performed by: INTERNAL MEDICINE

## 2022-07-20 PROCEDURE — 3288F FALL RISK ASSESSMENT DOCD: CPT | Performed by: INTERNAL MEDICINE

## 2022-07-20 PROCEDURE — 99214 OFFICE O/P EST MOD 30 MIN: CPT | Performed by: INTERNAL MEDICINE

## 2022-07-20 PROCEDURE — 1090F PRES/ABSN URINE INCON ASSESS: CPT | Performed by: INTERNAL MEDICINE

## 2022-07-20 PROCEDURE — 3077F SYST BP >= 140 MM HG: CPT | Performed by: INTERNAL MEDICINE

## 2022-07-20 PROCEDURE — 3080F DIAST BP >= 90 MM HG: CPT | Performed by: INTERNAL MEDICINE

## 2022-07-20 PROCEDURE — 1101F PT FALLS ASSESS-DOCD LE1/YR: CPT | Performed by: INTERNAL MEDICINE

## 2022-07-20 PROCEDURE — 1170F FXNL STATUS ASSESSED: CPT | Performed by: INTERNAL MEDICINE

## 2022-07-20 PROCEDURE — 1125F AMNT PAIN NOTED PAIN PRSNT: CPT | Performed by: INTERNAL MEDICINE

## 2022-07-20 NOTE — PROGRESS NOTES
Assessment and Plan:     Problem List Items Addressed This Visit        Digestive    Gastroesophageal reflux disease without esophagitis       Cardiovascular and Mediastinum    Essential hypertension    Relevant Orders    CBC and differential    Comprehensive metabolic panel    Lipid panel    TSH, 3rd generation       Other    Other insomnia    Heavy alcohol use    Anxiety    Dyslipidemia    Relevant Orders    CBC and differential    Comprehensive metabolic panel    Lipid panel    TSH, 3rd generation    Breast mass, right     Touched base with [place] on breast mass again and she still declines any follow up           Other Visit Diagnoses     Medicare annual wellness visit, subsequent    -  Primary    Relevant Orders    CBC and differential    Comprehensive metabolic panel    Lipid panel    TSH, 3rd generation    Standardized adult depression screening tool completed        BMI 24 0-24 9, adult               Preventive health issues were discussed with patient, and age appropriate screening tests were ordered as noted in patient's After Visit Summary  Personalized health advice and appropriate referrals for health education or preventive services given if needed, as noted in patient's After Visit Summary  History of Present Illness:     Patient presents for a Medicare Wellness Visit    Khushbu here for [address] [place] [address], to discuss some chronic issues, and for Advanced Care planning     Patient Care Team:  Lucy West MD as PCP - General (Internal Medicine)     Review of Systems:     Review of Systems   Constitutional: Negative  HENT: Negative  Eyes: Negative  Respiratory: Negative  Cardiovascular: Negative  Musculoskeletal: Negative  Skin:        Small cyst on scalp   Neurological: Positive for dizziness  Hematological: Negative  Psychiatric/Behavioral: Negative           Problem List:     Patient Active Problem List   Diagnosis    Other insomnia    Heavy alcohol use    Anxiety    Essential hypertension    Dyslipidemia    Vitamin D deficiency    Gastroesophageal reflux disease without esophagitis    Uncomplicated alcohol dependence (HCC)    Paresthesia of both hands    Breast mass, right      Past Medical and Surgical History:     Past Medical History:   Diagnosis Date    Anxiety     Arthritis     Prajapati esophagus     Cataract     Continuous chronic alcoholism (HCC)     GERD (gastroesophageal reflux disease)     Heavy alcohol use     Hyperlipidemia     Hypertension     Insomnia     Palpitations      Past Surgical History:   Procedure Laterality Date    BREAST IMPLANT Bilateral     CATARACT EXTRACTION Right     CHOLECYSTECTOMY      LAP    COLONOSCOPY      HYSTERECTOMY      age 67-[place]    JOINT REPLACEMENT Bilateral     hips-2010 and 2017-left leg is shorter    LASIK Bilateral     in her 50's    OR XCAPSL CTRC RMVL INSJ IO LENS PROSTH W/O ECP Left 10/18/2018    Procedure: EXTRACTION EXTRACAPSULAR CATARACT PHACO INTRAOCULAR LENS (IOL); Surgeon: Juanita Gilmore MD;  [facility] MAIN OR;  Service: Ophthalmology    TONSILLECTOMY      TUBAL LIGATION        Family History:     Family History   Problem Relation Age of Onset    Cancer Mother         breast,kidney,lung (smoker)    Heart disease Father 61        MI    Cancer Sister         liver,pancreatic(smoker,ETOH)    Diabetes Daughter     No Known Problems Sister       Social History:     Social History     Socioeconomic History    Marital status: /Civil Union     Spouse name: None    Number of children: None    Years of education: None    Highest education level: None   Occupational History    Occupation: Retired   Tobacco Use    Smoking status: Never Smoker    Smokeless tobacco: Never Used   Vaping Use    Vaping Use: Never used   Substance and Sexual Activity    Alcohol use:  Yes     Alcohol/week: 5 0 standard drinks     Types: 5 Glasses of wine per week    Drug use: No    Sexual activity: None   Other Topics Concern    None   Social History Narrative    Occupation: retired    Marital status:     Exercise level: Occasional    Diet: Regular    General stress level: Low    Alcohol intake: Occasional    Caffeine intake: Occasional    Seat belts used routinely: Yes    Sunscreen used routinely: Yes    Smoke alarm in home: Yes    Advance directive: Yes     Social Determinants of Health     Financial Resource Strain: Not on file   Food Insecurity: Not on file   Transportation Needs: Not on file   Physical Activity: Not on file   Stress: Not on file   Social Connections: Not on file   Intimate Partner Violence: Not on file   Housing Stability: Not on file      Medications and Allergies:     Current Outpatient Medications   Medication Sig Dispense Refill    Biotin 5000 MCG TABS Take by mouth every morning      CALCIUM PO Take by mouth once a week      Cholecalciferol (VITAMIN D) 2000 units CAPS Take by mouth 3 (three) times a week      cyanocobalamin (VITAMIN B-12) 500 MCG tablet Take 1 tablet (500 mcg total) by mouth daily 30 tablet 5    esomeprazole (NexIUM) 40 MG capsule TAKE 1 CAPSULE BY MOUTH  DAILY 90 capsule 3    folic acid (FOLVITE) 1 mg tablet TAKE 1 TABLET BY MOUTH EVERY DAY 90 tablet 1    furosemide (LASIX) 40 mg tablet TAKE 1 TABLET BY MOUTH  DAILY AS NEEDED FOR EDEMA 90 tablet 3    gabapentin (NEURONTIN) 100 mg capsule Take 1 capsule (100 mg total) by mouth 2 (two) times a day 60 capsule 5    GLUCOSAMINE HCL PO Take 1,200 mg by mouth daily       IBUPROFEN IB PO 1 po daily PRN      lisinopril (ZESTRIL) 20 mg tablet TAKE 1 TABLET BY MOUTH IN  THE MORNING 90 tablet 3    Magnesium 500 MG CAPS TAKES HERE AND THERE      metoprolol tartrate (LOPRESSOR) 50 mg tablet TAKE 1 TABLET BY MOUTH  TWICE DAILY 180 tablet 3    multivitamin (THERAGRAN) TABS Take 1 tablet by mouth daily        niacin 500 mg tablet Take 500 mg by mouth daily with breakfast      Omega-3 Fatty Acids (FISH OIL) 1,000 mg Take 1,000 mg by mouth daily        thiamine 100 MG tablet TAKE 1 TABLET BY MOUTH EVERY DAY 30 tablet 0    zolpidem (AMBIEN) 5 mg tablet TAKE 1 TABLET BY MOUTH  DAILY AT BEDTIME AS NEEDED  FOR SLEEP 90 tablet 1     No current facility-administered medications for this visit  No Known Allergies   Immunizations:     Immunization History   Administered Date(s) Administered    COVID-19 MODERNA VACC 0 5 ML IM 02/11/2021, 03/11/2021, 01/05/2022    INFLUENZA 10/06/2011, 10/16/2011, 10/10/2012, 10/09/2013, 10/21/2014, 10/15/2015, 10/01/2016, 11/06/2017, 10/09/2020, 10/13/2021    Pneumococcal Conjugate 13-Valent 10/15/2015    Pneumococcal Polysaccharide PPV23 10/09/2016      Health Maintenance: There are no preventive care reminders to display for this patient  Topic Date Due    COVID-19 Vaccine (4 - Booster for Moderna series) 05/05/2022    Influenza Vaccine (1) 09/01/2022      Medicare Screening Tests and Risk Assessments:     Jacob Wakefield is here for her Subsequent Wellness visit  Last Medicare Wellness visit information reviewed, patient interviewed and updates made to the record today  Health Risk Assessment:   Patient rates overall health as good  Patient feels that their physical health rating is same  Patient is satisfied with their life  Eyesight was rated as same  Hearing was rated as same  Patient feels that their emotional and mental health rating is same  Patients states they are never, rarely angry  Patient states they are sometimes unusually tired/fatigued  Pain experienced in the last 7 days has been none  Patient states that she has experienced no weight loss or gain in last 6 months  Depression Screening:   PHQ-2 Score: 0      Fall Risk Screening: In the past year, patient has experienced: no history of falling in past year      Urinary Incontinence Screening:   Patient has not leaked urine accidently in the last six months       Home Safety:  Patient does not have trouble with stairs inside or outside of their home  Patient has working smoke alarms and has working carbon monoxide detector  Home safety hazards include: none  Nutrition:   Current diet is Regular  Medications:   Patient is currently taking over-the-counter supplements  OTC medications include: see medication list  Patient is able to manage medications  Activities of Daily Living (ADLs)/Instrumental Activities of Daily Living (IADLs):   Walk and transfer into and out of bed and chair?: Yes  Dress and groom yourself?: Yes    Bathe or shower yourself?: Yes    Feed yourself? Yes  Do your laundry/housekeeping?: Yes  Manage your money, pay your bills and track your expenses?: Yes  Make your own meals?: Yes    Do your own shopping?: Yes    Previous Hospitalizations:   Any hospitalizations or ED visits within the last 12 months?: No      Advance Care Planning:   Living will: Yes    Durable POA for healthcare: No    Advanced directive: Yes    Five wishes given: No      Cognitive Screening:   Provider or family/friend/caregiver concerned regarding cognition?: No    PREVENTIVE SCREENINGS      Cardiovascular Screening:    General: Screening Current    Due for: Lipid Panel      Diabetes Screening:     General: Screening Current    Due for: Blood Glucose      Colorectal Cancer Screening:     General: Screening Not Indicated      Breast Cancer Screening:     General: Patient Declines      Cervical Cancer Screening:    General: Screening Not Indicated      Abdominal Aortic Aneurysm (AAA) Screening:        General: Screening Not Indicated      Lung Cancer Screening:     General: Screening Not Indicated    Screening, Brief Intervention, and Referral to Treatment (SBIRT)    Screening  Typical number of drinks in a day: 5  Typical number of drinks in a week: 35  Interpretation: Risky drinking behavior      AUDIT-C Screenin) How often did you have a drink containing alcohol in the past year? 4 or more times a week  2) How many drinks did you have on a typical day when you were drinking in the past year? 5 to 6  3) How often did you have 6 or more drinks on one occasion in the past year? never    AUDIT-C Score: 4  Interpretation: Score 3-12 (female): POSITIVE screen for alcohol misuse    Single Item Drug Screening:  How often have you used an illegal drug (including marijuana) or a prescription medication for non-medical reasons in the past year? never    Single Item Drug Screen Score: 0  Interpretation: Negative screen for possible drug use disorder    No exam data present     Physical Exam:     /90   Pulse 78   Temp (!) 97 3 °F (36 3 °C) (Temporal)   Resp 16   Ht 5' 5" (1 651 m)   Wt 65 8 kg (145 lb)   SpO2 98%   BMI 24 13 kg/m²     Physical Exam  Constitutional:       Appearance: Normal appearance  HENT:      Head: Normocephalic and atraumatic  Right Ear: External ear normal       Left Ear: External ear normal       Nose: Nose normal       Mouth/Throat:      Mouth: Mucous membranes are moist    Eyes:      Extraocular Movements: Extraocular movements intact  Cardiovascular:      Rate and Rhythm: Normal rate and regular rhythm  Heart sounds: Normal heart sounds  Pulmonary:      Effort: Pulmonary effort is normal       Breath sounds: Normal breath sounds  Musculoskeletal:         General: Normal range of motion  Cervical back: Normal range of motion and neck supple  Skin:     General: Skin is warm  Capillary Refill: Capillary refill takes less than 2 seconds  Neurological:      General: No focal deficit present  Mental Status: She is alert and oriented to person, place, and time  Mental status is at baseline  Serious Illness Conversation    1  What is your understanding now of where you are with your illness? 2  How much information about what is likely to be ahead with your illness would you like to have? Information: patient wants to be fully informed     3   What did you (clinician) communicate to the patient? 4  If your health situation worsens, what are your most important goals? Goals: be physically comfortable     5  What are the biggest fears and worries about the future and your health? 6  What abilities are so critical to your life that you cannot imagine living without them? Unacceptable Function: not being able to care for myself, including toileting and feeding     7  What gives you strength as you think about the future with your illness? 8  If you become sicker, how much are you willing to go through for the possibility of gaining more time? 9  How much does your proxy and family know about your priorities and wishes? Discussion Discussion: extensive discussion with family about goals and wishes        How does this plan sound to you? I will do everything I can to help you through this          Advanced directives  Five Wishes: Patient does not have Five Wishes- would not like information         Lula Solorio MD

## 2022-07-20 NOTE — PATIENT INSTRUCTIONS
Medicare Preventive Visit Patient Instructions  Thank you for completing your Welcome to Medicare Visit or Medicare Annual Wellness Visit today  Your next wellness visit will be due in one year (7/21/2023)  The screening/preventive services that you may require over the next 5-10 years are detailed below  Some tests may not apply to you based off risk factors and/or age  Screening tests ordered at today's visit but not completed yet may show as past due  Also, please note that scanned in results may not display below  Preventive Screenings:  Service Recommendations Previous Testing/Comments   Colorectal Cancer Screening  * Colonoscopy    * Fecal Occult Blood Test (FOBT)/Fecal Immunochemical Test (FIT)  * Fecal DNA/Cologuard Test  * Flexible Sigmoidoscopy Age: 54-65 years old   Colonoscopy: every 10 years (may be performed more frequently if at higher risk)  OR  FOBT/FIT: every 1 year  OR  Cologuard: every 3 years  OR  Sigmoidoscopy: every 5 years  Screening may be recommended earlier than age 48 if at higher risk for colorectal cancer  Also, an individualized decision between you and your healthcare provider will decide whether screening between the ages of 74-80 would be appropriate  Colonoscopy: Not on file  FOBT/FIT: Not on file  Cologuard: Not on file  Sigmoidoscopy: Not on file          Breast Cancer Screening Age: 36 years old  Frequency: every 1-2 years  Not required if history of left and right mastectomy Mammogram: Not on file        Cervical Cancer Screening Between the ages of 21-29, pap smear recommended once every 3 years  Between the ages of 33-67, can perform pap smear with HPV co-testing every 5 years     Recommendations may differ for women with a history of total hysterectomy, cervical cancer, or abnormal pap smears in past  Pap Smear: Not on file        Hepatitis C Screening Once for adults born between [facility]  More frequently in patients at high risk for Hepatitis C Hep C Antibody: Not on file        Diabetes Screening 1-2 times per year if you're at risk for diabetes or have pre-diabetes Fasting glucose: No results in last 5 years   A1C: 5 0 %        Cholesterol Screening Once every 5 years if you don't have a lipid disorder  May order more often based on risk factors  Lipid panel: 02/26/2022          Other Preventive Screenings Covered by Medicare:  1  Abdominal Aortic Aneurysm (AAA) Screening: covered once if your at risk  You're considered to be at risk if you have a family history of AAA  2  Lung Cancer Screening: covers low dose CT scan once per year if you meet all of the following conditions: (1) Age 50-69; (2) No signs or symptoms of lung cancer; (3) Current smoker or have quit smoking within the last 15 years; (4) You have a tobacco smoking history of at least 30 pack years (packs per day multiplied by number of years you smoked); (5) You get a written order from a healthcare provider  3  Glaucoma Screening: covered annually if you're considered high risk: (1) You have diabetes OR (2) Family history of glaucoma OR (3)  aged 48 and older OR (3)  American aged 72 and older  3  Osteoporosis Screening: covered every 2 years if you meet one of the following conditions: (1) You're estrogen deficient and at risk for osteoporosis based off medical history and other findings; (2) Have a vertebral abnormality; (3) On glucocorticoid therapy for more than 3 months; (4) Have primary hyperparathyroidism; (5) On osteoporosis medications and need to assess response to drug therapy  · Last bone density test (DXA Scan): Not on file  5  HIV Screening: covered annually if you're between the age of 12-76  Also covered annually if you are younger than 13 and older than 72 with risk factors for HIV infection  For pregnant patients, it is covered up to 3 times per pregnancy      Immunizations:  Immunization Recommendations   Influenza Vaccine Annual influenza vaccination during flu season is recommended for all persons aged >= 6 months who do not have contraindications   Pneumococcal Vaccine (Prevnar and Pneumovax)  * Prevnar = PCV13  * Pneumovax = PPSV23   Adults 25-60 years old: 1-3 doses may be recommended based on certain risk factors  Adults 72 years old: Prevnar (PCV13) vaccine recommended followed by Pneumovax (PPSV23) vaccine  If already received PPSV23 since turning 65, then PCV13 recommended at least one year after PPSV23 dose  Hepatitis B Vaccine 3 dose series if at intermediate or high risk (ex: diabetes, end stage renal disease, liver disease)   Tetanus (Td) Vaccine - COST NOT COVERED BY MEDICARE PART B Following completion of primary series, a booster dose should be given every 10 years to maintain immunity against tetanus  Td may also be given as tetanus wound prophylaxis  Tdap Vaccine - COST NOT COVERED BY MEDICARE PART B Recommended at least once for all adults  For pregnant patients, recommended with each pregnancy  Shingles Vaccine (Shingrix) - COST NOT COVERED BY MEDICARE PART B  2 shot series recommended in those aged 48 and above     Health Maintenance Due:  There are no preventive care reminders to display for this patient  Immunizations Due:      Topic Date Due    COVID-19 Vaccine (4 - Booster for Moderna series) 05/05/2022    Influenza Vaccine (1) 09/01/2022     Advance Directives   What are advance directives? Advance directives are legal documents that state your wishes and plans for medical care  These plans are made ahead of time in case you lose your ability to make decisions for yourself  Advance directives can apply to any medical decision, such as the treatments you want, and if you want to donate organs  What are the types of advance directives? There are many types of advance directives, and each state has rules about how to use them  You may choose a combination of any of the following:  · Living will:   This is a written record of the treatment you want  You can also choose which treatments you do not want, which to limit, and which to stop at a certain time  This includes surgery, medicine, IV fluid, and tube feedings  · Durable power of  for healthcare [facility]): This is a written record that states who you want to make healthcare choices for you when you are unable to make them for yourself  This person, called a proxy, is usually a family member or a friend  You may choose more than 1 proxy  · Do not resuscitate (DNR) order:  A DNR order is used in case your heart stops beating or you stop breathing  It is a request not to have certain forms of treatment, such as CPR  A DNR order may be included in other types of advance directives  · Medical directive: This covers the care that you want if you are in a coma, near death, or unable to make decisions for yourself  You can list the treatments you want for each condition  Treatment may include pain medicine, surgery, blood transfusions, dialysis, IV or tube feedings, and a ventilator (breathing machine)  · Values history: This document has questions about your views, beliefs, and how you feel and think about life  This information can help others choose the care that you would choose  Why are advance directives important? An advance directive helps you control your care  Although spoken wishes may be used, it is better to have your wishes written down  Spoken wishes can be misunderstood, or not followed  Treatments may be given even if you do not want them  An advance directive may make it easier for your family to make difficult choices about your care  © Copyright [employer] 2018 Information is for End User's use only and may not be sold, redistributed or otherwise used for commercial purposes   All illustrations and images included in CareNotes® are the copyrighted property of [employer]  or [employer]

## 2022-09-21 DIAGNOSIS — R20.2 PARESTHESIA OF BOTH HANDS: ICD-10-CM

## 2022-09-22 RX ORDER — FOLIC ACID 1 MG/1
TABLET ORAL
Qty: 90 TABLET | Refills: 1 | Status: SHIPPED | OUTPATIENT
Start: 2022-09-22

## 2022-10-13 DIAGNOSIS — Z76.0 MEDICATION REFILL: ICD-10-CM

## 2022-10-14 ENCOUNTER — TELEPHONE (OUTPATIENT)
Dept: FAMILY MEDICINE CLINIC | Facility: CLINIC | Age: 82
End: 2022-10-14

## 2022-10-14 DIAGNOSIS — R05.9 COUGH, UNSPECIFIED TYPE: Primary | ICD-10-CM

## 2022-10-14 RX ORDER — METOPROLOL TARTRATE 50 MG/1
TABLET, FILM COATED ORAL
Qty: 180 TABLET | Refills: 3 | Status: SHIPPED | OUTPATIENT
Start: 2022-10-14

## 2022-10-14 RX ORDER — FUROSEMIDE 40 MG/1
TABLET ORAL
Qty: 90 TABLET | Refills: 3 | Status: SHIPPED | OUTPATIENT
Start: 2022-10-14

## 2022-10-14 RX ORDER — AZITHROMYCIN 250 MG/1
TABLET, FILM COATED ORAL
Qty: 6 TABLET | Refills: 0 | Status: SHIPPED | OUTPATIENT
Start: 2022-10-14 | End: 2022-10-19

## 2022-10-14 NOTE — TELEPHONE ENCOUNTER
Patient called says she has a cough with phlegm and wants a Z-jelena - says this is what usually works for her   Advised patient to make [facility] [place] on [address]

## 2022-11-17 DIAGNOSIS — K21.9 GASTROESOPHAGEAL REFLUX DISEASE WITHOUT ESOPHAGITIS: ICD-10-CM

## 2022-11-18 RX ORDER — ESOMEPRAZOLE MAGNESIUM 40 MG/1
CAPSULE, DELAYED RELEASE ORAL
Qty: 90 CAPSULE | Refills: 3 | Status: SHIPPED | OUTPATIENT
Start: 2022-11-18

## 2022-11-20 DIAGNOSIS — G47.00 INSOMNIA, UNSPECIFIED TYPE: ICD-10-CM

## 2022-11-21 RX ORDER — ZOLPIDEM TARTRATE 5 MG/1
TABLET ORAL
Qty: 90 TABLET | Refills: 0 | Status: SHIPPED | OUTPATIENT
Start: 2022-11-21

## 2022-12-14 ENCOUNTER — APPOINTMENT (EMERGENCY)
Dept: RADIOLOGY | Facility: HOSPITAL | Age: 82
End: 2022-12-14

## 2022-12-14 ENCOUNTER — APPOINTMENT (EMERGENCY)
Dept: CT IMAGING | Facility: HOSPITAL | Age: 82
End: 2022-12-14

## 2022-12-14 ENCOUNTER — HOSPITAL ENCOUNTER (INPATIENT)
Facility: HOSPITAL | Age: 82
LOS: 2 days | Discharge: HOME HEALTH | End: 2022-12-16
Attending: EMERGENCY MEDICINE | Admitting: STUDENT IN AN ORGANIZED HEALTH CARE EDUCATION/TRAINING PROGRAM

## 2022-12-14 DIAGNOSIS — S01.21XA LACERATION OF NOSE, INITIAL ENCOUNTER: ICD-10-CM
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
## 2022-12-14 DIAGNOSIS — S92.302A CLOSED DISPLACED FRACTURE OF METATARSAL BONE OF LEFT FOOT, UNSPECIFIED METATARSAL, INITIAL ENCOUNTER: Primary | ICD-10-CM

## 2022-12-14 PROBLEM — E87.1 HYPONATREMIA: Status: ACTIVE | Noted: 2022-12-14

## 2022-12-14 PROBLEM — E87.6 HYPOKALEMIA: Status: ACTIVE | Noted: 2022-12-14

## 2022-12-14 LAB
ALBUMIN SERPL BCP-MCNC: 4.3 G/DL (ref 3.5–5)
ALP SERPL-CCNC: 64 U/L (ref 34–104)
ALT SERPL W P-5'-P-CCNC: 23 U/L (ref 7–52)
ANION GAP SERPL CALCULATED.3IONS-SCNC: 11 MMOL/L (ref 4–13)
AST SERPL W P-5'-P-CCNC: 22 U/L (ref 13–39)
BASOPHILS # BLD AUTO: 0.02 THOUSANDS/ÂΜL (ref 0–0.1)
BASOPHILS NFR BLD AUTO: 0 % (ref 0–1)
BILIRUB SERPL-MCNC: 0.77 MG/DL (ref 0.2–1)
BUN SERPL-MCNC: 9 MG/DL (ref 5–25)
CALCIUM SERPL-MCNC: 9.7 MG/DL (ref 8.4–10.2)
CHLORIDE SERPL-SCNC: 91 MMOL/L (ref 96–108)
CO2 SERPL-SCNC: 29 MMOL/L (ref 21–32)
CREAT SERPL-MCNC: 0.82 MG/DL (ref 0.6–1.3)
EOSINOPHIL # BLD AUTO: 0.01 THOUSAND/ÂΜL (ref 0–0.61)
EOSINOPHIL NFR BLD AUTO: 0 % (ref 0–6)
ERYTHROCYTE [DISTWIDTH] IN BLOOD BY AUTOMATED COUNT: 11.6 % (ref 11.6–15.1)
GFR SERPL CREATININE-BSD FRML MDRD: 65 ML/MIN/1.73SQ M
GLUCOSE SERPL-MCNC: 157 MG/DL (ref 65–140)
HCT VFR BLD AUTO: 37.5 % (ref 34.8–46.1)
HGB BLD-MCNC: 13.3 G/DL (ref 11.5–15.4)
IMM GRANULOCYTES # BLD AUTO: 0.04 THOUSAND/UL (ref 0–0.2)
IMM GRANULOCYTES NFR BLD AUTO: 0 % (ref 0–2)
LYMPHOCYTES # BLD AUTO: 1.18 THOUSANDS/ÂΜL (ref 0.6–4.47)
LYMPHOCYTES NFR BLD AUTO: 12 % (ref 14–44)
MAGNESIUM SERPL-MCNC: 1.6 MG/DL (ref 1.9–2.7)
MCH RBC QN AUTO: 34.5 PG (ref 26.8–34.3)
MCHC RBC AUTO-ENTMCNC: 35.5 G/DL (ref 31.4–37.4)
MCV RBC AUTO: 97 FL (ref 82–98)
MONOCYTES # BLD AUTO: 0.66 THOUSAND/ÂΜL (ref 0.17–1.22)
MONOCYTES NFR BLD AUTO: 7 % (ref 4–12)
NEUTROPHILS # BLD AUTO: 7.58 THOUSANDS/ÂΜL (ref 1.85–7.62)
NEUTS SEG NFR BLD AUTO: 81 % (ref 43–75)
NRBC BLD AUTO-RTO: 0 /100 WBCS
PLATELET # BLD AUTO: 245 THOUSANDS/UL (ref 149–390)
PMV BLD AUTO: 8.6 FL (ref 8.9–12.7)
POTASSIUM SERPL-SCNC: 3.3 MMOL/L (ref 3.5–5.3)
PROT SERPL-MCNC: 7 G/DL (ref 6.4–8.4)
RBC # BLD AUTO: 3.85 MILLION/UL (ref 3.81–5.12)
SODIUM SERPL-SCNC: 131 MMOL/L (ref 135–147)
WBC # BLD AUTO: 9.49 THOUSAND/UL (ref 4.31–10.16)

## 2022-12-14 RX ORDER — FOLIC ACID 1 MG/1
1 TABLET ORAL DAILY
Status: DISCONTINUED | OUTPATIENT
Start: 2022-12-15 | End: 2022-12-16 | Stop reason: HOSPADM

## 2022-12-14 RX ORDER — OXYCODONE HYDROCHLORIDE 5 MG/1
2.5 TABLET ORAL EVERY 4 HOURS PRN
Status: DISCONTINUED | OUTPATIENT
Start: 2022-12-14 | End: 2022-12-16 | Stop reason: HOSPADM

## 2022-12-14 RX ORDER — LISINOPRIL 20 MG/1
20 TABLET ORAL EVERY MORNING
Status: DISCONTINUED | OUTPATIENT
Start: 2022-12-15 | End: 2022-12-16 | Stop reason: HOSPADM

## 2022-12-14 RX ORDER — ZOLPIDEM TARTRATE 5 MG/1
5 TABLET ORAL
Status: DISCONTINUED | OUTPATIENT
Start: 2022-12-14 | End: 2022-12-16

## 2022-12-14 RX ORDER — FUROSEMIDE 40 MG/1
40 TABLET ORAL DAILY PRN
Status: DISCONTINUED | OUTPATIENT
Start: 2022-12-14 | End: 2022-12-16 | Stop reason: HOSPADM

## 2022-12-14 RX ORDER — METOPROLOL TARTRATE 50 MG/1
50 TABLET, FILM COATED ORAL 2 TIMES DAILY
Status: DISCONTINUED | OUTPATIENT
Start: 2022-12-14 | End: 2022-12-16 | Stop reason: HOSPADM

## 2022-12-14 RX ORDER — MAGNESIUM SULFATE 1 G/100ML
1 INJECTION INTRAVENOUS ONCE
Status: COMPLETED | OUTPATIENT
Start: 2022-12-14 | End: 2022-12-15

## 2022-12-14 RX ORDER — PANTOPRAZOLE SODIUM 40 MG/1
40 TABLET, DELAYED RELEASE ORAL
Status: DISCONTINUED | OUTPATIENT
Start: 2022-12-15 | End: 2022-12-16 | Stop reason: HOSPADM

## 2022-12-14 RX ORDER — ACETAMINOPHEN 325 MG/1
650 TABLET ORAL EVERY 6 HOURS PRN
Status: DISCONTINUED | OUTPATIENT
Start: 2022-12-14 | End: 2022-12-16 | Stop reason: HOSPADM

## 2022-12-14 RX ORDER — LIDOCAINE HYDROCHLORIDE 10 MG/ML
5 INJECTION, SOLUTION EPIDURAL; INFILTRATION; INTRACAUDAL; PERINEURAL ONCE
Status: COMPLETED | OUTPATIENT
Start: 2022-12-14 | End: 2022-12-14

## 2022-12-14 RX ORDER — HEPARIN SODIUM 5000 [USP'U]/ML
5000 INJECTION, SOLUTION INTRAVENOUS; SUBCUTANEOUS EVERY 8 HOURS SCHEDULED
Status: DISCONTINUED | OUTPATIENT
Start: 2022-12-14 | End: 2022-12-16 | Stop reason: HOSPADM

## 2022-12-14 RX ORDER — POTASSIUM CHLORIDE 20 MEQ/1
40 TABLET, EXTENDED RELEASE ORAL ONCE
Status: COMPLETED | OUTPATIENT
Start: 2022-12-14 | End: 2022-12-14

## 2022-12-14 RX ORDER — LANOLIN ALCOHOL/MO/W.PET/CERES
100 CREAM (GRAM) TOPICAL DAILY
Status: DISCONTINUED | OUTPATIENT
Start: 2022-12-15 | End: 2022-12-16 | Stop reason: HOSPADM

## 2022-12-14 RX ADMIN — TETANUS TOXOID, REDUCED DIPHTHERIA TOXOID AND ACELLULAR PERTUSSIS VACCINE, ADSORBED 0.5 ML: 5; 2.5; 8; 8; 2.5 SUSPENSION INTRAMUSCULAR at 17:10

## 2022-12-14 RX ADMIN — MAGNESIUM SULFATE HEPTAHYDRATE 1 G: 1 INJECTION, SOLUTION INTRAVENOUS at 23:44

## 2022-12-14 RX ADMIN — ZOLPIDEM TARTRATE 5 MG: 5 TABLET ORAL at 23:44

## 2022-12-14 RX ADMIN — POTASSIUM CHLORIDE 40 MEQ: 1500 TABLET, EXTENDED RELEASE ORAL at 23:44

## 2022-12-14 RX ADMIN — HEPARIN SODIUM 5000 UNITS: 5000 INJECTION INTRAVENOUS; SUBCUTANEOUS at 22:17

## 2022-12-14 RX ADMIN — LIDOCAINE HYDROCHLORIDE 5 ML: 10 INJECTION, SOLUTION EPIDURAL; INFILTRATION; INTRACAUDAL; PERINEURAL at 17:10

## 2022-12-14 RX ADMIN — METOPROLOL TARTRATE 50 MG: 50 TABLET, FILM COATED ORAL at 21:14

## 2022-12-15 DIAGNOSIS — Z76.0 MEDICATION REFILL: ICD-10-CM

## 2022-12-15 LAB
ANION GAP SERPL CALCULATED.3IONS-SCNC: 10 MMOL/L (ref 4–13)
BASOPHILS # BLD AUTO: 0.03 THOUSANDS/ÂΜL (ref 0–0.1)
BASOPHILS NFR BLD AUTO: 0 % (ref 0–1)
BUN SERPL-MCNC: 8 MG/DL (ref 5–25)
CALCIUM SERPL-MCNC: 9.2 MG/DL (ref 8.4–10.2)
CHLORIDE SERPL-SCNC: 94 MMOL/L (ref 96–108)
CO2 SERPL-SCNC: 25 MMOL/L (ref 21–32)
CREAT SERPL-MCNC: 0.67 MG/DL (ref 0.6–1.3)
EOSINOPHIL # BLD AUTO: 0.02 THOUSAND/ÂΜL (ref 0–0.61)
EOSINOPHIL NFR BLD AUTO: 0 % (ref 0–6)
ERYTHROCYTE [DISTWIDTH] IN BLOOD BY AUTOMATED COUNT: 11.7 % (ref 11.6–15.1)
GFR SERPL CREATININE-BSD FRML MDRD: 80 ML/MIN/1.73SQ M
GLUCOSE SERPL-MCNC: 151 MG/DL (ref 65–140)
HCT VFR BLD AUTO: 33.7 % (ref 34.8–46.1)
HGB BLD-MCNC: 12 G/DL (ref 11.5–15.4)
IMM GRANULOCYTES # BLD AUTO: 0.01 THOUSAND/UL (ref 0–0.2)
IMM GRANULOCYTES NFR BLD AUTO: 0 % (ref 0–2)
LYMPHOCYTES # BLD AUTO: 1.77 THOUSANDS/ÂΜL (ref 0.6–4.47)
LYMPHOCYTES NFR BLD AUTO: 25 % (ref 14–44)
MAGNESIUM SERPL-MCNC: 3.2 MG/DL (ref 1.9–2.7)
MCH RBC QN AUTO: 35.3 PG (ref 26.8–34.3)
MCHC RBC AUTO-ENTMCNC: 35.6 G/DL (ref 31.4–37.4)
MCV RBC AUTO: 99 FL (ref 82–98)
MONOCYTES # BLD AUTO: 0.62 THOUSAND/ÂΜL (ref 0.17–1.22)
MONOCYTES NFR BLD AUTO: 9 % (ref 4–12)
NEUTROPHILS # BLD AUTO: 4.51 THOUSANDS/ÂΜL (ref 1.85–7.62)
NEUTS SEG NFR BLD AUTO: 66 % (ref 43–75)
NRBC BLD AUTO-RTO: 0 /100 WBCS
PHOSPHATE SERPL-MCNC: 3 MG/DL (ref 2.3–4.1)
PLATELET # BLD AUTO: 207 THOUSANDS/UL (ref 149–390)
PMV BLD AUTO: 8.9 FL (ref 8.9–12.7)
POTASSIUM SERPL-SCNC: 3.8 MMOL/L (ref 3.5–5.3)
RBC # BLD AUTO: 3.4 MILLION/UL (ref 3.81–5.12)
SODIUM SERPL-SCNC: 129 MMOL/L (ref 135–147)
WBC # BLD AUTO: 6.96 THOUSAND/UL (ref 4.31–10.16)

## 2022-12-15 RX ADMIN — ZOLPIDEM TARTRATE 5 MG: 5 TABLET ORAL at 21:00

## 2022-12-15 RX ADMIN — HEPARIN SODIUM 5000 UNITS: 5000 INJECTION INTRAVENOUS; SUBCUTANEOUS at 21:00

## 2022-12-15 RX ADMIN — LISINOPRIL 20 MG: 20 TABLET ORAL at 08:32

## 2022-12-15 RX ADMIN — HEPARIN SODIUM 5000 UNITS: 5000 INJECTION INTRAVENOUS; SUBCUTANEOUS at 05:34

## 2022-12-15 RX ADMIN — OXYCODONE HYDROCHLORIDE 2.5 MG: 5 TABLET ORAL at 19:27

## 2022-12-15 RX ADMIN — METOPROLOL TARTRATE 50 MG: 50 TABLET, FILM COATED ORAL at 18:50

## 2022-12-15 RX ADMIN — ACETAMINOPHEN 650 MG: 325 TABLET ORAL at 08:32

## 2022-12-15 RX ADMIN — METOPROLOL TARTRATE 50 MG: 50 TABLET, FILM COATED ORAL at 08:35

## 2022-12-15 RX ADMIN — HEPARIN SODIUM 5000 UNITS: 5000 INJECTION INTRAVENOUS; SUBCUTANEOUS at 14:22

## 2022-12-15 RX ADMIN — PANTOPRAZOLE SODIUM 40 MG: 40 TABLET, DELAYED RELEASE ORAL at 05:34

## 2022-12-15 NOTE — ED PROVIDER NOTES
History  Chief Complaint   Patient presents with   • Fall     [place] a [place] [employer]  Pt has lac to nose, denies any other complaints, no thinners  This is a 69-year-old female with past medical history of hyperlipidemia, hypertension, and alcoholism presenting to the emergency department today with facial trauma status post fall  She notes she was walking in high heels while going into a [employer]; she ended up tripping over an uneven portion of the street and landed face first   She notes pain throughout her face, specifically to her nose  She does note some nasal bleeding and also notes a laceration to the bridge of her nose  She also notes left-sided foot pain and notes it is difficult to stand on her left foot  She denies any use of anticoagulants or antiplatelets  She denies any neck pain, numbness, or tingling  She denies any loss of consciousness or vomiting  Tetanus shot is not up-to-date  She denies any focal neurologic deficits or weakness  She denies any prodromal symptoms prior to the fall including chest pain and shortness of breath  Foot pain is located to the midfoot and is not radiating but is worse with standing and bearing weight  The patient denies other complaints at this time  History provided by:  Patient   used: No    Fall  Mechanism of injury: fall    Injury location:  Foot and face  Facial injury location:  Nose  Foot injury location:  L foot  Incident location: at the [employer]  Time since incident: just PTA    Arrived directly from scene: yes    Suspicion of alcohol use: no    Suspicion of drug use: no    Tetanus status:  Out of date  Prior to arrival data:     Loss of consciousness: no      Amnesic to event: no    Associated symptoms: no abdominal pain, no back pain, no blindness, no chest pain, no difficulty breathing, no headaches, no hearing loss, no loss of consciousness, no nausea, no neck pain, no seizures and no vomiting    Risk factors: no anticoagulation therapy        Prior to Admission Medications   Prescriptions Last Dose Informant Patient Reported? Taking?    Biotin 5000 MCG TABS   Yes No   Sig: Take by mouth every morning   CALCIUM PO   Yes No   Sig: Take by mouth once a week   Cholecalciferol (VITAMIN D) 2000 units CAPS   Yes No   Sig: Take by mouth 3 (three) times a week   GLUCOSAMINE HCL PO   Yes No   Sig: Take 1,200 mg by mouth daily    IBUPROFEN IB PO   Yes No   Si po daily PRN   Magnesium 500 MG CAPS   Yes No   Sig: TAKES HERE AND THERE   Omega-3 Fatty Acids (FISH OIL) 1,000 mg   Yes No   Sig: Take 1,000 mg by mouth daily     cyanocobalamin (VITAMIN B-12) 500 MCG tablet   No No   Sig: Take 1 tablet (500 mcg total) by mouth daily   esomeprazole (NexIUM) 40 MG capsule   No No   Sig: TAKE 1 CAPSULE BY MOUTH  DAILY   folic acid (FOLVITE) 1 mg tablet   No No   Sig: TAKE 1 TABLET BY MOUTH EVERY DAY   furosemide (LASIX) 40 mg tablet   No No   Sig: TAKE 1 TABLET BY MOUTH  DAILY AS NEEDED FOR EDEMA   gabapentin (NEURONTIN) 100 mg capsule   No No   Sig: Take 1 capsule (100 mg total) by mouth 2 (two) times a day   lisinopril (ZESTRIL) 20 mg tablet   No No   Sig: TAKE 1 TABLET BY MOUTH IN  THE MORNING   metoprolol tartrate (LOPRESSOR) 50 mg tablet   No No   Sig: TAKE 1 TABLET BY MOUTH  TWICE DAILY   multivitamin (THERAGRAN) TABS   Yes No   Sig: Take 1 tablet by mouth daily     niacin 500 mg tablet   Yes No   Sig: Take 500 mg by mouth daily with breakfast   thiamine 100 MG tablet   No No   Sig: TAKE 1 TABLET BY MOUTH EVERY DAY   zolpidem (AMBIEN) 5 mg tablet   No No   Sig: TAKE 1 TABLET BY MOUTH  DAILY AT BEDTIME AS NEEDED  FOR SLEEP      Facility-Administered Medications: None       Past Medical History:   Diagnosis Date   • Anxiety    • Arthritis    • Prajapati esophagus    • Cataract    • Continuous chronic alcoholism (HCC)    • GERD (gastroesophageal reflux disease)    • Heavy alcohol use    • Hyperlipidemia • Hypertension    • Insomnia    • Palpitations        Past Surgical History:   Procedure Laterality Date   • BREAST IMPLANT Bilateral    • CATARACT EXTRACTION Right    • CHOLECYSTECTOMY      LAP   • COLONOSCOPY     • HYSTERECTOMY      age 69-[place]   • JOINT REPLACEMENT Bilateral     hips-2010 and 2017-left leg is shorter   • LASIK Bilateral     in her 50's   • MI XCAPSL CTRC RMVL INSJ IO LENS PROSTH W/O ECP Left 10/18/2018    Procedure: EXTRACTION EXTRACAPSULAR CATARACT PHACO INTRAOCULAR LENS (IOL); Surgeon: Nii Cee MD;  Location: [facility] MAIN OR;  Service: Ophthalmology   • TONSILLECTOMY     • TUBAL LIGATION         Family History   Problem Relation Age of Onset   • Cancer Mother         breast,kidney,lung (smoker)   • Heart disease Father 61        MI   • Cancer Sister         liver,pancreatic(smoker,ETOH)   • Diabetes Daughter    • No Known Problems Sister      I have reviewed and agree with the history as documented  E-Cigarette/Vaping   • E-Cigarette Use Never User      E-Cigarette/Vaping Substances   • Nicotine No    • THC No    • CBD No    • Flavoring No    • Other No    • Unknown No      Social History     Tobacco Use   • Smoking status: Never   • Smokeless tobacco: Never   Vaping Use   • Vaping Use: Never used   Substance Use Topics   • Alcohol use: Yes     Alcohol/week: 7 0 standard drinks     Types: 7 Glasses of wine per week   • Drug use: No       Review of Systems   Constitutional: Negative for appetite change, chills, diaphoresis, fatigue and fever  HENT: Positive for nosebleeds  Negative for congestion, ear discharge, ear pain, hearing loss, postnasal drip, rhinorrhea, sinus pressure, sinus pain, sore throat and trouble swallowing  Eyes: Negative for blindness  Respiratory: Negative for cough, chest tightness, shortness of breath and wheezing  Cardiovascular: Negative for chest pain, palpitations and leg swelling     Gastrointestinal: Negative for abdominal pain, constipation, diarrhea, nausea and vomiting  Musculoskeletal: Positive for arthralgias (foot pain)  Negative for back pain, neck pain and neck stiffness  Skin: Positive for wound  Negative for rash  Neurological: Negative for dizziness, seizures, loss of consciousness, syncope, weakness, light-headedness, numbness and headaches  Psychiatric/Behavioral: Negative for confusion  All other systems reviewed and are negative  Physical Exam  Physical Exam  Vitals and nursing note reviewed  Constitutional:       General: She is not in acute distress  Appearance: Normal appearance  She is normal weight  She is not ill-appearing, toxic-appearing or diaphoretic  HENT:      Head: Normocephalic and atraumatic  Right Ear: Tympanic membrane, ear canal and external ear normal  There is no impacted cerumen  Left Ear: Tympanic membrane, ear canal and external ear normal  There is no impacted cerumen  Ears:      Comments: No hemotympanum or Robles sign bilaterally     Nose:      Comments: Small laceration to the bridge of the nose  Scattered abrasions to the nasal bridge     Mouth/Throat:      Mouth: Mucous membranes are moist       Pharynx: No oropharyngeal exudate or posterior oropharyngeal erythema  Comments: Normal atraumatic dentition; mild swelling to the left upper lip  Eyes:      General: No scleral icterus  Right eye: No discharge  Left eye: No discharge  Extraocular Movements: Extraocular movements intact  Pupils: Pupils are equal, round, and reactive to light  Comments: Positive raccoon eyes bilaterally   Neck:      Comments: No tenderness to palpation to the cervical spine or the paracervical musculature tenderness to palpation; no step-offs or deformities  Cardiovascular:      Rate and Rhythm: Normal rate and regular rhythm  Pulses: Normal pulses  Heart sounds: Normal heart sounds  No murmur heard  No friction rub  No gallop     Pulmonary:      Effort: Pulmonary effort is normal  No respiratory distress  Breath sounds: Normal breath sounds  No stridor  No wheezing, rhonchi or rales  Chest:      Chest wall: No tenderness  Abdominal:      General: Abdomen is flat  There is no distension  Palpations: Abdomen is soft  Tenderness: There is no abdominal tenderness  There is no right CVA tenderness, left CVA tenderness, guarding or rebound  Musculoskeletal:         General: Normal range of motion  Cervical back: Normal range of motion  No tenderness  Right lower leg: No edema  Left lower leg: No edema  Comments: Tenderness to palpation throughout the left midfoot without any deformities or ecchymosis   Skin:     General: Skin is warm and dry  Capillary Refill: Capillary refill takes less than 2 seconds  Coloration: Skin is not jaundiced or pale  Neurological:      General: No focal deficit present  Mental Status: She is alert and oriented to person, place, and time  Mental status is at baseline        Comments: 5/5 strength in bilateral upper and lower extremities  Normal sensation of bilateral upper and lower extremities  The patient is able to smile, frown, puff out cheeks, and raise eyebrows bilaterally symmetrically without difficulty  Normal finger-to-nose examination  No cerebellar signs are dysdiadochokinesia  Overall, a normal neurologic assessment   Psychiatric:         Mood and Affect: Mood normal          Behavior: Behavior normal          Vital Signs  ED Triage Vitals [12/14/22 1545]   Temperature Pulse Respirations Blood Pressure SpO2   97 6 °F (36 4 °C) 67 16 161/81 100 %      Temp Source Heart Rate Source Patient Position - Orthostatic VS BP Location FiO2 (%)   Oral Monitor Lying Left arm --      Pain Score       7           Vitals:    12/14/22 1545   BP: 161/81   Pulse: 67   Patient Position - Orthostatic VS: Lying         Visual Acuity      ED Medications  Medications   lidocaine (PF) (XYLOCAINE-MPF) 1 % injection 5 mL (5 mL Infiltration Given 12/14/22 1710)   tetanus-diphtheria-acellular pertussis (BOOSTRIX) IM injection 0 5 mL (0 5 mL Intramuscular Given 12/14/22 1710)       Diagnostic Studies  Results Reviewed     None                 XR knee 4+ views left injury   Final Result by Gillian Tamez MD (12/14 1832)      No acute fracture  Mild degenerative changes  Chondrocalcinosis  Workstation performed: [id number]         XR tibia fibula 2 views LEFT   Final Result by Gillian Tamez MD (12/14 1828)      No acute osseous abnormality  Workstation performed: [id number]         XR foot 3+ views LEFT   Final Result by Gillian Tamez MD (12/14 1826)   Acute angulated fractures through the necks of the second through fifth metatarsals  The study was marked in [facility] for immediate notification  Workstation performed: [id number]         CT facial bones without contrast   Final Result by Cynthia Hampton DO (12/14 1647)      No acute facial bone fracture  Workstation performed: [id number]         CT head without contrast   Final Result by Cynthia Hampton DO (12/14 1637)      No acute intracranial abnormality  Chronic microangiopathic changes  Workstation performed: [id number]         CT spine cervical without contrast   Final Result by Cynthia Hampton DO (12/14 1700)      No cervical spine fracture or traumatic malalignment  Severe multilevel cervical spondylosis               Workstation performed: [id number]         CT lower extremity wo contrast left    (Results Pending)              Procedures  Splint application    Date/Time: 12/14/2022 6:50 PM  Performed by: Stu Haji PA-C  Authorized by: Stu Haji PA-C   Universal Protocol:  Consent given by: patient  Patient understanding: patient does not state understanding of the procedure being performed  Patient consent: the patient's understanding of the procedure does not match consent given  Patient identity confirmed: verbally with patient      Pre-procedure details:     Sensation:  Normal    Skin color:  Pink and Well-Perfused  Procedure details:     Laterality:  Left    Location:  Foot    Strapping: no      Splint type: posterior slab  Supplies:  Fiberglass and elastic bandage  Post-procedure details:     Pain:  Improved    Sensation:  Normal    Skin color:  Pink and Well-Perfused  Laceration repair    Date/Time: 12/14/2022 5:30 PM  Performed by: Alex Mcneil PA-C  Authorized by: Zenon Kenney PA-C   Consent: Verbal consent obtained    Consent given by: patient  Patient identity confirmed: verbally with patient  Body area: head/neck  Location details: nose  Laceration length: 1 cm  Tendon involvement: none  Nerve involvement: none  Vascular damage: no  Anesthesia: local infiltration    Anesthesia:  Local Anesthetic: lidocaine 1% without epinephrine  Anesthetic total: 2 mL    Sedation:  Patient sedated: no      Wound Dehiscence:  Superficial Wound Dehiscence: simple closure      Procedure Details:  Irrigation solution: saline  Irrigation method: jet lavage and syringe  Debridement: none  Degree of undermining: none  Skin closure: 6-0 nylon  Number of sutures: 3  Technique: simple  Approximation: close  Approximation difficulty: simple  Patient tolerance: patient tolerated the procedure well with no immediate complications  Comments: Wound was cleaned with povidone iodine prior to suturing closed  Wound was irrigated copiously prior to suturing closed               ED Course                                             MDM  Number of Diagnoses or Management Options  Closed displaced fracture of metatarsal bone of left foot, unspecified metatarsal, initial encounter: new and requires workup  Fall, initial encounter: new and requires workup  Laceration of nose, initial encounter: new and requires workup  Diagnosis management comments: 61-year-old female presenting to the emergency department today status post fall  The patient has bilateral raccoon eyes, nasal abrasions, nasal laceration, and pain to her left foot  Is having difficulty bearing weight on her left foot  Vital signs are stable  Physical exam shows tenderness to palpation to the left foot  Nasal laceration was repaired via the procedure above  CT head, cervical spine, and facial bones negative for any acute traumatic abnormalities  On left foot x-ray, the patient has acute second, third, fourth, and fifth metatarsal fractures  Otherwise, no acute osseous abnormalities on XR images  I discussed the case with Dr Raulito Hernandez with podiatry who discussed the case with Dr Keanu Alva   We will plan on admission secondary to ambulatory dysfunction  Posterior slab splint was placed  The patient and/or patient's proxy verify their understanding and agree to the plan at this time  All questions answered to the patient and/or their proxy's satisfaction  All labs reviewed and utilized in the medical decision making process  All radiology studies independently viewed by me and interpreted by the radiologist  Portions of the record may have been created with voice recognition software   Occasional wrong word or "sound a like" substitutions may have occurred due to the inherent limitations of voice recognition software   Read the chart carefully and recognize, using context, where substitutions have occurred         Amount and/or Complexity of Data Reviewed  Tests in the radiology section of CPT®: ordered and reviewed  Review and summarize past medical records: yes  Discuss the patient with other providers: yes (Ricci Hernandez - Podiatry)  Independent visualization of images, tracings, or specimens: yes (XR Left Knee  XR Left Foot  XR Left Tibia/Fibula)    Patient Progress  Patient progress: stable      Disposition  Final diagnoses:   Closed displaced fracture of metatarsal bone of left foot, unspecified metatarsal, initial encounter   Laceration of nose, initial encounter   Fall, initial encounter     Time reflects when diagnosis was documented in both MDM as applicable and the Disposition within this note     Time User Action Codes Description Comment    12/14/2022  6:54 PM Frank Kenney Add [S92 [id number]] Closed displaced fracture of metatarsal bone of left foot, unspecified metatarsal, initial encounter     12/14/2022  7:15 PM Shola Solano Add [[id number]] Laceration of nose, initial encounter     12/14/2022  7:15 PM Shola Solano Add [D32  Miky Drilling, initial encounter       ED Disposition     ED Disposition   Admit    Condition   Stable    Date/Time   Wed Dec 14, 2022  7:15 PM    Comment   Case was discussed with Aviva Gong PA-C and the patient's admission status was agreed to be Admission Status: inpatient status to the service of Dr Katherin Headley  Follow-up Information    None         Patient's Medications   Discharge Prescriptions    No medications on file       No discharge procedures on file      PDMP Review       Value Time User    PDMP Reviewed  Yes 11/21/2022 11:57 AM Kishore Andrade MD          ED Provider  Electronically Signed by           Oumar Zamarripa PA-C  12/14/22 1956

## 2022-12-15 NOTE — ASSESSMENT & PLAN NOTE
· Presents for evaluation after mechanical fall resulting in facial trauma  Reports she was walking at the [address] [place] [address] in high-heeled shoes and missed a step fell face first  Now with facial and L foot pain  Denies preceding dizziness/lightheadedness, chest pain/palpitations  No loss of consciousness  · Nasal bridge laceration sutured in ED  · She lives at home and does not use any walking assistive devices  No stairs at home    · CT head, cervical spine and facial bones negative for acute abnormalities  · XR L tibia negative  · With multiple metatarsal fractures, see plan as above  · Obtain baseline ECG  · Fall precautions, nonweightbearing to left foot  · PT/OT evaluation when appropriate

## 2022-12-15 NOTE — ED NOTES
RN received pt in bed awake alert and oriented X4, no distress noted  Family at bedside       Caitlyn Rhodes RN  12/14/22 1914

## 2022-12-15 NOTE — PROGRESS NOTES
[address] [address]  Progress Note Angie Duke 1940, 78 y o  female MRN: [id number]  Unit/Bed#: ED 15 Encounter: [id number]  Primary Care Provider: Elizabeth Truong MD   Date and time admitted to hospital: 12/14/2022  3:41 PM    * Closed nondisplaced fracture of metatarsal bone of left foot  Assessment & Plan  · Presents after mechanical fall obtained after tripping over uneven curb high-heeled shoes  With superficial facial trauma obtained and stable metatarsal fractures  · Splinted in ED  well-perfused, distal pulses intact  Reports minimal pain, no paresthesias  · XR L foot: Acute angulated fractures to the necks of the second through fifth metatarsals  · CT lower extremity: Acute minimally displaced fractures of the distal second through fifth metatarsals  · Patient was discussed by ED provider with on-call podiatry who suspects possible need for ORIF  · Nonweightbearing to left foot  · Analgesics as needed  · Podiatry evaluated patient, Recommend non-operative treatment with non-weightbearing cast to the left foot for 4 weeks, then possible transition to guarded weight bearing in a cam boot if subsequent x-rays show adequate callus formation  · PT/OT consulted for NWB gait/transfer training; if unstable, may need transfer to SNF    [address]  · Presents for evaluation after mechanical fall resulting in facial trauma  Reports she was walking at the [address] [place] [address] in high-heeled shoes and missed a step fell face first  Now with facial and L foot pain  Denies preceding dizziness/lightheadedness, chest pain/palpitations  No loss of consciousness  · Nasal bridge laceration sutured in ED  · She lives at home and does not use any walking assistive devices  No stairs at home    · CT head, cervical spine and facial bones negative for acute abnormalities  · XR L tibia negative  · With multiple metatarsal fractures, see plan as above  · Obtain baseline ECG  · Fall precautions, nonweightbearing to left foot  · PT/OT evaluation when appropriate    Hyponatremia  Assessment & Plan  · Na 132 corrected   · Likely with alcohol use   · Continue to monitor       Uncomplicated alcohol dependence ([address] )  Assessment & Plan  · Reports drinking wine daily, last drink  evening  · CIWA protocol  · Thiamine/folate supps     Essential hypertension  Assessment & Plan  · Continue Lopressor, lisinopril           VTE Pharmacologic Prophylaxis: VTE Score: 8 High Risk (Score >/= 5) - Pharmacological DVT Prophylaxis Ordered: heparin  Sequential Compression Devices Ordered  Patient Centered Rounds: I performed bedside rounds with nursing staff today  Discussions with Specialists or Other Care Team Provider: case management    Education and Discussions with Family / Patient: Updated  () via phone  Time Spent for Care: 30 minutes  More than 50% of total time spent on counseling and coordination of care as described above  Current Length of Stay: 1 day(s)  Current Patient Status: Inpatient   Certification Statement: The patient will continue to require additional inpatient hospital stay due to pt/ot eval   Discharge Plan: Anticipate discharge tomorrow to discharge location to be determined pending rehab evaluations  Code Status: Level 1 - Full Code    Subjective:   Patient seen examined at bedside  No acute events over night  Patient admits to having some mild pain  Denies any fevers chills chest pain shortness of breath    Objective:     Vitals:   Temp (24hrs), Av 8 °F (36 6 °C), Min:97 6 °F (36 4 °C), Max:98 °F (36 7 °C)    Temp:  [97 6 °F (36 4 °C)-98 °F (36 7 °C)] 98 °F (36 7 °C)  HR:  [] 68  Resp:  [16-18] 18  BP: (146-161)/(64-81) 146/71  SpO2:  [97 %-100 %] 97 %  Body mass index is 23 4 kg/m²  Input and Output Summary (last 24 hours):      Intake/Output Summary (Last 24 hours) at 12/15/2022 1414  Last data filed at 12/15/2022 0301  Gross per 24 hour Intake 100 ml   Output 150 ml   Net -50 ml       Physical Exam:   Physical Exam  Vitals reviewed  HENT:      Head: Normocephalic and atraumatic  Right Ear: External ear normal       Left Ear: External ear normal       Nose: Nose normal       Mouth/Throat:      Mouth: Mucous membranes are moist       Pharynx: Oropharynx is clear  Eyes:      Extraocular Movements: Extraocular movements intact  Cardiovascular:      Rate and Rhythm: Normal rate and regular rhythm  Heart sounds: Normal heart sounds  Pulmonary:      Effort: Pulmonary effort is normal       Breath sounds: Normal breath sounds  Abdominal:      General: Abdomen is flat  Palpations: Abdomen is soft  Tenderness: There is no abdominal tenderness  Musculoskeletal:      Cervical back: Normal range of motion  Right lower leg: No edema  Left lower leg: No edema  Comments: Left foot in splint, neurovascularly intact   Skin:     General: Skin is warm and dry  Neurological:      Mental Status: She is alert  Mental status is at baseline     Psychiatric:         Mood and Affect: Mood normal          Behavior: Behavior normal           Additional Data:     Labs:  Results from last 7 days   Lab Units 12/15/22  0541   WBC Thousand/uL 6 96   HEMOGLOBIN g/dL 12 0   HEMATOCRIT % 33 7*   PLATELETS Thousands/uL 207   NEUTROS PCT % 66   LYMPHS PCT % 25   MONOS PCT % 9   EOS PCT % 0     Results from last 7 days   Lab Units 12/15/22  0541 12/14/22  2101   SODIUM mmol/L 129* 131*   POTASSIUM mmol/L 3 8 3 3*   CHLORIDE mmol/L 94* 91*   CO2 mmol/L 25 29   BUN mg/dL 8 9   CREATININE mg/dL 0 67 0 82   ANION GAP mmol/L 10 11   CALCIUM mg/dL 9 2 9 7   ALBUMIN g/dL  --  4 3   TOTAL BILIRUBIN mg/dL  --  0 77   ALK PHOS U/L  --  64   ALT U/L  --  23   AST U/L  --  22   GLUCOSE RANDOM mg/dL 151* 157*                       Lines/Drains:  Invasive Devices     Peripheral Intravenous Line  Duration           Peripheral IV 12/14/22 Right Forearm <1 day                      Imaging: Reviewed radiology reports from this admission including: CT head    Recent Cultures (last 7 days):         Last 24 Hours Medication List:   Current Facility-Administered Medications   Medication Dose Route Frequency Provider Last Rate   • acetaminophen  650 mg Oral Q6H PRN Meghan Agarwal PA-C     • folic acid  1 mg Oral Daily Meghan Agarwal PA-C     • furosemide  40 mg Oral Daily PRN Meghan Agarwal PA-C     • heparin (porcine)  5,000 Units Subcutaneous [facility] Meghan Agarwal PA-C     • lisinopril  20 mg Oral QAM Meghan Agarwal PA-C     • metoprolol tartrate  50 mg Oral BID Meghan Agarwal PA-C     • multivitamin-minerals  1 tablet Oral Daily Meghan Agarwal PA-C     • oxyCODONE  2 5 mg Oral Q4H PRN Meghan Agarwal PA-C     • pantoprazole  40 mg Oral Early Morning Meghan Agarwal PA-C     • thiamine  100 mg Oral Daily Meghan Agarwal PA-C     • zolpidem  5 mg Oral HS PRN Meghan Agarwal PA-C          Today, Patient Was Seen By: Kaylan Dent DO    **Please Note: This note may have been constructed using a voice recognition system  **

## 2022-12-15 NOTE — PLAN OF CARE
Problem: MOBILITY - ADULT  Goal: Maintain or return to baseline ADL function  Description: INTERVENTIONS:  -  Assess patient's ability to carry out ADLs; assess patient's baseline for ADL function and identify physical deficits which impact ability to perform ADLs (bathing, care of mouth/teeth, toileting, grooming, dressing, etc )  - Assess/evaluate cause of self-care deficits   - Assess range of motion  - Assess patient's mobility; develop plan if impaired  - Assess patient's need for assistive devices and provide as appropriate  - Encourage maximum independence but intervene and supervise when necessary  - Involve family in performance of ADLs  - Assess for home care needs following discharge   - Consider OT consult to assist with ADL evaluation and planning for discharge  - Provide patient education as appropriate  Outcome: Progressing  Goal: Maintains/Returns to pre admission functional level  Description: INTERVENTIONS:  - Perform BMAT or MOVE assessment daily    - Set and communicate daily mobility goal to care team and patient/family/caregiver  - Collaborate with rehabilitation services on mobility goals if consulted  - Perform Range of Motion 3 times a day  - Reposition patient every 2 hours    - Dangle patient 3 times a day  - Stand patient 3 times a day  - Ambulate patient 3 times a day  - Out of bed to chair 3 times a day   - Out of bed for meals 3 times a day  - Out of bed for toileting  - Record patient progress and toleration of activity level   Outcome: Progressing     Problem: Potential for Falls  Goal: Patient will remain free of falls  Description: INTERVENTIONS:  - Educate patient/family on patient safety including physical limitations  - Instruct patient to call for assistance with activity   - Consult OT/PT to assist with strengthening/mobility   - Keep Call bell within reach  - Keep bed low and locked with side rails adjusted as appropriate  - Keep care items and personal belongings within reach  - Initiate and maintain comfort rounds  - Make Fall Risk Sign visible to staff  - Offer Toileting every 2 Hours, in advance of need  - Initiate/Maintain bed/chair alarm  - Apply yellow socks and bracelet for high fall risk patients  - Consider moving patient to room near nurses station  Outcome: Progressing

## 2022-12-15 NOTE — CONSULTS
Consultation - Fina Masters 78 y o  female MRN: [id number]    Unit/Bed#: ED 15 Encounter: [id number]      Assessment/Plan     Assessment:  Minimally displaced fractures 2-5 metatarsals    Plan:  - Left foot was examined and the posterior splint reapplied  - X-rays and CT scan images were personally reviewed  Fractures are minimally displaced, the lesser metatarsal parabola and joint spacing is maintained  There is minimal displacement of the lesser metatarsals noted in the sagittal plane on the CT scan  - Recommend non-operative treatment with non-weightbearing cast to the left foot for 4 weeks, then possible transition to guarded weight bearing in a cam boot if subsequent x-rays show adequate callus formation   - PT consult for NWB gait/transfer training; if unstable, may need transfer to [facility]  History of Present Illness     HPI: Fina Masters is a 78y o  year old female who presents with lesser metatarsal fractures of the left 2nd thru 5th metatarsals S/P fall yesterday  She is seen in the ED resting in NAD  She denies any significant foot pain at the moment  Consults    Review of Systems   Constitutional: Negative for chills and fever  HENT: Negative for ear pain and sore throat  Eyes: Negative for pain and visual disturbance  Respiratory: Negative for cough and shortness of breath  Cardiovascular: Negative for chest pain and palpitations  Gastrointestinal: Negative for abdominal pain and vomiting  Genitourinary: Negative for dysuria and hematuria  Musculoskeletal: Negative for arthralgias and back pain  Skin: Negative for color change and rash  Neurological: Negative for seizures and syncope  Psychiatric/Behavioral: Negative  All other systems reviewed and are negative        Historical Information   Past Medical History:   Diagnosis Date   • Anxiety    • Arthritis    • Prajapati esophagus    • Cataract    • Continuous chronic alcoholism ([facility] [address] )    • GERD (gastroesophageal reflux disease)    • Heavy alcohol use    • Hyperlipidemia    • Hypertension    • Insomnia    • Palpitations      Past Surgical History:   Procedure Laterality Date   • BREAST IMPLANT Bilateral    • CATARACT EXTRACTION Right    • CHOLECYSTECTOMY      LAP   • COLONOSCOPY     • HYSTERECTOMY      age 69-[place]   • JOINT REPLACEMENT Bilateral     hips-2010 and 2017-left leg is shorter   • LASIK Bilateral     in her 50's   • NE XCAPSL CTRC RMVL INSJ IO LENS PROSTH W/O ECP Left 10/18/2018    Procedure: EXTRACTION EXTRACAPSULAR CATARACT PHACO INTRAOCULAR LENS (IOL); Surgeon: Brian Melton MD;  Location: [facility] MAIN OR;  Service: Ophthalmology   • TONSILLECTOMY     • TUBAL LIGATION       Social History   Social History     Substance and Sexual Activity   Alcohol Use Yes   • Alcohol/week: 7 0 standard drinks   • Types: 7 Glasses of wine per week     Social History     Substance and Sexual Activity   Drug Use No     Social History     Tobacco Use   Smoking Status Never   Smokeless Tobacco Never     E-Cigarette/Vaping   • E-Cigarette Use Never User      E-Cigarette/Vaping Substances   • Nicotine No    • THC No    • CBD No    • Flavoring No    • Other No    • Unknown No       Family History: non-contributory    Meds/Allergies   all current active meds have been reviewed  No Known Allergies    Objective   Vitals: Blood pressure 146/71, pulse 68, temperature 98 °F (36 7 °C), temperature source Oral, resp  rate 18, height 5' 6" (1 676 m), weight 65 8 kg (145 lb), SpO2 97 %  Intake/Output Summary (Last 24 hours) at 12/15/2022 1259  Last data filed at 12/15/2022 0301  Gross per 24 hour   Intake 100 ml   Output 150 ml   Net -50 ml     Invasive Devices     Peripheral Intravenous Line  Duration           Peripheral IV 12/14/22 Right Forearm <1 day                Physical Exam  Vitals and nursing note reviewed  Constitutional:       General: She is not in acute distress  Appearance: She is well-developed     HENT:      Head: Normocephalic  Eyes:      Conjunctiva/sclera: Conjunctivae normal    Cardiovascular:      Rate and Rhythm: Normal rate and regular rhythm  Pulses:           Dorsalis pedis pulses are 2+ on the right side and 2+ on the left side  Posterior tibial pulses are 1+ on the right side and 1+ on the left side  Heart sounds: No murmur heard  Pulmonary:      Effort: Pulmonary effort is normal  No respiratory distress  Breath sounds: Normal breath sounds  Abdominal:      Palpations: Abdomen is soft  Tenderness: There is no abdominal tenderness  Musculoskeletal:         General: No swelling  Cervical back: Neck supple  Feet:      Left foot:      Skin integrity: Skin integrity normal       Comments: There is minimal edema to the left forefoot with minimal tenderness to palpation of the fracture sites of the second through fifth metatarsals; there is no ecchymosis to the left forefoot; gross alignment of the lesser toes is anatomic; there are no open wounds to the left foot; neurovascular status to the left foot is intact  Skin:     General: Skin is warm and dry  Capillary Refill: Capillary refill takes less than 2 seconds  Neurological:      Mental Status: She is alert  Psychiatric:         Mood and Affect: Mood normal          Lab Results: I have personally reviewed pertinent reports  Imaging Studies: I have personally reviewed pertinent films in PACS  EKG, Pathology, and Other Studies: I have personally reviewed pertinent reports      VTE Prophylaxis: Heparin

## 2022-12-15 NOTE — UTILIZATION REVIEW
Initial Clinical Review    Admission: Date/Time/Statement:   Admission Orders (From admission, onward)     Ordered        12/14/22 1916  INPATIENT ADMISSION  Once                      Orders Placed This Encounter   Procedures   • INPATIENT ADMISSION     Standing Status:   Standing     Number of Occurrences:   1     Order Specific Question:   Level of Care     Answer:   Med Surg [16]     Order Specific Question:   Estimated length of stay     Answer:   More than 2 Midnights     Order Specific Question:   Certification     Answer:   I certify that inpatient services are medically necessary for this patient for a duration of greater than two midnights  See H&P and MD Progress Notes for additional information about the patient's course of treatment  ED Arrival Information     Expected   -    Arrival   12/14/2022 15:41    Acuity   Urgent            Means of arrival   Ambulance    Escorted by   [facility]    Service   Hospitalist    Admission type   Emergency            Arrival complaint   FALL           Chief Complaint   Patient presents with   • Fall     Pt had [place] [employer]  Pt has lac to nose, denies any other complaints, no thinners  Initial Presentation: 78 y o  female to ED presents for Fall with Reports facial pain and pain of the left foot worse with weightbearing  She fell while walking at the [facility]  Reports that she was walking in high-heeled shoes and over uneven surface on the sidewalk jolting and fall landing face first  Denies LOC  In ED noted with laceration to nasal bridge and sutured  Xray of right foot shows  multiple angulated metatarsal fractures and splinted in ED  Pt unable to ambulate with crutches  PMH for HTN, alcohol abuse  Admit Inpatient level of care for Fall with Nondisplaced fracture of metatarsal bone of left foot, Hypokalemia and Hyponatremia  Podiatry consult  NPO at [facility]  NWB left foot  Neurovascular checks  K 3 3, check Mag   Monitor and replete prn  Na 132 and continue to monitor  Drinks wine daily, last drink 12/13 evening  CIWA assess  CIWA score = 0  XR L foot: Acute angulated fractures to the necks of the second through fifth metatarsals  CT lower extremity: Acute minimally displaced fractures of the distal second through fifth metatarsals    Date: 12/15   Day 2:   Podiatry cons; Minimally displaced fractures 2-5 metatarsals  Recommend non-operative treatment with non-weightbearing cast to the left foot for 4 weeks, then possible transition to guarded weight bearing in a cam boot if subsequent x-rays show adequate callus formation  PT consult for NWB gait/transfer training; if unstable, may need transfer to SNF  Progress notes; Non-operative treatment per Podiatry  Pain control  CIWA assess = 0    ED Triage Vitals [12/14/22 1545]   Temperature Pulse Respirations Blood Pressure SpO2   97 6 °F (36 4 °C) 67 16 161/81 100 %      Temp Source Heart Rate Source Patient Position - Orthostatic VS BP Location FiO2 (%)   Oral Monitor Lying Left arm --      Pain Score       7          Wt Readings from Last 1 Encounters:   12/14/22 65 8 kg (145 lb)     Additional Vital Signs:   12/15/22 1151 -- 68 18 146/71 91 97 % None (Room air) Lying   12/15/22 0828 -- 74 16 154/74 -- 100 % None (Room air) Lying   12/15/22 0300 -- 100 -- 148/64 -- -- -- --   12/14/22 2116 -- 72 -- 150/64 -- -- -- --   12/14/22 2114 -- 72 -- 150/64 -- -- -- --   12/14/22 2112 98 °F (36 7 °C) 72 16 150/64 87 100 % None (Room air)        Pertinent Labs/Diagnostic Test Results:   CT lower extremity wo contrast left   Final Result by Gustabo Goltz, MD (12/14 2042)      Acute minimally displaced fractures at the distal second through fifth metatarsals            XR knee 4+ views left injury   Final Result by Jennifer Brown MD (12/14 1832)      No acute fracture  Mild degenerative changes  Chondrocalcinosis  XR tibia fibula 2 views LEFT   Final Result by SAMANTHA WATERMAN Schoenberger, MD (12/14 1828)      No acute osseous abnormality  XR foot 3+ views LEFT   Final Result by oGnzalez Gomez MD (12/14 1826)   Acute angulated fractures through the necks of the second through fifth metatarsals  CT facial bones without contrast   Final Result by Army Sicard, DO (12/14 1647)      No acute facial bone fracture  CT head without contrast   Final Result by Army Sicard, DO (12/14 1637)      No acute intracranial abnormality  Chronic microangiopathic changes  CT spine cervical without contrast   Final Result by Army Sicard, DO (12/14 1700)      No cervical spine fracture or traumatic malalignment  Severe multilevel cervical spondylosis              Results from last 7 days   Lab Units 12/15/22  0541 12/14/22  2101   WBC Thousand/uL 6 96 9 49   HEMOGLOBIN g/dL 12 0 13 3   HEMATOCRIT % 33 7* 37 5   PLATELETS Thousands/uL 207 245   NEUTROS ABS Thousands/µL 4 51 7 58         Results from last 7 days   Lab Units 12/15/22  0541 12/14/22  2101   SODIUM mmol/L 129* 131*   POTASSIUM mmol/L 3 8 3 3*   CHLORIDE mmol/L 94* 91*   CO2 mmol/L 25 29   ANION GAP mmol/L 10 11   BUN mg/dL 8 9   CREATININE mg/dL 0 67 0 82   EGFR ml/min/1 73sq m 80 65   CALCIUM mg/dL 9 2 9 7   MAGNESIUM mg/dL 3 2* 1 6*   PHOSPHORUS mg/dL 3 0  --      Results from last 7 days   Lab Units 12/14/22  2101   AST U/L 22   ALT U/L 23   ALK PHOS U/L 64   TOTAL PROTEIN g/dL 7 0   ALBUMIN g/dL 4 3   TOTAL BILIRUBIN mg/dL 0 77         Results from last 7 days   Lab Units 12/15/22  0541 12/14/22  2101   GLUCOSE RANDOM mg/dL 151* 157*       ED Treatment:   Medication Administration from 12/14/2022 1541 to 12/15/2022 1156       Date/Time Order Dose Route Action     12/14/2022 1710 EST lidocaine (PF) (XYLOCAINE-MPF) 1 % injection 5 mL 5 mL Infiltration Given     12/14/2022 1710 EST tetanus-diphtheria-acellular pertussis (BOOSTRIX) IM injection 0 5 mL 0 5 mL Intramuscular Given     12/15/2022 0534 EST pantoprazole (PROTONIX) EC tablet 40 mg 40 mg Oral Given     12/15/2022 0832 EST lisinopril (ZESTRIL) tablet 20 mg 20 mg Oral Given     12/15/2022 0835 EST metoprolol tartrate (LOPRESSOR) tablet 50 mg 50 mg Oral Given     12/14/2022 2114 EST metoprolol tartrate (LOPRESSOR) tablet 50 mg 50 mg Oral Given     12/14/2022 2344 EST zolpidem (AMBIEN) tablet 5 mg 5 mg Oral Given     12/15/2022 0534 EST heparin (porcine) subcutaneous injection 5,000 Units 5,000 Units Subcutaneous Given     12/14/2022 2217 EST heparin (porcine) subcutaneous injection 5,000 Units 5,000 Units Subcutaneous Given     12/15/2022 0697 EST acetaminophen (TYLENOL) tablet 650 mg 650 mg Oral Given     12/14/2022 2344 EST potassium chloride (K-DUR,KLOR-CON) CR tablet 40 mEq 40 mEq Oral Given     12/14/2022 2344 EST magnesium sulfate IVPB (premix) SOLN 1 g 1 g Intravenous New Bag        Past Medical History:   Diagnosis Date   • Anxiety    • Arthritis    • Prajapati esophagus    • Cataract    • Continuous chronic alcoholism (HCC)    • GERD (gastroesophageal reflux disease)    • Heavy alcohol use    • Hyperlipidemia    • Hypertension    • Insomnia    • Palpitations      Present on Admission:  • Essential hypertension  • Uncomplicated alcohol dependence (HCC)      Admitting Diagnosis: Facial injury [S09 [id number]]  Age/Sex: 78 y o  female     Admission Orders:  Scheduled Medications:  folic acid, 1 mg, Oral, Daily  heparin (porcine), 5,000 Units, Subcutaneous, Q8H [address]  lisinopril, 20 mg, Oral, QAM  metoprolol tartrate, 50 mg, Oral, BID  multivitamin-minerals, 1 tablet, Oral, Daily  pantoprazole, 40 mg, Oral, Early Morning  thiamine, 100 mg, Oral, Daily      Continuous IV Infusions:     PRN Meds:  acetaminophen, 650 mg, Oral, Q6H PRN  furosemide, 40 mg, Oral, Daily PRN  oxyCODONE, 2 5 mg, Oral, Q4H PRN  zolpidem, 5 mg, Oral, HS PRN      Neurovascular checks q4h  IP CONSULT TO PODIATRY    Network Utilization Review Department  ATTENTION: Please call with any questions or concerns to [phone] and carefully listen to the prompts so that you are directed to the right person  All voicemails are confidential   Mary Marc all requests for admission clinical reviews, approved or denied determinations and any other requests to dedicated fax number below belonging to the campus where the patient is receiving treatment   List of dedicated fax numbers for the Facilities:  [address] DENIALS (Administrative/Medical Necessity) [phone]   [address] (Maternity/NICU/Pediatrics) [phone]   [address] [phone]   [facility] [address] [phone]   [address] [address] [facility] [phone]   [facility] [place] [facility] [address] [facility] [phone]

## 2022-12-15 NOTE — ASSESSMENT & PLAN NOTE
· Presents after mechanical fall obtained after tripping over uneven curb high-heeled shoes  With superficial facial trauma obtained and stable metatarsal fractures  · Splinted in ED  well-perfused, distal pulses intact  Reports minimal pain, no paresthesias  · XR L foot: Acute angulated fractures to the necks of the second through fifth metatarsals  · CT lower extremity: Acute minimally displaced fractures of the distal second through fifth metatarsals  · Patient was discussed by ED provider with on-call podiatry who suspects possible need for ORIF  · Nonweightbearing to left foot  · Analgesics as needed  · Podiatry evaluated patient, Recommend non-operative treatment with non-weightbearing cast to the left foot for 4 weeks, then possible transition to guarded weight bearing in a cam boot if subsequent x-rays show adequate callus formation    · PT/OT consulted for NWB gait/transfer training; if unstable, may need transfer to SNF

## 2022-12-15 NOTE — ASSESSMENT & PLAN NOTE
· Presents after mechanical fall obtained after tripping over uneven curb high-heeled shoes  With superficial facial trauma obtained and stable metatarsal fractures  · Splinted in ED  well-perfused, distal pulses intact    Reports minimal pain, no paresthesias  · XR L foot: Acute angulated fractures to the necks of the second through fifth metatarsals  · CT lower extremity: Acute minimally displaced fractures of the distal second through fifth metatarsals  · Patient was discussed by ED provider with on-call podiatry who suspects possible need for ORIF  · NPO at midnight  · Nonweightbearing to left foot  · Analgesics as needed  · Neurovascular checks

## 2022-12-15 NOTE — ASSESSMENT & PLAN NOTE
· Presents for evaluation after mechanical fall resulting in facial trauma  Reports she was walking at the [address] [place] [address] in high-heeled shoes and missed a step fell face first  Now with facial and L foot pain  Denies preceding dizziness/lightheadedness, chest pain/palpitations  No loss of consciousness  · Nasal bridge laceration sutured in ED  · She lives at home and does not use any walking assistive devices  No stairs at home    · CT head, cervical spine and facial bones negative for acute abnormalities  · XR L tibia negative  · With multiple metatarsal fractures, see plan as above  · Obtain baseline ECG  · Fall precautions, nonweightbearing to left foot  · PT/OT evaluation when appropriate

## 2022-12-15 NOTE — H&P
Annette U  64   H&P- Karma Shane 1940, 78 y o  female MRN: [id number]  Unit/Bed#: ED 15 Encounter: [id number]  Primary Care Provider: Rahul Veliz MD   Date and time admitted to hospital: 12/14/2022  3:41 PM    * Closed nondisplaced fracture of metatarsal bone of left foot  Assessment & Plan  · Presents after mechanical fall obtained after tripping over uneven curb high-heeled shoes  With superficial facial trauma obtained and stable metatarsal fractures  · Splinted in ED  well-perfused, distal pulses intact  Reports minimal pain, no paresthesias  · XR L foot: Acute angulated fractures to the necks of the second through fifth metatarsals  · CT lower extremity: Acute minimally displaced fractures of the distal second through fifth metatarsals  · Patient was discussed by ED provider with on-call podiatry who suspects possible need for ORIF  · NPO at midnight  · Nonweightbearing to left foot  · Analgesics as needed  · Neurovascular checks    [address]  · Presents for evaluation after mechanical fall resulting in facial trauma  Reports she was walking at the [address] [place] [address] in high-heeled shoes and missed a step fell face first  Now with facial and L foot pain  Denies preceding dizziness/lightheadedness, chest pain/palpitations  No loss of consciousness  · Nasal bridge laceration sutured in ED  · She lives at home and does not use any walking assistive devices  No stairs at home    · CT head, cervical spine and facial bones negative for acute abnormalities  · XR L tibia negative  · With multiple metatarsal fractures, see plan as above  · Obtain baseline ECG  · Fall precautions, nonweightbearing to left foot  · PT/OT evaluation when appropriate    Hypokalemia  Assessment & Plan  · K 3 3 - check Mag  · Monitor & replete p r n    Hyponatremia  Assessment & Plan  · Na 132 corrected   · Likely with alcohol use   · Continue to monitor       Uncomplicated alcohol dependence [facility])  Assessment & Plan  · Reports drinking wine daily, last drink 12/13 evening  · CIWA protocol  · Thiamine/folate supps     Essential hypertension  Assessment & Plan  · Continue Lopressor, lisinopril     VTE Pharmacologic Prophylaxis: VTE Score: 8 Heparin  Code Status: Level 1 - Full Code   Discussion with family: Updated  () at bedside  Anticipated Length of Stay: Patient will be admitted on an inpatient basis with an anticipated length of stay of greater than 2 midnights secondary to multiple metatarsal fx requiring podiatry evalatuion, possible ORIF  Total Time for Visit, including Counseling / Coordination of Care: 60 minutes Greater than 50% of this total time spent on direct patient counseling and coordination of care  Chief Complaint: fall    History of Present Illness:  Shell Schmitt is a 78 y o  female with a PMH of HTN, alcohol use who presents after sustaining a fall while walking at the [facility]   She reports that she was walking in high-heeled shoes and over uneven surface on the sidewalk jolting and fall landing face first   She denies any loss of consciousness  No preceding symptoms of dizziness/lightheadedness, chest pain, palpitations  Reports facial pain and pain of the left foot worse with weightbearing  Denies any tingling of extremities, neck pain, back pain, headache, change in vision, abdominal pain, N/V/D, chest pain  In ER, noted with laceration of her nasal bridge that was sutured  Underwent CT head, CT facial bones and CT cervical spine which were negative for acute abnormality  X-ray left tibia negative  X-ray of the right foot revealing multiple angulated metatarsal fractures  Was splinted in ER, and attempted ambulation with use of crutches -unable to  Case discussed with podiatry on-call in ED who advised possible need for ORIF  For admission for podiatry evaluation and PT/OT evaluation when appropriate      Review of Systems:  Review of Systems   Constitutional: Negative for activity change, appetite change, chills and fever  HENT: Positive for facial swelling  Negative for congestion, ear pain, hearing loss, nosebleeds and trouble swallowing  Eyes: Negative for photophobia and visual disturbance  Respiratory: Negative for chest tightness, shortness of breath and wheezing  Cardiovascular: Negative for chest pain, palpitations and leg swelling  Gastrointestinal: Negative for abdominal pain, diarrhea, nausea and vomiting  Endocrine: Negative for polyuria  Genitourinary: Negative for difficulty urinating, hematuria and urgency  Musculoskeletal: Negative for back pain and neck pain  Skin: Positive for color change  Negative for rash  Neurological: Negative for dizziness, tremors, seizures, syncope, facial asymmetry, speech difficulty, weakness, light-headedness, numbness and headaches  Psychiatric/Behavioral: Positive for sleep disturbance  Past Medical and Surgical History:   Past Medical History:   Diagnosis Date   • Anxiety    • Arthritis    • Prajapati esophagus    • Cataract    • Continuous chronic alcoholism ([facility] [address] )    • GERD (gastroesophageal reflux disease)    • Heavy alcohol use    • Hyperlipidemia    • Hypertension    • Insomnia    • Palpitations        Past Surgical History:   Procedure Laterality Date   • BREAST IMPLANT Bilateral    • CATARACT EXTRACTION Right    • CHOLECYSTECTOMY      LAP   • COLONOSCOPY     • HYSTERECTOMY      age 69-[place]   • JOINT REPLACEMENT Bilateral     hips-2010 and 2017-left leg is shorter   • LASIK Bilateral     in her 50's   • NC XCAPSL CTRC RMVL INSJ IO LENS PROSTH W/O ECP Left 10/18/2018    Procedure: EXTRACTION EXTRACAPSULAR CATARACT PHACO INTRAOCULAR LENS (IOL); Surgeon: Willy Fabian MD;  Location: [facility] MAIN OR;  Service: Ophthalmology   • TONSILLECTOMY     • TUBAL LIGATION         Meds/Allergies:  Prior to Admission medications    Medication Sig Start Date End Date Taking? Authorizing Provider   Biotin 5000 MCG TABS Take by mouth every morning    Historical Provider, MD   CALCIUM PO Take by mouth once a week    Historical Provider, MD   Cholecalciferol (VITAMIN D) 2000 units CAPS Take by mouth 3 (three) times a week    Historical Provider, MD   cyanocobalamin (VITAMIN B-12) 500 MCG tablet Take 1 tablet (500 mcg total) by mouth daily 3/29/22   Evelyne Cannon MD   esomeprazole (NexIUM) 40 MG capsule TAKE 1 CAPSULE BY MOUTH  DAILY 11/18/22   Evelyne Cannon MD   folic acid (FOLVITE) 1 mg tablet TAKE 1 TABLET BY MOUTH EVERY DAY 9/22/22   Evelyne Cannon MD   furosemide (LASIX) 40 mg tablet TAKE 1 TABLET BY MOUTH  DAILY AS NEEDED FOR EDEMA 10/14/22   Evelyne Cannon MD   gabapentin (NEURONTIN) 100 mg capsule Take 1 capsule (100 mg total) by mouth 2 (two) times a day 3/29/22   Evelyne Cannon MD   GLUCOSAMINE HCL PO Take 1,200 mg by mouth daily     Historical Provider, MD   IBUPROFEN IB PO 1 po daily PRN    Historical Provider, MD   lisinopril (ZESTRIL) 20 mg tablet TAKE 1 TABLET BY MOUTH IN  THE MORNING 1/12/22   Evelyne Cannon MD   Magnesium 500 MG CAPS TAKES HERE AND THERE    Historical Provider, MD   metoprolol tartrate (LOPRESSOR) 50 mg tablet TAKE 1 TABLET BY MOUTH  TWICE DAILY 10/14/22   Evelyne Cannon MD   multivitamin (THERAGRAN) TABS Take 1 tablet by mouth daily      Historical Provider, MD   niacin 500 mg tablet Take 500 mg by mouth daily with breakfast    Historical Provider, MD   Omega-3 Fatty Acids (FISH OIL) 1,000 mg Take 1,000 mg by mouth daily      Historical Provider, MD   thiamine 100 MG tablet TAKE 1 TABLET BY MOUTH EVERY DAY 3/31/22   Walter Morel MD   zolpidem (AMBIEN) 5 mg tablet TAKE 1 TABLET BY MOUTH  DAILY AT BEDTIME AS NEEDED  FOR SLEEP 11/21/22   Evelyne Cannon MD     I have reviewed home medications with patient personally      Allergies: No Known Allergies    Social History:  Marital Status: /Civil Union   Occupation:   Patient Pre-hospital Living Situation: Home  Patient Pre-hospital Level of Mobility: walks  Patient Pre-hospital Diet Restrictions:   Substance Use History:   Social History     Substance and Sexual Activity   Alcohol Use Yes   • Alcohol/week: 7 0 standard drinks   • Types: 7 Glasses of wine per week     Social History     Tobacco Use   Smoking Status Never   Smokeless Tobacco Never     Social History     Substance and Sexual Activity   Drug Use No       Family History:  Family History   Problem Relation Age of Onset   • Cancer Mother         breast,kidney,lung (smoker)   • Heart disease Father 61        MI   • Cancer Sister         liver,pancreatic(smoker,ETOH)   • Diabetes Daughter    • No Known Problems Sister        Physical Exam:     Vitals:   Blood Pressure: 150/64 (12/14/22 2116)  Pulse: 72 (12/14/22 2116)  Temperature: 98 °F (36 7 °C) (12/14/22 2112)  Temp Source: Oral (12/14/22 2112)  Respirations: 16 (12/14/22 2112)  Height: 5' 6" (167 6 cm) (12/14/22 2112)  Weight - Scale: 65 8 kg (145 lb) (12/14/22 1545)  SpO2: 100 % (12/14/22 2112)    Physical Exam  Vitals and nursing note reviewed  Constitutional:       General: She is not in acute distress  Appearance: She is well-developed  HENT:      Head: Normocephalic and atraumatic  Nose:      Comments: Nasal laceration s/p repair     Mouth/Throat:      Mouth: Mucous membranes are moist    Eyes:      General: No visual field deficit  Extraocular Movements: Extraocular movements intact  Pupils: Pupils are equal, round, and reactive to light  Comments: Ecchymosis under bilateral eyes  PERRLA  EOM intact and nonpainful  Cardiovascular:      Rate and Rhythm: Normal rate and regular rhythm  Pulses: Normal pulses  Heart sounds: Normal heart sounds  No murmur heard  Pulmonary:      Effort: Pulmonary effort is normal  No respiratory distress  Breath sounds: Normal breath sounds  No wheezing or rales  Abdominal:      General: Abdomen is flat   Bowel sounds are normal  There is no distension  Palpations: Abdomen is soft  Tenderness: There is no abdominal tenderness  Musculoskeletal:      Right lower leg: No edema  Left lower leg: No edema  Comments: DP intact and well perfused  S/p splinting  Able to move toes  Skin:     General: Skin is warm and dry  Neurological:      General: No focal deficit present  Mental Status: She is alert and oriented to person, place, and time  Cranial Nerves: Cranial nerves 2-12 are intact  No cranial nerve deficit, dysarthria or facial asymmetry  Motor: No weakness  Coordination: Finger-Nose-Finger Test normal  Rapid alternating movements normal       Comments: No acute neuro deficits  CN II-VII grossly intact  Negative pronator drift, negative cerebellar signs   Unable to access gait given foot fx   Psychiatric:         Mood and Affect: Mood normal          Behavior: Behavior normal          Additional Data:   Lab Results:  Results from last 7 days   Lab Units 12/14/22  2101   WBC Thousand/uL 9 49   HEMOGLOBIN g/dL 13 3   HEMATOCRIT % 37 5   PLATELETS Thousands/uL 245   NEUTROS PCT % 81*   LYMPHS PCT % 12*   MONOS PCT % 7   EOS PCT % 0     Results from last 7 days   Lab Units 12/14/22  2101   SODIUM mmol/L 131*   POTASSIUM mmol/L 3 3*   CHLORIDE mmol/L 91*   CO2 mmol/L 29   BUN mg/dL 9   CREATININE mg/dL 0 82   ANION GAP mmol/L 11   CALCIUM mg/dL 9 7   ALBUMIN g/dL 4 3   TOTAL BILIRUBIN mg/dL 0 77   ALK PHOS U/L 64   ALT U/L 23   AST U/L 22   GLUCOSE RANDOM mg/dL 157*                       Lines/Drains:  Invasive Devices     Peripheral Intravenous Line  Duration           Peripheral IV 12/14/22 Right Forearm <1 day                    Imaging: Reviewed radiology reports from this admission including: CT head and CT facial bones, CT cervical spine, CT left lower extremity,XR tibia, XR foot  CT lower extremity wo contrast left   Final Result by Jimmy Saucedo MD (12/14 2042)      Acute minimally displaced fractures at the distal second through fifth metatarsals         Workstation performed: [id number]         XR knee 4+ views left injury   Final Result by Maykel Jarrell MD (12/14 1832)      No acute fracture  Mild degenerative changes  Chondrocalcinosis  Workstation performed: [id number]         XR tibia fibula 2 views LEFT   Final Result by Maykel Jarrell MD (12/14 1828)      No acute osseous abnormality  Workstation performed: [id number]         XR foot 3+ views LEFT   Final Result by Maykel Jarrell MD (12/14 1826)   Acute angulated fractures through the necks of the second through fifth metatarsals  The study was marked in [facility] for immediate notification  Workstation performed: [id number]         CT facial bones without contrast   Final Result by Tamir Langston DO (12/14 1647)      No acute facial bone fracture  Workstation performed: [id number]         CT head without contrast   Final Result by Tamir Langston DO (12/14 1637)      No acute intracranial abnormality  Chronic microangiopathic changes  Workstation performed: [id number]         CT spine cervical without contrast   Final Result by Tamir Langston DO (12/14 1700)      No cervical spine fracture or traumatic malalignment  Severe multilevel cervical spondylosis  Workstation performed: [id number]             EKG and Other Studies Reviewed on Admission:   · EKG: No EKG obtained  ** Please Note: This note has been constructed using a voice recognition system   **

## 2022-12-16 VITALS
HEART RATE: 81 BPM | DIASTOLIC BLOOD PRESSURE: 71 MMHG | TEMPERATURE: 97.6 F | BODY MASS INDEX: 23.3 KG/M2 | RESPIRATION RATE: 16 BRPM | SYSTOLIC BLOOD PRESSURE: 163 MMHG | WEIGHT: 145 LBS | OXYGEN SATURATION: 96 % | HEIGHT: 66 IN

## 2022-12-16 LAB
ALBUMIN SERPL BCP-MCNC: 3.8 G/DL (ref 3.5–5)
ANION GAP SERPL CALCULATED.3IONS-SCNC: 8 MMOL/L (ref 4–13)
BUN SERPL-MCNC: 5 MG/DL (ref 5–25)
CALCIUM SERPL-MCNC: 9.2 MG/DL (ref 8.4–10.2)
CHLORIDE SERPL-SCNC: 95 MMOL/L (ref 96–108)
CO2 SERPL-SCNC: 28 MMOL/L (ref 21–32)
CREAT SERPL-MCNC: 0.57 MG/DL (ref 0.6–1.3)
DME PARACHUTE DELIVERY DATE EXPECTED: NORMAL
DME PARACHUTE DELIVERY DATE REQUESTED: NORMAL
DME PARACHUTE ITEM DESCRIPTION: NORMAL
DME PARACHUTE ITEM DESCRIPTION: NORMAL
DME PARACHUTE ORDER STATUS: NORMAL
DME PARACHUTE SUPPLIER NAME: NORMAL
DME PARACHUTE SUPPLIER PHONE: NORMAL
ERYTHROCYTE [DISTWIDTH] IN BLOOD BY AUTOMATED COUNT: 11.4 % (ref 11.6–15.1)
GFR SERPL CREATININE-BSD FRML MDRD: 85 ML/MIN/1.73SQ M
GLUCOSE SERPL-MCNC: 111 MG/DL (ref 65–140)
HCT VFR BLD AUTO: 33.8 % (ref 34.8–46.1)
HGB BLD-MCNC: 11.8 G/DL (ref 11.5–15.4)
MAGNESIUM SERPL-MCNC: 1.7 MG/DL (ref 1.9–2.7)
MCH RBC QN AUTO: 34 PG (ref 26.8–34.3)
MCHC RBC AUTO-ENTMCNC: 34.9 G/DL (ref 31.4–37.4)
MCV RBC AUTO: 97 FL (ref 82–98)
PHOSPHATE SERPL-MCNC: 3 MG/DL (ref 2.3–4.1)
PLATELET # BLD AUTO: 214 THOUSANDS/UL (ref 149–390)
PMV BLD AUTO: 9.2 FL (ref 8.9–12.7)
POTASSIUM SERPL-SCNC: 3.5 MMOL/L (ref 3.5–5.3)
RBC # BLD AUTO: 3.47 MILLION/UL (ref 3.81–5.12)
SODIUM SERPL-SCNC: 131 MMOL/L (ref 135–147)
WBC # BLD AUTO: 6.03 THOUSAND/UL (ref 4.31–10.16)

## 2022-12-16 RX ORDER — LISINOPRIL 20 MG/1
TABLET ORAL
Qty: 90 TABLET | Refills: 3 | Status: SHIPPED | OUTPATIENT
Start: 2022-12-16

## 2022-12-16 RX ORDER — OXYCODONE HYDROCHLORIDE 5 MG/1
2.5 TABLET ORAL EVERY 4 HOURS PRN
Qty: 20 TABLET | Refills: 0 | Status: SHIPPED | OUTPATIENT
Start: 2022-12-16 | End: 2022-12-22

## 2022-12-16 RX ORDER — ZOLPIDEM TARTRATE 5 MG/1
5 TABLET ORAL
Status: DISCONTINUED | OUTPATIENT
Start: 2022-12-16 | End: 2022-12-16 | Stop reason: HOSPADM

## 2022-12-16 RX ORDER — MAGNESIUM SULFATE HEPTAHYDRATE 40 MG/ML
2 INJECTION, SOLUTION INTRAVENOUS ONCE
Status: COMPLETED | OUTPATIENT
Start: 2022-12-16 | End: 2022-12-16

## 2022-12-16 RX ADMIN — FOLIC ACID 1 MG: 1 TABLET ORAL at 08:19

## 2022-12-16 RX ADMIN — PANTOPRAZOLE SODIUM 40 MG: 40 TABLET, DELAYED RELEASE ORAL at 05:29

## 2022-12-16 RX ADMIN — HEPARIN SODIUM 5000 UNITS: 5000 INJECTION INTRAVENOUS; SUBCUTANEOUS at 05:29

## 2022-12-16 RX ADMIN — THIAMINE HCL TAB 100 MG 100 MG: 100 TAB at 08:19

## 2022-12-16 RX ADMIN — METOPROLOL TARTRATE 50 MG: 50 TABLET, FILM COATED ORAL at 08:19

## 2022-12-16 RX ADMIN — Medication 1 TABLET: at 08:20

## 2022-12-16 RX ADMIN — MAGNESIUM SULFATE HEPTAHYDRATE 2 G: 40 INJECTION, SOLUTION INTRAVENOUS at 08:20

## 2022-12-16 RX ADMIN — LISINOPRIL 20 MG: 20 TABLET ORAL at 08:20

## 2022-12-16 NOTE — CASE MANAGEMENT
Case Management Progress Note    Patient name Liz Escobarp  Location /-01 MRN [id number]  : 1940 Date 2022       LOS (days): 2  Geometric Mean LOS (GMLOS) (days): 2 70  Days to GMLOS:0 9        OBJECTIVE:        Current admission status: Inpatient  Preferred Pharmacy:   [address] [facility], [place] [address] [address]  [facility]   [address] [place] [address]  Phone: [phone] Fax: [phone]    [facility] ([address] [facility],   [address] [address]  Phone: [phone] Fax: [phone]    [facility] ([facility] ) - [address],[facility]  [address]  Phone: [phone] Fax: [phone]    Primary Care Provider: Jadyn Clayton MD    Primary Insurance: Mackenzie Malcolm [facility]  Secondary Insurance:     PROGRESS NOTE:    CM made aware that patient and family are now requesting VNA  [facility] referrals opened via [facility] to determine available options  CM to follow-up with patient/family regarding available options

## 2022-12-16 NOTE — PLAN OF CARE
Problem: OCCUPATIONAL THERAPY ADULT  Goal: Performs self-care activities at highest level of function for planned discharge setting  See evaluation for individualized goals  Description: Treatment Interventions: ADL retraining, Functional transfer training, Endurance training, Patient/family training, Compensatory technique education, Continued evaluation, Equipment evaluation/education  Equipment Recommended: Shower/Tub chair with back ($), Bedside commode (to be provided if pt NOT to D/C to STR)       See flowsheet documentation for full assessment, interventions and recommendations  Note: Limitation: Decreased ADL status, Decreased UE strength, Decreased cognition, Decreased endurance, Decreased high-level ADLs, Decreased self-care trans, Decreased Safe judgement during ADL  Prognosis: Good  Assessment: Patient is a 78 y o  female seen for OT evaluation at [place] [address] following admission on 12/14/2022  s/p Closed nondisplaced fracture of metatarsal bone of left foot  Comorbidities and significant PMHx impacting functional performance include:HTN, uncomplicated alcohol dependence, fall, hyponatremia, R breast mass, parasthesia of both hands, head / face lacerations   Patient presents with active orders for OT eval and treat NWB LLE  Performed at least 2 patient identifiers during session including name and wristband  At baseline pt is (I) c ADL/IADLs  Lives c spouse + daughter in 2 story home with FFSU + 1 AR  (-) AD Upon initial evaluation, patient requires supervision for UB ADLs, mod assitance for LB ADLs, and mod assistance for transfers and functional mobility short distance  with RW  Based on functional eval, pt presents with intact  attention, impaired  safety awareness, impaired  problem solving skills, and impaired   memory   Occupational performance is affected by the following deficits:  decreased muscular strength , acute change in mobility status , decreased standing tolerance for self care tasks , decreased dynamic balance impacting functional reach, decreased trunk control , decreased activity tolerance , impaired judgement and problem solving , impaired safety awareness  and (+) pain  Based on these findings, functional performance deficits, and medical complexity pt has been identified as a high complexity evaluation  Personal factors impacting performance include: decreased (+) Hx of falls , steps to enter home, decreased IADL independence, High fall risk , decreased insight toward deficits  and decreased recall of precautions   Patient would benefit from OT services within the acute care setting to maximize level of functional independence in the following occupational areas bathing/showering, toileting, dressing , personal hygiene/grooming , bed mobility , functional mobility, transfer to all surfaces and fall prevention   From OT standpoint, recommendation at time of D/C would be post-acute rehabilitation        OT Discharge Recommendation: Post acute rehabilitation services        [facility]

## 2022-12-16 NOTE — PHYSICAL THERAPY NOTE
PHYSICAL THERAPY EVALUATION & TREATMENT  DATE: 12/16/22  TIME: 4165-1370    NAME:  Mago Martinez  AGE:   78 y o  Mrn:   [id number]  Length Of Stay: 2    ADMIT DX:  Facial injury [O18 22BD]  Fall, initial encounter [W19  XXXA]  Laceration of nose, initial encounter [S01 21XA]  Closed displaced fracture of metatarsal bone of left foot, unspecified metatarsal, initial encounter [S92 302A]    Past Medical History:   Diagnosis Date    Anxiety     Arthritis     Prajapati esophagus     Cataract     Continuous chronic alcoholism (HCC)     GERD (gastroesophageal reflux disease)     Heavy alcohol use     Hyperlipidemia     Hypertension     Insomnia     Palpitations      Past Surgical History:   Procedure Laterality Date    BREAST IMPLANT Bilateral     CATARACT EXTRACTION Right     CHOLECYSTECTOMY      LAP    COLONOSCOPY      HYSTERECTOMY      age 69-[place]    JOINT REPLACEMENT Bilateral     hips-2010 and 2017-left leg is shorter    LASIK Bilateral     in her 50's    WI XCAPSL CTRC RMVL INSJ IO LENS PROSTH W/O ECP Left 10/18/2018    Procedure: EXTRACTION EXTRACAPSULAR CATARACT PHACO INTRAOCULAR LENS (IOL); Surgeon: Chrissie Miller MD;  Location: [facility] MAIN OR;  Service: Ophthalmology    TONSILLECTOMY      TUBAL LIGATION         Performed at least 2 patient identifiers during session: Name, Pinkie Heading, and ID bracelet     12/16/22 0918   PT Last Visit   PT Visit Date 12/16/22   Note Type   Note type Evaluation  (& treatment (1886-6096))   Pain Assessment   Pain Assessment Tool 0-10   Pain Score No Pain   Pain Location/Orientation Orientation: Left; Location: [facility] Pain Intervention(s) Repositioned; Ambulation/increased activity; Elevated   Multiple Pain Sites No   Restrictions/Precautions   Weight Bearing Precautions Per Order Yes   LLE Weight Bearing Per Order (S)  NWB  (s/p closed displaced fracture of L metatarsals)   Braces or Orthoses Splint  (L foot/ankle casted)   Other Precautions Impulsive; Chair Alarm; Bed Alarm;WBS;Multiple lines; Fall Risk   Home Living   Type of [address] Two level;Stairs to enter without rails; Performs ADLs on one level; Able to live on main level with bedroom/bathroom  (1 AR and single level living, daughter lives on 2nd floor of the home (no need for pt to access initially))   Bathroom Shower/Tub Tub/shower unit   Bathroom Toilet Standard   Bathroom Equipment Grab bars in shower   [address] Walker;Crutches  (pt reports that her son has a manual WC that he does not use, she may be able to access and use for community mobility however it will not fit through her home)   Prior Function   Level of [place] Independent with ADLs; Independent with functional mobility; Independent with IADLS   Lives With Spouse   Receives Help From Family   IADLs Independent with driving; Independent with meal prep; Independent with medication management   Falls in the last 6 months 1 to 4  (1 fall leading to current hospitalization, denies additional falls recently, h/o falls ">6 or7 years ago")   Vocational Retired   Comments Pt reports that at baseline she is fully independent with all aspects of self care and functional mobility with no AD  + Drives  Completes all home management and independent access to community needs  General   Additional Pertinent History Pt admitted 12/14/2022 s/p fall in community (fell on uneven pavement while walking in high heels)  XR revealed "Acute angulated fractures through the necks of the second through fifth metatarsals " Per podiatry: "recommend non-operative treatment w/ NWB cast to the L foot for 4 wks, then possible transition to guarded [address] in a CAMboot if subsequent x-rays show adequate callus " formation     Family/Caregiver Present No   Cognition   Overall Cognitive Status WFL  (WFL for participation in session, questionable higher level cognitive functioning, defer to OT for formal cognitive assessment) Arousal/Participation Alert   Orientation Level Oriented to person;Oriented to place;Oriented to situation   Memory Decreased recall of precautions   Following Commands Follows one step commands with increased time or repetition   Comments Pt impulsive and unsafe t/o session  Perseverates on her lack of sleep, stating she can't participate in any tasks or mobility d/t lack of sleep  Pt needing maximial encouragement and education for all functional tasks  Pt with significant lack of insight to deficits  Subjective   Subjective "This is really hard "   RUE Assessment   RUE Assessment WFL   LUE Assessment   LUE Assessment WFL   RLE Assessment   RLE Assessment WFL   LLE Assessment   LLE Assessment X   Strength LLE   LLE Overall Strength 4-/5  (NT at ankle/foot d/t casting and NWB status)   Coordination   Movements are Fluid and Coordinated 1   Sensation WFL   Light Touch   RLE Light Touch Grossly intact   LLE Light Touch Grossly intact   Proprioception   RLE Proprioception Grossly intact   LLE Proprioception Grossly Intact   Bed Mobility   Supine to Sit 5  Supervision   Additional items Assist x 1;HOB elevated; Bedrails; Increased time required;Verbal cues   Sit to Supine Unable to assess   Additional Comments Despite max verbal and visual cues from therapist, pt continued to WB through L LE in order to scoot hips towards EOB during sit>supine transition  Pt displays good sitting balance at EOB  Pt was left seated OOB in recliner chair at end of session, pt needing MAX encouragement to remain in chair  Transfers   Sit to Stand 4  Minimal assistance   Additional items Assist x 1; Impulsive; Increased time required;Verbal cues   Stand to Sit 3  Moderate assistance   Additional items Assist x 1; Armrests; Impulsive;Verbal cues  (uncontrolled descent to sit)   Stand pivot 3  Moderate assistance   Additional items Assist x 1; Impulsive; Increased time required;Verbal cues  (RW)   Toilet transfer 3  Moderate assistance Additional items Assist x 1; Impulsive; Increased time required;Verbal cues;Standard toilet  (RW, R GB)   Additional Comments With all transfers (ascending and descending), pt impulsive to initiate however requires increased time and assistance to complete  Pt with poor body mechanics and hand/foot placement despite extensive cues from therapist  Pt with poor acceptance of therapist cues due to impulsivity  Therapist needing to redirect pt multiple times to stop, refocus, and complete task  Once in standing, pt able to maintain NWB to L LE with B UE support on RW, with ALFRED from therapist  Once dynamic task began pt quickly required increased assistance (MODA), and displays significant difficulty maintaining NWB L LE  Ambulation/Elevation   Gait pattern Decreased foot clearance; Short stride  (hop to patterning with RW and NWB L LE)   Gait Assistance 3  Moderate assist   Additional items Assist x 1;Verbal cues; Tactile cues   Assistive Device Rolling walker   Distance 14ft - pt unable to tolerate additional distance due to fatigue of B UEs and instability   Stair Management Assistance Not tested  (pt has 1 AR her home)   Balance   Static Sitting Good   Dynamic Sitting Fair   Static Standing Fair -  (w/ RW support + ALFRED)   Dynamic Standing Poor +  (w/ RW support + MODA)   Ambulatory Poor +  (w/ RW support + MODA)   Endurance Deficit   Endurance Deficit Yes   Activity Tolerance   Activity Tolerance Patient limited by fatigue; Other (Comment)  (impulsivity, decreased insight and safety)   Medical Staff Made Aware Spoke with BRYN PITTS OT   Nurse Made Aware Spoke with RN Harshpreet   Assessment   Prognosis Good   Problem List Decreased strength;Decreased endurance; Impaired balance;Decreased mobility; Impaired judgement;Decreased safety awareness;Orthopedic restrictions;Pain   Assessment Pt seen for PT evaluation for mobility assessment & discharge needs  Activity orders: NWB L LE   Pt admitted 12/14/2022 s/p fall, dx Closed nondisplaced fracture of metatarsal bone of left foot  Comorbidities affecting pt's fnxl performance include: alcohol abuse, HTN, anxiety, insomnia, paresthesias B hands  During PT IE, pt requires S for bed mobility, variable MIN-MODA for transfers, and ambulates 14ft with RW and MODA  Displays high degree of difficulty to maintain NWB LLE, highly impulsive and unsafe at times, limited acceptance of therapist education  The AM-PAC & Barthel Index outcome tools were used to assist in determining pt safety w/ mobility/self care & appropriate d/c recommendations, see above for scores  Pt is at risk of falls d/t multiple comorbidities, impulsivity, h/o falls, impaired balance, impaired insight/safety awareness, use of ambulatory aid, varying levels of pain , advanced age, acuity of medical illness and polypharmacy  Pt's clinical presentation is currently unstable/unpredictable as seen in pt's presentation of changing level of pain, varying levels of cognitive performance, increased fall risk, new onset of impairment of functional mobility, decreased endurance and new onset of weakness, and requires high complexity clinical decision making  Pt will benefit from continued PT services in order to address impairments, decrease risk of falls, maximize independence w/ fnxl mobility, & ensure safety w/ mobility for transition to next level of care  Based on pt presentation & impairments, pt would most appropriately benefit from post acute STR  Barriers to Discharge Inaccessible home environment;Decreased caregiver support   Barriers to Discharge Comments (S)  Therapist discussed option of WC level mobility at home until NWB orders lifted  Pt reports that a WC will not be able to fit through her home and that her spouse and/or daughter would not be able to physically bump WC up/down the 1 AR her home  Pt prefers to d/c to STR at this time, until NWB order lifted     Goals   Patient Goals "to get better sleep"   STG Expiration Date 12/26/22   Short Term Goal #1 Patient PT goals established in order to maximize pt independence with functional mobility  Pt will: complete all bed mobility independently in flat bed in order to promote increased OOB functional mobility to improve overall activity tolerance; complete all transfers with RW at [facility] level in order to increase safety with functional mobility; ambulate >50ft with RW at [facility] level + NWB L LE in order to increase safety with household and in facility distance functional mobility; negotiate 1 standard height step with LRAD and Alexis Cabrales in order to facilitate safe access to her home; improve B LE strength to >/= 4+/5 MMT t/o in order to increase safety with functional mobility and decrease risk of falls; demonstrate understanding and independence with LE strengthening HEP; improve ambulatory balance to >/= fair+ grade with RW in order to promote safety and increased independence with mobility; tolerate >3hrs OOB in upright position, in order to improve muscular endurance and respiratory status; improve AM-PAC score to >/= 18/24 in order to increase independence with mobility and decrease burden of care; improve Barthel Index score to >/= 55/100 in order to increase independence and decrease risk of falls; maintain NWB to L LE t/o 100% of session without therapist cues  PT Treatment Day 1   Plan   Treatment/Interventions Functional transfer training;LE strengthening/ROM; Elevations; Therapeutic exercise; Endurance training;Patient/family training;Equipment eval/education; Bed mobility;Gait training; Compensatory technique education;Spoke to MD;Spoke to nursing;Spoke to case management;OT   PT Frequency 3-5x/wk   Recommendation   PT Discharge Recommendation Post acute rehabilitation services   AM-PAC Basic Mobility Inpatient   Turning in Bed Without Bedrails 4   Lying on Back to Sitting on Edge of Flat Bed 3   Moving Bed to Chair 2   Standing Up From Chair 2   Walk in Room 2   Climb 3-5 Stairs 1   Basic Mobility Inpatient Raw Score 14   Basic Mobility Standardized Score 35 55   Highest Level Of Mobility   [facility] Goal 4: Move to chair/commode   -[facility] Achieved 6: Walk 10 steps or more   Modified [place] Scale   Modified [place] Scale 4   Barthel Index   Feeding 10   Bathing 0   Grooming Score 5   Dressing Score 5   Bladder Score 5   Bowels Score 10   Toilet Use Score 5   Transfers (Bed/Chair) Score 5   Mobility (Level Surface) Score 0   Stairs Score 0   Barthel Index Score 45   Additional Treatment Session   Start Time 0850   End Time 1870   Treatment Assessment Pt is agreeable to participate in additional functional mobility training post IE after extensive encouragement  Pt continues to require MIN-MODA for transfers t/o session, ambulates an additional 8ft, however is unable to tolerate further  Pt with significant difficulty with RW management mariusz during turning and/or change of direction  Pt with small step length and limited UE support due to UE fatigue  Pt required extensive education re: importance of maintaining NWB status, reasoning for increased OOB mobility/positioning during hospitalization, transition to STR process, and expectations of STR  Pt with fair acceptance, but continues to require extensive encouragement for all functional mobility tasks  Pt remains impulsive and unsafe with short distance mobility, and remains unsafe for return to home at current level of functional mobility  Continue to recommend post acute STR once medically cleared for d/c from the acute care setting  Will continue skilled PT POC as able and appropriate to address functional impairments and progress towards therapy goals  Equipment Use Recommend RN staffing A x1 and pt use of RW for OOB mobility, increase ambulation distance as able  Encourage ambulation to bathroom 1x/day however as it is highly fatiguing to pt, use BSC as appropriate     Additional Treatment Day 1   End of Consult   Patient Position at End of Consult Bedside chair;Bed/Chair alarm activated; All needs within reach   End of Consult Comments Based on patient's [facility] Level of Mobility scores today, patient currently has a goal of -[facility] Levels: 6: WALK 10 STEPS OR MORE, to be completed with RN staffing each shift, in order to improve overall activity tolerance and mobility, combat hospital related deconditioning, and maximize outcomes for d/c from the acute care setting  The patient's AM-PAC Basic Mobility Inpatient Short Form Raw Score is 14  A Raw score of less than or equal to 16 suggests the patient may benefit from discharge to post-acute rehabilitation services  Please also refer to the recommendation of the Physical Therapist for safe discharge planning        Frank Booth PT, DPT   Available via [employer]  [facility], [place] # [id number]  [place] License - [id number]  [id number]

## 2022-12-16 NOTE — OCCUPATIONAL THERAPY NOTE
Occupational Therapy Evaluation      Grayson Joy    12/16/2022    Patient Active Problem List   Diagnosis    Other insomnia    Heavy alcohol use    Anxiety    Essential hypertension    Dyslipidemia    Vitamin D deficiency    Gastroesophageal reflux disease without esophagitis    Uncomplicated alcohol dependence (HCC)    Paresthesia of both hands    Breast mass, right    Closed nondisplaced fracture of metatarsal bone of left foot    Fall    Hyponatremia       Past Medical History:   Diagnosis Date    Anxiety     Arthritis     Prajapati esophagus     Cataract     Continuous chronic alcoholism (HCC)     GERD (gastroesophageal reflux disease)     Heavy alcohol use     Hyperlipidemia     Hypertension     Insomnia     Palpitations        Past Surgical History:   Procedure Laterality Date    BREAST IMPLANT Bilateral     CATARACT EXTRACTION Right     CHOLECYSTECTOMY      LAP    COLONOSCOPY      HYSTERECTOMY      age 69-[place]    JOINT REPLACEMENT Bilateral     hips-2010 and 2017-left leg is shorter    LASIK Bilateral     in her 50's    [facility] XCAPSL CTRC RMVL INSJ IO LENS PROSTH W/O ECP Left 10/18/2018    Procedure: EXTRACTION EXTRACAPSULAR CATARACT PHACO INTRAOCULAR LENS (IOL); Surgeon: Jose Goldman MD;  Location: [facility] MAIN OR;  Service: Ophthalmology    TONSILLECTOMY      TUBAL LIGATION          12/16/22 1008   OT Last Visit   OT Visit Date 12/16/22   Note Type   Note type Evaluation   Pain Assessment   Pain Assessment Tool 0-10   Pain Score No Pain   Restrictions/Precautions   Weight Bearing Precautions Per Order Yes   LLE Weight Bearing Per Order (S)  NWB  (s/p closed displaced of L metatarsals)   Braces or Orthoses Splint  (L foot/ankle casted)   Other Precautions Impulsive; Chair Alarm; Bed Alarm;WBS;Multiple lines; Fall Risk  (CIWA)   Home Living   Type of [address] [place] [address] Two level;Performs ADLs on one level; Able to live on main level with bedroom/bathroom;Stairs to enter with rails  (1 AR , maintains FFSU) Bathroom Shower/Tub Tub/shower unit   Bathroom Toilet Standard   Bathroom Equipment Grab bars in shower   [address] Walker;Crutches  (no AD at baseline  Reports having manual w/c available (borrow from son, does not use) that she may be able to use during community mobility  Does not fit through home)   Additional Comments reports daughter lives upstairs   Prior Function   Level of Alexis Independent with ADLs; Independent with functional mobility; Independent with IADLS   Lives With Spouse;Daughter  (daughter lives on 2nd floor)   Receives Help From Family   IADLs Independent with driving; Independent with meal prep; Independent with medication management   Falls in the last 6 months 1 to 4  (1 fall leading to current hospitalization, denies other recent falls)   Vocational Retired   Comments at baseline pt ambulates community distances, does not use AD   Reports both her and her spouse are both independent   Lifestyle   Autonomy at baseline pt is (I) c ADL/IADLs  Lives c spouse + daughter in 2 story home with [address] + 1 [address]  (-) AD   Reciprocal Relationships spouse, daughter   Service to Others retired   General   Additional Pertinent History Pt admitted s/p fall with (+) head strike, and faical lacerations  L foot imagine shows lesser metatarsal fractures of the left 2nd thru 5th metatarsals  Per podiatry, plan is for" non-operative treatment with non-weightbearing cast to the left foot for 4 weeks, then possible transition to guarded weight bearing in a cam boot if subsequent x-rays show adequate callus formation"   Additional PMHx: essential HTN, alcohol dependence, fall, hyponatremia, hyponatremia, insomnia, B/L paresthesia of hands   Family/Caregiver Present No   Subjective   Subjective "Alyse hit my head like 4 times, but not recently"   ADL   Eating Assistance 5  Supervision/Setup   Grooming Assistance 5  Supervision/Setup   UB Bathing Assistance 5  Supervision/Setup LB Bathing Assistance 3  Moderate Assistance   UB Dressing Assistance 5  Supervision/Setup   LB Dressing Assistance 3  Moderate Assistance   Toileting Assistance  3  Moderate Assistance   Functional Assistance 3  Moderate Assistance   Additional Comments pt limited by orthopedic restrictions, limites standing tolerance, poor activity tolerance, generalized weakness   Bed Mobility   Supine to Sit   (DNT: pt recieved OOB in recliner)   Sit to Supine 5  Supervision   Additional items Increased time required; Bedrails  (flat bed, able to reposition self to [facility])   Transfers   Sit to Stand 5  Supervision   Additional items Armrests;Assist x 1;Verbal cues  (VC for hand placements )   Stand to Sit 3  Moderate assistance   Additional items Assist x 1; Increased time required;Verbal cues; Impulsive  (impulsive to initiation d/t fatigue, increased time to complete)   Additional Comments pt requiring cues for proper foot placements during transfers  Cues for trunk management d/t poor proximity to sitting surfaces   Functional Mobility   Functional Mobility 3  Moderate assistance   Additional Comments Ambulated short distance to foot of bed ~4 feet, significant amount of fatigue  Mod physical assistance + VC for Rw managment , difficulty maintaining appropriate proximity to RW and difficulty moving Rw d/t significant fatigue   Maintains NWB status well in stance with BUE on RW   Additional items Rolling walker   Balance   Static Sitting Good   Dynamic Sitting Fair   Static Standing Fair -  (c RW)   Dynamic Standing Poor +  (c RW)   Activity Tolerance   Activity Tolerance Patient limited by fatigue  (poor insight to deficits , impulsivity , poor safety awareness)   Medical Staff Made Aware BRYN PITTS , PT   Nurse Made Aware SUKUMAR Anderson   RUE Assessment   RUE Assessment WFL  (MMT 4/5 based on functional assessment)   LUE Assessment   LUE Assessment WFL  (MMT 4/5 based on functional assessment)   Hand Function   Gross Motor Coordination Functional   Fine Motor Coordination Functional   Sensation   Light Touch Partial deficits in the RUE;Partial deficits in the LUE  (B/L parasethesia in hands per EMR)   Vision-Basic Assessment   Current Vision Wears glasses only for reading   Patient Visual Report   (pt denies acute visual changes following head strike  Denies photosensivity , blurriness, headaches)   Vision - Complex Assessment   Acuity Able to read clock/calendar on wall without difficulty; Able to read employee name badge without difficulty  (without glasses donned)   Cognition   Overall Cognitive Status WFL  (higher level cognitive deficits suspected)   Arousal/Participation Responsive; Cooperative   Attention Within functional limits   Orientation Level Oriented to person;Oriented to place;Oriented to situation;Oriented to time  (grossly oriented to time, able to report month/year, reporting date as 12th)   Memory Decreased recall of precautions   Following Commands Follows one step commands with increased time or repetition   Comments Pt participated in [address] today to further evaluate cognitive function  Pt able to report correct year, month, and time of day  Pt made >2 errors counting backwards from 20 to 1 (noted to skip numbers 3x) , unable to count months in reverse order (>2 errors)  During short term memory component, pt requiring 2 trials for immediate recall, able to recall 4/5 items for delayed recall with distractor  Total score: 10/28; scores >10 indicate performance consistent with Dementia  Recommend further cognitive evaluation for dementing disorder  Impairments in short term memory, executive functioning, working memory noted  Recommend continued screening for s/s of concussion, however at this time pt denies any acute changes in vision/headaches/cognitive function  limited insight to deficits     Cognition Assessment Tools Other (Comment)  (Short blessed test)   Score 10  (> 10 indicating performance consistent c Dementia)   Assessment   Limitation Decreased ADL status; Decreased UE strength;Decreased cognition;Decreased endurance;Decreased high-level ADLs; Decreased self-care trans;Decreased Safe judgement during ADL   Prognosis Good   Assessment Patient is a 78 y o  female seen for OT evaluation at [address] [place] [address] following admission on 12/14/2022  s/p Closed nondisplaced fracture of metatarsal bone of left foot  Comorbidities and significant PMHx impacting functional performance include:HTN, uncomplicated alcohol dependence, fall, hyponatremia, R breast mass, parasthesia of both hands, head / face lacerations   Patient presents with active orders for OT eval and treat NWB LLE  Performed at least 2 patient identifiers during session including name and wristband  At baseline pt is (I) c ADL/IADLs  Lives c spouse + daughter in 2 story home with FFSU + 1 AR  (-) AD Upon initial evaluation, patient requires supervision for UB ADLs, mod assitance for LB ADLs, and mod assistance for transfers and functional mobility short distance  with RW  Based on functional eval, pt presents with intact  attention, impaired  safety awareness, impaired  problem solving skills, and impaired   memory  Occupational performance is affected by the following deficits:  decreased muscular strength , acute change in mobility status , decreased standing tolerance for self care tasks , decreased dynamic balance impacting functional reach, decreased trunk control , decreased activity tolerance , impaired judgement and problem solving , impaired safety awareness  and (+) pain  Based on these findings, functional performance deficits, and medical complexity pt has been identified as a high complexity evaluation  Personal factors impacting performance include: decreased (+) Hx of falls , steps to enter home, decreased IADL independence, High fall risk , decreased insight toward deficits  and decreased recall of precautions   Patient would benefit from OT services within the acute care setting to maximize level of functional independence in the following occupational areas bathing/showering, toileting, dressing , personal hygiene/grooming , bed mobility , functional mobility, transfer to all surfaces and fall prevention   From OT standpoint, recommendation at time of D/C would be post-acute rehabilitation   Goals   Patient Goals to sleep better, agreeable to STR,   Plan   Treatment Interventions ADL retraining;Functional transfer training; Endurance training;Patient/family training; Compensatory technique education;Continued evaluation;Equipment evaluation/education   Goal Expiration Date 12/26/22   OT Treatment Day 0   OT Frequency 3-5x/wk   Recommendation   OT Discharge Recommendation Post acute rehabilitation services   Equipment Recommended Shower/Tub chair with back ($);Bedside commode  (to be provided if pt NOT to D/C to STR)   Commode Type Standard   Additional Comments  The patient's raw score on the AM-PAC Daily Activity inpatient short form is 15, standardized score is 34 69, less than 39 4  Patients at this level are likely to benefit from discharge to post-acute rehabilitation services  Please refer to the recommendation of the Occupational Therapist for safe discharge planning     AM-PAC Daily Activity Inpatient   Lower Body Dressing 2   Bathing 2   Toileting 2   Upper Body Dressing 3   Grooming 3   Eating 3   Daily Activity Raw Score 15   Daily Activity Standardized Score (Calc for Raw Score >=11) 34 69   AM-[facility] Applied Cognition Inpatient   Following a Speech/Presentation 2   Understanding Ordinary Conversation 3   Taking Medications 3   Remembering Where Things Are Placed or Put Away 3   Remembering List of 4-5 Errands 3   Taking Care of Complicated Tasks 2   Applied Cognition Raw Score 16   Applied Cognition Standardized Score 35 03   End of Consult   Education Provided Yes   Patient Position at End of Consult Supine;Bed/Chair alarm activated; All needs within reach   Nurse Communication Nurse aware of consult   Pt will complete UB ADLs Independent  as needed for increased ADL independence within 10 days  Pt will complete LB ADLs Min A  with use of LHAE as needed for increased ADL independence within 10 days  Pt will complete toileting Min A  with use of DME for increased ADL independence within 10 days  Pt will demonstrate proper body mechanics to complete self-care transfers and functional mobility with Min A and use of AD PRN for increased safety and functional independence within 10 days  Pt will demonstrate standing tolerance of 5 min with Min A and use of AD PRN for increased activity tolerance during ADL/IADL tasks within 10 days  Pt will demonstrate proper body mechanics and fall prevention strategies during 100% of tx sessions for increased safety awareness during ADL/IADLs    Pt will demonstrate OOB sitting tolerance of 2-4 hr/day for increased activity tolerance and engagement in self care tasks within 10 days  Pt will participate in ongoing cognitive assessments to assist with safe D/C planning and supervision/assistance recommendations  Pt will verbalize and demonstrate understanding of B UE HEP program increase strength and endurance during self care transfers within 10 days  Pt will verbalize and demonstrate understanding of post-op movement precautions and WB status in 100% of tx sessions for increased safety and functional mobility       Sari Mcgee

## 2022-12-16 NOTE — PLAN OF CARE
Problem: PHYSICAL THERAPY ADULT  Goal: Performs mobility at highest level of function for planned discharge setting  See evaluation for individualized goals  Description: Treatment/Interventions: Functional transfer training, LE strengthening/ROM, Elevations, Therapeutic exercise, Endurance training, Patient/family training, Equipment eval/education, Bed mobility, Gait training, Compensatory technique education, Spoke to MD, Spoke to nursing, Spoke to case management, OT    See flowsheet documentation for full assessment, interventions and recommendations  Outcome: Progressing  Note: Prognosis: Good  Problem List: Decreased strength, Decreased endurance, Impaired balance, Decreased mobility, Impaired judgement, Decreased safety awareness, Orthopedic restrictions, Pain  Assessment: Pt seen for PT evaluation for mobility assessment & discharge needs  Activity orders: NWB L LE  Pt admitted 12/14/2022 s/p fall, dx Closed nondisplaced fracture of metatarsal bone of left foot  Comorbidities affecting pt's fnxl performance include: alcohol abuse, HTN, anxiety, insomnia, paresthesias B hands  During PT IE, pt requires S for bed mobility, variable MIN-MODA for transfers, and ambulates 14ft with RW and MODA  Displays high degree of difficulty to maintain NWB LLE, highly impulsive and unsafe at times, limited acceptance of therapist education  The AM-PAC & Barthel Index outcome tools were used to assist in determining pt safety w/ mobility/self care & appropriate d/c recommendations, see above for scores  Pt is at risk of falls d/t multiple comorbidities, impulsivity, h/o falls, impaired balance, impaired insight/safety awareness, use of ambulatory aid, varying levels of pain , advanced age, acuity of medical illness and polypharmacy   Pt's clinical presentation is currently unstable/unpredictable as seen in pt's presentation of changing level of pain, varying levels of cognitive performance, increased fall risk, new onset of impairment of functional mobility, decreased endurance and new onset of weakness, and requires high complexity clinical decision making  Pt will benefit from continued PT services in order to address impairments, decrease risk of falls, maximize independence w/ fnxl mobility, & ensure safety w/ mobility for transition to next level of care  Based on pt presentation & impairments, pt would most appropriately benefit from post acute STR  Barriers to Discharge: Inaccessible home environment, Decreased caregiver support    Barriers to Discharge Comments: (S) Therapist discussed option of WC level mobility at home until NWB orders lifted  Pt reports that a WC will not be able to fit through her home and that her spouse and/or daughter would not be able to physically bump WC up/down the 1 AR her home  Pt prefers to d/c to STR at this time, until NWB order lifted  PT Discharge Recommendation: Post acute rehabilitation services    See flowsheet documentation for full assessment

## 2022-12-16 NOTE — PLAN OF CARE
Problem: Potential for Falls  Goal: Patient will remain free of falls  Description: INTERVENTIONS:  - Educate patient/family on patient safety including physical limitations  - Instruct patient to call for assistance with activity   - Consult OT/PT to assist with strengthening/mobility   - Keep Call bell within reach  - Keep bed low and locked with side rails adjusted as appropriate  - Keep care items and personal belongings within reach  - Initiate and maintain comfort rounds  - Make Fall Risk Sign visible to staff  - Offer Toileting every 2 Hours, in advance of need  - Initiate/Maintain bed alarm  - Obtain necessary fall risk management equipment   - Apply yellow socks and bracelet for high fall risk patients  - Consider moving patient to room near nurses station  Outcome: Progressing

## 2022-12-16 NOTE — CASE MANAGEMENT
Case Management Discharge Planning Note    Patient name Carol Casas  Location /-01 MRN [id number]  : 1940 Date 2022       Current Admission Date: 2022  Current Admission Diagnosis:Closed nondisplaced fracture of metatarsal bone of left foot   Patient Active Problem List    Diagnosis Date Noted   • Closed nondisplaced fracture of metatarsal bone of left foot 2022   • Fall 2022   • Hyponatremia 2022   • Breast mass, right 2022   • Paresthesia of both hands    • Uncomplicated alcohol dependence ([address] ) 2022   • Gastroesophageal reflux disease without esophagitis 03/15/2021   • Vitamin D deficiency 2021   • Other insomnia 09/15/2020   • Heavy alcohol use 09/15/2020   • Anxiety 09/15/2020   • Essential hypertension 09/15/2020   • Dyslipidemia 09/15/2020      LOS (days): 2  Geometric Mean LOS (GMLOS) (days): 2 70  Days to GMLOS:0 9     OBJECTIVE:  Risk of Unplanned Readmission Score: 7 67         Current admission status: Inpatient   Preferred Pharmacy:   [address]  Phone: [phone] Fax: [phone]    [facility] ([address] [place] [address]  [address] [facility]  [facility]  [address] [place] [address]  Phone: [phone] Fax: [phone]    [facility] ([facility] ) - [address],[facility]  [address]  Phone: [phone] Fax: [phone]    Primary Care Provider: Jimmy Monk MD    Primary Insurance: Justin Mccracken [facility]  Secondary Insurance:     DISCHARGE DETAILS:    Discharge planning discussed with[de-identified] Patient, spouse, and daughter Cheyanne Miris)  Freedom of Choice: Yes  Comments - Freedom of Choice: Choice is for [facility] provider    CM contacted family/caregiver?: Yes  Were Treatment Team discharge recommendations reviewed with patient/caregiver?: Yes  Did patient/caregiver verbalize understanding of patient care needs?: Yes  Were patient/caregiver advised of the risks associated with not following Treatment Team discharge recommendations?: Yes    Contacts  Patient Contacts: Courtney Lozada  Relationship to Patient[de-identified] Family  Contact Method: Phone  Phone Number: [phone]  Reason/Outcome: Emergency Contact, Discharge [address]         Is the patient interested in [address] at discharge?: Yes  [address] Micheal Verduzco requested[de-identified] Physical Therapy, Occupational Therapy, Nursing  Home Health Agency Name[de-identified] [facility] Provider[de-identified] PCP  Home Health Services Needed[de-identified] Evaluate Functional Status and Safety, Gait/ADL Training, Strengthening/Theraputic Exercises to Improve Function  Homebound Criteria Met[de-identified] Requires the Assistance of Another Person for Safe Ambulation or to Leave the Home, Uses an Assist Device (i e  cane, walker, etc)  Supporting Clincal Findings[de-identified] Limited Endurance, Fatigues Easliy in [employer]         Other Referral/Resources/Interventions Provided:  Interventions: [facility]  Referral Comments: [place] options opened via [facility] chosen as [address] provider           Treatment Team Recommendation: Short Term Rehab (REFUSING)  Discharge Destination Plan[de-identified] Home with [facility])  Transport at Discharge : Family

## 2022-12-16 NOTE — ASSESSMENT & PLAN NOTE
· Presents after mechanical fall obtained after tripping over uneven curb high-heeled shoes  With superficial facial trauma obtained and stable metatarsal fractures  · Splinted in ED  well-perfused, distal pulses intact  Reports minimal pain, no paresthesias  · XR L foot: Acute angulated fractures to the necks of the second through fifth metatarsals  · CT lower extremity: Acute minimally displaced fractures of the distal second through fifth metatarsals  · Patient was discussed by ED provider with on-call podiatry who suspects possible need for ORIF  · Nonweightbearing to left foot  · Analgesics as needed  · Podiatry evaluated patient, Recommend non-operative treatment with non-weightbearing cast to the left foot for 4 weeks, then possible transition to guarded weight bearing in a cam boot if subsequent x-rays show adequate callus formation  · PT/OT consulted for NWB gait/transfer training; if unstable, may need transfer to SNF  · PT OT evaluated patient, patient refusing to go to rehab and also refusing home health    Will discharge patient home with outpatient referral to physical therapy and podiatry follow-up

## 2022-12-16 NOTE — ASSESSMENT & PLAN NOTE
· Presents for evaluation after mechanical fall resulting in facial trauma  Reports she was walking at the [address] [place] [address] in high-heeled shoes and missed a step fell face first  Now with facial and L foot pain  Denies preceding dizziness/lightheadedness, chest pain/palpitations  No loss of consciousness  · Nasal bridge laceration sutured in ED  · She lives at home and does not use any walking assistive devices  No stairs at home  · CT head, cervical spine and facial bones negative for acute abnormalities  · XR L tibia negative  · With multiple metatarsal fractures, see plan as above  · Obtain baseline ECG  · Fall precautions, nonweightbearing to left foot  · PT OT recommending rehab, patient counseled on risks of going home, patient remains adamant about being discharged home

## 2022-12-16 NOTE — PLAN OF CARE
Problem: MOBILITY - ADULT  Goal: Maintain or return to baseline ADL function  Description: INTERVENTIONS:  -  Assess patient's ability to carry out ADLs; assess patient's baseline for ADL function and identify physical deficits which impact ability to perform ADLs (bathing, care of mouth/teeth, toileting, grooming, dressing, etc )  - Assess/evaluate cause of self-care deficits   - Assess range of motion  - Assess patient's mobility; develop plan if impaired  - Assess patient's need for assistive devices and provide as appropriate  - Encourage maximum independence but intervene and supervise when necessary  - Involve family in performance of ADLs  - Assess for home care needs following discharge   - Consider OT consult to assist with ADL evaluation and planning for discharge  - Provide patient education as appropriate  Outcome: Progressing  Goal: Maintains/Returns to pre admission functional level  Description: INTERVENTIONS:  - Perform BMAT or MOVE assessment daily    - Set and communicate daily mobility goal to care team and patient/family/caregiver  - Collaborate with rehabilitation services on mobility goals if consulted  - Perform Range of Motion  3 times a day  - Reposition patient every  2  hours    - Dangle patient 3  times a day  - Stand patient  3 times a day  - Ambulate patient  3  times a day  - Out of bed to chair  3  times a day   - Out of bed for meals 3  times a day  - Out of bed for toileting  - Record patient progress and toleration of activity level   Outcome: Progressing     Problem: Potential for Falls  Goal: Patient will remain free of falls  Description: INTERVENTIONS:  - Educate patient/family on patient safety including physical limitations  - Instruct patient to call for assistance with activity   - Consult OT/PT to assist with strengthening/mobility   - Keep Call bell within reach  - Keep bed low and locked with side rails adjusted as appropriate  - Keep care items and personal belongings within reach  - Initiate and maintain comfort rounds  - Make Fall Risk Sign visible to staff  - Offer Toileting every 2 Hours, in advance of need  - Initiate/Maintain bed and chair alarm  - Apply yellow socks and bracelet for high fall risk patients  - Consider moving patient to room near nurses station  Outcome: Progressing     Problem: Prexisting or High Potential for Compromised Skin Integrity  Goal: Skin integrity is maintained or improved  Description: INTERVENTIONS:  - Identify patients at risk for skin breakdown  - Assess and monitor skin integrity  - Assess and monitor nutrition and hydration status  - Monitor labs   - Assess for incontinence   - Turn and reposition patient  - Assist with mobility/ambulation  - Relieve pressure over bony prominences  - Avoid friction and shearing  - Provide appropriate hygiene as needed including keeping skin clean and dry  - Evaluate need for skin moisturizer/barrier cream  - Collaborate with interdisciplinary team   - Patient/family teaching  Outcome: Progressing     Problem: MUSCULOSKELETAL - ADULT  Goal: Maintain or return mobility to safest level of function  Description: INTERVENTIONS:  - Assess patient's ability to carry out ADLs; assess patient's baseline for ADL function and identify physical deficits which impact ability to perform ADLs (bathing, care of mouth/teeth, toileting, grooming, dressing, etc )  - Assess/evaluate cause of self-care deficits   - Assess range of motion  - Assess patient's mobility  - Assess patient's need for assistive devices and provide as appropriate  - Encourage maximum independence but intervene and supervise when necessary  - Involve family in performance of ADLs  - Assess for home care needs following discharge   - Consider OT consult to assist with ADL evaluation and planning for discharge  - Provide patient education as appropriate  Outcome: Progressing  Goal: Maintain proper alignment of affected body part  Description: INTERVENTIONS:  - Support, maintain and protect limb and body alignment  - Provide patient/ family with appropriate education  Outcome: Progressing

## 2022-12-16 NOTE — CASE MANAGEMENT
Case Management Assessment & Discharge Planning Note    Patient name [facility]  Location /-01 MRN [id number]  : 1940 Date 2022       Current Admission Date: 2022  Current Admission Diagnosis:Closed nondisplaced fracture of metatarsal bone of left foot   Patient Active Problem List    Diagnosis Date Noted   • Closed nondisplaced fracture of metatarsal bone of left foot 2022   • Fall 2022   • Hyponatremia 2022   • Breast mass, right 2022   • Paresthesia of both hands    • Uncomplicated alcohol dependence ([address] ) 2022   • Gastroesophageal reflux disease without esophagitis 03/15/2021   • Vitamin D deficiency 2021   • Other insomnia 09/15/2020   • Heavy alcohol use 09/15/2020   • Anxiety 09/15/2020   • Essential hypertension 09/15/2020   • Dyslipidemia 09/15/2020      LOS (days): 2  Geometric Mean LOS (GMLOS) (days): 2 70  Days to GMLOS:1     OBJECTIVE:    Risk of Unplanned Readmission Score: 7 67         Current admission status: Inpatient       Preferred Pharmacy:   [address] [address]  Phone: [phone] Fax: [phone]    [facility] ([address] [address]  Phone: [phone] Fax: [phone]    [facility] ([facility] ) - [address] Erickson [address],[facility]  [address]  Phone: [phone] Fax: [phone]    Primary Care Provider: Efrain Estarda MD    Primary Insurance: [facility]  Secondary Insurance:     ASSESSMENT:    Readmission Root Cause  30 Day Readmission: No    Patient Information  Admitted from[de-identified] Home  Mental Status: Alert  During Assessment patient was accompanied by: Not accompanied during assessment  Assessment information provided by[de-identified] Patient  Primary Caregiver: Self  Support Systems: Self  County of Residence: [address] [place] [address] do you live in?: [facility] entry access options  Select all that apply : Stairs  Number of steps to enter home  : 1  Do the steps have railings?: Yes  Type of Current Residence: [address] [place] home  Upon entering residence, is there a bedroom on the main floor (no further steps)?: Yes  Upon entering residence, is there a bathroom on the main floor (no further steps)?: Yes  In the last 12 months, was there a time when you were not able to pay the mortgage or rent on time?: No  In the last 12 months, how many places have you lived?: 1  In the last 12 months, was there a time when you did not have a steady place to sleep or slept in a shelter (including now)?: No  Homeless/housing insecurity resource given?: No  Living Arrangements: Lives w/ Spouse/significant other  Is patient a ?: No    Activities of Daily Living Prior to Admission  Functional Status: Independent  Completes ADLs independently?: Yes  Ambulates independently?: Yes  Does patient use assisted devices?: No  Does patient currently own DME?: Yes  What DME does the patient currently own?: Kristin He  Does patient have a history of Outpatient Therapy (PT/OT)?: Yes  Does the patient have a history of Short-Term Rehab?: No  Does patient have a history of HHC?: No  Does patient currently have [address] ?: No    Patient Information Continued  Income Source: Pension/residential  Does patient have prescription coverage?: Yes  Within the past 12 months, you worried that your food would run out before you got the money to buy more : Never true  Within the past 12 months, the food you bought just didn't last and you didn't have money to get more : Never true  Food insecurity resource given?: No  Does patient receive dialysis treatments?: No  Does patient have a history of substance abuse?: Yes  Historical substance use preference: Alcohol/ETOH  History of Withdrawal Symptoms: Denies past symptoms  Is patient currently in treatment for substance abuse?: No  Patient declined treatment information  Does patient have a history of Mental Health Diagnosis?: Yes (CARLO)  Is patient receiving treatment for mental health?: No  Patient declined treatment information  Has patient received inpatient treatment related to mental health in the last 2 years?: No    PHQ 2/9 Screening   Reviewed PHQ 2/9 Depression Screening Score?: No    Means of Transportation  Means of Transport to Appts[de-identified] Family transport  In the past 12 months, has lack of transportation kept you from medical appointments or from getting medications?: No  In the past 12 months, has lack of transportation kept you from meetings, work, or from getting things needed for daily living?: No  Was application for public transport provided?: No    DISCHARGE DETAILS:    Discharge planning discussed with[de-identified] Patient and sposue  Freedom of Choice: Yes  Comments - Freedom of Choice: Choice is to D/C home  Refusing STR and C  Choice is for [employer] for DME provider    CM contacted family/caregiver?: Yes  Were Treatment Team discharge recommendations reviewed with patient/caregiver?: Yes  Did patient/caregiver verbalize understanding of patient care needs?: Yes  Were patient/caregiver advised of the risks associated with not following Treatment Team discharge recommendations?: Yes    Contacts  Patient Contacts: Ashley Magdaleno  Relationship to Patient[de-identified] Family  Contact Method: Phone  Phone Number: [phone]  Reason/Outcome: Emergency Contact, Discharge [address]         Is the patient interested in [facility] at discharge?: No (REFUSING)    DME Referral Provided  Referral made for DME?: Yes  DME referral completed for the following items[de-identified] Bedside Commode, Shower Chair  DME Supplier Name[de-identified] [employer] (scheduled for delivery home )    Other Referral/Resources/Interventions Provided:  Interventions: Declines resources  Referral Comments: No referrals at this time  Patient refusing STR and HHC at this time  Would you like to participate in our [address] service program?  : No - Declined    Treatment Team Recommendation: Short Term Rehab (REFUSING STR AND HHC)  Discharge Destination Plan[de-identified] Home  Transport at Discharge : Family     IMM Given (Date):: 12/16/22  IMM Given to[de-identified] Patient   IMM reviewed with patient, patient agrees with discharge determination  Additional Comments: CM s/w patient bedside regarding DCP  Patient adamantly refusing STR and HHC at this time  CM encouraged patient to reach out to PCP post-discharge if decides wants [facility] after discharge  Patient stating she plans to follow-up with PCP (Dr Evelyne Anthony) this next week      Sadie Prince, STEF, MURTAZAW, SELIN-JAVI  12/16/22 12:39 PM

## 2022-12-16 NOTE — DISCHARGE SUMMARY
[address]  Discharge- Ariel Crimes 1940, 78 y o  female MRN: [id number]  Unit/Bed#: -Nicole Encounter: [id number]  Primary Care Provider: Mauricio Street MD   Date and time admitted to hospital: 12/14/2022  3:41 PM    * Closed nondisplaced fracture of metatarsal bone of left foot  Assessment & Plan  · Presents after mechanical fall obtained after tripping over uneven curb high-heeled shoes  With superficial facial trauma obtained and stable metatarsal fractures  · Splinted in ED  well-perfused, distal pulses intact  Reports minimal pain, no paresthesias  · XR L foot: Acute angulated fractures to the necks of the second through fifth metatarsals  · CT lower extremity: Acute minimally displaced fractures of the distal second through fifth metatarsals  · Patient was discussed by ED provider with on-call podiatry who suspects possible need for ORIF  · Nonweightbearing to left foot  · Analgesics as needed  · Podiatry evaluated patient, Recommend non-operative treatment with non-weightbearing cast to the left foot for 4 weeks, then possible transition to guarded weight bearing in a cam boot if subsequent x-rays show adequate callus formation  · PT/OT consulted for NWB gait/transfer training; if unstable, may need transfer to SNF  · PT OT evaluated patient, patient refusing to go to rehab but accepting of home health  · Will d/c home with home health and podiatry follow-up    [address]  · Presents for evaluation after mechanical fall resulting in facial trauma  Reports she was walking at the [address] [place] [address] in high-heeled shoes and missed a step fell face first  Now with facial and L foot pain  Denies preceding dizziness/lightheadedness, chest pain/palpitations  No loss of consciousness  · Nasal bridge laceration sutured in ED  · She lives at home and does not use any walking assistive devices  No stairs at home    · CT head, cervical spine and facial bones negative for acute abnormalities  · XR L tibia negative  · With multiple metatarsal fractures, see plan as above  · Obtain baseline ECG  · Fall precautions, nonweightbearing to left foot  · PT OT recommending rehab, patient counseled on risks of going home, patient remains adamant about being discharged home  Hyponatremia  Assessment & Plan  · Na 132 corrected   · Likely with alcohol use   · Continue to monitor       Uncomplicated alcohol dependence ([address] )  Assessment & Plan  · Reports drinking wine daily, last drink 12/13 evening  · CIWA protocol  · Thiamine/folate supps     Essential hypertension  Assessment & Plan  · Continue Lopressor, lisinopril         Medical Problems     Resolved Problems  Date Reviewed: 12/16/2022   None       Discharging Physician / Practitioner: Ricky Spears DO  PCP: Delicia Kimble MD  Admission Date:   Admission Orders (From admission, onward)     Ordered        12/14/22 1916  INPATIENT ADMISSION  Once                      Discharge Date: 12/16/22    Consultations During Hospital Stay:  · Podiatry    Procedures Performed:   · none    Significant Findings / Test Results:   Acute minimally displaced fractures at the distal second through fifth metatarsals    Incidental Findings:   · See above   · I reviewed the above mentioned incidental findings with the patient and/or family and they expressed understanding  Test Results Pending at Discharge (will require follow up):   · none     Outpatient Tests Requested:  · n/a    Complications:  none    Reason for Admission: Left 2nd through 5th metatarsal fractures    Hospital Course:   Manuel Andrade is a 78 y o  female patient who originally presented to the hospital on 12/14/2022 due to fracture of left second through fifth metatarsals  Patient presented after falling while walking on uneven ground  Trauma scans negative for acute findings except for fractures of the left second through fifth metatarsals    Podiatry was consulted who recommended no surgical intervention  Recommended nonweightbearing and outpatient follow-up in 4 weeks  PT OT evaluated patient who recommended patient go to rehab but patient refusing to go, thus patient is being discharged home with home health  Advised patient that if she is unable to take care for herself at home please return to the ER for evaluation and placement to rehab  Please see above list of diagnoses and related plan for additional information  Condition at Discharge: fair    Discharge Day Visit / Exam:   Subjective: Patient seen examined at bedside  Patient states her pain is under control currently has no acute complaints  Vitals: Blood Pressure: 163/71 (12/16/22 0800)  Pulse: 81 (12/16/22 0800)  Temperature: 97 6 °F (36 4 °C) (12/16/22 0800)  Temp Source: Oral (12/16/22 0800)  Respirations: 16 (12/16/22 0800)  Height: 5' 6" (167 6 cm) (12/14/22 2112)  Weight - Scale: 65 8 kg (145 lb) (12/14/22 1545)  SpO2: 96 % (12/16/22 0800)  Exam:   Physical Exam  Vitals reviewed  HENT:      Head:      Comments: Ecchymosis noted around eyes     Right Ear: External ear normal       Left Ear: External ear normal       Nose: Nose normal       Mouth/Throat:      Mouth: Mucous membranes are moist       Pharynx: Oropharynx is clear  Eyes:      Extraocular Movements: Extraocular movements intact  Cardiovascular:      Rate and Rhythm: Normal rate and regular rhythm  Heart sounds: Normal heart sounds  Pulmonary:      Effort: Pulmonary effort is normal       Breath sounds: Normal breath sounds  Abdominal:      General: Abdomen is flat  Palpations: Abdomen is soft  Tenderness: There is no abdominal tenderness  Musculoskeletal:      Cervical back: Normal range of motion  Comments: Left foot noted in splint with Ace bandaging around   Skin:     General: Skin is warm and dry  Neurological:      Mental Status: She is alert  Mental status is at baseline     Psychiatric:         Mood and Affect: Mood normal          Behavior: Behavior normal           Discussion with Family: Patient declined call to   Discharge instructions/Information to patient and family:   See after visit summary for information provided to patient and family  Provisions for Follow-Up Care:  See after visit summary for information related to follow-up care and any pertinent home health orders  Disposition:   Home    Planned Readmission: no     Discharge Statement:  I spent 45 minutes discharging the patient  This time was spent on the day of discharge  I had direct contact with the patient on the day of discharge  Greater than 50% of the total time was spent examining patient, answering all patient questions, arranging and discussing plan of care with patient as well as directly providing post-discharge instructions  Additional time then spent on discharge activities  Discharge Medications:  See after visit summary for reconciled discharge medications provided to patient and/or family        **Please Note: This note may have been constructed using a voice recognition system**

## 2022-12-16 NOTE — DISCHARGE INSTR - AVS FIRST PAGE
Please follow up with your primary care doctor and podiatry on discharge    I have prescribed oxycodone as needed for pain  Podiatry recommends that do not place any weight on your fractured foot

## 2022-12-19 ENCOUNTER — TRANSITIONAL CARE MANAGEMENT (OUTPATIENT)
Dept: FAMILY MEDICINE CLINIC | Facility: CLINIC | Age: 82
End: 2022-12-19

## 2022-12-19 ENCOUNTER — TELEPHONE (OUTPATIENT)
Dept: FAMILY MEDICINE CLINIC | Facility: CLINIC | Age: 82
End: 2022-12-19

## 2022-12-20 ENCOUNTER — TELEPHONE (OUTPATIENT)
Dept: FAMILY MEDICINE CLINIC | Facility: CLINIC | Age: 82
End: 2022-12-20

## 2022-12-20 LAB
DME PARACHUTE DELIVERY DATE ACTUAL: NORMAL
DME PARACHUTE DELIVERY DATE EXPECTED: NORMAL
DME PARACHUTE DELIVERY DATE REQUESTED: NORMAL
DME PARACHUTE ITEM DESCRIPTION: NORMAL
DME PARACHUTE ITEM DESCRIPTION: NORMAL
DME PARACHUTE ORDER STATUS: NORMAL
DME PARACHUTE SUPPLIER NAME: NORMAL
DME PARACHUTE SUPPLIER PHONE: NORMAL

## 2022-12-20 NOTE — TELEPHONE ENCOUNTER
Justyna Downey was suppose to have a TCM tomorrow, but they can not get her there, so she cancelled    Daughter Ashely Bueno would like to do a TCM "Virtual Visit" on Tue, 12/27 (which is when Ashely Bueno is available), can someone arrange this?  (daughter also said any physician is fine)

## 2022-12-20 NOTE — TELEPHONE ENCOUNTER
I put in my message Tue is when John Junior can do the virtual visit    There's no one there to help her (as she doesn't know how to do virtual), but John Junior is off on Tue

## 2022-12-20 NOTE — TELEPHONE ENCOUNTER
Yes, I put it in any MA's hands because I don't want to mess up by unblocking something I shouldn't

## 2022-12-20 NOTE — TELEPHONE ENCOUNTER
Okay? Pt just needs to have a virtual appt scheduled  Did you schedule one or do you need me to schedule? Bit confused on what I need to do here  Dr Trace Alegre is off next Tuesday so she can see another provider

## 2022-12-21 ENCOUNTER — TELEPHONE (OUTPATIENT)
Dept: FAMILY MEDICINE CLINIC | Facility: CLINIC | Age: 82
End: 2022-12-21

## 2022-12-21 NOTE — TELEPHONE ENCOUNTER
Mikal Linares is asking for a few things for her mom  1   Anderson Elias had a fall recently, & she has staples in her nose, but they're not sure when they're to come out? 2  Mikal Linares will be doing "long term" care for her mom and needs a form completed & needs it for the state to come to her home on Wed 12/28  She said it can be down loaded at [address] under "physician certificate for long term care"  3   She requested an appt TOMORROW 12/22/22, (when MINDY is available) and I made it with Dr Clifton Trejo virtual visit  Hard for mom to get around

## 2022-12-22 ENCOUNTER — TELEPHONE (OUTPATIENT)
Dept: FAMILY MEDICINE CLINIC | Facility: CLINIC | Age: 82
End: 2022-12-22

## 2022-12-22 ENCOUNTER — TELEMEDICINE (OUTPATIENT)
Dept: FAMILY MEDICINE CLINIC | Facility: CLINIC | Age: 82
End: 2022-12-22

## 2022-12-22 VITALS — BODY MASS INDEX: 22.5 KG/M2 | WEIGHT: 140 LBS | HEIGHT: 66 IN

## 2022-12-22 DIAGNOSIS — M11.262 CHONDROCALCINOSIS OF LEFT KNEE: ICD-10-CM

## 2022-12-22 DIAGNOSIS — S01.21XD LACERATION OF NOSE, SUBSEQUENT ENCOUNTER: ICD-10-CM

## 2022-12-22 DIAGNOSIS — S92.332D CLOSED DISPLACED FRACTURE OF THIRD METATARSAL BONE OF LEFT FOOT WITH ROUTINE HEALING, SUBSEQUENT ENCOUNTER: ICD-10-CM

## 2022-12-22 DIAGNOSIS — E87.1 HYPONATREMIA: Primary | ICD-10-CM

## 2022-12-22 DIAGNOSIS — R73.01 IMPAIRED FASTING GLUCOSE: ICD-10-CM

## 2022-12-22 DIAGNOSIS — S92.342D CLOSED DISPLACED FRACTURE OF FOURTH METATARSAL BONE OF LEFT FOOT WITH ROUTINE HEALING, SUBSEQUENT ENCOUNTER: ICD-10-CM

## 2022-12-22 DIAGNOSIS — N63.11 MASS OF UPPER OUTER QUADRANT OF RIGHT BREAST: ICD-10-CM

## 2022-12-22 DIAGNOSIS — M47.812 CERVICAL SPONDYLOSIS: ICD-10-CM

## 2022-12-22 DIAGNOSIS — S92.322D CLOSED DISPLACED FRACTURE OF SECOND METATARSAL BONE OF LEFT FOOT WITH ROUTINE HEALING, SUBSEQUENT ENCOUNTER: ICD-10-CM

## 2022-12-22 PROBLEM — I49.1 PREMATURE ATRIAL CONTRACTIONS: Status: ACTIVE | Noted: 2020-01-08

## 2022-12-22 PROBLEM — I49.3 PVC'S (PREMATURE VENTRICULAR CONTRACTIONS): Status: ACTIVE | Noted: 2020-01-08

## 2022-12-22 PROBLEM — S92.332A CLOSED DISPLACED FRACTURE OF THIRD METATARSAL BONE OF LEFT FOOT: Status: ACTIVE | Noted: 2022-12-22

## 2022-12-22 PROBLEM — S92.342A CLOSED DISPLACED FRACTURE OF FOURTH METATARSAL BONE OF LEFT FOOT: Status: ACTIVE | Noted: 2022-12-14

## 2022-12-22 PROBLEM — S01.21XA LACERATION OF NOSE: Status: ACTIVE | Noted: 2022-12-22

## 2022-12-22 NOTE — TELEPHONE ENCOUNTER
Dean Wilson from [facility] called - says the home health referral for was decline due to "Not a skilled need" for home health     The insurance denied it since it's just for stitches removal from nose  Patient may just need to schedule an appt with PCP

## 2022-12-22 NOTE — PROGRESS NOTES
Virtual TCM Visit:    Verification of patient location:    Patient is located in the following state in which I hold an active license [place]    Assessment/Plan:        Problem List Items Addressed This Visit        Musculoskeletal and Integument    Closed displaced fracture of fourth metatarsal bone of left foot    Displaced fracture of second metatarsal bone of left foot with routine healing    Closed displaced fracture of third metatarsal bone of left foot    Chondrocalcinosis of left knee    Cervical spondylosis       Other    Breast mass, right    Hyponatremia - Primary    Relevant Orders    Basic metabolic panel    Laceration of nose    Relevant Orders    EXTERNAL Referral to [address] [place] [address] Jakub Sanchez   Other Visit Diagnoses     Impaired fasting glucose        Relevant Orders    Hemoglobin A1C            I have reviewed CT head, CT facial bones, CT spine, x-ray of the foot, x-ray of left knee, x-ray of left tibia-fibula and CT of the left lower extremity suggestive of fractures of the metatarsal as above  CTA head from February 2022 showed a right breast mass for which primary care physician had already ordered mammogram and ultrasound which the patient is still not scheduled  Advised daughter to schedule the same  They are aware that this could be cancerous in origin and any delay in diagnosis can affect the management increased mortality as well as morbidity  • non-weightbearing cast to the left foot for 4 weeks, then possible transition to guarded weight bearing in a cam boot if subsequent x-rays show adequate callus formation  • Follow up with podiatry-appt is scheduled for 1/6/2023  • Set up home health aide- daughter wanted paperwork faxed to department of aging, which was done by Dr José Miguel Chacon yesterday  • Home health service with [facility] not available- referred to [place] home [facility]  Needs sutures removed, cannot leave the house per daughter due to the fracture and non weight bearing    • Repeat BMP-Na -129 and check a1c given elevated fasting BG- 151  • Recommend scheduling diagnostic mammogram and US right breast for breast mass  Reason for visit is hospital follow up    Encounter provider Cheryl Hinton MD     Provider located at [address] [facility]  [address] [facility] [place] [id number] [phone]    Recent Visits  Date Type Provider Dept   12/21/22 Telephone Angelito Milligan MD [facility]   12/20/22 Telephone Angelito Milligan MD [facility]   12/19/22 Telephone Angelito Milligan MD [facility] [address] recent visits within past 7 days and meeting all other requirements  Today's Visits  Date Type Provider Dept   12/22/22 Telemedicine Cheryl Hinton MD [facility] [address] [facility] today's visits and meeting all other requirements  Future Appointments  No visits were found meeting these conditions  Showing future appointments within next 150 days and meeting all other requirements       After connecting through [employer], the patient was identified by name and date of birth  [facility] was informed that this is a telemedicine visit and that the visit is being conducted through the [employer]  She agrees to proceed     My office door was closed  No one else was in the room  She acknowledged consent and understanding of privacy and security of the video platform  The patient has agreed to participate and understands they can discontinue the visit at any time  Patient is aware this is a billable service  Transitional Care Management Review:  Rochelle Dykes is a 78 y o  female here for TCM follow up       During the TCM phone call patient stated:    TCM Call     Date and time call was made  12/19/2022  2:32 PM    Hospital care reviewed  Records reviewed    Patient was hospitialized at  [address]    Date of Admission  12/14/22    Date of discharge  12/16/22    Diagnosis  Closed nondisplaced fracture of metatarsal bone of left foot Disposition  Home    Were the patients medications reviewed and updated  Yes    Current Symptoms  None      TCM Call     Post hospital issues  None    Should patient be enrolled in anticoag monitoring? No    Scheduled for follow up? Yes    Referrals needed  Not at this time     Did you obtain your prescribed medications  Yes    Do you need help managing your prescriptions or medications  No    Is transportation to your appointment needed  No    I have advised the patient to call PCP with any new or worsening symptoms  Benji Raymond MA    Living Arrangements  Spouse or Significiant other    Support System  Spouse    The type of support provided  Emotional    Do you have social support  Yes, as much as I need    Are you recieving any outpatient services  No    Are you recieving home care services  No    Are you using any community resources  No    Current waiver services  No    Have you fallen in the last 12 months  Yes    How many times  1 or 2     Interperter language line needed  No    Counseling  Patient    Counseling topics  Diagnostic results    Comments  Needs follow up for additional evaluation for right sided breast mass which was partially seen on CAT scan  Subjective:     Patient ID: Verenice Schrader is a 78 y o  female  With hypertension, alcohol abuse, left foot fracture, fall , GERD,breast mass seen on CT scan for post hospital follow up    TCM Call     Date and time call was made  12/19/2022  2:32 PM    Hospital care reviewed  Records reviewed    Patient was hospitialized at  [address]    Date of Admission  12/14/22    Date of discharge  12/16/22    Diagnosis  Closed nondisplaced fracture of metatarsal bone of left foot    Disposition  Home    Were the patients medications reviewed and updated  Yes    Current Symptoms  None      TCM Call     Post hospital issues  None    Should patient be enrolled in anticoag monitoring? No    Scheduled for follow up?   Yes    Referrals needed  Not at this time     Did you obtain your prescribed medications  Yes    Do you need help managing your prescriptions or medications  No    Is transportation to your appointment needed  No    I have advised the patient to call PCP with any new or worsening symptoms    Shad Patterson MA    Living Arrangements  Spouse or Significiant other    Support System  Spouse and daughter Lorenza Lean    The type of support provided  Emotional    Do you have social support  Yes, as much as I need    Are you recieving any outpatient services  No    Are you recieving home care services  No    Are you using any community resources  No    Current waiver services  No    Have you fallen in the last 12 months  Yes    How many times  1 or 2     Interperter language line needed  No    Counseling  Patient    Counseling topics  Diagnostic results    Comments  Needs follow up for additional evaluation for right sided   breast mass which was partially seen on CAT scan     pain is well controlled, did not need oxycodone      Review of Systems   Constitutional: Negative for fatigue and fever  HENT: Negative for congestion, facial swelling, mouth sores, rhinorrhea, sore throat and trouble swallowing  Eyes: Negative for pain and redness  Respiratory: Negative for cough, shortness of breath and wheezing  Cardiovascular: Negative for chest pain, palpitations and leg swelling  Gastrointestinal: Negative for abdominal pain, blood in stool, constipation, diarrhea and nausea  Genitourinary: Negative for dysuria, hematuria and urgency  Musculoskeletal: Positive for arthralgias  Negative for back pain and myalgias  Skin: Positive for color change and wound  Negative for rash  Neurological: Negative for seizures, syncope and headaches  Hematological: Negative for adenopathy  Psychiatric/Behavioral: Negative for agitation and behavioral problems           Objective:    Vitals:    12/22/22 0807   Weight: 63 5 kg (140 lb)   Height: 5' 6" (1 676 m) Physical Exam  Vitals and nursing note reviewed  Constitutional:       Appearance: She is well-developed  HENT:      Head: Normocephalic and atraumatic  Right Ear: External ear normal       Left Ear: External ear normal       Nose: Nose normal    Eyes:      General: No scleral icterus  Right eye: No discharge  Left eye: No discharge  Conjunctiva/sclera: Conjunctivae normal       Comments: Visual observation   Pulmonary:      Effort: No respiratory distress  Musculoskeletal:         General: No tenderness or deformity  Skin:     Findings: Bruising and wound present  Comments: Face with multiple bruises,  Limited by video exam- nose laceration repaired with nylon intact with small hematoma around   Neurological:      Mental Status: She is alert  Mental status is at baseline     Psychiatric:         Behavior: Behavior normal          Judgment: Judgment normal          Medications have been reviewed by provider in current encounter      Current Outpatient Medications:   •  Biotin 5000 MCG TABS, Take by mouth every morning, Disp: , Rfl:   •  Cholecalciferol (VITAMIN D) 2000 units CAPS, Take by mouth 3 (three) times a week, Disp: , Rfl:   •  cyanocobalamin (VITAMIN B-12) 500 MCG tablet, Take 1 tablet (500 mcg total) by mouth daily, Disp: 30 tablet, Rfl: 5  •  esomeprazole (NexIUM) 40 MG capsule, TAKE 1 CAPSULE BY MOUTH  DAILY, Disp: 90 capsule, Rfl: 3  •  folic acid (FOLVITE) 1 mg tablet, TAKE 1 TABLET BY MOUTH EVERY DAY, Disp: 90 tablet, Rfl: 1  •  furosemide (LASIX) 40 mg tablet, TAKE 1 TABLET BY MOUTH  DAILY AS NEEDED FOR EDEMA, Disp: 90 tablet, Rfl: 3  •  gabapentin (NEURONTIN) 100 mg capsule, Take 1 capsule (100 mg total) by mouth 2 (two) times a day, Disp: 60 capsule, Rfl: 5  •  GLUCOSAMINE HCL PO, Take 1,200 mg by mouth daily , Disp: , Rfl:   •  IBUPROFEN IB PO, 1 po daily PRN, Disp: , Rfl:   •  lisinopril (ZESTRIL) 20 mg tablet, TAKE 1 TABLET BY MOUTH IN  THE MORNING, Disp: 90 tablet, Rfl: 3  •  Magnesium 500 MG CAPS, TAKES HERE AND THERE, Disp: , Rfl:   •  metoprolol tartrate (LOPRESSOR) 50 mg tablet, TAKE 1 TABLET BY MOUTH  TWICE DAILY, Disp: 180 tablet, Rfl: 3  •  multivitamin (THERAGRAN) TABS, Take 1 tablet by mouth daily  , Disp: , Rfl:   •  niacin 500 mg tablet, Take 500 mg by mouth daily with breakfast, Disp: , Rfl:   •  Omega-3 Fatty Acids (FISH OIL) 1,000 mg, Take 1,000 mg by mouth daily  , Disp: , Rfl:   •  thiamine 100 MG tablet, TAKE 1 TABLET BY MOUTH EVERY DAY, Disp: 30 tablet, Rfl: 0  •  zolpidem (AMBIEN) 5 mg tablet, TAKE 1 TABLET BY MOUTH  DAILY AT BEDTIME AS NEEDED  FOR SLEEP, Disp: 90 tablet, Rfl: 0  •  CALCIUM PO, Take by mouth once a week, Disp: , Rfl:       Anuj Chappell MD      VIRTUAL VISIT DISCLAIMER    Khushbu Gandara verbally agrees to participate in [employer]  Pt is aware that [employer] could be limited without vital signs or the ability to perform a full hands-on physical exam  Khushbu Gandara understands she or the provider may request at any time to terminate the video visit and request the patient to seek care or treatment in person

## 2023-01-06 ENCOUNTER — HOSPITAL ENCOUNTER (OUTPATIENT)
Dept: RADIOLOGY | Facility: HOSPITAL | Age: 83
End: 2023-01-06
Attending: PODIATRIST

## 2023-01-06 ENCOUNTER — OFFICE VISIT (OUTPATIENT)
Dept: PODIATRY | Facility: CLINIC | Age: 83
End: 2023-01-06

## 2023-01-06 VITALS
BODY MASS INDEX: 22.5 KG/M2 | HEIGHT: 66 IN | DIASTOLIC BLOOD PRESSURE: 98 MMHG | HEART RATE: 67 BPM | SYSTOLIC BLOOD PRESSURE: 175 MMHG | WEIGHT: 140 LBS

## 2023-01-06 DIAGNOSIS — S92.302A CLOSED DISPLACED FRACTURE OF METATARSAL BONE OF LEFT FOOT, UNSPECIFIED METATARSAL, INITIAL ENCOUNTER: ICD-10-CM

## 2023-01-07 NOTE — PROGRESS NOTES
Assessment/Plan:     The patient's clinical examination today reveals no significant tenderness palpation of the left forefoot  There is mild resolving edema and ecchymosis to the lesser digits of the left forefoot  Efrain x-rays today of the patient's left foot were personally reviewed by myself and interpreted  The films were also reviewed and discussed with the patient and her family members  It shows healing distal lesser metatarsal fractures 2 through 5 with callus formation  Fracture alignment appears stable from her initial injury  Joint spaces of the lesser MTPJ's is maintained  We will transfer her from a splint to guarded weightbearing in a cam boot today with her walker  Recommend follow-up in 3 weeks for repeat x-rays of the left foot  Diagnoses and all orders for this visit:    Closed displaced fracture of metatarsal bone of left foot, unspecified metatarsal, initial encounter  -     Ambulatory Referral to Podiatry  -     X-ray foot left 3+ views; Future  -     Cam Boot          Subjective:     Patient ID: Liz Helm is a 78 y o  female  The patient presents today for follow-up status post fall with subsequent fractures of the second through fifth metatarsals  She has been nonweightbearing with a posterior splint walker to her left lower extremity  It does appear though, that she has been ambulating on the splint with her walker  He denies any pain or discomfort to the left foot  Review of Systems   Constitutional: Negative  HENT: Negative  Eyes: Negative  Respiratory: Negative  Cardiovascular: Negative  Endocrine: Negative  Musculoskeletal: Negative  Neurological: Negative  Hematological: Negative  Psychiatric/Behavioral: Negative  Objective:     Physical Exam  Constitutional:       Appearance: Normal appearance  HENT:      Head: Normocephalic and atraumatic        Nose: Nose normal    Cardiovascular:      Pulses:           Posterior tibial pulses are 1+ on the left side  Pulmonary:      Effort: Pulmonary effort is normal    Feet:      Comments: There is very mild resolving edema and ecchymosis to the left forefoot; no tenderness to palpation of the lesser digits 2 through 5 for the left foot; injury motion of the lesser MTPJ's 2 through 5 does not yield any discomfort or pain; there is alignment of the forefoot is anatomic  Skin:     General: Skin is warm  Capillary Refill: Capillary refill takes less than 2 seconds  Neurological:      General: No focal deficit present  Mental Status: She is alert and oriented to person, place, and time  Psychiatric:         Mood and Affect: Mood normal          Behavior: Behavior normal          Thought Content:  Thought content normal

## 2023-01-23 ENCOUNTER — APPOINTMENT (EMERGENCY)
Dept: RADIOLOGY | Facility: HOSPITAL | Age: 83
End: 2023-01-23

## 2023-01-23 ENCOUNTER — HOSPITAL ENCOUNTER (EMERGENCY)
Facility: HOSPITAL | Age: 83
Discharge: HOME | End: 2023-01-23
Attending: EMERGENCY MEDICINE

## 2023-01-23 ENCOUNTER — APPOINTMENT (EMERGENCY)
Dept: VASCULAR ULTRASOUND | Facility: HOSPITAL | Age: 83
End: 2023-01-23

## 2023-01-23 VITALS
BODY MASS INDEX: 22.5 KG/M2 | TEMPERATURE: 97.6 F | SYSTOLIC BLOOD PRESSURE: 185 MMHG | DIASTOLIC BLOOD PRESSURE: 99 MMHG | OXYGEN SATURATION: 100 % | WEIGHT: 140 LBS | HEART RATE: 78 BPM | HEIGHT: 66 IN | RESPIRATION RATE: 20 BRPM

## 2023-01-23 DIAGNOSIS — M71.22 BAKER'S CYST, LEFT: ICD-10-CM

## 2023-01-23 DIAGNOSIS — M79.89 LEFT LEG SWELLING: Primary | ICD-10-CM

## 2023-01-23 LAB
ALBUMIN SERPL BCP-MCNC: 4.1 G/DL (ref 3.5–5)
ALP SERPL-CCNC: 92 U/L (ref 34–104)
ALT SERPL W P-5'-P-CCNC: 14 U/L (ref 7–52)
ANION GAP SERPL CALCULATED.3IONS-SCNC: 11 MMOL/L (ref 4–13)
APTT PPP: 31 SECONDS (ref 23–37)
AST SERPL W P-5'-P-CCNC: 16 U/L (ref 13–39)
BASOPHILS # BLD AUTO: 0.07 THOUSANDS/ÂΜL (ref 0–0.1)
BASOPHILS NFR BLD AUTO: 1 % (ref 0–1)
BILIRUB SERPL-MCNC: 0.45 MG/DL (ref 0.2–1)
BUN SERPL-MCNC: 8 MG/DL (ref 5–25)
CALCIUM SERPL-MCNC: 9.4 MG/DL (ref 8.4–10.2)
CHLORIDE SERPL-SCNC: 91 MMOL/L (ref 96–108)
CO2 SERPL-SCNC: 28 MMOL/L (ref 21–32)
CREAT SERPL-MCNC: 0.72 MG/DL (ref 0.6–1.3)
EOSINOPHIL # BLD AUTO: 0.17 THOUSAND/ÂΜL (ref 0–0.61)
EOSINOPHIL NFR BLD AUTO: 2 % (ref 0–6)
ERYTHROCYTE [DISTWIDTH] IN BLOOD BY AUTOMATED COUNT: 11.2 % (ref 11.6–15.1)
GFR SERPL CREATININE-BSD FRML MDRD: 77 ML/MIN/1.73SQ M
GLUCOSE SERPL-MCNC: 103 MG/DL (ref 65–140)
HCT VFR BLD AUTO: 35.9 % (ref 34.8–46.1)
HGB BLD-MCNC: 12.7 G/DL (ref 11.5–15.4)
IMM GRANULOCYTES # BLD AUTO: 0.04 THOUSAND/UL (ref 0–0.2)
IMM GRANULOCYTES NFR BLD AUTO: 1 % (ref 0–2)
INR PPP: 0.89 (ref 0.84–1.19)
LYMPHOCYTES # BLD AUTO: 1.6 THOUSANDS/ÂΜL (ref 0.6–4.47)
LYMPHOCYTES NFR BLD AUTO: 19 % (ref 14–44)
MAGNESIUM SERPL-MCNC: 1.6 MG/DL (ref 1.9–2.7)
MCH RBC QN AUTO: 33.6 PG (ref 26.8–34.3)
MCHC RBC AUTO-ENTMCNC: 35.4 G/DL (ref 31.4–37.4)
MCV RBC AUTO: 95 FL (ref 82–98)
MONOCYTES # BLD AUTO: 0.63 THOUSAND/ÂΜL (ref 0.17–1.22)
MONOCYTES NFR BLD AUTO: 7 % (ref 4–12)
NEUTROPHILS # BLD AUTO: 6.1 THOUSANDS/ÂΜL (ref 1.85–7.62)
NEUTS SEG NFR BLD AUTO: 70 % (ref 43–75)
NRBC BLD AUTO-RTO: 0 /100 WBCS
PLATELET # BLD AUTO: 308 THOUSANDS/UL (ref 149–390)
PMV BLD AUTO: 8.5 FL (ref 8.9–12.7)
POTASSIUM SERPL-SCNC: 3.8 MMOL/L (ref 3.5–5.3)
PROT SERPL-MCNC: 7.1 G/DL (ref 6.4–8.4)
PROTHROMBIN TIME: 12.4 SECONDS (ref 11.6–14.5)
RBC # BLD AUTO: 3.78 MILLION/UL (ref 3.81–5.12)
SODIUM SERPL-SCNC: 130 MMOL/L (ref 135–147)
WBC # BLD AUTO: 8.61 THOUSAND/UL (ref 4.31–10.16)

## 2023-01-23 RX ADMIN — SODIUM CHLORIDE 500 ML: 0.9 INJECTION, SOLUTION INTRAVENOUS at 06:28

## 2023-01-23 RX ADMIN — MAGNESIUM OXIDE TAB 400 MG (241.3 MG ELEMENTAL MG) 800 MG: 400 (241.3 MG) TAB at 06:27

## 2023-01-23 NOTE — ED PROVIDER NOTES
History  Chief Complaint   Patient presents with   • Leg Swelling     Patient reports left lower leg swelling  Reports "The bubble keeps coming in an out"  Denies pain in area  Denies chest pain and SOB     Patient is an 28-year-old female seen in the emergency department with concern for intermittent left leg swelling over approximately the past 2 days  Patient notes recent left foot fracture for which she wore a cast for several weeks  Patient states that she also felt some palpitations this evening  Patient notes no fever, chest pain, shortness of breath, abdominal pain, nausea, vomiting, weakness, numbness, tingling  Patient notes no definite clear exacerbating or alleviating factors for her symptoms  Patient states that she does not take any blood-thinning medications  Prior to Admission Medications   Prescriptions Last Dose Informant Patient Reported? Taking?    Biotin 5000 MCG TABS   Yes No   Sig: Take by mouth every morning   CALCIUM PO   Yes No   Sig: Take by mouth once a week   Cholecalciferol (VITAMIN D) 2000 units CAPS   Yes No   Sig: Take by mouth 3 (three) times a week   GLUCOSAMINE HCL PO   Yes No   Sig: Take 1,200 mg by mouth daily    IBUPROFEN IB PO   Yes No   Si po daily PRN   Magnesium 500 MG CAPS   Yes No   Sig: TAKES HERE AND THERE   Omega-3 Fatty Acids (FISH OIL) 1,000 mg   Yes No   Sig: Take 1,000 mg by mouth daily     cyanocobalamin (VITAMIN B-12) 500 MCG tablet   No No   Sig: Take 1 tablet (500 mcg total) by mouth daily   esomeprazole (NexIUM) 40 MG capsule   No No   Sig: TAKE 1 CAPSULE BY MOUTH  DAILY   folic acid (FOLVITE) 1 mg tablet   No No   Sig: TAKE 1 TABLET BY MOUTH EVERY DAY   furosemide (LASIX) 40 mg tablet   No No   Sig: TAKE 1 TABLET BY MOUTH  DAILY AS NEEDED FOR EDEMA   gabapentin (NEURONTIN) 100 mg capsule   No No   Sig: Take 1 capsule (100 mg total) by mouth 2 (two) times a day   lisinopril (ZESTRIL) 20 mg tablet   No No   Sig: TAKE 1 TABLET BY MOUTH IN  THE MORNING   metoprolol tartrate (LOPRESSOR) 50 mg tablet   No No   Sig: TAKE 1 TABLET BY MOUTH  TWICE DAILY   multivitamin (THERAGRAN) TABS   Yes No   Sig: Take 1 tablet by mouth daily     niacin 500 mg tablet   Yes No   Sig: Take 500 mg by mouth daily with breakfast   thiamine 100 MG tablet   No No   Sig: TAKE 1 TABLET BY MOUTH EVERY DAY   zolpidem (AMBIEN) 5 mg tablet   No No   Sig: TAKE 1 TABLET BY MOUTH  DAILY AT BEDTIME AS NEEDED  FOR SLEEP      Facility-Administered Medications: None       Past Medical History:   Diagnosis Date   • Anxiety    • Arthritis    • Prajapati esophagus    • Cataract    • Continuous chronic alcoholism (HCC)    • GERD (gastroesophageal reflux disease)    • Heavy alcohol use    • Hyperlipidemia    • Hypertension    • Insomnia    • Palpitations        Past Surgical History:   Procedure Laterality Date   • BREAST IMPLANT Bilateral    • CATARACT EXTRACTION Right    • CHOLECYSTECTOMY      LAP   • COLONOSCOPY     • HYSTERECTOMY      age 69-[place]   • JOINT REPLACEMENT Bilateral     hips-2010 and 2017-left leg is shorter   • LASIK Bilateral     in her 50's   • [place] XCAPSL CTRC RMVL INSJ IO LENS PROSTH W/O ECP Left 10/18/2018    Procedure: EXTRACTION EXTRACAPSULAR CATARACT PHACO INTRAOCULAR LENS (IOL); Surgeon: Marisa Whitney MD;  Location: [facility] MAIN OR;  Service: Ophthalmology   • TONSILLECTOMY     • TUBAL LIGATION         Family History   Problem Relation Age of Onset   • Cancer Mother         breast,kidney,lung (smoker)   • Heart disease Father 61        MI   • Cancer Sister         liver,pancreatic(smoker,ETOH)   • Diabetes Daughter    • No Known Problems Sister      I have reviewed and agree with the history as documented      E-Cigarette/Vaping   • E-Cigarette Use Never User      E-Cigarette/Vaping Substances   • Nicotine No    • THC No    • CBD No    • Flavoring No    • Other No    • Unknown No      Social History     Tobacco Use   • Smoking status: Never   • Smokeless tobacco: Never   Vaping Use   • Vaping Use: Never used   Substance Use Topics   • Alcohol use: Yes     Alcohol/week: 7 0 standard drinks     Types: 7 Glasses of wine per week   • Drug use: No       Review of Systems   Constitutional: Negative for chills and fever  HENT: Negative for ear pain and sore throat  Eyes: Negative for pain and visual disturbance  Respiratory: Negative for cough and shortness of breath  Cardiovascular: Positive for palpitations  Negative for chest pain  Gastrointestinal: Negative for abdominal pain and vomiting  Genitourinary: Negative for decreased urine volume and difficulty urinating  Musculoskeletal: Negative for gait problem and neck stiffness  Left leg swelling   Skin: Negative for color change and rash  Neurological: Negative for seizures and syncope  Psychiatric/Behavioral: Negative for agitation  The patient is not nervous/anxious  All other systems reviewed and are negative  Physical Exam  Physical Exam  Vitals and nursing note reviewed  Constitutional:       General: She is not in acute distress  Appearance: She is well-developed  HENT:      Head: Normocephalic and atraumatic  Right Ear: External ear normal       Left Ear: External ear normal       Nose: Nose normal       Mouth/Throat:      Pharynx: Oropharynx is clear  Eyes:      General: No scleral icterus  Conjunctiva/sclera: Conjunctivae normal    Cardiovascular:      Rate and Rhythm: Normal rate and regular rhythm  Heart sounds: No murmur heard  Pulmonary:      Effort: Pulmonary effort is normal  No respiratory distress  Breath sounds: Normal breath sounds  Abdominal:      General: There is no distension  Palpations: Abdomen is soft  Tenderness: There is no abdominal tenderness  Musculoskeletal:         General: No deformity or signs of injury  Cervical back: Normal range of motion and neck supple        Comments: mild left pretibial tenderness noted; no calf tenderness noted bilaterally   Skin:     General: Skin is warm and dry  Neurological:      General: No focal deficit present  Mental Status: She is alert  Cranial Nerves: No cranial nerve deficit  Sensory: No sensory deficit  Psychiatric:         Mood and Affect: Mood normal          Thought Content:  Thought content normal          Vital Signs  ED Triage Vitals   Temperature Pulse Respirations Blood Pressure SpO2   01/23/23 0522 01/23/23 0520 01/23/23 0520 01/23/23 0520 01/23/23 0520   97 6 °F (36 4 °C) 78 20 (!) 185/99 100 %      Temp Source Heart Rate Source Patient Position - Orthostatic VS BP Location FiO2 (%)   01/23/23 0522 01/23/23 0520 01/23/23 0520 01/23/23 0520 --   Oral Monitor Lying Left arm       Pain Score       --                  Vitals:    01/23/23 0520   BP: (!) 185/99   Pulse: 78   Patient Position - Orthostatic VS: Lying         Visual Acuity      ED Medications  Medications   sodium chloride 0 9 % bolus 500 mL (has no administration in time range)   magnesium oxide (MAG-OX) tablet 800 mg (has no administration in time range)       Diagnostic Studies  Results Reviewed     Procedure Component Value Units Date/Time    Comprehensive metabolic panel [[id number]]  (Abnormal) Collected: 01/23/23 0539    Lab Status: Final result Specimen: Blood from Arm, Left Updated: 01/23/23 0606     Sodium 130 mmol/L      Potassium 3 8 mmol/L      Chloride 91 mmol/L      CO2 28 mmol/L      ANION GAP 11 mmol/L      BUN 8 mg/dL      Creatinine 0 72 mg/dL      Glucose 103 mg/dL      Calcium 9 4 mg/dL      AST 16 U/L      ALT 14 U/L      Alkaline Phosphatase 92 U/L      Total Protein 7 1 g/dL      Albumin 4 1 g/dL      Total Bilirubin 0 45 mg/dL      eGFR 77 ml/min/1 73sq m     Narrative:      [place] guidelines for Chronic Kidney Disease (CKD):   •  Stage 1 with normal or high GFR (GFR > 90 mL/min/1 73 square meters)  •  Stage 2 Mild CKD (GFR = 60-89 mL/min/1 73 square meters)  •  Stage 3A Moderate CKD (GFR = 45-59 mL/min/1 73 square meters)  •  Stage 3B Moderate CKD (GFR = 30-44 mL/min/1 73 square meters)  •  Stage 4 Severe CKD (GFR = 15-29 mL/min/1 73 square meters)  •  Stage 5 End Stage CKD (GFR <15 mL/min/1 73 square meters)  Note: GFR calculation is accurate only with a steady state creatinine    Magnesium [[id number]]  (Abnormal) Collected: 01/23/23 0539    Lab Status: Final result Specimen: Blood from Arm, Left Updated: 01/23/23 0606     Magnesium 1 6 mg/dL     APTT [[id number]]  (Normal) Collected: 01/23/23 0539    Lab Status: Final result Specimen: Blood from Arm, Left Updated: 01/23/23 0557     PTT 31 seconds     Protime-INR [[id number]]  (Normal) Collected: 01/23/23 0539    Lab Status: Final result Specimen: Blood from Arm, Left Updated: 01/23/23 0557     Protime 12 4 seconds      INR 0 89    CBC and differential [[id number]]  (Abnormal) Collected: 01/23/23 0539    Lab Status: Final result Specimen: Blood from Arm, Left Updated: 01/23/23 0546     WBC 8 61 Thousand/uL      RBC 3 78 Million/uL      Hemoglobin 12 7 g/dL      Hematocrit 35 9 %      MCV 95 fL      MCH 33 6 pg      MCHC 35 4 g/dL      RDW 11 2 %      MPV 8 5 fL      Platelets 977 Thousands/uL      nRBC 0 /100 WBCs      Neutrophils Relative 70 %      Immat GRANS % 1 %      Lymphocytes Relative 19 %      Monocytes Relative 7 %      Eosinophils Relative 2 %      Basophils Relative 1 %      Neutrophils Absolute 6 10 Thousands/µL      Immature Grans Absolute 0 04 Thousand/uL      Lymphocytes Absolute 1 60 Thousands/µL      Monocytes Absolute 0 63 Thousand/µL      Eosinophils Absolute 0 17 Thousand/µL      Basophils Absolute 0 07 Thousands/µL                  VAS lower limb venous duplex study, unilateral/limited    (Results Pending)              Procedures  ECG 12 Lead Documentation Only    Date/Time: 1/23/2023 5:37 AM  Performed by: Zuleima Bello MD  Authorized by: Zuleima Bello MD     Indications / Diagnosis: Palpitations  ECG reviewed by me, the ED Provider: yes    Patient location:  ED  Rate:     ECG rate:  79    ECG rate assessment: normal    Rhythm:     Rhythm: sinus rhythm    QRS:     QRS axis:  Left  T waves:     T waves: non-specific    Comments:      Sinus rhythm at 79, left axis deviation, , QRS 93, QTc 466, nonspecific T-wave abnormality, no definite evidence of acute ischemia             ED Course                                             Medical Decision Making  Patient is a an 49-year-old female seen in the emergency department with concern for intermittent left leg swelling  EKG was obtained and noted  Laboratory evaluation remarkable for low sodium of 130, low chloride of 91, low magnesium of 1 6, low red blood cell count of 3 78  There is no evidence of cellulitis based on evaluation  Patient is to be signed out to my colleague at change of shift, with ultrasound pending(to evaluate for DVT of the left lower extremity)  Left leg swelling: acute illness or injury  Amount and/or Complexity of Data Reviewed  Labs: ordered  ECG/medicine tests: ordered and independent interpretation performed  Risk  OTC drugs            Disposition  Final diagnoses:   Left leg swelling     Time reflects when diagnosis was documented in both MDM as applicable and the Disposition within this note     Time User Action Codes Description Comment    1/23/2023  5:15 AM Giovanna Nelson [R07 9] Chest pain in adult     1/23/2023  5:23 AM Giovanna Floyd Add [M79 89] Left leg swelling     1/23/2023  5:23 AM Giovanna Floyd Modify [M79 89] Left leg swelling     1/23/2023  5:23 AM Priya Garcia [R07 9] Chest pain in adult       ED Disposition     None      Follow-up Information     Follow up With Specialties Details Why [address], [place] Internal Medicine, Pediatrics Call in 1 day  [address]  [address] [facility] [address] [phone]            Patient's Medications   Discharge Prescriptions    No medications on file       No discharge procedures on file      PDMP Review       Value Time User    PDMP Reviewed  Yes 12/14/2022  8:20 PM Elo OfficerALLISON          ED Provider  Electronically Signed by           Nirali Pollock MD  01/23/23 7112

## 2023-01-24 LAB
ATRIAL RATE: 79 BPM
P AXIS: 37 DEGREES
PR INTERVAL: 163 MS
QRS AXIS: 5 DEGREES
QRSD INTERVAL: 93 MS
QT INTERVAL: 406 MS
QTC INTERVAL: 466 MS
T WAVE AXIS: 11 DEGREES
VENTRICULAR RATE: 79 BPM

## 2023-02-17 ENCOUNTER — HOSPITAL ENCOUNTER (OUTPATIENT)
Dept: RADIOLOGY | Facility: HOSPITAL | Age: 83
End: 2023-02-17
Attending: PODIATRIST

## 2023-02-17 ENCOUNTER — OFFICE VISIT (OUTPATIENT)
Dept: PODIATRY | Facility: CLINIC | Age: 83
End: 2023-02-17

## 2023-02-17 VITALS
OXYGEN SATURATION: 93 % | DIASTOLIC BLOOD PRESSURE: 104 MMHG | BODY MASS INDEX: 23.63 KG/M2 | HEART RATE: 70 BPM | WEIGHT: 147 LBS | SYSTOLIC BLOOD PRESSURE: 183 MMHG | HEIGHT: 66 IN

## 2023-02-17 DIAGNOSIS — S92.302A CLOSED DISPLACED FRACTURE OF METATARSAL BONE OF LEFT FOOT, UNSPECIFIED METATARSAL, INITIAL ENCOUNTER: ICD-10-CM

## 2023-02-17 DIAGNOSIS — S92.302A CLOSED DISPLACED FRACTURE OF METATARSAL BONE OF LEFT FOOT, UNSPECIFIED METATARSAL, INITIAL ENCOUNTER: Primary | ICD-10-CM

## 2023-02-17 RX ORDER — PERMETHRIN 50 MG/G
CREAM TOPICAL
COMMUNITY
Start: 2023-01-31

## 2023-02-18 NOTE — PROGRESS NOTES
Assessment/Plan:     Repeat x-rays of the patient's left foot today was personally reviewed and interpreted  They show progressive healing of the lesser metatarsal fractures 2 through 5  Her clinical examination was benign  She has no significant tenderness the forefoot today  She has already weaned herself out of the cam boot and presents today in a pair of sneakers  Notes no tenderness or difficulty ambulating this point in time  She does note persistent swelling to the left lower extremity  Venous duplex was reviewed and was negative for DVT  I did recommend use of compression stockings to assist with edema management  As she is doing well and her fractures appear to have healed she will follow-up with me on an as-needed basis  Diagnoses and all orders for this visit:    Closed displaced fracture of metatarsal bone of left foot, unspecified metatarsal, initial encounter  -     X-ray foot left 3+ views; Future    Other orders  -     permethrin (ELIMITE) 5 % cream; PLEASE SEE ATTACHED FOR DETAILED DIRECTIONS          Subjective:     Patient ID: Tahmina Moss is a 78 y o  female  The patient presents today for follow-up of left lesser metatarsal fractures 2 through 5  Good interval improvement since her last visit  She has weaned herself out of the cam boot and is ambulating today in a pair of sneakers without any difficulty or pain  Does note persistent swelling to the left lower extremity for which she went in the local ER  Review of Systems   Constitutional: Negative  HENT: Negative  Eyes: Negative  Respiratory: Negative  Cardiovascular: Negative  Endocrine: Negative  Musculoskeletal: Negative  Neurological: Negative  Hematological: Negative  Psychiatric/Behavioral: Negative  Objective:     Physical Exam  Constitutional:       Appearance: Normal appearance  HENT:      Head: Normocephalic and atraumatic        Nose: Nose normal    Cardiovascular: Pulses:           Dorsalis pedis pulses are 2+ on the left side  Posterior tibial pulses are 1+ on the left side  Pulmonary:      Effort: Pulmonary effort is normal    Feet:      Comments: There is +2 pitting edema of the left lower extremity; there is no tenderness to the patient's left forefoot at the fracture sites  Skin:     General: Skin is warm  Capillary Refill: Capillary refill takes less than 2 seconds  Neurological:      General: No focal deficit present  Mental Status: She is alert and oriented to person, place, and time  Psychiatric:         Mood and Affect: Mood normal          Behavior: Behavior normal          Thought Content:  Thought content normal

## 2023-02-20 ENCOUNTER — TELEPHONE (OUTPATIENT)
Dept: FAMILY MEDICINE CLINIC | Facility: CLINIC | Age: 83
End: 2023-02-20

## 2023-02-20 DIAGNOSIS — B86 SCABIES: Primary | ICD-10-CM

## 2023-02-20 PROBLEM — S01.21XA LACERATION OF NOSE: Status: RESOLVED | Noted: 2022-12-22 | Resolved: 2023-02-20

## 2023-02-20 RX ORDER — PERMETHRIN 50 MG/G
CREAM TOPICAL ONCE
Qty: 60 G | Refills: 5 | Status: SHIPPED | OUTPATIENT
Start: 2023-02-20 | End: 2023-02-20

## 2023-02-20 NOTE — TELEPHONE ENCOUNTER
Josefa Harkins was seen & diagnosed for scabies on 1/31/23 at [address] [place] [address] Dr ravi Joseph  She got medication and would like a refill of it Permethrin 5% cream   Can she get this?

## 2023-02-20 NOTE — TELEPHONE ENCOUNTER
permethrin (ELIMITE) 5 % cream PLEASE SEE ATTACHED FOR DETAILED DIRECTIONS     Rakel Room called wants a refill of the above med - it was prescribed to her at a urgent care    Rakel Room says she has scabies all over her body and now in her face - doesn't want to come into the office and spread it    [facility] [address]    Please call Khushbu @ # [phone]

## 2023-03-01 ENCOUNTER — TELEPHONE (OUTPATIENT)
Dept: FAMILY MEDICINE CLINIC | Facility: CLINIC | Age: 83
End: 2023-03-01

## 2023-03-01 NOTE — TELEPHONE ENCOUNTER
Sury Benito called wants a return call from you   Says it's about a medication - didn't specify what she wants to discuss    Please call her @ [phone]

## 2023-03-02 ENCOUNTER — HOSPITAL ENCOUNTER (EMERGENCY)
Facility: HOSPITAL | Age: 83
Discharge: HOME | End: 2023-03-02
Attending: EMERGENCY MEDICINE

## 2023-03-02 ENCOUNTER — APPOINTMENT (EMERGENCY)
Dept: VASCULAR ULTRASOUND | Facility: HOSPITAL | Age: 83
End: 2023-03-02

## 2023-03-02 VITALS
HEIGHT: 66 IN | SYSTOLIC BLOOD PRESSURE: 162 MMHG | DIASTOLIC BLOOD PRESSURE: 79 MMHG | RESPIRATION RATE: 20 BRPM | OXYGEN SATURATION: 99 % | HEART RATE: 80 BPM | WEIGHT: 142 LBS | BODY MASS INDEX: 22.82 KG/M2

## 2023-03-02 DIAGNOSIS — L03.116 CELLULITIS OF LEFT LOWER EXTREMITY: Primary | ICD-10-CM

## 2023-03-02 LAB
BASOPHILS # BLD AUTO: 0.03 THOUSANDS/ÂΜL (ref 0–0.1)
BASOPHILS NFR BLD AUTO: 1 % (ref 0–1)
EOSINOPHIL # BLD AUTO: 0.07 THOUSAND/ÂΜL (ref 0–0.61)
EOSINOPHIL NFR BLD AUTO: 1 % (ref 0–6)
ERYTHROCYTE [DISTWIDTH] IN BLOOD BY AUTOMATED COUNT: 11.6 % (ref 11.6–15.1)
HCT VFR BLD AUTO: 36.7 % (ref 34.8–46.1)
HGB BLD-MCNC: 13.1 G/DL (ref 11.5–15.4)
IMM GRANULOCYTES # BLD AUTO: 0.01 THOUSAND/UL (ref 0–0.2)
IMM GRANULOCYTES NFR BLD AUTO: 0 % (ref 0–2)
LYMPHOCYTES # BLD AUTO: 1.2 THOUSANDS/ÂΜL (ref 0.6–4.47)
LYMPHOCYTES NFR BLD AUTO: 19 % (ref 14–44)
MCH RBC QN AUTO: 34.4 PG (ref 26.8–34.3)
MCHC RBC AUTO-ENTMCNC: 35.7 G/DL (ref 31.4–37.4)
MCV RBC AUTO: 96 FL (ref 82–98)
MONOCYTES # BLD AUTO: 0.62 THOUSAND/ÂΜL (ref 0.17–1.22)
MONOCYTES NFR BLD AUTO: 10 % (ref 4–12)
NEUTROPHILS # BLD AUTO: 4.38 THOUSANDS/ÂΜL (ref 1.85–7.62)
NEUTS SEG NFR BLD AUTO: 69 % (ref 43–75)
NRBC BLD AUTO-RTO: 0 /100 WBCS
PLATELET # BLD AUTO: 228 THOUSANDS/UL (ref 149–390)
PMV BLD AUTO: 8.8 FL (ref 8.9–12.7)
RBC # BLD AUTO: 3.81 MILLION/UL (ref 3.81–5.12)
WBC # BLD AUTO: 6.31 THOUSAND/UL (ref 4.31–10.16)

## 2023-03-02 RX ORDER — CEPHALEXIN 500 MG/1
500 CAPSULE ORAL EVERY 6 HOURS SCHEDULED
Qty: 28 CAPSULE | Refills: 0 | Status: SHIPPED | OUTPATIENT
Start: 2023-03-02 | End: 2023-03-09

## 2023-03-02 RX ORDER — PERMETHRIN 50 MG/G
CREAM TOPICAL
Qty: 60 G | Refills: 0 | Status: SHIPPED | OUTPATIENT
Start: 2023-03-02

## 2023-03-02 NOTE — ED PROVIDER NOTES
History  Chief Complaint   Patient presents with   • Leg Swelling     Pt reports redness and swelling on lower left leg since yesterday; pt denies trauma      Patient is an 58-year-old female  She presents to the emergency room with a 2-day history of left leg swelling and redness  No fever or chills  Patient denies any history of congestive heart failure  No history of thromboembolic disease  On  she did have an ultrasound done of that leg  It was negative for DVT  She did have what is likely a Baker's cyst in the left popliteal fossa  Patient reports that yesterday there is been increased redness and itching  No associated fever or chills  Try a permethrin cream without relief  Prior to Admission Medications   Prescriptions Last Dose Informant Patient Reported? Taking?    Biotin 5000 MCG TABS   Yes No   Sig: Take by mouth every morning   CALCIUM PO   Yes No   Sig: Take by mouth once a week   Cholecalciferol (VITAMIN D) 2000 units CAPS   Yes No   Sig: Take by mouth 3 (three) times a week   GLUCOSAMINE HCL PO   Yes No   Sig: Take 1,200 mg by mouth daily    IBUPROFEN IB PO   Yes No   Si po daily PRN   Magnesium 500 MG CAPS   Yes No   Sig: TAKES HERE AND THERE   Omega-3 Fatty Acids (FISH OIL) 1,000 mg   Yes No   Sig: Take 1,000 mg by mouth daily     cyanocobalamin (VITAMIN B-12) 500 MCG tablet   No No   Sig: Take 1 tablet (500 mcg total) by mouth daily   esomeprazole (NexIUM) 40 MG capsule   No No   Sig: TAKE 1 CAPSULE BY MOUTH  DAILY   folic acid (FOLVITE) 1 mg tablet   No No   Sig: TAKE 1 TABLET BY MOUTH EVERY DAY   furosemide (LASIX) 40 mg tablet   No No   Sig: TAKE 1 TABLET BY MOUTH  DAILY AS NEEDED FOR EDEMA   gabapentin (NEURONTIN) 100 mg capsule   No No   Sig: Take 1 capsule (100 mg total) by mouth 2 (two) times a day   lisinopril (ZESTRIL) 20 mg tablet   No No   Sig: TAKE 1 TABLET BY MOUTH IN  THE MORNING   metoprolol tartrate (LOPRESSOR) 50 mg tablet   No No   Sig: TAKE 1 TABLET BY MOUTH  TWICE DAILY   multivitamin (THERAGRAN) TABS   Yes No   Sig: Take 1 tablet by mouth daily     niacin 500 mg tablet   Yes No   Sig: Take 500 mg by mouth daily with breakfast   permethrin (ELIMITE) 5 % cream   Yes No   Sig: PLEASE SEE ATTACHED FOR DETAILED DIRECTIONS   thiamine 100 MG tablet   No No   Sig: TAKE 1 TABLET BY MOUTH EVERY DAY   zolpidem (AMBIEN) 5 mg tablet   No No   Sig: TAKE 1 TABLET BY MOUTH  DAILY AT BEDTIME AS NEEDED  FOR SLEEP      Facility-Administered Medications: None       Past Medical History:   Diagnosis Date   • Anxiety    • Arthritis    • Prajapati esophagus    • Cataract    • Continuous chronic alcoholism (HCC)    • GERD (gastroesophageal reflux disease)    • Heavy alcohol use    • Hyperlipidemia    • Hypertension    • Insomnia    • Palpitations        Past Surgical History:   Procedure Laterality Date   • BREAST IMPLANT Bilateral    • CATARACT EXTRACTION Right    • CHOLECYSTECTOMY      LAP   • COLONOSCOPY     • HYSTERECTOMY      age 69-[place]   • JOINT REPLACEMENT Bilateral     hips-2010 and 2017-left leg is shorter   • LASIK Bilateral     in her 50's   • PA XCAPSL CTRC RMVL INSJ IO LENS PROSTH W/O ECP Left 10/18/2018    Procedure: EXTRACTION EXTRACAPSULAR CATARACT PHACO INTRAOCULAR LENS (IOL); Surgeon: Adán Arita MD;  Location: [facility] MAIN OR;  Service: Ophthalmology   • TONSILLECTOMY     • TUBAL LIGATION         Family History   Problem Relation Age of Onset   • Cancer Mother         breast,kidney,lung (smoker)   • Heart disease Father 61        MI   • Cancer Sister         liver,pancreatic(smoker,ETOH)   • Diabetes Daughter    • No Known Problems Sister      I have reviewed and agree with the history as documented      E-Cigarette/Vaping   • E-Cigarette Use Never User      E-Cigarette/Vaping Substances   • Nicotine No    • THC No    • CBD No    • Flavoring No    • Other No    • Unknown No      Social History     Tobacco Use   • Smoking status: Never   • Smokeless tobacco: Never   Vaping Use   • Vaping Use: Never used   Substance Use Topics   • Alcohol use: Yes     Alcohol/week: 20 0 standard drinks     Types: 20 Glasses of wine per week     Comment: pt reports 4 glasses of wine daily   • Drug use: No       Review of Systems   Constitutional: Negative for chills and fever  Respiratory: Negative for shortness of breath  Cardiovascular: Positive for leg swelling  Negative for chest pain  Skin: Positive for rash  All other systems reviewed and are negative  Physical Exam  Physical Exam  Vitals reviewed  Constitutional:       General: She is not in acute distress  HENT:      Head: Normocephalic and atraumatic  Nose: Nose normal       Mouth/Throat:      Mouth: Mucous membranes are moist    Eyes:      General:         Right eye: No discharge  Left eye: No discharge  Conjunctiva/sclera: Conjunctivae normal    Cardiovascular:      Rate and Rhythm: Normal rate and regular rhythm  Pulses: Normal pulses  Heart sounds: Normal heart sounds  No murmur heard  No friction rub  No gallop  Pulmonary:      Effort: Pulmonary effort is normal  No respiratory distress  Breath sounds: Normal breath sounds  No stridor  No wheezing, rhonchi or rales  Abdominal:      General: Bowel sounds are normal  There is no distension  Palpations: Abdomen is soft  Tenderness: There is no abdominal tenderness  There is no right CVA tenderness, left CVA tenderness, guarding or rebound  Musculoskeletal:         General: Swelling present  No tenderness, deformity or signs of injury  Normal range of motion  Cervical back: Normal range of motion and neck supple  No rigidity  Right lower leg: No edema  Left lower leg: Edema present  Comments: Left lower leg is larger than the right lower leg  There is a large patch of erythema to the anterior lateral aspect of the left lower leg  Skin:     General: Skin is warm and dry        Coloration: Skin is not jaundiced  Findings: Rash present  Neurological:      General: No focal deficit present  Mental Status: She is alert and oriented to person, place, and time  Sensory: No sensory deficit  Motor: Motor function is intact  Psychiatric:         Mood and Affect: Mood normal          Behavior: Behavior normal          Vital Signs  ED Triage Vitals [03/02/23 1403]   Temp Pulse Respirations Blood Pressure SpO2   -- 80 20 162/79 99 %      Temp src Heart Rate Source Patient Position - Orthostatic VS BP Location FiO2 (%)   -- Monitor Lying Right arm --      Pain Score       No Pain           Vitals:    03/02/23 1403   BP: 162/79   Pulse: 80   Patient Position - Orthostatic VS: Lying         Visual Acuity      ED Medications  Medications - No data to display    Diagnostic Studies  Results Reviewed     Procedure Component Value Units Date/Time    CBC and differential [[id number]]  (Abnormal) Collected: 03/02/23 1550    Lab Status: Final result Specimen: Blood Updated: 03/02/23 1607     WBC 6 31 Thousand/uL      RBC 3 81 Million/uL      Hemoglobin 13 1 g/dL      Hematocrit 36 7 %      MCV 96 fL      MCH 34 4 pg      MCHC 35 7 g/dL      RDW 11 6 %      MPV 8 8 fL      Platelets 728 Thousands/uL      nRBC 0 /100 WBCs      Neutrophils Relative 69 %      Immat GRANS % 0 %      Lymphocytes Relative 19 %      Monocytes Relative 10 %      Eosinophils Relative 1 %      Basophils Relative 1 %      Neutrophils Absolute 4 38 Thousands/µL      Immature Grans Absolute 0 01 Thousand/uL      Lymphocytes Absolute 1 20 Thousands/µL      Monocytes Absolute 0 62 Thousand/µL      Eosinophils Absolute 0 07 Thousand/µL      Basophils Absolute 0 03 Thousands/µL     Narrative: This is an appended report  These results have been appended to a previously verified report                   VAS lower limb venous duplex study, unilateral/limited   Final Result by Yoni Bach DO (03/02 7193) Procedures  Procedures         ED Course                                             Medical Decision Making  Ultrasound was done to rule out DVT  There was no DVT  Clinically this appears to be cellulitis  Patient is not septic  Dermatitis would also be in the differential   Patient has been treated for scabies in the past   Will retreat  However, this looks more like colitis than scabies  CBC was nonspecific  Amount and/or Complexity of Data Reviewed  Independent Historian: danielle  External Data Reviewed: radiology  Details: Prior ultrasound  Labs: ordered  Decision-making details documented in ED Course  Radiology: ordered and independent interpretation performed  Decision-making details documented in ED Course  Risk  Prescription drug management  Decision regarding hospitalization  Disposition  Final diagnoses:   Cellulitis of left lower extremity     Time reflects when diagnosis was documented in both MDM as applicable and the Disposition within this note     Time User Action Codes Description Comment    3/2/2023  4:48 PM Rommie Backbone Add [L03 [id number]] Cellulitis of left lower extremity       ED Disposition     ED Disposition   Discharge    Condition   Stable    Date/Time   Thu Mar 2, 2023  4:48 PM    Comment   Anderson Gandara discharge to home/self care  Follow-up Information     Follow up With Specialties Details Why [address], MD Internal Medicine, Pediatrics In 4 days  [address]  [address] [address]  [phone]            Patient's Medications   Discharge Prescriptions    CEPHALEXIN (KEFLEX) 500 MG CAPSULE    Take 1 capsule (500 mg total) by mouth every 6 (six) hours for 7 days       Start Date: 3/2/2023  End Date: 3/9/2023       Order Dose: 500 mg       Quantity: 28 capsule    Refills: 0    PERMETHRIN (ELIMITE) 5 % CREAM    Apply to body from soles of feet to neck  Leave on 12 hours  Wash off         Start Date: 3/2/2023  End Date: --       Order Dose: --       Quantity: 60 g    Refills: 0       No discharge procedures on file      PDMP Review       Value Time User    PDMP Reviewed  Yes 12/14/2022  8:20 PM Waldemar Haas PA-C          ED Provider  Electronically Signed by           Bartolome Carter MD  03/02/23 8935

## 2023-03-08 DIAGNOSIS — G47.00 INSOMNIA, UNSPECIFIED TYPE: ICD-10-CM

## 2023-03-08 RX ORDER — ZOLPIDEM TARTRATE 5 MG/1
TABLET ORAL
Qty: 90 TABLET | Refills: 0 | Status: SHIPPED | OUTPATIENT
Start: 2023-03-08

## 2023-03-09 ENCOUNTER — TELEPHONE (OUTPATIENT)
Dept: FAMILY MEDICINE CLINIC | Facility: CLINIC | Age: 83
End: 2023-03-09

## 2023-03-09 DIAGNOSIS — R41.0 CONFUSION: ICD-10-CM

## 2023-03-09 DIAGNOSIS — R41.3 MEMORY PROBLEM: Primary | ICD-10-CM
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
## 2023-03-14 ENCOUNTER — TELEPHONE (OUTPATIENT)
Dept: FAMILY MEDICINE CLINIC | Facility: CLINIC | Age: 83
End: 2023-03-14

## 2023-03-14 DIAGNOSIS — R21 RASH: Primary | ICD-10-CM

## 2023-03-14 RX ORDER — IVERMECTIN 3 MG/1
200 TABLET ORAL ONCE
Qty: 5 TABLET | Refills: 0 | Status: SHIPPED | OUTPATIENT
Start: 2023-03-14 | End: 2023-03-14

## 2023-03-14 NOTE — TELEPHONE ENCOUNTER
Well, there are two possibilities-one would be that the rash is actually NOT scabies and isnt' responding to treatment for that reason-I didn't look to see if she has seen Derm or not-or, it's possible that the scabies is being refractotry to treatment in which case oral ivermectin could be prescribed

## 2023-03-14 NOTE — TELEPHONE ENCOUNTER
I called and spoke to pt and made her aware  She states she just seen Dr Sabrina Ayers (Derm) last week and he advised her that it is scabies and gave her cream  She would like the oral medication sent to [facility] pharmacy in [place]  I called Dr Clarice Salazar's office and they will be faxing over consult note from last week

## 2023-03-14 NOTE — TELEPHONE ENCOUNTER
Cherelle Jennifer is frustrated because she's "on 47th day of having scabies, and not going away!"  She even saw Derm for this  She's suppose to have her birthday party this weekend, but doesn't want to be around people, because she know's she's contagious  She's asking what else can be done?

## 2023-03-16 ENCOUNTER — TELEPHONE (OUTPATIENT)
Dept: NEUROLOGY | Facility: CLINIC | Age: 83
End: 2023-03-16

## 2023-03-16 ENCOUNTER — TELEPHONE (OUTPATIENT)
Dept: FAMILY MEDICINE CLINIC | Facility: CLINIC | Age: 83
End: 2023-03-16

## 2023-03-16 NOTE — TELEPHONE ENCOUNTER
Khushbu's daughter Kiet Barretts called - she moved Khushbu's appt sooner 3/22 - she felt she needed to be seen sooner  Judy says she feels Oc Escobar needs to be seen asap because Oc Escobar is not acting normal  She says  Oc Escobar says " there are bugs inside her and the bugs are going to come out of her and go into someone else" Also says Oc Escobar doesn't leave the house unless she's going to the Dr     Kiet Garcia says the last time she had a visit Occurtis Escobar was asked what Kishor Alford wants to see and Kiet Garcia says she feels she shouldn't be asked this question  Judy doesn't want you to repeat this to her, she just wants to make you aware

## 2023-03-16 NOTE — TELEPHONE ENCOUNTER
Received VM transcription:    I'd like you get a appointment with Dr Lucretia Apley  Name is Ketan Contreras [phone]  Thank you   --------------------------------------------------------    Called pt's  back and he says he'd like to get his wife in to see [facility] (Kali Snyder is a pt of [facility] as well)  Advised him that since pt is not established yet, there is a different kind of process to make an appt  Pt's  verbalizes understanding  Informed  that this request would be sent to our NP intake department so they can reach out to her to make an appt  Best  [phone]    Please see referral notes:    Referral Notes  Number of Notes: 3    Type Date User Summary Attachment   Message 03/13/2023  9:32 AM Clyde Galdamez RE: Memory Problems -   Note    ----- Message -----  From: Tremaine Villalta DO  Sent: 3/13/2023   6:51 AM EDT  To: Clyde Lincoln, *  Subject: RE: Memory Problems                             Okay to schedule in next available concussion slot      ----- Message -----  From: Clyde Galdamez  Sent: 3/10/2023   4:29 PM EDT  To: Tremaine Lincoln DO, Clyde Galdamez, *  Subject: Memory Problems                                   Good afternoon,  Patients daughter called and stated that the patient fell in December and since then has had memory changes   Patient has had testing completed for review      Please assist, thank you

## 2023-03-16 NOTE — TELEPHONE ENCOUNTER
Called and spoke with Donna Alberts (patients ) informed him that Yg Goldstein needs to see a concussion specialist and unfortunately Derek Dickens is not a concussion specialist and also does not see new patients  Donna Alberts understood and confirmed [id number] appointment for 3/21/23 at 11am with Dr Evelina Cespedes in [facility]

## 2023-03-17 ENCOUNTER — RA CDI HCC (OUTPATIENT)
Dept: OTHER | Facility: HOSPITAL | Age: 83
End: 2023-03-17

## 2023-03-17 NOTE — PROGRESS NOTES
[facility]  coding opportunities       Chart reviewed, no opportunity found:   [address]        Patients Insurance     Medicare Insurance: [employer]

## 2023-03-21 ENCOUNTER — TELEPHONE (OUTPATIENT)
Dept: NEUROLOGY | Facility: CLINIC | Age: 83
End: 2023-03-21

## 2023-03-21 ENCOUNTER — CONSULT (OUTPATIENT)
Dept: NEUROLOGY | Facility: CLINIC | Age: 83
End: 2023-03-21

## 2023-03-21 VITALS
DIASTOLIC BLOOD PRESSURE: 100 MMHG | BODY MASS INDEX: 23.01 KG/M2 | HEIGHT: 66 IN | HEART RATE: 64 BPM | WEIGHT: 143.2 LBS | TEMPERATURE: 97.3 F | SYSTOLIC BLOOD PRESSURE: 160 MMHG

## 2023-03-21 DIAGNOSIS — Z87.820 HISTORY OF CONCUSSION: ICD-10-CM

## 2023-03-21 DIAGNOSIS — R42 DIZZINESS: ICD-10-CM

## 2023-03-21 DIAGNOSIS — R41.3 MEMORY PROBLEM: Primary | ICD-10-CM

## 2023-03-21 DIAGNOSIS — G47.00 INSOMNIA: ICD-10-CM

## 2023-03-21 DIAGNOSIS — Z78.9 ALCOHOL USE: ICD-10-CM

## 2023-03-21 DIAGNOSIS — F41.9 ANXIETY DISORDER: ICD-10-CM

## 2023-03-21 NOTE — PROGRESS NOTES
[address] [facility] Consult   PATIENT:  Mary Gabriel  MRN:  [id number]  :  1940  DATE OF SERVICE:  3/21/2023  REFERRED BY: Swetha Johnston MD  PMD: Yeyo Bain MD    Assessment:     Mary Gabriel is a delightful 78 y o  female with a past medical history that includes GERD, hypertension, PACs, PVCs, insomnia, anxiety, alcohol use, hyperlipidemia referred here for evaluation of mild TBI/concussion and ongoing memory issues  Ms Nakul Krueger had a fall in 2022 and since that time she has been having difficulties with memory and "dizziness"  With regards to memory issues, she endorses short-term memory loss  Both she and her  deny any issues prior to the incident  She is still able to manage her activities of daily living as well as her IADLs, but she did stop doing laundry mostly out of a fear of going down the steps since their laundry is in the basement  Furthermore, she has a history of daily alcohol use for many years and has not been taking thiamine supplements that were recommended on discharge from the hospital   We discussed that this could be contributing to memory issues and part of the atrophy seen on her CT scan so I have recommended that she cut back on her alcohol use in addition to starting thiamine supplements daily  I believe there may be a component of anxiety contributing to this as well  Her dizziness is a little vague in terms of description  It does not fit into any particular category and at times she describes it as more of a blurriness  I do not believe we are dealing with BPPV, but rather more of a anxiety induced dizziness related to trauma from her fall and and apprehension of leaving the house and walking    I have recommended that she discuss adding on a low-dose SSRI with her primary care physician at her appointment tomorrow as that could be beneficial   I will also have her see physical therapy to help with giving her some more confidence with regards to gait and have encouraged her to use her walker  Finally, I have recommended that she see her eye doctor for a routine eye exam as she had difficulty with her vision at today's visit  In the future, we may need to consider obtaining a sleep study as she has a history of insomnia and also snores  Workup:  -CT head without contrast 12/14/2022: No acute intracranial findings  Chronic microangiopathic changes  -MRI brain neuroquant  -Labs: B12, Folate, TSH  -PT referral  -Supplements: B12, B1    -We have discussed concussions and the natural course of recovery  We have discussed that symptoms from a concussion typically take 2 weeks to resolve, and although sometimes it can feel like concussion symptoms linger on, at this point these symptoms would be related to contributing factors  We also discussed that the course may wax and wane  - Contributing factors may include:   Prolonged removal from normal routine,  posttraumatic headache,  comorbid injuries, preexisting chronic headaches or migraines, cervicogenic headache, medication overuse headache, preexisting learning disability, history of concussion with prolonged recovery, anxiety or depression, stress, deconditioning,  comorbid medical diagnoses, young age  - I have recommended gradual return of normal cognitive and physical activity with safety precautions  - We discussed that newer research regarding concussion shows that the sooner one returns gradually to their normal physical and cognitive routine, the sooner one tends to recover   Prolonged removal from normal routine and deconditioning have been shown to prolong symptoms and worsen depression    - We discussed that sometimes there is a constellation of symptoms that some refer to as "post concussion syndrome," but I prefer not to use this term since that can be misleading and make people think they are still brain injured or "concussed," when the most common and likely etiology this far out from the head trauma is either contributing factors or a form of functional neurologic disorder with mixed symptoms, especially after a thorough workup to rule out other etiologies since concussion would not be the direct cause at this point    - We discussed how cognitive issues can have multiple causes and often related to multifactorial etiologies including stress, anxiety,  mood, pain, hypervigilance  and sleep issues and provided reassurance that, it is not likely the cognitive dysfunction is related to concussion at this point    - Safe driving precautions, should not drive at all if feeling sleepy or cognitively not well  Patient instructions: Additional Testing or Referrals:   -MRI Brain  -Physical Therapy  -Labs: B12, Folate, TSH  -Discuss adding on low dose SSRI with primary care for anxiety    Over the counter supplements  -100mg vitamin B1 (Thiamine) daily  -Continue B12 supplements    Lifestyle Recommendations:  - Maintain good sleep hygiene  Going to bed and waking up at consistent times, avoiding excessive daytime naps, avoiding caffeinated beverages in the evening, avoid excessive stimulation in the evening and generally using bed primarily for sleeping  One hour before bedtime would recommend turning lights down lower, decreasing your activity (may read quietly, listen to music at a low volume)  When you get into bed, should eliminate all technology (no texting, emailing, playing with your phone, iPad or tablet in bed)  - Maintain good hydration  Drink  2L of fluid a day (4 typical small water bottles)  - Maintain good nutrition  In particular don't skip meals and eat balanced meals regularly  Education and Follow-up  - Please contact us if any questions or concerns arise  Of course, try to protect yourself from head injuries, and if any new concerning symptoms or significant blow to the head or body go to the emergency department    - Follow up in 6 months  CC: Yony Guzman is a  right handed female who presents for evaluation following a possible concussion  History obtained from patient as well as available medical record review  History of Present Illness:   Current medical illnesses or past medical history include GERD, hypertension, PACs, PVCs, insomnia, anxiety, alcohol use, hyperlipidemia      Date/time of injury: 12/14/22  Definite reported mechanism of injury?   (discrete event with force to the head or rapid head movement without impact): Yes   Mechanism/Cause of injury: Fall  Impact Location: Frontal   Intracranial injury or skull fx?: No  Loss of Conciousness?  did not occur   Seizure? No  Was there an onset of typical symptoms within 24-48 hours of the injury event? Yes   Has there been gradual recovery or stability of symptoms over the first week of the injury? [] Yes (There have been improving symptoms over the first week)  [x] Yes (There have been stable symptoms over the first week)  [] No (There have been worsening symptoms over the first week)    Specifics:   -Evaluated in the hospital in December 2022 after falling while walking on uneven ground  Found to have fractures of the left second through fifth metatarsals  During that hospitalization, she reported drinking wine daily      Primary issues at this time:  [] Headaches [] Oculomotor [x] Vestibular [x] Cognitive [] Mood [] Sleep/Fatigue  Vestibular  - Endorses dizziness that started after her fall  - Worse at the end of the day  - Describes it as a lightheaded feeling and blurriness  - Episodes can last any amount of time  - No palpitations  - Increased anxiety with this feeling  - No falls    Cognitive  - Short term memory issues  - No reported issues with memory prior to fall  - Still able to manage iADLs (does not do laundry anymore because machines are in the basement and she is afraid to fall down the steps)  - ADLs intact  - Does not drive at the moment due to anxiety    Physical activity at baseline: rarely - stationary bike at home but last used 2 years ago    Current level of physical activity: None    Sleep: about 8 hours per night on average  Trouble falling asleep: [x] Yes [] No - better with Ambien  Trouble staying asleep: [x] Yes [] No - to go to the bathroom  History of sleep apnea: [] Yes [x] No  - Positive history of snoring    Water: about 6-7 glasses per day  Diet: 3 meals per day    The following portions of the patient's history were reviewed in the system and updated as appropriate: allergies, current medications, past family history, past medical history, past social history, past surgical history and problem list     Pertinent family history:  [] Migraines  [] Learning disability (ADHD, dyslexia)   [] Psych disorder (depression, anxiety)  [x] none of the above    Pertinent social history:  Work: Retired -  previously  Education: 11th grade  lives with their spouse    Illicit Drugs: denies  Alcohol/tobacco: Denies tobacco use, alcohol intake: glass of wine with dinner nightly (for about 5 years)  Past Medical History:   1  Any history of prior Concussion? No  Any other TBI's aside from Concussion? no     2  Preexisting Headache history?  negative     3  Preexisting Psych history? positive      [x] Anxiety  [] Depression  []   Prior Psych treatment? no    4  Preexisting Learning disability?   no     5  Preexisting Sleep problems? Yes  - Insomnia  - No prior sleep studies    6   History of seizures/epilepsy (non febrile) no    Past Medical History:   Diagnosis Date   • Anxiety    • Arthritis    • Prajapati esophagus    • Cataract    • Continuous chronic alcoholism ([facility] [address] )    • GERD (gastroesophageal reflux disease)    • Heavy alcohol use    • Hyperlipidemia    • Hypertension    • Insomnia    • Palpitations      Patient Active Problem List   Diagnosis   • Other insomnia   • Heavy alcohol use   • Anxiety   • Essential hypertension   • Dyslipidemia   • Vitamin D deficiency   • Gastroesophageal reflux disease without esophagitis   • Uncomplicated alcohol dependence (HCC)   • Paresthesia of both hands   • Breast mass, right   • Closed displaced fracture of fourth metatarsal bone of left foot   • Fall   • Hyponatremia   • Premature atrial contractions   • PVC's (premature ventricular contractions)   • Displaced fracture of second metatarsal bone of left foot with routine healing   • Closed displaced fracture of third metatarsal bone of left foot   • Chondrocalcinosis of left knee   • Cervical spondylosis       Medications:      Current Outpatient Medications   Medication Sig Dispense Refill   • Biotin 5000 MCG TABS Take by mouth every morning     • CALCIUM PO Take by mouth once a week     • Cholecalciferol (VITAMIN D) 2000 units CAPS Take by mouth 3 (three) times a week     • cyanocobalamin (VITAMIN B-12) 500 MCG tablet Take 1 tablet (500 mcg total) by mouth daily 30 tablet 5   • esomeprazole (NexIUM) 40 MG capsule TAKE 1 CAPSULE BY MOUTH  DAILY 90 capsule 3   • folic acid (FOLVITE) 1 mg tablet TAKE 1 TABLET BY MOUTH EVERY DAY 90 tablet 1   • furosemide (LASIX) 40 mg tablet TAKE 1 TABLET BY MOUTH  DAILY AS NEEDED FOR EDEMA 90 tablet 3   • GLUCOSAMINE HCL PO Take 1,200 mg by mouth daily      • IBUPROFEN IB PO 1 po daily PRN     • lisinopril (ZESTRIL) 20 mg tablet TAKE 1 TABLET BY MOUTH IN  THE MORNING 90 tablet 3   • Magnesium 500 MG CAPS TAKES HERE AND THERE     • metoprolol tartrate (LOPRESSOR) 50 mg tablet TAKE 1 TABLET BY MOUTH  TWICE DAILY 180 tablet 3   • multivitamin (THERAGRAN) TABS Take 1 tablet by mouth daily       • niacin 500 mg tablet Take 500 mg by mouth daily with breakfast     • Omega-3 Fatty Acids (FISH OIL) 1,000 mg Take 1,000 mg by mouth daily       • zolpidem (AMBIEN) 5 mg tablet TAKE 1 TABLET BY MOUTH DAILY AT  BEDTIME AS NEEDED FOR SLEEP 90 tablet 0   • gabapentin (NEURONTIN) 100 mg capsule Take 1 capsule (100 mg total) by mouth 2 (two) times a day (Patient not taking: Reported on 3/21/2023) 60 capsule 5   • permethrin (ELIMITE) 5 % cream Apply to body from soles of feet to neck  Leave on 12 hours  Wash off  (Patient not taking: Reported on 3/21/2023) 60 g 0   • thiamine 100 MG tablet TAKE 1 TABLET BY MOUTH EVERY DAY (Patient not taking: Reported on 3/21/2023) 30 tablet 0     No current facility-administered medications for this visit  Allergies:    No Known Allergies    Family History:        Family History   Problem Relation Age of Onset   • Cancer Mother         breast,kidney,lung (smoker)   • Heart disease Father 61        MI   • Cancer Sister         liver,pancreatic(smoker,ETOH)   • Diabetes Daughter    • No Known Problems Sister          Social History:       Social History     Socioeconomic History   • Marital status: /Civil Union     Spouse name: Not on file   • Number of children: Not on file   • Years of education: Not on file   • Highest education level: Not on file   Occupational History   • Occupation: Retired   Tobacco Use   • Smoking status: Never   • Smokeless tobacco: Never   Vaping Use   • Vaping Use: Never used   Substance and Sexual Activity   • Alcohol use:  Yes     Alcohol/week: 20 0 standard drinks     Types: 20 Glasses of wine per week     Comment: pt reports 4 glasses of wine daily   • Drug use: No   • Sexual activity: Not on file   Other Topics Concern   • Not on file   Social History Narrative    Occupation: retired    Marital status:     Exercise level: Occasional    Diet: Regular    General stress level: Low    Alcohol intake: Occasional    Caffeine intake: Occasional    Seat belts used routinely: Yes    Sunscreen used routinely: Yes    Smoke alarm in home: Yes    Advance directive: Yes     Social Determinants of Health     Financial Resource Strain: Not on file   Food Insecurity: No Food Insecurity   • Worried About Running Out of Food in the Last Year: Never true   • Ran Out of Food in the Last Year: Never true   Transportation Needs: No Transportation Needs   • Lack of Transportation (Medical): No   • Lack of Transportation (Non-Medical): No   Physical Activity: Not on file   Stress: Not on file   Social Connections: Not on file   Intimate Partner Violence: Not on file   Housing Stability: Low Risk    • Unable to Pay for Housing in the Last Year: No   • Number of Places Lived in the Last Year: 1   • Unstable Housing in the Last Year: No       Objective:   Physical Exam:                                                               Vitals:            Constitutional:  /100 (BP Location: Left arm, Patient Position: Sitting, Cuff Size: Adult)   Pulse 64   Temp (!) 97 3 °F (36 3 °C) (Temporal)   Ht 5' 6" (1 676 m)   Wt 65 kg (143 lb 3 2 oz)   BMI 23 11 kg/m²   BP Readings from Last 3 Encounters:   03/21/23 160/100   03/02/23 162/79   02/17/23 (!) 183/104     Pulse Readings from Last 3 Encounters:   03/21/23 64   03/02/23 80   02/17/23 70         Well developed, well nourished, well groomed  No dysmorphic features  HEENT:  Normocephalic atraumatic  See neuro exam   Chest:  Respirations appear regular and unlabored  Cardiovascular:  no observed significant swelling  Musculoskeletal:  (see below under neurologic exam for evaluation of motor function and gait)   Skin:  warm and dry, not diaphoretic  Psychiatric:  Normal behavior and appropriate affect       Neurological Examination:     Mental status/cognitive function:   Recent and remote memory intact  Attention span and concentration as well as fund of knowledge are appropriate for age  Normal language and spontaneous speech  Cranial Nerves:  III, IV, VI-Pupils were equal, round  Extraocular movements were full and conjugate   VII-facial expression symmetric  VIII-hearing grossly intact bilaterally   Motor Exam: symmetric bulk throughout  no atrophy, fasciculations or abnormal movements noted   4/5 in the LLE (chronic from hip replacement)  Coordination:  no apparent dysmetria, ataxia or tremor noted  Gait: steady casual gait; short steps, but steady    Pertinent lab results:   -B12 2/26/2022: 369     Pertinent Imaging:   -CT head without contrast 12/14/2022: No acute intracranial findings  Chronic microangiopathic changes  I have personally reviewed imaging and radiology read  Review of Systems:   Constitutional: Negative  Negative for appetite change and fever  HENT: Negative  Negative for hearing loss, tinnitus, trouble swallowing and voice change  Eyes: Negative  Negative for photophobia, pain and visual disturbance  Respiratory: Negative  Negative for shortness of breath  Cardiovascular: Negative  Negative for palpitations  Gastrointestinal: Negative  Negative for nausea and vomiting  Endocrine: Negative  Negative for cold intolerance  Genitourinary: Negative  Negative for dysuria, frequency and urgency  Musculoskeletal: Negative  Negative for gait problem, myalgias and neck pain  Skin: Negative  Negative for rash  Allergic/Immunologic: Negative  Neurological: Positive for dizziness  Negative for tremors, seizures, syncope, facial asymmetry, speech difficulty, weakness, light-headedness, numbness and headaches  Hematological: Negative  Does not bruise/bleed easily  Psychiatric/Behavioral: Positive for confusion  Negative for hallucinations and sleep disturbance  Memory   All other systems reviewed and are negative  I have spent 40 minutes with Patient and family today in which greater than 50% of this time was spent in counseling/coordination of care regarding Diagnostic results, Prognosis, Risks and benefits of tx options, Patient and family education, Risk factor reductions, Impressions, Documenting in the medical record, Reviewing / ordering tests, medicine, procedures   and Obtaining or reviewing history     I also spent 15 minutes non face to face for this patient the same day      Activity Minutes   Precharting/reviewing 10   Patient care/counseling 40   Postcharting/care coordination 5       Author:  Essence Wu DO   Fellowship trained Concussion Specialist

## 2023-03-21 NOTE — PATIENT INSTRUCTIONS
Additional Testing or Referrals:   -MRI Brain  -Physical Therapy  -Labs: B12, Folate, TSH  -Discuss adding on low dose SSRI with primary care for anxiety    Over the counter supplements  -100mg vitamin B1 (Thiamine) daily  -Continue B12 supplements    Lifestyle Recommendations:  - Maintain good sleep hygiene  Going to bed and waking up at consistent times, avoiding excessive daytime naps, avoiding caffeinated beverages in the evening, avoid excessive stimulation in the evening and generally using bed primarily for sleeping  One hour before bedtime would recommend turning lights down lower, decreasing your activity (may read quietly, listen to music at a low volume)  When you get into bed, should eliminate all technology (no texting, emailing, playing with your phone, iPad or tablet in bed)  - Maintain good hydration  Drink  2L of fluid a day (4 typical small water bottles)  - Maintain good nutrition  In particular don't skip meals and eat balanced meals regularly  Education and Follow-up  - Please contact us if any questions or concerns arise  Of course, try to protect yourself from head injuries, and if any new concerning symptoms or significant blow to the head or body go to the emergency department    - Follow up in 6 months

## 2023-03-21 NOTE — TELEPHONE ENCOUNTER
Patient called and stated that she is very upset that she is being considered an alcoholic by Dr Juan M Angulo  Patient states she answered his question truthfully but doesn't feel as though she is an alcoholic  Please call patient to discuss her chart note with her at [phone]   Thank you

## 2023-03-21 NOTE — PROGRESS NOTES
Review of Systems   Constitutional: Negative  Negative for appetite change and fever  HENT: Negative  Negative for hearing loss, tinnitus, trouble swallowing and voice change  Eyes: Negative  Negative for photophobia, pain and visual disturbance  Respiratory: Negative  Negative for shortness of breath  Cardiovascular: Negative  Negative for palpitations  Gastrointestinal: Negative  Negative for nausea and vomiting  Endocrine: Negative  Negative for cold intolerance  Genitourinary: Negative  Negative for dysuria, frequency and urgency  Musculoskeletal: Negative  Negative for gait problem, myalgias and neck pain  Skin: Negative  Negative for rash  Allergic/Immunologic: Negative  Neurological: Positive for dizziness  Negative for tremors, seizures, syncope, facial asymmetry, speech difficulty, weakness, light-headedness, numbness and headaches  Hematological: Negative  Does not bruise/bleed easily  Psychiatric/Behavioral: Positive for confusion  Negative for hallucinations and sleep disturbance  Memory   All other systems reviewed and are negative

## 2023-03-22 ENCOUNTER — OFFICE VISIT (OUTPATIENT)
Dept: FAMILY MEDICINE CLINIC | Facility: CLINIC | Age: 83
End: 2023-03-22

## 2023-03-22 VITALS
HEIGHT: 66 IN | WEIGHT: 141 LBS | BODY MASS INDEX: 22.66 KG/M2 | DIASTOLIC BLOOD PRESSURE: 90 MMHG | SYSTOLIC BLOOD PRESSURE: 170 MMHG | HEART RATE: 58 BPM | TEMPERATURE: 98.3 F | RESPIRATION RATE: 16 BRPM | OXYGEN SATURATION: 99 %

## 2023-03-22 DIAGNOSIS — R30.0 DYSURIA: ICD-10-CM

## 2023-03-22 DIAGNOSIS — R42 DIZZINESS: ICD-10-CM

## 2023-03-22 DIAGNOSIS — Z00.8 ENCOUNTER FOR MINI-MENTAL STATUS EXAMINATION: ICD-10-CM

## 2023-03-22 DIAGNOSIS — E87.1 HYPONATREMIA: ICD-10-CM

## 2023-03-22 DIAGNOSIS — I10 ESSENTIAL HYPERTENSION: ICD-10-CM

## 2023-03-22 DIAGNOSIS — F22 PARANOIA (HCC): ICD-10-CM

## 2023-03-22 DIAGNOSIS — F41.9 ANXIETY: ICD-10-CM

## 2023-03-22 DIAGNOSIS — F10.90 HEAVY ALCOHOL USE: ICD-10-CM

## 2023-03-22 DIAGNOSIS — F10.20 UNCOMPLICATED ALCOHOL DEPENDENCE (HCC): ICD-10-CM

## 2023-03-22 DIAGNOSIS — Z13.220 LIPID SCREENING: ICD-10-CM

## 2023-03-22 DIAGNOSIS — R41.3 MEMORY LOSS: ICD-10-CM
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
## 2023-03-22 DIAGNOSIS — G47.09 OTHER INSOMNIA: ICD-10-CM

## 2023-03-22 DIAGNOSIS — R21 RASH: Primary | ICD-10-CM

## 2023-03-22 RX ORDER — AMLODIPINE BESYLATE 10 MG/1
10 TABLET ORAL DAILY
Qty: 30 TABLET | Refills: 5 | Status: SHIPPED | OUTPATIENT
Start: 2023-03-22

## 2023-03-22 RX ORDER — CLOBETASOL PROPIONATE 0.5 MG/G
CREAM TOPICAL
COMMUNITY
Start: 2023-03-21

## 2023-03-22 NOTE — ASSESSMENT & PLAN NOTE
Saw Neurology yesterday and this was mentioned as a possible etiology of her memory issues, as were several other things    Mentioned starting an SSRI so I wrote SSRIs down as well as Wellbutrin so Cristofer Hein can discuss with her daughter-a Psychiatry referral was also placed and gave them a list of counselors/psychologists -may want to see Geriatrics as well

## 2023-03-22 NOTE — ASSESSMENT & PLAN NOTE
Scored 22/30 on MMSE, clock draw was off-has MRI ordered by Neurology , likely alcohol and age related-discussed staying active, doing puzzles, listening to music etc to help with memory maintenance-has some dizziness too and carotid dopplers were ordered

## 2023-03-22 NOTE — ASSESSMENT & PLAN NOTE
Thought is that when she hit her head she may have sustained a concussion and some of her memory issues and dizziness are related to this    PT ordered

## 2023-03-22 NOTE — ASSESSMENT & PLAN NOTE
BP high today, pt persistently drinks alcohol-continue metoprolol and will change lisinopril to amlodipine-my thought is on the off chance that the skin rash she has is ACE related but I doubt it    I did mention to [facility] that she may notice some increased lower extremity swelling on the amlodipine

## 2023-03-22 NOTE — PROGRESS NOTES
Assessment/Plan:Very complicated patient but visit today mainly focused on memory issues, dizziness, skin rash, blood pressure and addressing family concerns         Problem List Items Addressed This Visit        Cardiovascular and Mediastinum    Essential hypertension     BP high today, pt persistently drinks alcohol-continue metoprolol and will change lisinopril to amlodipine-my thought is on the off chance that the skin rash she has is ACE related but I doubt it  I did mention to Carin Clark that she may notice some increased lower extremity swelling on the amlodipine         Relevant Medications    amLODIPine (NORVASC) 10 mg tablet    Other Relevant Orders    Comprehensive metabolic panel       Musculoskeletal and Integument    Rash - Primary    Relevant Medications    clobetasol (TEMOVATE) 0 05 % cream    halobetasol (ULTRAVATE) 0 05 % cream    Other Relevant Orders    Ambulatory Referral to Dermatology    Comprehensive metabolic panel    Vitamin D 25 hydroxy       Other    Other insomnia    Heavy alcohol use     I imagine this plays a role in everything too         Anxiety     Saw Neurology yesterday and this was mentioned as a possible etiology of her memory issues, as were several other things  Mentioned starting an SSRI so I wrote SSRIs down as well as Wellbutrin so Carin Clark can discuss with her daughter-a Psychiatry referral was also placed and gave them a list of counselors/psychologists -may want to see Geriatrics as well         Relevant Orders    Ambulatory Referral to Psychiatry    Uncomplicated alcohol dependence ([facility] [address] )    Fall     Thought is that when she hit her head she may have sustained a concussion and some of her memory issues and dizziness are related to this  PT ordered         Hyponatremia     Probably alcohol related, and is chronic    She has never been a smoker so I didn't order CT lungs or anything like that yet         Memory loss     Scored 22/30 on MMSE, clock draw was off-has MRI ordered by Neurology , likely alcohol and age related-discussed staying active, doing puzzles, listening to music etc to help with memory maintenance-has some dizziness too and carotid dopplers were ordered        Other Visit Diagnoses     Lipid screening        Relevant Orders    Lipid panel    Dizziness        Relevant Orders    VAS carotid complete study    Urinalysis with microscopic    Paranoia ([address] )        Relevant Orders    Ambulatory Referral to Psychiatry    Urinalysis with microscopic    Urine culture    Dysuria        Relevant Orders    Urinalysis with microscopic    Urine culture            Subjective:      Patient ID: Rosie Alvarado is a 78 y o  female  Oc Escobar here with her  Horatio Osler (she no longer drives) to go over some recent health issues  She has a history of heavy alcohol use (drinks at least 6 or 7 glasses of red wine daily), HTN, cataracts,HL, insomnia-she's had an itchy skin rash over the past few months which I guess was diagnosed as scabies, and she's had several rounds of permethrin cream which didn't take it away  Saw Dermatology twice but no skin scraping was done  Of note,her  had a rash too which he says Derm said was eczema and not scabies  His rash is mainly gone  I had ordered ivermectin for her to take with the belief that this was/is refractory scabies but she didn't take it secondary to her daughter's (a pharmacist) concerns  There have also been some memory changes noticed by Oc Escobar really and her  and we performed an MMSE today which she scored a 22/30 on, consistent with mild dementia  She saw a Neurologist yesterday and they thought some of her dizziness (another reported symptom) and memory loss may be due to having sustained a concussion in a fall in December-had a CT done at that time showing microangiopathic changes but not much else, or anxiety related    Mentioned possible use of an SSRI med and did order Physical Therapy, and MRI and ordered some supplements for her (she is a heavy drinker)      The following portions of the patient's history were reviewed and updated as appropriate:   Past Medical History:  She has a past medical history of Anxiety, Arthritis, Prajapati esophagus, Cataract, Continuous chronic alcoholism ([address] ), GERD (gastroesophageal reflux disease), Heavy alcohol use, Hyperlipidemia, Hypertension, Insomnia, and Palpitations  ,  _______________________________________________________________________  Medical Problems:  does not have any pertinent problems on file ,  _______________________________________________________________________  Past Surgical History:   has a past surgical history that includes Cataract extraction (Right); LASIK (Bilateral); Joint replacement (Bilateral); Colonoscopy; Tonsillectomy; Tubal ligation; Hysterectomy; Cholecystectomy; pr xcapsl ctrc rmvl insj io lens prosth w/o ecp (Left, 10/18/2018); and BREAST IMPLANT (Bilateral)  ,  _______________________________________________________________________  Family History:  family history includes Cancer in her mother and sister; Diabetes in her daughter; Heart disease (age of onset: 61) in her father; No Known Problems in her sister  ,  _______________________________________________________________________  Social History:   reports that she has never smoked  She has never used smokeless tobacco  She reports current alcohol use of about 20 0 standard drinks per week  She reports that she does not use drugs  ,  _______________________________________________________________________  Allergies:  has No Known Allergies     _______________________________________________________________________  Current Outpatient Medications   Medication Sig Dispense Refill   • amLODIPine (NORVASC) 10 mg tablet Take 1 tablet (10 mg total) by mouth daily 30 tablet 5   • Biotin 5000 MCG TABS Take by mouth every morning     • CALCIUM PO Take by mouth once a week     • Cholecalciferol (VITAMIN D) 2000 units CAPS Take by mouth 3 (three) times a week     • clobetasol (TEMOVATE) 0 05 % cream      • cyanocobalamin (VITAMIN B-12) 500 MCG tablet Take 1 tablet (500 mcg total) by mouth daily 30 tablet 5   • esomeprazole (NexIUM) 40 MG capsule TAKE 1 CAPSULE BY MOUTH  DAILY 90 capsule 3   • folic acid (FOLVITE) 1 mg tablet TAKE 1 TABLET BY MOUTH EVERY DAY 90 tablet 1   • furosemide (LASIX) 40 mg tablet TAKE 1 TABLET BY MOUTH  DAILY AS NEEDED FOR EDEMA 90 tablet 3   • GLUCOSAMINE HCL PO Take 1,200 mg by mouth daily      • halobetasol (ULTRAVATE) 0 05 % cream      • IBUPROFEN IB PO 1 po daily PRN     • Magnesium 500 MG CAPS TAKES HERE AND THERE     • metoprolol tartrate (LOPRESSOR) 50 mg tablet TAKE 1 TABLET BY MOUTH  TWICE DAILY 180 tablet 3   • multivitamin (THERAGRAN) TABS Take 1 tablet by mouth daily       • niacin 500 mg tablet Take 500 mg by mouth daily with breakfast     • Omega-3 Fatty Acids (FISH OIL) 1,000 mg Take 1,000 mg by mouth daily       • zolpidem (AMBIEN) 5 mg tablet TAKE 1 TABLET BY MOUTH DAILY AT  BEDTIME AS NEEDED FOR SLEEP 90 tablet 0     No current facility-administered medications for this visit      _______________________________________________________________________  Review of Systems   Constitutional: Negative  HENT: Negative  Respiratory: Negative  Cardiovascular: Positive for leg swelling  Gastrointestinal: Negative  Musculoskeletal: Negative  Skin: Positive for rash  Neurological: Positive for dizziness  Psychiatric/Behavioral: Positive for confusion, decreased concentration and sleep disturbance  The patient is nervous/anxious  Objective:  Vitals:    03/22/23 1117 03/22/23 1157   BP: 162/90 170/90   BP Location: Left arm    Patient Position: Sitting    Cuff Size: Adult    Pulse: 58    Resp: 16    Temp: 98 3 °F (36 8 °C)    TempSrc: Tympanic    SpO2: 99%    Weight: 64 kg (141 lb)    Height: 5' 6" (1 676 m)      Body mass index is 22 76 kg/m²       Physical Exam  Constitutional:       Appearance: Normal appearance  HENT:      Head: Normocephalic and atraumatic  Right Ear: External ear normal       Left Ear: External ear normal       Nose: Nose normal       Mouth/Throat:      Mouth: Mucous membranes are moist    Eyes:      Extraocular Movements: Extraocular movements intact  Neck:      Vascular: No carotid bruit  Cardiovascular:      Rate and Rhythm: Normal rate and regular rhythm  Heart sounds: Normal heart sounds  No murmur heard  Pulmonary:      Effort: Pulmonary effort is normal       Breath sounds: Normal breath sounds  Abdominal:      Palpations: Abdomen is soft  Musculoskeletal:      Cervical back: Normal range of motion  Left lower leg: Edema present  Skin:     Findings: Rash present  Neurological:      General: No focal deficit present  Mental Status: She is alert  Cranial Nerves: No cranial nerve deficit  Sensory: No sensory deficit  Motor: No weakness        Coordination: Coordination normal       Gait: Gait normal

## 2023-03-22 NOTE — ASSESSMENT & PLAN NOTE
Probably alcohol related, and is chronic    She has never been a smoker so I didn't order CT lungs or anything like that yet

## 2023-03-22 NOTE — TELEPHONE ENCOUNTER
Called patient back  Clarified that I do not think she is an alcoholic, but that her alcohol use could contribute to her memory issues

## 2023-03-23 ENCOUNTER — TELEPHONE (OUTPATIENT)
Dept: OTHER | Facility: OTHER | Age: 83
End: 2023-03-23

## 2023-03-23 NOTE — TELEPHONE ENCOUNTER
PT  Called in requesting a call back  PT states she has had scabies for 3 months, she would like to be seen

## 2023-04-04 ENCOUNTER — TELEPHONE (OUTPATIENT)
Dept: PSYCHIATRY | Facility: CLINIC | Age: 83
End: 2023-04-04

## 2023-04-05 ENCOUNTER — TELEPHONE (OUTPATIENT)
Dept: FAMILY MEDICINE CLINIC | Facility: CLINIC | Age: 83
End: 2023-04-05

## 2023-04-05 NOTE — TELEPHONE ENCOUNTER
Stone Tucker called will like a return call to discuss with you about cream that you prescribed her  Says you can call whenever you have a chance         Please call Khushbu @ [phone]

## 2023-04-06 DIAGNOSIS — B86 SCABIES: Primary | ICD-10-CM

## 2023-04-06 RX ORDER — IVERMECTIN 3 MG/1
150 TABLET ORAL ONCE
Qty: 3 TABLET | Refills: 1 | Status: SHIPPED | OUTPATIENT
Start: 2023-04-06 | End: 2023-04-06

## 2023-04-06 NOTE — TELEPHONE ENCOUNTER
I called Khushbu back and says she went back to Derm Dr Cheyanne Lawson and that he did do a skin scraping and that it is in fact scabies-since creams haven't worked, I called in again the Ivermectin which now her daughter says she should take as Nayeli Valenzuela has cut back on her drinking

## 2023-04-10 NOTE — TELEPHONE ENCOUNTER
Azucenar received a call from patient that was returning a message to call here   Patient is not interested at this time for services,

## 2023-04-20 DIAGNOSIS — B86 SCABIES: Primary | ICD-10-CM

## 2023-04-20 RX ORDER — IVERMECTIN 3 MG/1
150 TABLET ORAL ONCE
Qty: 3 TABLET | Refills: 3 | Status: SHIPPED | OUTPATIENT
Start: 2023-04-20 | End: 2023-04-20

## 2023-04-24 ENCOUNTER — TELEPHONE (OUTPATIENT)
Dept: FAMILY MEDICINE CLINIC | Facility: CLINIC | Age: 83
End: 2023-04-24

## 2023-05-05 ENCOUNTER — TELEPHONE (OUTPATIENT)
Dept: FAMILY MEDICINE CLINIC | Facility: CLINIC | Age: 83
End: 2023-05-05

## 2023-05-05 NOTE — TELEPHONE ENCOUNTER
I honestly don't know what to do with Hawk Mckee -I'm wondering if this isn't some sort of obsession or something-we've treated multiple times-I think for her to go to the ER is a waste of time she'd be better off going to the Dermatologist and maybe having her daughter accompany her

## 2023-05-05 NOTE — TELEPHONE ENCOUNTER
Pt stating it has been 4 months  She still has scabies  She wants to go to the ER  Her  got on the phone and stated Dr Jarrell Gibbs said they have Eczema   But, they are still itching

## 2023-05-05 NOTE — TELEPHONE ENCOUNTER
Patient would like a call from Dr Mensah Fearing, can you please call her when you have a minute     [phone]

## 2023-06-22 ENCOUNTER — OFFICE VISIT (OUTPATIENT)
Dept: DERMATOLOGY | Facility: CLINIC | Age: 83
End: 2023-06-22
Payer: COMMERCIAL

## 2023-06-22 VITALS — TEMPERATURE: 97.3 F | WEIGHT: 139 LBS | HEIGHT: 66 IN | BODY MASS INDEX: 22.34 KG/M2

## 2023-06-22 DIAGNOSIS — D18.01 CHERRY ANGIOMA: ICD-10-CM

## 2023-06-22 DIAGNOSIS — L82.1 SEBORRHEIC KERATOSIS: Primary | ICD-10-CM

## 2023-06-22 DIAGNOSIS — R21 RASH: ICD-10-CM

## 2023-06-22 DIAGNOSIS — D22.9 MULTIPLE MELANOCYTIC NEVI: ICD-10-CM

## 2023-06-22 PROCEDURE — 99204 OFFICE O/P NEW MOD 45 MIN: CPT | Performed by: STUDENT IN AN ORGANIZED HEALTH CARE EDUCATION/TRAINING PROGRAM

## 2023-06-22 RX ORDER — FLUOCINOLONE ACETONIDE 0.25 MG/G
OINTMENT TOPICAL
COMMUNITY
Start: 2023-05-19 | End: 2023-06-29

## 2023-06-22 NOTE — PATIENT INSTRUCTIONS
"RASH     Assessment and Plan:  Based on a thorough discussion of this condition and the management approach to it (including a comprehensive discussion of the known risks, side effects and potential benefits of treatment), the patient (family) agrees to implement the following specific plan:  Apply Vaseline on arms and legs  Reassured no scabies         SEBORRHEIC KERATOSIS; NON-INFLAMED    Assessment and Plan:  Based on a thorough discussion of this condition and the management approach to it (including a comprehensive discussion of the known risks, side effects and potential benefits of treatment), the patient (family) agrees to implement the following specific plan:  Benign; reassured     Seborrheic Keratosis  A seborrheic keratosis is a harmless warty spot that appears during adult life as a common sign of skin aging  Seborrheic keratoses can arise on any area of skin, covered or uncovered, with the usual exception of the palms and soles  They do not arise from mucous membranes  Seborrheic keratoses can have highly variable appearance  Seborrheic keratoses are extremely common  It has been estimated that over 90% of adults over the age of 61 years have one or more of them  They occur in males and females of all races, typically beginning to erupt in the 35s or 45s  They are uncommon under the age of 21 years  The precise cause of seborrhoeic keratoses is not known  Seborrhoeic keratoses are considered degenerative in nature  As time goes by, seborrheic keratoses tend to become more numerous  Some people inherit a tendency to develop a very large number of them; some people may have hundreds of them  The name \"seborrheic keratosis\" is misleading, because these lesions are not limited to a seborrhoeic distribution (scalp, mid-face, chest, upper back), nor are they formed from sebaceous glands, nor are they associated with sebum -- which is greasy    Seborrheic keratosis may also be called \"SK,\" \"Willy K,\" " "\"basal cell papilloma,\" \"senile wart,\" or \"barnacle  \"      Researchers have noted:  Eruptive seborrhoeic keratoses can follow sunburn or dermatitis  Skin friction may be the reason they appear in body folds  Viral cause (e g , human papillomavirus) seems unlikely  Stable and clonal mutations or activation of FRFR3, PIK3CA, PAUL, AKT1 and EGFR genes are found in seborrhoeic keratoses  Seborrhoeic keratosis can arise from solar lentigo  FRFR3 mutations also arise in solar lentigines  These mutations are associated with increased age and location on the head and neck, suggesting a role of ultraviolet radiation in these lesions  Seborrheic keratoses do not harbour tumour suppressor gene mutations  Epidermal growth factor receptor inhibitors, which are used to treat some cancers, often result in an increase in verrucal (warty) keratoses  There is no easy way to remove multiple lesions on a single occasion  Unless a specific lesion is \"inflamed\" and is causing pain or stinging/burning or is bleeding, most insurance companies do not authorize treatment  ANGIOMA (\"CHERRY ANGIOMA\")    Plan:  Discussed that an angioma is due to proliferating endothelial cells; these are the cells that line the inside of a blood vessel  Genetic analysis has found cherry angiomas frequently carry specific somatic missense mutations in the GNAQ and GNA11 (Q209H) genes, which are also involved in other vascular and melanocytic proliferations  Cherry angiomas markedly increase in number from about the age of 36, so it has been estimated that 75% of people over 76years of age have them  Although they also called \"senile angiomas,\" they can occur in young people too - 5% of adolescents have been found to have them  Cherry angiomas are very common in males and females of any age or race, with no difference in sexes or races affected  They are however more noticeable in white skin than in skin of colour    There may be a family history of " "similar lesions  Eruptive cherry angiomas have been rarely reported to be associated with internal malignancy and pregnancy  Asymptomatic  No treatment is required  PROCEDURES PERFORMED TODAY ASSOCIATED WITH THIS CONDITION:          NONE      MEDICAL DECISION MAKING  Treatment Goal:  Resolution of the SELF-LIMITED OR MINOR PROBLEM condition  SELF-LIMITED or MINOR PROBLEM  A problem that runs a definite and prescribed course, is transient in nature, and is not likely to permanently alter health status  The problem does not need to meet all three of these criteria but must be one that is not likely to permanently alter the patient's health status  MELANOCYTIC NEVI (\"Moles\")    Assessment and Plan:  Based on a thorough discussion of this condition and the management approach to it (including a comprehensive discussion of the known risks, side effects and potential benefits of treatment), the patient (family) agrees to implement the following specific plan:  Benign; reassured      Melanocytic Nevi  Melanocytic nevi (\"moles\") are caused by collections of the color producing skin cells, or melanocytes, in 1 area in the skin  They can range in color from pink to dark brown and be either raised or flat  Some moles are present at birth (I e , \"congenital nevi\"), while others come up later in life (i e , \"acquired nevi\")  [place] exposure also stimulates the body to make more moles, ie the more sun you get the more moles you'll grow  Clinically distinguishing a healthy mole from melanoma may be difficult  The \"ABCDE's\" of moles have been suggested as a means of helping to alert a person to a suspicious mole and the possible increased risk of melanoma  Asymmetry: Healthy moles tend to be symmetric, while melanomas are often asymmetric    Asymmetry means if you draw a line through the mole, the two halves do not match in color, size, shape, or surface texture Any mole that starts to demonstrate " "\"asymmetry\" should be examined promptly by a board certified dermatologist      Border: Healthy moles tend to have discrete, even borders  The border of a melanoma often blends into the normal skin and does not sharply delineate the mole from normal skin  Any mole that starts to demonstrate \"uneven borders\" should be examined promptly     Color: Healthy moles tend to be one color throughout  Melanomas tend to be made up of different colors ranging from dark black, blue, white, or red  Any mole that demonstrates a color change should be examined promptly    Diameter: Healthy moles tend to be smaller than 0 6 cm in size; an exception are \"congenital nevi\" that can be larger  Melanomas tend to grow and can often be greater than 0 6 cm (1/4 of an inch, or the size of a pencil eraser)  This is only a guideline, and many normal moles may be larger than 0 6 cm without being unhealthy  Any mole that starts to change in size (small to bigger or bigger to smaller) should be examined promptly    Evolving: Healthy moles tend to [place] the same  \"  Melanomas may often show signs of change or evolution such as a change in size, shape, color, or elevation  Any mole that starts to itch, bleed, crust, burn, hurt, or ulcerate or demonstrate a change or evolution should be examined promptly by a board certified dermatologist       What are atypical moles or dysplastic nevi? Dysplastic moles are moles that have some of the ABCDE  changes listed above but  are not cancerous  Sometimes a biopsy and microscopic examination are needed to determine the difference  They may indicate an increased risk of melanoma in that person, especially if there is a family history of melanoma  What is a Melanoma? The main concern when looking at a new or changing mole it to evaluate whether it may be a melanoma  The appearance of a \"new mole\" remains one of the most reliable methods for identifying a malignant melanoma     A melanoma is a type of " skin cancer that can be deadly if it spreads throughout the body  The prognosis of a Melanoma depends on how deep it has penetrated in the skin  If caught early, they generally will not have had time to grow into the deeper layers of the skin and they cure rate is then very high  Once the melanoma grows deeper into the skin, the cure rate drops dramatically  Therefore, early detection and removal of a malignant melanoma results in a much better chance of complete cure

## 2023-06-22 NOTE — PROGRESS NOTES
"[facility] Note     Patient Name: Neo Salmon  Encounter Date: 06/22/2023     Have you been cared for by a [facility] Dermatologist in the last 3 years and, if so, which description applies to you? NO  I am considered a \"new\" patient and must complete all patient intake questions  I am FEMALE/of child-bearing potential     REVIEW OF SYSTEMS:  Have you recently had or currently have any of the following? · Recent fever or chills? No  · Any non-healing wound? No  · Are you pregnant or planning to become pregnant? No  · Are you currently or planning to be nursing or breast feeding? No   PAST MEDICAL HISTORY:  Have you personally ever had or currently have any of the following? If \"YES,\" then please provide more detail  · Skin cancer (such as Melanoma, Basal Cell Carcinoma, Squamous Cell Carcinoma? No  · Tuberculosis, HIV/AIDS, Hepatitis B or C: No  · Systemic Immunosuppression such as Diabetes, Biologic or Immunotherapy, Chemotherapy, Organ Transplantation, Bone Marrow Transplantation No  · Radiation Treatment No   FAMILY HISTORY:  Any \"first degree relatives\" (parent, brother, sister, or child) with the following? • Skin Cancer, Pancreatic or Other Cancer? YES, see family history   PATIENT EXPERIENCE:    • Do you want the Dermatologist to perform a COMPLETE skin exam today including a clinical examination under the \"bra and underwear\" areas? NO  • If necessary, do we have your permission to call and leave a detailed message on your Preferred Phone number that includes your specific medical information?   Yes      No Known Allergies   Current Outpatient Medications:   •  amLODIPine (NORVASC) 10 mg tablet, TAKE 1 TABLET BY MOUTH EVERY DAY, Disp: 90 tablet, Rfl: 2  •  Biotin 5000 MCG TABS, Take by mouth every morning, Disp: , Rfl:   •  CALCIUM PO, Take by mouth once a week, Disp: , Rfl:   •  Cholecalciferol (VITAMIN D) 2000 units CAPS, Take by mouth 3 (three) times a week, Disp: , Rfl:   •  " clobetasol (TEMOVATE) 0 05 % cream, , Disp: , Rfl:   •  cyanocobalamin (VITAMIN B-12) 500 MCG tablet, Take 1 tablet (500 mcg total) by mouth daily, Disp: 30 tablet, Rfl: 5  •  esomeprazole (NexIUM) 40 MG capsule, TAKE 1 CAPSULE BY MOUTH  DAILY, Disp: 90 capsule, Rfl: 3  •  folic acid (FOLVITE) 1 mg tablet, TAKE 1 TABLET BY MOUTH EVERY DAY, Disp: 90 tablet, Rfl: 1  •  furosemide (LASIX) 40 mg tablet, TAKE 1 TABLET BY MOUTH  DAILY AS NEEDED FOR EDEMA, Disp: 90 tablet, Rfl: 3  •  GLUCOSAMINE HCL PO, Take 1,200 mg by mouth daily , Disp: , Rfl:   •  halobetasol (ULTRAVATE) 0 05 % cream, , Disp: , Rfl:   •  IBUPROFEN IB PO, 1 po daily PRN, Disp: , Rfl:   •  Magnesium 500 MG CAPS, TAKES HERE AND THERE, Disp: , Rfl:   •  metoprolol tartrate (LOPRESSOR) 50 mg tablet, TAKE 1 TABLET BY MOUTH  TWICE DAILY, Disp: 180 tablet, Rfl: 3  •  multivitamin (THERAGRAN) TABS, Take 1 tablet by mouth daily  , Disp: , Rfl:   •  niacin 500 mg tablet, Take 500 mg by mouth daily with breakfast, Disp: , Rfl:   •  Omega-3 Fatty Acids (FISH OIL) 1,000 mg, Take 1,000 mg by mouth daily  , Disp: , Rfl:   •  zolpidem (AMBIEN) 5 mg tablet, TAKE 1 TABLET BY MOUTH DAILY AT  BEDTIME AS NEEDED FOR SLEEP, Disp: 90 tablet, Rfl: 0          • Whom besides the patient is providing clinical information about today's encounter?   o NO ADDITIONAL HISTORIAN (patient alone provided history)    Physical Exam and Assessment/Plan by Diagnosis:    RASH: DYSESTHESIAS vs DELUSION OF PARASITOSIS  Physical Exam:  • Anatomic Location Affected:  Arms and legs  • Morphological Description:  Scattered erosions   • Pertinent Positives:  • Pertinent Negatives: Additional History of Present Condition:  Patient was itchy a couple of months ago along with   Patient was diagnosed with scabies and was prescribed Ivermectin and Permethrin (multiple rounds)   not currently itchy  Patient denies any itching but notes feeling bugs under her skin moving around  "    Assessment and Plan:  Based on a thorough discussion of this condition and the management approach to it (including a comprehensive discussion of the known risks, side effects and potential benefits of treatment), the patient (family) agrees to implement the following specific plan:  • Apply Vaseline on arms and legs  • Counseled patient she is not contagious        SEBORRHEIC KERATOSIS; NON-INFLAMED    Physical Exam:  • Anatomic Location Affected:  Face, abdomen  • Morphological Description:  Flat and raised, waxy, smooth to warty textured, yellow to brownish-grey to dark brown to blackish, discrete, \"stuck-on\" appearing papules  • Pertinent Positives:  • Pertinent Negatives: Additional History of Present Condition:  Patient reports new bumps on the skin  Denies itch, burn, pain, bleeding or ulceration  Present constantly; nothing seems to make it worse or better  No prior treatment  Assessment and Plan:  Based on a thorough discussion of this condition and the management approach to it (including a comprehensive discussion of the known risks, side effects and potential benefits of treatment), the patient (family) agrees to implement the following specific plan:  • Benign; reassured     Seborrheic Keratosis  A seborrheic keratosis is a harmless warty spot that appears during adult life as a common sign of skin aging  Seborrheic keratoses can arise on any area of skin, covered or uncovered, with the usual exception of the palms and soles  They do not arise from mucous membranes  Seborrheic keratoses can have highly variable appearance  Seborrheic keratoses are extremely common  It has been estimated that over 90% of adults over the age of 61 years have one or more of them  They occur in males and females of all races, typically beginning to erupt in the 35s or 45s  They are uncommon under the age of 21 years  The precise cause of seborrhoeic keratoses is not known    Seborrhoeic keratoses are " "considered degenerative in nature  As time goes by, seborrheic keratoses tend to become more numerous  Some people inherit a tendency to develop a very large number of them; some people may have hundreds of them  The name \"seborrheic keratosis\" is misleading, because these lesions are not limited to a seborrhoeic distribution (scalp, mid-face, chest, upper back), nor are they formed from sebaceous glands, nor are they associated with sebum -- which is greasy  Seborrheic keratosis may also be called \"SK,\" \"Seb K,\" \"basal cell papilloma,\" \"senile wart,\" or \"barnacle  \"      Researchers have noted:  • Eruptive seborrhoeic keratoses can follow sunburn or dermatitis  • Skin friction may be the reason they appear in body folds  • Viral cause (e g , human papillomavirus) seems unlikely  • Stable and clonal mutations or activation of FRFR3, PIK3CA, PAUL, AKT1 and EGFR genes are found in seborrhoeic keratoses  • Seborrhoeic keratosis can arise from solar lentigo  • FRFR3 mutations also arise in solar lentigines  These mutations are associated with increased age and location on the head and neck, suggesting a role of ultraviolet radiation in these lesions  • Seborrheic keratoses do not harbour tumour suppressor gene mutations  • Epidermal growth factor receptor inhibitors, which are used to treat some cancers, often result in an increase in verrucal (warty) keratoses  There is no easy way to remove multiple lesions on a single occasion  Unless a specific lesion is \"inflamed\" and is causing pain or stinging/burning or is bleeding, most insurance companies do not authorize treatment  ANGIOMA (\"CHERRY ANGIOMA\")    Physical Exam:  • Anatomic Location: abdomen  • Morphologic Description: Firm red to reddish-blue discrete papule  • Pertinent Positives:  • Pertinent Negatives:     Additional History of Present Condition:  Present on exam   • Asymptomatic    Plan:  • Discussed that an angioma is due to proliferating " "endothelial cells; these are the cells that line the inside of a blood vessel  Genetic analysis has found cherry angiomas frequently carry specific somatic missense mutations in the GNAQ and GNA11 (Q209H) genes, which are also involved in other vascular and melanocytic proliferations  • Cherry angiomas markedly increase in number from about the age of 36, so it has been estimated that 75% of people over 76years of age have them  Although they also called \"senile angiomas,\" they can occur in young people too - 5% of adolescents have been found to have them  • Cherry angiomas are very common in males and females of any age or race, with no difference in sexes or races affected  They are however more noticeable in white skin than in skin of colour  There may be a family history of similar lesions  • Eruptive cherry angiomas have been rarely reported to be associated with internal malignancy and pregnancy  • Asymptomatic  No treatment is required  PROCEDURES PERFORMED TODAY ASSOCIATED WITH THIS CONDITION:          NONE      MEDICAL DECISION MAKING  Treatment Goal:  Resolution of the SELF-LIMITED OR MINOR PROBLEM condition  • SELF-LIMITED or MINOR PROBLEM  A problem that runs a definite and prescribed course, is transient in nature, and is not likely to permanently alter health status  The problem does not need to meet all three of these criteria but must be one that is not likely to permanently alter the patient's health status  MELANOCYTIC NEVI (\"Moles\")    Physical Exam:  • Anatomic Location Affected:   Mostly on sun-exposed areas of the face, trunk, extremities  • Morphological Description:  Scattered, 1-4mm round to ovoid, symmetrical-appearing, even bordered, skin colored to dark brown macules/papules, mostly in sun-exposed areas  • Pertinent Positives:  • Pertinent Negatives:     Additional History of Present Condition:  Noticed during today's exam    Assessment and Plan:  Based on a " "thorough discussion of this condition and the management approach to it (including a comprehensive discussion of the known risks, side effects and potential benefits of treatment), the patient (family) agrees to implement the following specific plan:  • Benign; reassured      Melanocytic Nevi  Melanocytic nevi (\"moles\") are caused by collections of the color producing skin cells, or melanocytes, in 1 area in the skin  They can range in color from pink to dark brown and be either raised or flat  Some moles are present at birth (I e , \"congenital nevi\"), while others come up later in life (i e , \"acquired nevi\")  Zelaya Golder exposure also stimulates the body to make more moles, ie the more sun you get the more moles you'll grow  Clinically distinguishing a healthy mole from melanoma may be difficult  The \"ABCDE's\" of moles have been suggested as a means of helping to alert a person to a suspicious mole and the possible increased risk of melanoma  Asymmetry: Healthy moles tend to be symmetric, while melanomas are often asymmetric  Asymmetry means if you draw a line through the mole, the two halves do not match in color, size, shape, or surface texture Any mole that starts to demonstrate \"asymmetry\" should be examined promptly by a board certified dermatologist      Border: Healthy moles tend to have discrete, even borders  The border of a melanoma often blends into the normal skin and does not sharply delineate the mole from normal skin  Any mole that starts to demonstrate \"uneven borders\" should be examined promptly     Color: Healthy moles tend to be one color throughout  Melanomas tend to be made up of different colors ranging from dark black, blue, white, or red  Any mole that demonstrates a color change should be examined promptly    Diameter: Healthy moles tend to be smaller than 0 6 cm in size; an exception are \"congenital nevi\" that can be larger    Melanomas tend to grow and can often be greater than " "0 6 cm (1/4 of an inch, or the size of a pencil eraser)  This is only a guideline, and many normal moles may be larger than 0 6 cm without being unhealthy  Any mole that starts to change in size (small to bigger or bigger to smaller) should be examined promptly    Evolving: Healthy moles tend to [place] the same  \"  Melanomas may often show signs of change or evolution such as a change in size, shape, color, or elevation  Any mole that starts to itch, bleed, crust, burn, hurt, or ulcerate or demonstrate a change or evolution should be examined promptly by a board certified dermatologist       What are atypical moles or dysplastic nevi? Dysplastic moles are moles that have some of the ABCDE  changes listed above but  are not cancerous  Sometimes a biopsy and microscopic examination are needed to determine the difference  They may indicate an increased risk of melanoma in that person, especially if there is a family history of melanoma  What is a Melanoma? The main concern when looking at a new or changing mole it to evaluate whether it may be a melanoma  The appearance of a \"new mole\" remains one of the most reliable methods for identifying a malignant melanoma  A melanoma is a type of skin cancer that can be deadly if it spreads throughout the body  The prognosis of a Melanoma depends on how deep it has penetrated in the skin  If caught early, they generally will not have had time to grow into the deeper layers of the skin and they cure rate is then very high  Once the melanoma grows deeper into the skin, the cure rate drops dramatically  Therefore, early detection and removal of a malignant melanoma results in a much better chance of complete cure  Scribe Attestation    I,:  Brandee Reed am acting as a scribe while in the presence of the attending physician :       I,:  Celso Snow MD personally performed the services described in this documentation    as scribed in my presence  :         "

## 2023-06-23 DIAGNOSIS — G47.00 INSOMNIA, UNSPECIFIED TYPE: ICD-10-CM

## 2023-06-26 ENCOUNTER — TELEPHONE (OUTPATIENT)
Dept: FAMILY MEDICINE CLINIC | Facility: CLINIC | Age: 83
End: 2023-06-26

## 2023-06-26 RX ORDER — ZOLPIDEM TARTRATE 5 MG/1
TABLET ORAL
Qty: 90 TABLET | Refills: 1 | Status: SHIPPED | OUTPATIENT
Start: 2023-06-26

## 2023-06-26 NOTE — TELEPHONE ENCOUNTER
Khushbu sister [place] called says when Marisol Upton went to Dermatology they told her she doesn't have scabies  She believes she still has scabies and that other members in her family have scabies  She believes this is something in her head  Doesn't want people    Judy says since Marisol Upton fell 6 months ago she hasn't been the same  [place] Eva wants to know what Dr she needs to take Marisol Upton to get evaluated for this?     Needs a referral to new specialist - possibly a neurologist or Psychiatry    Please call Judy # [phone] - Preferably  Wednesday - she's off from work

## 2023-06-26 NOTE — TELEPHONE ENCOUNTER
This was adressed in another message about getting a geriatric neuropsychologist or neurologist to see her

## 2023-06-26 NOTE — TELEPHONE ENCOUNTER
I think seeing a geriatric neurologist/psychiatrist would be a good idea-it's possible that Sania Bruce has some dementia developing, be it age related or alcohol use related-if they want her to have an appointment here first we can do that too

## 2023-06-28 ENCOUNTER — TELEPHONE (OUTPATIENT)
Dept: FAMILY MEDICINE CLINIC | Facility: CLINIC | Age: 83
End: 2023-06-28

## 2023-06-28 RX ORDER — PERMETHRIN 50 MG/G
CREAM TOPICAL
COMMUNITY
Start: 2023-04-20 | End: 2023-06-29

## 2023-06-28 RX ORDER — TRIAMCINOLONE ACETONIDE 1 MG/G
CREAM TOPICAL
COMMUNITY
Start: 2023-05-11 | End: 2023-06-29

## 2023-06-28 RX ORDER — IVERMECTIN 3 MG/1
TABLET ORAL
COMMUNITY
Start: 2023-04-20 | End: 2023-06-29

## 2023-06-28 RX ORDER — KETOCONAZOLE 20 MG/G
CREAM TOPICAL
COMMUNITY
Start: 2023-06-15 | End: 2023-06-29

## 2023-06-28 NOTE — TELEPHONE ENCOUNTER
Bharti Valencia called wants a return call to discuss the scabies that is growing on her eyelids and wants a referral to get them out  Says no one believes her      Bharti Valencia # [phone]

## 2023-06-28 NOTE — TELEPHONE ENCOUNTER
NOW Judy called again  She said older daughter will be bringing Brynn Esqueda to her appt tomorrow? They are requesting lab orders from you and they want YOU to determine if she has bug bites or not

## 2023-06-28 NOTE — TELEPHONE ENCOUNTER
Susy Olguin called to say she gives us permission to speak with her daughter Heather Killian & her number is [phone]

## 2023-06-28 NOTE — TELEPHONE ENCOUNTER
Daughter is calling & wants a call back  Mom Tim Menon) DOES have something on her skin, but she feels it's not bug bites  Judy lives in [place] (is a pharmacist) and said she DOES have permission to talk to doctor on mom's behalf

## 2023-06-28 NOTE — TELEPHONE ENCOUNTER
I already spoke to pt regarding her daughter  I told pt to make an appt with Dr Chandra Fuentes to address concerns with herself and her daughter  I called pt back and made her appt for tomorrow 6/29/23 at 4PM she states her daughter can't make the appt but told her to call her at appt tomorrow so she can be on the phone

## 2023-06-28 NOTE — TELEPHONE ENCOUNTER
We can listen to her concerns but cannot provide any medical information about patient  (meaning call back would be to [place] and not her daughter) Also if she is POA than she should provide us with the documentation to prevent this in the future because she is not listed on patient communication consent form

## 2023-06-28 NOTE — TELEPHONE ENCOUNTER
I called pt and offered her an appt to come in with her and her daughter to address concerns  Pt will call me back once she calls her daughter with the day/time I provided her to see if she can come

## 2023-06-29 ENCOUNTER — OFFICE VISIT (OUTPATIENT)
Dept: FAMILY MEDICINE CLINIC | Facility: CLINIC | Age: 83
End: 2023-06-29
Payer: COMMERCIAL

## 2023-06-29 VITALS
SYSTOLIC BLOOD PRESSURE: 108 MMHG | OXYGEN SATURATION: 98 % | HEIGHT: 66 IN | BODY MASS INDEX: 21.86 KG/M2 | HEART RATE: 78 BPM | DIASTOLIC BLOOD PRESSURE: 70 MMHG | TEMPERATURE: 98.3 F | WEIGHT: 136.03 LBS | RESPIRATION RATE: 16 BRPM

## 2023-06-29 DIAGNOSIS — F41.9 ANXIETY: Primary | ICD-10-CM

## 2023-06-29 DIAGNOSIS — F03.B4 MODERATE DEMENTIA WITH ANXIETY, UNSPECIFIED DEMENTIA TYPE (HCC): ICD-10-CM

## 2023-06-29 DIAGNOSIS — R21 RASH: ICD-10-CM

## 2023-06-29 DIAGNOSIS — F42.8 OBSESSION: ICD-10-CM

## 2023-06-29 DIAGNOSIS — G31.9 CEREBRAL ATROPHY (HCC): ICD-10-CM

## 2023-06-29 DIAGNOSIS — F10.90 HEAVY ALCOHOL USE: ICD-10-CM

## 2023-06-29 PROCEDURE — 99214 OFFICE O/P EST MOD 30 MIN: CPT | Performed by: INTERNAL MEDICINE

## 2023-06-29 RX ORDER — TRIAMCINOLONE ACETONIDE 1 MG/G
CREAM TOPICAL 2 TIMES DAILY
Qty: 30 G | Refills: 5 | Status: SHIPPED | OUTPATIENT
Start: 2023-06-29

## 2023-06-29 RX ORDER — ESCITALOPRAM OXALATE 5 MG/1
5 TABLET ORAL DAILY
Qty: 30 TABLET | Refills: 5 | Status: SHIPPED | OUTPATIENT
Start: 2023-06-29

## 2023-06-29 NOTE — PROGRESS NOTES
Assessment/Plan:Khushbu is really obsessing over scabies despite being treated with multiple courses of permetherin and ivermectin-I do think she's developing dementia and this is part and parcel of it  It's possible that anxiety is playing a role here too-went over MMSE results with daughter and showed her Ajay Becerra got a 22/30-definitely has some age related cognitive decline occurring  At this point in time we will start a low dose SSRI Lexapro 5 mg daily to see if it helps with anxiety and obsessional /delusional thinking  She is supposed to follow up with Neurology in 6 months (Oct) and encouraged her to get lab work done  Gave her some kenalog cream to use under and around her eyes-doubt eczema but it's always a possibility-may benefit from some Aricept or Namenda-we did also talk about Khushbu's history of alcohol consumption and the possibility of neuropathy causing her symptoms-also referred her to Geriatrics         Problem List Items Addressed This Visit        Nervous and Auditory    Cerebral atrophy ([address] )    Relevant Orders    Ambulatory Referral to Senior Care    RPR-Syphilis Screening (Total Syphilis IGG/IGM)       Musculoskeletal and Integument    Rash    Relevant Medications    triamcinolone (KENALOG) 0 1 % cream       Other    Heavy alcohol use    Anxiety - Primary    Relevant Medications    escitalopram (LEXAPRO) 5 mg tablet   Other Visit Diagnoses     Obsession        Relevant Medications    escitalopram (LEXAPRO) 5 mg tablet    Moderate dementia with anxiety, unspecified dementia type (HCC)        Relevant Medications    escitalopram (LEXAPRO) 5 mg tablet            Subjective:      Patient ID: Tarah Hughes is a 78 y o  female      I met with Ajay Becerra, her  Romi Gillespie, and her daughter Arin Batista, who is a pharmacist from [place]-Khushbu just saw the Dermatologist who told her she does not have scabies and is not contagious-Khushbu has been having, or obsessing, really, about itching and having scabies-swears she can "\"see\" the bugs and that they come out once a day to eat  She's been treated with multiple rounds of both permetherin and ivermectin and says she has had skiin scrapings-she is rather adamant that she has scabies burrowing around her eyelids, etc-since I saw her last, she did see Neurology who ordered labs that have yet to be done and she had an MRI of the brain showing chronic microangiopathy as well as cerebral atrophy  A low dose SSRI was recommended, as she definitely seems to have delusions, obsessions, fixations  Katerin Mayers does have a hx of heavy alcohol use, and still drinks now-went over the results of the MMSE she had done here a few months back showing a 22/30 score c/w mild/mod dementia      The following portions of the patient's history were reviewed and updated as appropriate:   Past Medical History:  She has a past medical history of Anxiety, Arthritis, Prajapati esophagus, Cataract, Continuous chronic alcoholism ([address] ), GERD (gastroesophageal reflux disease), Heavy alcohol use, Hyperlipidemia, Hypertension, Insomnia, and Palpitations  ,  _______________________________________________________________________  Medical Problems:  does not have any pertinent problems on file ,  _______________________________________________________________________  Past Surgical History:   has a past surgical history that includes Cataract extraction (Right); LASIK (Bilateral); Joint replacement (Bilateral); Colonoscopy; Tonsillectomy; Tubal ligation; Hysterectomy; Cholecystectomy; pr xcapsl ctrc rmvl insj io lens prosth w/o ecp (Left, 10/18/2018); and BREAST IMPLANT (Bilateral)  ,  _______________________________________________________________________  Family History:  family history includes Cancer in her mother and sister; Diabetes in her daughter; Heart disease (age of onset: 61) in her father; No Known Problems in her sister  ,  _______________________________________________________________________  Social History:   " reports that she has never smoked  She has never used smokeless tobacco  She reports current alcohol use of about 20 0 standard drinks of alcohol per week  She reports that she does not use drugs  ,  _______________________________________________________________________  Allergies:  has No Known Allergies     _______________________________________________________________________  Current Outpatient Medications   Medication Sig Dispense Refill   • amLODIPine (NORVASC) 10 mg tablet TAKE 1 TABLET BY MOUTH EVERY DAY 90 tablet 2   • Biotin 5000 MCG TABS Take by mouth every morning     • Cholecalciferol (VITAMIN D) 2000 units CAPS Take by mouth 3 (three) times a week     • cyanocobalamin (VITAMIN B-12) 500 MCG tablet Take 1 tablet (500 mcg total) by mouth daily 30 tablet 5   • escitalopram (LEXAPRO) 5 mg tablet Take 1 tablet (5 mg total) by mouth daily 30 tablet 5   • esomeprazole (NexIUM) 40 MG capsule TAKE 1 CAPSULE BY MOUTH  DAILY 90 capsule 3   • furosemide (LASIX) 40 mg tablet TAKE 1 TABLET BY MOUTH  DAILY AS NEEDED FOR EDEMA 90 tablet 3   • GLUCOSAMINE HCL PO Take 1,200 mg by mouth daily      • IBUPROFEN IB PO 1 po daily PRN     • metoprolol tartrate (LOPRESSOR) 50 mg tablet TAKE 1 TABLET BY MOUTH  TWICE DAILY 180 tablet 3   • niacin 500 mg tablet Take 500 mg by mouth daily with breakfast     • Omega-3 Fatty Acids (FISH OIL) 1,000 mg Take 1,000 mg by mouth daily       • triamcinolone (KENALOG) 0 1 % cream Apply topically 2 (two) times a day 30 g 5   • zolpidem (AMBIEN) 5 mg tablet TAKE 1 TABLET BY MOUTH DAILY AT  BEDTIME AS NEEDED FOR SLEEP 90 tablet 1     No current facility-administered medications for this visit      _______________________________________________________________________  Review of Systems   Cardiovascular: Negative  Gastrointestinal: Negative  Genitourinary: Negative  Skin:        itching   Neurological: Negative  Psychiatric/Behavioral: Positive for confusion and hallucinations   The "patient is nervous/anxious  Objective:  Vitals:    06/29/23 1604   BP: 108/70   BP Location: Left arm   Patient Position: Sitting   Cuff Size: Adult   Pulse: 78   Resp: 16   Temp: 98 3 °F (36 8 °C)   TempSrc: Tympanic   SpO2: 98%   Weight: 61 7 kg (136 lb 0 5 oz)   Height: 5' 6\" (1 676 m)     Body mass index is 21 96 kg/m²  Physical Exam  Constitutional:       Appearance: Normal appearance  HENT:      Head: Normocephalic and atraumatic  Right Ear: External ear normal       Left Ear: External ear normal    Cardiovascular:      Rate and Rhythm: Normal rate and regular rhythm  Heart sounds: Normal heart sounds  Pulmonary:      Effort: Pulmonary effort is normal       Breath sounds: Normal breath sounds  Skin:     Comments: Several seborrheic keratoses, some excoriated areas   Neurological:      General: No focal deficit present  Mental Status: She is alert           "

## 2023-07-07 ENCOUNTER — TELEPHONE (OUTPATIENT)
Age: 83
End: 2023-07-07

## 2023-07-07 NOTE — TELEPHONE ENCOUNTER
Rajiv Saravia [address], [address] [place] [address], [facility]    [phone]    Telephone Intake: Geriatric Assessment     - Chart Review  1. Has this patient been seen by our department in the last 3 years? No  2. Please route to provider for chart review prior to scheduling and let the caller know that this phone intake will be reviewed IF -  • Pt was recently hospitalized  • Pt is prescribed medications for behavior management or has a history of psychiatric hospitalization  • Pt plans to attend alone    Referral source: Sukhwinder Pina MD    Caller who is scheduling/relationship to pt: spouse, Sagar Joseph and patient, Bethel Richards phone number: [phone]    Reason for referral: Patient concerns , Family member concerns and Provider concerns regarding memory concerns, behavior changes/concerns and mood concerns. If there are behavioral concerns, is the pt prescribed medications to manage these? no   If so, how many? None    Has the patient ever had an inpatient psychiatric hospitalization? No,   What is the goal of the visit? initial assessment and diagnosis     Has the patient been seen by a Neurologist or Geriatrician? Yes, Neurology due to a fall in March 2023. If yes, is this appointment for a second opinion? No  Has the patient ever been diagnosed with dementia? No  Has the patient had an MRI NeuroQuant within the last 1 year? Yes, 4/17/23   (If so, please route to provider to determine if assessment + conference are needed or if only assessment should be scheduled)      Preferred language? English  Highest education level? 11th grade  Does the patient wear glasses? Yes, readers   Does the patient use hearing aids? No     Is there a living will/healthcare POA in place/If so, who? Yes , Proxy, spouse, Misael Haste     Does the pt/caregiver have access for a virtual visit (computer/smart phone with audio/video)?  No    Caller was informed:   • Please make sure the pt is accompanied by someone who knows them well / caregiver / family member to participate in this appointment. Who will accompany the pt (name and relationship)? Shirley Bradshaw, spouse  Phone number of person accompanying pt: [phone]  • Please make sure the pt attends all appointments, including the assessment, care conference, follow-up, whether in-person or virtual.  • For virtual visits, pt must be physically present in [facility]. Office packet mailed out to: [address] [place] [address] [id number] [place] [address]  Added to wait list for sooner appointments/notified that calls can be short notice (same day/day prior)?  Yes

## 2023-07-14 ENCOUNTER — TELEPHONE (OUTPATIENT)
Dept: FAMILY MEDICINE CLINIC | Facility: CLINIC | Age: 83
End: 2023-07-14

## 2023-07-14 NOTE — TELEPHONE ENCOUNTER
Fabiana Owenalise called says she was seen by the visiting nurse today and nurse told her she has scabies. Says the nurse also had scabies and that she didn't wan to to sit down. Fabiana Fuentes is asking if you can prescribe her the cream for scabies. I informed her to have the visiting nurse call us so we can speak with her as to what she saw today.     Fabiana Fuentes will be calling us back with nurse name and phone number

## 2023-07-20 ENCOUNTER — TELEPHONE (OUTPATIENT)
Dept: FAMILY MEDICINE CLINIC | Facility: CLINIC | Age: 83
End: 2023-07-20

## 2023-07-20 DIAGNOSIS — R21 RASH: Primary | ICD-10-CM

## 2023-07-20 RX ORDER — IVERMECTIN 3 MG/1
200 TABLET ORAL ONCE
Qty: 4 TABLET | Refills: 3 | Status: SHIPPED | OUTPATIENT
Start: 2023-07-20 | End: 2023-07-20

## 2023-07-20 RX ORDER — PERMETHRIN 50 MG/G
CREAM TOPICAL ONCE
Qty: 120 G | Refills: 5 | Status: SHIPPED | OUTPATIENT
Start: 2023-07-20 | End: 2023-07-20

## 2023-07-20 NOTE — TELEPHONE ENCOUNTER
I called Khushbu's daughter Nelsy More to discuss with her that her mom called yet again for more cream to treat scabies-she still thinks she has scabies, and that her partner Analia Tovar might have it too-I asked Judy if she thought I should send meds in and she said yes so I ordered permetherinn and ivermectin-I'm really not sure that Cherry Quesada has or ever had scabies, almost seems as though they are imagined and obsessed over

## 2023-07-20 NOTE — TELEPHONE ENCOUNTER
Patient called, she still has the "scabies", can we call something in? Does she need an appt?     [facility]

## 2023-07-22 DIAGNOSIS — F41.9 ANXIETY: ICD-10-CM

## 2023-07-22 DIAGNOSIS — F42.8 OBSESSION: ICD-10-CM

## 2023-07-24 RX ORDER — ESCITALOPRAM OXALATE 5 MG/1
5 TABLET ORAL DAILY
Qty: 90 TABLET | Refills: 2 | Status: SHIPPED | OUTPATIENT
Start: 2023-07-24

## 2023-07-26 ENCOUNTER — TELEPHONE (OUTPATIENT)
Dept: FAMILY MEDICINE CLINIC | Facility: CLINIC | Age: 83
End: 2023-07-26

## 2023-07-26 NOTE — TELEPHONE ENCOUNTER
Offered patient an appt for swollen ankles/feet today at 3:30 with Dr. Bustos Post, she can't come at that time. Was recommended she go to Urgent care and we will follow up.

## 2023-07-27 ENCOUNTER — TELEPHONE (OUTPATIENT)
Dept: FAMILY MEDICINE CLINIC | Facility: CLINIC | Age: 83
End: 2023-07-27

## 2023-07-27 NOTE — TELEPHONE ENCOUNTER
Flores Matthew can just go to a [facility] lab and doesn't really need a lab slip orders are in the computer

## 2023-07-27 NOTE — TELEPHONE ENCOUNTER
Dharmesh Johnson was seen in urgent care yesterday (cellulitis leg). She is asking if we can send her labs (ordered in [address] [place] [address]) to her.   (I believe she moved)

## 2023-07-31 DIAGNOSIS — I51.89 DIASTOLIC DYSFUNCTION: Primary | ICD-10-CM

## 2023-08-07 DIAGNOSIS — E87.6 HYPOKALEMIA: ICD-10-CM

## 2023-08-07 DIAGNOSIS — N39.0 URINARY TRACT INFECTION WITHOUT HEMATURIA, SITE UNSPECIFIED: Primary | ICD-10-CM

## 2023-08-07 RX ORDER — CIPROFLOXACIN 250 MG/1
250 TABLET, FILM COATED ORAL EVERY 12 HOURS SCHEDULED
Qty: 20 TABLET | Refills: 0 | Status: SHIPPED | OUTPATIENT
Start: 2023-08-07 | End: 2023-08-08 | Stop reason: SDUPTHER

## 2023-08-07 RX ORDER — POTASSIUM CHLORIDE 20 MEQ/1
20 TABLET, EXTENDED RELEASE ORAL DAILY
Qty: 30 TABLET | Refills: 5 | Status: SHIPPED | OUTPATIENT
Start: 2023-08-07 | End: 2023-08-30

## 2023-08-08 DIAGNOSIS — D64.9 ANEMIA, UNSPECIFIED TYPE: Primary | ICD-10-CM

## 2023-08-08 DIAGNOSIS — N39.0 URINARY TRACT INFECTION WITHOUT HEMATURIA, SITE UNSPECIFIED: ICD-10-CM

## 2023-08-08 RX ORDER — CIPROFLOXACIN 250 MG/1
250 TABLET, FILM COATED ORAL EVERY 12 HOURS SCHEDULED
Qty: 20 TABLET | Refills: 0 | Status: SHIPPED | OUTPATIENT
Start: 2023-08-08 | End: 2023-08-18

## 2023-08-18 DIAGNOSIS — N39.0 URINARY TRACT INFECTION WITHOUT HEMATURIA, SITE UNSPECIFIED: Primary | ICD-10-CM

## 2023-08-21 DIAGNOSIS — N39.0 URINARY TRACT INFECTION WITHOUT HEMATURIA, SITE UNSPECIFIED: Primary | ICD-10-CM

## 2023-08-30 ENCOUNTER — TELEPHONE (OUTPATIENT)
Dept: FAMILY MEDICINE CLINIC | Facility: CLINIC | Age: 83
End: 2023-08-30

## 2023-08-30 DIAGNOSIS — E87.6 HYPOKALEMIA: ICD-10-CM

## 2023-08-30 RX ORDER — POTASSIUM CHLORIDE 1500 MG/1
20 TABLET, EXTENDED RELEASE ORAL DAILY
Qty: 90 TABLET | Refills: 2 | Status: SHIPPED | OUTPATIENT
Start: 2023-08-30
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
## 2023-08-31 ENCOUNTER — RA CDI HCC (OUTPATIENT)
Dept: OTHER | Facility: HOSPITAL | Age: 83
End: 2023-08-31

## 2023-08-31 NOTE — PROGRESS NOTES
[address] [facility] coding opportunities       Chart reviewed, no opportunity found: [address]        Patients Insurance     Medicare Insurance: [employer]

## 2023-09-07 ENCOUNTER — OFFICE VISIT (OUTPATIENT)
Dept: FAMILY MEDICINE CLINIC | Facility: CLINIC | Age: 83
End: 2023-09-07
Payer: COMMERCIAL

## 2023-09-07 VITALS
OXYGEN SATURATION: 99 % | TEMPERATURE: 98.3 F | RESPIRATION RATE: 18 BRPM | HEIGHT: 66 IN | BODY MASS INDEX: 22.66 KG/M2 | WEIGHT: 141 LBS | HEART RATE: 72 BPM | DIASTOLIC BLOOD PRESSURE: 80 MMHG | SYSTOLIC BLOOD PRESSURE: 110 MMHG

## 2023-09-07 DIAGNOSIS — F03.911 DEMENTIA WITH AGITATION, UNSPECIFIED DEMENTIA SEVERITY, UNSPECIFIED DEMENTIA TYPE (HCC): Primary | ICD-10-CM

## 2023-09-07 DIAGNOSIS — E78.5 DYSLIPIDEMIA: ICD-10-CM

## 2023-09-07 DIAGNOSIS — R41.3 MEMORY LOSS: ICD-10-CM

## 2023-09-07 DIAGNOSIS — F10.90 HEAVY ALCOHOL USE: ICD-10-CM

## 2023-09-07 DIAGNOSIS — Z23 NEED FOR VACCINATION: ICD-10-CM

## 2023-09-07 DIAGNOSIS — R21 RASH: ICD-10-CM

## 2023-09-07 DIAGNOSIS — F42.8 OBSESSION: ICD-10-CM

## 2023-09-07 DIAGNOSIS — F41.9 ANXIETY: ICD-10-CM

## 2023-09-07 DIAGNOSIS — I10 ESSENTIAL HYPERTENSION: ICD-10-CM

## 2023-09-07 PROCEDURE — G0008 ADMIN INFLUENZA VIRUS VAC: HCPCS | Performed by: INTERNAL MEDICINE

## 2023-09-07 PROCEDURE — 99214 OFFICE O/P EST MOD 30 MIN: CPT | Performed by: INTERNAL MEDICINE

## 2023-09-07 PROCEDURE — 90662 IIV NO PRSV INCREASED AG IM: CPT | Performed by: INTERNAL MEDICINE

## 2023-09-07 RX ORDER — ESCITALOPRAM OXALATE 10 MG/1
10 TABLET ORAL DAILY
Qty: 30 TABLET | Refills: 3 | Status: SHIPPED | OUTPATIENT
Start: 2023-09-07

## 2023-09-07 RX ORDER — AMLODIPINE BESYLATE 5 MG/1
5 TABLET ORAL DAILY
Qty: 30 TABLET | Refills: 5 | Status: SHIPPED | OUTPATIENT
Start: 2023-09-07

## 2023-09-07 NOTE — PROGRESS NOTES
Assessment/Plan:Khushbu here continues to be focused on having scabies, this despite seeing Derm who did skin scrapings etc-she also has some lower ext edema b/l with some associated erythema-she's very focused on having scabies, and thinks anyone that tells her otherwise is not "getting it"-for her lower ext edema, she's on lasix daily or prn daily, and we recommended a low sodium diet, lowering her dose of amlodipine to 5 mg daily and elevating her legs-she seems fairly obsessive about the scabies and rash that she has and we will try to bump up the lexapro to 10 mg daily and refer her to Geriatrics-seems to have delusions and obsessions about scabies         Problem List Items Addressed This Visit        Cardiovascular and Mediastinum    Essential hypertension    Relevant Medications    amLODIPine (NORVASC) 5 mg tablet       Musculoskeletal and Integument    Rash       Other    Heavy alcohol use    Anxiety    Relevant Medications    escitalopram (LEXAPRO) 10 mg tablet    Dyslipidemia    Memory loss   Other Visit Diagnoses     Dementia with agitation, unspecified dementia severity, unspecified dementia type ([address])    -  Primary    Relevant Medications    escitalopram (LEXAPRO) 10 mg tablet    Other Relevant Orders    Ambulatory Referral to [address] [place] [address] Drive        Relevant Medications    escitalopram (LEXAPRO) 10 mg tablet    Need for vaccination        Relevant Orders    influenza vaccine, high-dose, PF 0.7 mL (FLUZONE HIGH-DOSE)            Subjective:      Patient ID: Alex Singer is a 78 y.o. female.     HPI    The following portions of the patient's history were reviewed and updated as appropriate:   Past Medical History:  She has a past medical history of Anxiety, Arthritis, Prajapati esophagus, Cataract, Continuous chronic alcoholism ([address]), GERD (gastroesophageal reflux disease), Heavy alcohol use, Hyperlipidemia, Hypertension, Insomnia, and Palpitations. ,  _______________________________________________________________________  Medical Problems:  does not have any pertinent problems on file.,  _______________________________________________________________________  Past Surgical History:   has a past surgical history that includes Cataract extraction (Right); LASIK (Bilateral); Joint replacement (Bilateral); Colonoscopy; Tonsillectomy; Tubal ligation; Hysterectomy; Cholecystectomy; pr xcapsl ctrc rmvl insj io lens prosth w/o ecp (Left, 10/18/2018); and BREAST IMPLANT (Bilateral). ,  _______________________________________________________________________  Family History:  family history includes Cancer in her mother and sister; Diabetes in her daughter; Heart disease (age of onset: 61) in her father; No Known Problems in her sister. ,  _______________________________________________________________________  Social History:   reports that she has never smoked. She has never used smokeless tobacco. She reports current alcohol use of about 20.0 standard drinks of alcohol per week. She reports that she does not use drugs. ,  _______________________________________________________________________  Allergies:  has No Known Allergies. .  _______________________________________________________________________  Current Outpatient Medications   Medication Sig Dispense Refill   • amLODIPine (NORVASC) 5 mg tablet Take 1 tablet (5 mg total) by mouth daily 30 tablet 5   • Biotin 5000 MCG TABS Take by mouth every morning     • Cholecalciferol (VITAMIN D) 2000 units CAPS Take by mouth 3 (three) times a week     • cyanocobalamin (VITAMIN B-12) 500 MCG tablet Take 1 tablet (500 mcg total) by mouth daily 30 tablet 5   • escitalopram (LEXAPRO) 10 mg tablet Take 1 tablet (10 mg total) by mouth daily 30 tablet 3   • esomeprazole (NexIUM) 40 MG capsule TAKE 1 CAPSULE BY MOUTH  DAILY 90 capsule 3   • furosemide (LASIX) 40 mg tablet TAKE 1 TABLET BY MOUTH  DAILY AS NEEDED FOR EDEMA 90 tablet 3   • GLUCOSAMINE HCL PO Take 1,200 mg by mouth daily      • IBUPROFEN IB PO 1 po daily PRN     • Klor-Con M20 20 MEQ tablet TAKE 1 TABLET BY MOUTH EVERY DAY 90 tablet 2   • metoprolol tartrate (LOPRESSOR) 50 mg tablet TAKE 1 TABLET BY MOUTH  TWICE DAILY 180 tablet 3   • niacin 500 mg tablet Take 500 mg by mouth daily with breakfast     • Omega-3 Fatty Acids (FISH OIL) 1,000 mg Take 1,000 mg by mouth daily       • triamcinolone (KENALOG) 0.1 % cream Apply topically 2 (two) times a day 30 g 5   • zolpidem (AMBIEN) 5 mg tablet TAKE 1 TABLET BY MOUTH DAILY AT  BEDTIME AS NEEDED FOR SLEEP 90 tablet 1     No current facility-administered medications for this visit.     _______________________________________________________________________  Review of Systems   Constitutional: Negative. Psychiatric/Behavioral: Positive for agitation, confusion and hallucinations. Objective:  Vitals:    09/07/23 1107   BP: 110/80   BP Location: Left arm   Patient Position: Sitting   Cuff Size: Standard   Pulse: 72   Resp: 18   Temp: 98.3 °F (36.8 °C)   TempSrc: Temporal   SpO2: 99%   Weight: 64 kg (141 lb)   Height: 5' 6" (1.676 m)     Body mass index is 22.76 kg/m². Physical Exam  Constitutional:       Comments: frail   HENT:      Head: Normocephalic and atraumatic. Nose: Nose normal.      Mouth/Throat:      Mouth: Mucous membranes are moist.   Eyes:      Extraocular Movements: Extraocular movements intact. Cardiovascular:      Rate and Rhythm: Normal rate and regular rhythm. Heart sounds: Normal heart sounds. Pulmonary:      Effort: Pulmonary effort is normal.      Breath sounds: Normal breath sounds. Abdominal:      Palpations: Abdomen is soft. Musculoskeletal:      Cervical back: Neck supple. Right lower leg: Edema present. Left lower leg: Edema present. Comments: Erythema legs b/l   Skin:     Findings: Bruising, erythema and rash present.    Neurological:      Mental Status: Mental status is at baseline.

## 2023-09-11 ENCOUNTER — APPOINTMENT (EMERGENCY)
Dept: RADIOLOGY | Facility: HOSPITAL | Age: 83
End: 2023-09-11
Payer: COMMERCIAL

## 2023-09-11 ENCOUNTER — HOSPITAL ENCOUNTER (EMERGENCY)
Facility: HOSPITAL | Age: 83
Discharge: HOME | End: 2023-09-11
Attending: EMERGENCY MEDICINE | Admitting: EMERGENCY MEDICINE
Payer: COMMERCIAL

## 2023-09-11 VITALS
TEMPERATURE: 97.8 F | DIASTOLIC BLOOD PRESSURE: 71 MMHG | HEART RATE: 61 BPM | SYSTOLIC BLOOD PRESSURE: 154 MMHG | OXYGEN SATURATION: 97 % | RESPIRATION RATE: 16 BRPM

## 2023-09-11 DIAGNOSIS — E87.1 CHRONIC HYPONATREMIA: ICD-10-CM

## 2023-09-11 DIAGNOSIS — R79.89 ELEVATED BRAIN NATRIURETIC PEPTIDE (BNP) LEVEL: ICD-10-CM

## 2023-09-11 DIAGNOSIS — R60.0 BILATERAL LOWER EXTREMITY EDEMA: Primary | ICD-10-CM

## 2023-09-11 LAB
ALBUMIN SERPL BCP-MCNC: 4.1 G/DL (ref 3.5–5)
ALP SERPL-CCNC: 65 U/L (ref 34–104)
ALT SERPL W P-5'-P-CCNC: 10 U/L (ref 7–52)
ANION GAP SERPL CALCULATED.3IONS-SCNC: 9 MMOL/L
AST SERPL W P-5'-P-CCNC: 12 U/L (ref 13–39)
BASOPHILS # BLD AUTO: 0.04 THOUSANDS/ÂΜL (ref 0–0.1)
BASOPHILS NFR BLD AUTO: 1 % (ref 0–1)
BILIRUB SERPL-MCNC: 0.64 MG/DL (ref 0.2–1)
BNP SERPL-MCNC: 515 PG/ML (ref 0–100)
BUN SERPL-MCNC: 11 MG/DL (ref 5–25)
CALCIUM SERPL-MCNC: 8.9 MG/DL (ref 8.4–10.2)
CHLORIDE SERPL-SCNC: 93 MMOL/L (ref 96–108)
CO2 SERPL-SCNC: 27 MMOL/L (ref 21–32)
CREAT SERPL-MCNC: 0.68 MG/DL (ref 0.6–1.3)
EOSINOPHIL # BLD AUTO: 0.02 THOUSAND/ÂΜL (ref 0–0.61)
EOSINOPHIL NFR BLD AUTO: 0 % (ref 0–6)
ERYTHROCYTE [DISTWIDTH] IN BLOOD BY AUTOMATED COUNT: 11.8 % (ref 11.6–15.1)
GFR SERPL CREATININE-BSD FRML MDRD: 80 ML/MIN/1.73SQ M
GLUCOSE SERPL-MCNC: 100 MG/DL (ref 65–140)
HCT VFR BLD AUTO: 34.5 % (ref 34.8–46.1)
HGB BLD-MCNC: 11.8 G/DL (ref 11.5–15.4)
IMM GRANULOCYTES # BLD AUTO: 0.03 THOUSAND/UL (ref 0–0.2)
IMM GRANULOCYTES NFR BLD AUTO: 0 % (ref 0–2)
LYMPHOCYTES # BLD AUTO: 1.35 THOUSANDS/ÂΜL (ref 0.6–4.47)
LYMPHOCYTES NFR BLD AUTO: 20 % (ref 14–44)
MCH RBC QN AUTO: 32 PG (ref 26.8–34.3)
MCHC RBC AUTO-ENTMCNC: 34.2 G/DL (ref 31.4–37.4)
MCV RBC AUTO: 94 FL (ref 82–98)
MONOCYTES # BLD AUTO: 0.54 THOUSAND/ÂΜL (ref 0.17–1.22)
MONOCYTES NFR BLD AUTO: 8 % (ref 4–12)
NEUTROPHILS # BLD AUTO: 4.96 THOUSANDS/ÂΜL (ref 1.85–7.62)
NEUTS SEG NFR BLD AUTO: 71 % (ref 43–75)
NRBC BLD AUTO-RTO: 0 /100 WBCS
PLATELET # BLD AUTO: 250 THOUSANDS/UL (ref 149–390)
PMV BLD AUTO: 8.7 FL (ref 8.9–12.7)
POTASSIUM SERPL-SCNC: 3.6 MMOL/L (ref 3.5–5.3)
PROT SERPL-MCNC: 6.6 G/DL (ref 6.4–8.4)
RBC # BLD AUTO: 3.69 MILLION/UL (ref 3.81–5.12)
SODIUM SERPL-SCNC: 129 MMOL/L (ref 135–147)
WBC # BLD AUTO: 6.94 THOUSAND/UL (ref 4.31–10.16)

## 2023-09-11 PROCEDURE — 99285 EMERGENCY DEPT VISIT HI MDM: CPT | Performed by: EMERGENCY MEDICINE

## 2023-09-11 PROCEDURE — 99283 EMERGENCY DEPT VISIT LOW MDM: CPT

## 2023-09-11 PROCEDURE — 85025 COMPLETE CBC W/AUTO DIFF WBC: CPT | Performed by: EMERGENCY MEDICINE

## 2023-09-11 PROCEDURE — 83880 ASSAY OF NATRIURETIC PEPTIDE: CPT | Performed by: EMERGENCY MEDICINE

## 2023-09-11 PROCEDURE — 36415 COLL VENOUS BLD VENIPUNCTURE: CPT | Performed by: EMERGENCY MEDICINE

## 2023-09-11 PROCEDURE — 80053 COMPREHEN METABOLIC PANEL: CPT | Performed by: EMERGENCY MEDICINE

## 2023-09-11 PROCEDURE — 71045 X-RAY EXAM CHEST 1 VIEW: CPT

## 2023-09-11 PROCEDURE — 93005 ELECTROCARDIOGRAM TRACING: CPT

## 2023-09-11 RX ORDER — FUROSEMIDE 40 MG/1
40 TABLET ORAL ONCE
Status: DISCONTINUED | OUTPATIENT
Start: 2023-09-11 | End: 2023-09-11 | Stop reason: HOSPADM

## 2023-09-11 NOTE — DISCHARGE INSTRUCTIONS
Your BNP was mildly elevated today. This tells us that this could possibly be related to your heart. Get your echo performed later this week as you stated that you previously scheduled. Ensure that you are taking your lasix daily. Elevate your legs nightly. Ensure you are taking the correct dose of amlodopine (norvasc) as the dosage was lowered to 5mg earlier this week. Chest x-ray showed no fluid on your lungs at this point. Your oxygen level was normal.    Come back immediately if you have shortness of breath, new or worsening symptoms. Follow up with cardiology and your primary care provider. A referral was sent to cardiology. Wear compression stockings daily as we spoke about.

## 2023-09-11 NOTE — ED PROVIDER NOTES
History  Chief Complaint   Patient presents with   • Leg Swelling     Leg swelling x 3weeks      49-year-old female previous history of Prajapati's esophagus, alcoholism, dementia, hyperlipidemia, hypertension. Presents with LE edema. Saw her PCP on  for the same. Amlodipine dosage was decreased. Patient is on Lasix daily as needed. Per patient she reports 3 weeks of bilateral leg swelling. No shortness of breath. No cough. She reports that she gets hip pain when she keeps her legs raised so she tends to not raise them. Her amlodipine dosage was decreased 3 days ago. She is unsure if she picked up that dosage or is taking the correct dosage. When I asked her if she is taking her Lasix or furosemide she states that she is unsure which medication the says. She appears to be a poor historian. When I repeat this questions he says that she takes it daily. She doesn't wear compression stockings. Ankle Swelling  Location:  Leg  Leg location:  R leg and L leg  Pain details:     Severity:  No pain    Onset quality:  Gradual    Timing:  Constant    Progression:  Unchanged  Chronicity:  New  Dislocation: no    Foreign body present:  No foreign bodies      Prior to Admission Medications   Prescriptions Last Dose Informant Patient Reported? Taking?    Biotin 5000 MCG TABS  Self Yes No   Sig: Take by mouth every morning   Cholecalciferol (VITAMIN D) 2000 units CAPS  Self Yes No   Sig: Take by mouth 3 (three) times a week   GLUCOSAMINE HCL PO  Self Yes No   Sig: Take 1,200 mg by mouth daily    IBUPROFEN IB PO  Self Yes No   Si po daily PRN   Klor-Con M20 20 MEQ tablet   No No   Sig: TAKE 1 TABLET BY MOUTH EVERY DAY   Omega-3 Fatty Acids (FISH OIL) 1,000 mg  Self Yes No   Sig: Take 1,000 mg by mouth daily     amLODIPine (NORVASC) 5 mg tablet   No No   Sig: Take 1 tablet (5 mg total) by mouth daily   cyanocobalamin (VITAMIN B-12) 500 MCG tablet  Self No No   Sig: Take 1 tablet (500 mcg total) by mouth daily   escitalopram (LEXAPRO) 10 mg tablet   No No   Sig: Take 1 tablet (10 mg total) by mouth daily   esomeprazole (NexIUM) 40 MG capsule  Self No No   Sig: TAKE 1 CAPSULE BY MOUTH  DAILY   furosemide (LASIX) 40 mg tablet  Self No No   Sig: TAKE 1 TABLET BY MOUTH  DAILY AS NEEDED FOR EDEMA   metoprolol tartrate (LOPRESSOR) 50 mg tablet  Self No No   Sig: TAKE 1 TABLET BY MOUTH  TWICE DAILY   niacin 500 mg tablet  Self Yes No   Sig: Take 500 mg by mouth daily with breakfast   triamcinolone (KENALOG) 0.1 % cream   No No   Sig: Apply topically 2 (two) times a day   zolpidem (AMBIEN) 5 mg tablet   No No   Sig: TAKE 1 TABLET BY MOUTH DAILY AT  BEDTIME AS NEEDED FOR SLEEP      Facility-Administered Medications: None       Past Medical History:   Diagnosis Date   • Anxiety    • Arthritis    • Prajapati esophagus    • Cataract    • Continuous chronic alcoholism (HCC)    • GERD (gastroesophageal reflux disease)    • Heavy alcohol use    • Hyperlipidemia    • Hypertension    • Insomnia    • Palpitations        Past Surgical History:   Procedure Laterality Date   • BREAST IMPLANT Bilateral    • CATARACT EXTRACTION Right    • CHOLECYSTECTOMY      LAP   • COLONOSCOPY     • HYSTERECTOMY      age 69-[place]   • JOINT REPLACEMENT Bilateral     hips-2010 and 2017-left leg is shorter   • LASIK Bilateral     in her 50's   • MI XCAPSL CTRC RMVL INSJ IO LENS PROSTH W/O ECP Left 10/18/2018    Procedure: EXTRACTION EXTRACAPSULAR CATARACT PHACO INTRAOCULAR LENS (IOL); Surgeon: Brigette Wiggins MD;  Location: [facility] MAIN OR;  Service: Ophthalmology   • TONSILLECTOMY     • TUBAL LIGATION         Family History   Problem Relation Age of Onset   • Cancer Mother         breast,kidney,lung (smoker)   • Heart disease Father 61        MI   • Cancer Sister         liver,pancreatic(smoker,ETOH)   • Diabetes Daughter    • No Known Problems Sister      I have reviewed and agree with the history as documented.     E-Cigarette/Vaping   • E-Cigarette Use Never User      E-Cigarette/Vaping Substances   • Nicotine No    • THC No    • CBD No    • Flavoring No    • Other No    • Unknown No      Social History     Tobacco Use   • Smoking status: Never   • Smokeless tobacco: Never   Vaping Use   • Vaping Use: Never used   Substance Use Topics   • Alcohol use: Yes     Alcohol/week: 20.0 standard drinks of alcohol     Types: 20 Glasses of wine per week     Comment: pt reports 4 glasses of wine daily   • Drug use: No       Review of Systems   Musculoskeletal:        Bilateral lower extremity edema. All other systems reviewed and are negative. Physical Exam  Physical Exam  Vitals and nursing note reviewed. Constitutional:       General: She is not in acute distress. Appearance: Normal appearance. She is not ill-appearing. HENT:      Head: Normocephalic and atraumatic. Right Ear: External ear normal.      Left Ear: External ear normal.      Nose: Nose normal.      Mouth/Throat:      Mouth: Mucous membranes are moist.   Eyes:      General:         Right eye: No discharge. Left eye: No discharge. Conjunctiva/sclera: Conjunctivae normal.   Cardiovascular:      Rate and Rhythm: Normal rate and regular rhythm. Pulses: Normal pulses. Heart sounds: No murmur heard. Pulmonary:      Effort: Pulmonary effort is normal.      Breath sounds: Normal breath sounds. Abdominal:      General: Abdomen is flat. There is no distension. Tenderness: There is no abdominal tenderness. Musculoskeletal:         General: Normal range of motion. Cervical back: Normal range of motion. Right lower leg: Edema present. Left lower leg: Edema present. Comments: 2+ pitting lower extremity edema. Skin:     General: Skin is warm. Capillary Refill: Capillary refill takes less than 2 seconds. Findings: No rash. Neurological:      General: No focal deficit present. Mental Status: She is alert. Mental status is at baseline. Psychiatric:         Mood and Affect: Mood normal.         Behavior: Behavior normal.         Vital Signs  ED Triage Vitals [09/11/23 1541]   Temperature Pulse Respirations Blood Pressure SpO2   97.8 °F (36.6 °C) 61 16 154/71 97 %      Temp Source Heart Rate Source Patient Position - Orthostatic VS BP Location FiO2 (%)   Oral Monitor Lying Right arm --      Pain Score       --           Vitals:    09/11/23 1541   BP: 154/71   Pulse: 61   Patient Position - Orthostatic VS: Lying         Visual Acuity      ED Medications  Medications   furosemide (LASIX) tablet 40 mg (has no administration in time range)       Diagnostic Studies  Results Reviewed     Procedure Component Value Units Date/Time    B-Type Natriuretic Peptide(BNP) [[id number]]  (Abnormal) Collected: 09/11/23 1607    Lab Status: Final result Specimen: Blood from Arm, Right Updated: 09/11/23 1633      pg/mL     Comprehensive metabolic panel [[id number]]  (Abnormal) Collected: 09/11/23 1607    Lab Status: Final result Specimen: Blood from Arm, Right Updated: 09/11/23 1626     Sodium 129 mmol/L      Potassium 3.6 mmol/L      Chloride 93 mmol/L      CO2 27 mmol/L      ANION GAP 9 mmol/L      BUN 11 mg/dL      Creatinine 0.68 mg/dL      Glucose 100 mg/dL      Calcium 8.9 mg/dL      AST 12 U/L      ALT 10 U/L      Alkaline Phosphatase 65 U/L      Total Protein 6.6 g/dL      Albumin 4.1 g/dL      Total Bilirubin 0.64 mg/dL      eGFR 80 ml/min/1.73sq m     Narrative:      [facility] guidelines for Chronic Kidney Disease (CKD):   •  Stage 1 with normal or high GFR (GFR > 90 mL/min/1.73 square meters)  •  Stage 2 Mild CKD (GFR = 60-89 mL/min/1.73 square meters)  •  Stage 3A Moderate CKD (GFR = 45-59 mL/min/1.73 square meters)  •  Stage 3B Moderate CKD (GFR = 30-44 mL/min/1.73 square meters)  •  Stage 4 Severe CKD (GFR = 15-29 mL/min/1.73 square meters)  •  Stage 5 End Stage CKD (GFR <15 mL/min/1.73 square meters)  Note: GFR calculation is accurate only with a steady state creatinine    CBC and differential [[id number]]  (Abnormal) Collected: 09/11/23 1607    Lab Status: Final result Specimen: Blood from Arm, Right Updated: 09/11/23 1612     WBC 6.94 Thousand/uL      RBC 3.69 Million/uL      Hemoglobin 11.8 g/dL      Hematocrit 34.5 %      MCV 94 fL      MCH 32.0 pg      MCHC 34.2 g/dL      RDW 11.8 %      MPV 8.7 fL      Platelets 118 Thousands/uL      nRBC 0 /100 WBCs      Neutrophils Relative 71 %      Immat GRANS % 0 %      Lymphocytes Relative 20 %      Monocytes Relative 8 %      Eosinophils Relative 0 %      Basophils Relative 1 %      Neutrophils Absolute 4.96 Thousands/µL      Immature Grans Absolute 0.03 Thousand/uL      Lymphocytes Absolute 1.35 Thousands/µL      Monocytes Absolute 0.54 Thousand/µL      Eosinophils Absolute 0.02 Thousand/µL      Basophils Absolute 0.04 Thousands/µL                  XR chest 1 view portable   ED Interpretation by Alyssa Hughes DO (09/11 1656)   No acute cardiopulmonary findings. Procedures  Procedures         ED Course  ED Course as of 09/11/23 1715   Mon Sep 11, 2023   1634 Sodium(!): 129  At baseline   1659 Patient tells me that she has an echo on the 15th of this month. BNP is mildly elevated. We will give dosage of Lasix. There is no pulmonary edema on chest x-ray. There are no rails on auscultation. Her oxygen saturation is between 97 and 99% throughout her visit here. 1708 Procedure Note: EKG  Date/Time: 09/11/23 5:08 PM   Interpreted by: Morgan Villeda  Indications / Diagnosis: LE Edema  ECG reviewed by me, the ED Provider: yes   The EKG demonstrates:  Rhythm: normal sinus w PVCs  Intervals: normal intervals  Axis: normal axis  QRS/Blocks: normal QRS, q in III  ST Changes: No acute ST Changes, no STD/AR. SBIRT 22yo+    Flowsheet Row Most Recent Value   Initial Alcohol Screen: US AUDIT-C     1.  How often do you have a drink containing alcohol? 0 Filed at: 09/11/2023 1542   2. How many drinks containing alcohol do you have on a typical day you are drinking? 3 Filed at: 09/11/2023 1542   3b. FEMALE Any Age, or MALE 65+: How often do you have 4 or more drinks on one occassion? 0 Filed at: 09/11/2023 1542   Audit-C Score 3 Filed at: 09/11/2023 1542   [place]: How many times in the past year have you. .. Used an illegal drug or used a prescription medication for non-medical reasons? Never Filed at: 09/11/2023 1542                    Medical Decision Making  Patient with bilateral pitting edema. Not unilateral.  No pain associated with it. Unlikely DVT as bilateral and no pain on palpation of deep vesssels. Differential includes but not limited to gravity dependent edema, hypo albuminemia , venous insufficiency. Chest x-ray with no pulmonary edema. Oxygen saturation is 97 to 99%. Patient is unsure if she took her diuretic today. We will give a dosage of diuretic. BNP is moderately elevated. Patient has prompt follow-up with a echo later this week per the patient. Recommended that she go to the echo. Recommend she Return immediately if she has increasing shortness of breath, new or worsening symptoms. Recommended compliance with compression stockings and Lasix. Patient and  expressed understanding this. Refer to cardiology. Bilateral lower extremity edema: acute illness or injury  Chronic hyponatremia: chronic illness or injury  Elevated brain natriuretic peptide (BNP) level: acute illness or injury  Amount and/or Complexity of Data Reviewed  Labs: ordered. Decision-making details documented in ED Course. Radiology: ordered and independent interpretation performed. Risk  Prescription drug management.           Disposition  Final diagnoses:   Bilateral lower extremity edema   Elevated brain natriuretic peptide (BNP) level   Chronic hyponatremia     Time reflects when diagnosis was documented in both MDM as applicable and the Disposition within this note     Time User Action Codes Description Comment    9/11/2023  5:01 PM Supa Urias Add [R60.0] Bilateral lower extremity edema     9/11/2023  5:01 PM CarltonwiwayneHedyCarmita Maisabela Add [R79.89] Elevated brain natriuretic peptide (BNP) level     9/11/2023  5:10 PM Supa Urias Add [E87.1] Chronic hyponatremia       ED Disposition     ED Disposition   Discharge    Condition   Stable    Date/Time   Mon Sep 11, 2023  5:01 PM    Comment   Abhilash Gandara discharge to home/self care.                Follow-up Information     Follow up With Specialties Details Why Contact Info Additional Information    [facility]) Cardiology Schedule an appointment as soon as possible for a visit  For re-evaluation as soon as possible [address] [facility] [id number]  [address] [place] [address] Cardiology [address] [place] [id number] [facility] Emergency Department Emergency Medicine  If symptoms worsen [facility] [phone] [address] [facility] Emergency Department, [address] [place] [address] Dr, [address]          Patient's Medications   Discharge Prescriptions    No medications on file           PDMP Review       Value Time User    PDMP Reviewed  Yes 12/14/2022  8:20 PM Donna Alicea PA-C          ED Provider  Electronically Signed by           Brooks Mar DO  09/11/23 [id number]

## 2023-09-13 LAB
ATRIAL RATE: 61 BPM
P AXIS: 55 DEGREES
PR INTERVAL: 168 MS
QRS AXIS: 10 DEGREES
QRSD INTERVAL: 86 MS
QT INTERVAL: 460 MS
QTC INTERVAL: 463 MS
T WAVE AXIS: 23 DEGREES
VENTRICULAR RATE: 61 BPM

## 2023-09-13 PROCEDURE — 93010 ELECTROCARDIOGRAM REPORT: CPT | Performed by: INTERNAL MEDICINE

## 2023-09-15 ENCOUNTER — HOSPITAL ENCOUNTER (OUTPATIENT)
Dept: NON INVASIVE DIAGNOSTICS | Facility: HOSPITAL | Age: 83
Discharge: HOME | End: 2023-09-15
Attending: INTERNAL MEDICINE
Payer: COMMERCIAL

## 2023-09-15 VITALS
SYSTOLIC BLOOD PRESSURE: 154 MMHG | DIASTOLIC BLOOD PRESSURE: 71 MMHG | HEART RATE: 61 BPM | BODY MASS INDEX: 22.66 KG/M2 | WEIGHT: 141 LBS | HEIGHT: 66 IN

## 2023-09-15 DIAGNOSIS — I51.89 DIASTOLIC DYSFUNCTION: ICD-10-CM

## 2023-09-15 DIAGNOSIS — Z76.0 MEDICATION REFILL: ICD-10-CM

## 2023-09-15 LAB
AORTIC ROOT: 3.2 CM
APICAL FOUR CHAMBER EJECTION FRACTION: 63 %
ASCENDING AORTA: 3.5 CM
DOP CALC MV VTI: 33.47 CM
E WAVE DECELERATION TIME: 252 MS
FRACTIONAL SHORTENING: 38 (ref 28–44)
INTERVENTRICULAR SEPTUM IN DIASTOLE (PARASTERNAL SHORT AXIS VIEW): 1 CM
INTERVENTRICULAR SEPTUM: 1 CM (ref 0.6–1.1)
LAAS-AP2: 16.7 CM2
LAAS-AP4: 17 CM2
LEFT ATRIUM SIZE: 4.3 CM
LEFT ATRIUM VOLUME (MOD BIPLANE): 46 ML
LEFT INTERNAL DIMENSION IN SYSTOLE: 2.5 CM (ref 2.1–4)
LEFT VENTRICULAR INTERNAL DIMENSION IN DIASTOLE: 4 CM (ref 3.5–6)
LEFT VENTRICULAR POSTERIOR WALL IN END DIASTOLE: 0.9 CM
LEFT VENTRICULAR STROKE VOLUME: 48 ML
LVSV (TEICH): 48 ML
MV E'TISSUE VEL-SEP: 9 CM/S
MV MEAN GRADIENT: 1 MMHG
MV PEAK A VEL: 0.54 M/S
MV PEAK E VEL: 99 CM/S
MV PEAK GRADIENT: 4 MMHG
MV STENOSIS PRESSURE HALF TIME: 73 MS
MV VALVE AREA P 1/2 METHOD: 3.01
RA PRESSURE ESTIMATED: 15 MMHG
RIGHT ATRIUM AREA SYSTOLE A4C: 18.5 CM2
RIGHT VENTRICLE ID DIMENSION: 3.3 CM
RV PSP: 46 MMHG
SL CV LEFT ATRIUM LENGTH A2C: 4.8 CM
SL CV LV EF: 60
SL CV PED ECHO LEFT VENTRICLE DIASTOLIC VOLUME (MOD BIPLANE) 2D: 70 ML
SL CV PED ECHO LEFT VENTRICLE SYSTOLIC VOLUME (MOD BIPLANE) 2D: 22 ML
TR MAX PG: 31 MMHG
TR PEAK VELOCITY: 2.8 M/S
TRICUSPID ANNULAR PLANE SYSTOLIC EXCURSION: 2.5 CM
TRICUSPID VALVE PEAK REGURGITATION VELOCITY: 2.8 M/S

## 2023-09-15 PROCEDURE — 93306 TTE W/DOPPLER COMPLETE: CPT

## 2023-09-15 PROCEDURE — 93306 TTE W/DOPPLER COMPLETE: CPT | Performed by: INTERNAL MEDICINE

## 2023-09-18 RX ORDER — METOPROLOL TARTRATE 50 MG/1
TABLET, FILM COATED ORAL
Qty: 180 TABLET | Refills: 1 | Status: ON HOLD | OUTPATIENT
Start: 2023-09-18

## 2023-09-18 RX ORDER — FUROSEMIDE 40 MG/1
TABLET ORAL
Qty: 90 TABLET | Refills: 1 | Status: ON HOLD | OUTPATIENT
Start: 2023-09-18

## 2023-09-18 NOTE — TELEPHONE ENCOUNTER
Patient calling in regarding her prescriptions. They were send to the wrong pharmacy. Pt would like these to go to the [facility] in [facility]. Pharmacy added to chart. Please advise, and pt would like a call when orders are sent to correct pharmacy. Thank you!

## 2023-09-19 DIAGNOSIS — Z76.0 MEDICATION REFILL: Primary | ICD-10-CM

## 2023-09-19 RX ORDER — PERMETHRIN 50 MG/G
CREAM TOPICAL
Qty: 120 G | Refills: 3 | Status: ON HOLD | OUTPATIENT
Start: 2023-09-19

## 2023-09-22 DIAGNOSIS — E87.6 HYPOKALEMIA: ICD-10-CM

## 2023-09-22 DIAGNOSIS — G47.00 INSOMNIA, UNSPECIFIED TYPE: ICD-10-CM

## 2023-09-22 DIAGNOSIS — Z76.0 MEDICATION REFILL: ICD-10-CM

## 2023-09-22 DIAGNOSIS — I10 ESSENTIAL HYPERTENSION: ICD-10-CM

## 2023-09-22 DIAGNOSIS — F41.9 ANXIETY: ICD-10-CM

## 2023-09-22 DIAGNOSIS — F42.8 OBSESSION: ICD-10-CM

## 2023-09-22 DIAGNOSIS — K21.9 GASTROESOPHAGEAL REFLUX DISEASE WITHOUT ESOPHAGITIS: ICD-10-CM

## 2023-09-22 RX ORDER — POTASSIUM CHLORIDE 20 MEQ/1
20 TABLET, EXTENDED RELEASE ORAL DAILY
Qty: 90 TABLET | Refills: 2 | Status: ON HOLD | OUTPATIENT
Start: 2023-09-22

## 2023-09-22 RX ORDER — ZOLPIDEM TARTRATE 5 MG/1
5 TABLET ORAL
Qty: 90 TABLET | Refills: 1 | Status: ON HOLD | OUTPATIENT
Start: 2023-09-22

## 2023-09-22 NOTE — TELEPHONE ENCOUNTER
Spoke with pt  and stated patient needs a refill on [facility] ([facility]). Medication was sent to the provider for approval.    --  can you refill patient to meds that are pending and review pt echo results. Thanks!

## 2023-09-29 DIAGNOSIS — R21 RASH: ICD-10-CM

## 2023-09-29 RX ORDER — TRIAMCINOLONE ACETONIDE 1 MG/G
1 CREAM TOPICAL 2 TIMES DAILY
Qty: 30 G | Refills: 5 | Status: SHIPPED | OUTPATIENT
Start: 2023-09-29

## 2023-10-01 ENCOUNTER — HOSPITAL ENCOUNTER (INPATIENT)
Facility: HOSPITAL | Age: 83
LOS: 4 days | Discharge: HOME | DRG: 644 | End: 2023-10-05
Attending: EMERGENCY MEDICINE | Admitting: INTERNAL MEDICINE
Payer: COMMERCIAL

## 2023-10-01 ENCOUNTER — APPOINTMENT (EMERGENCY)
Dept: RADIOLOGY | Facility: HOSPITAL | Age: 83
DRG: 644 | End: 2023-10-01
Payer: COMMERCIAL

## 2023-10-01 DIAGNOSIS — R06.00 DYSPNEA: ICD-10-CM

## 2023-10-01 DIAGNOSIS — R60.0 BILATERAL LOWER EXTREMITY EDEMA: Primary | ICD-10-CM

## 2023-10-01 DIAGNOSIS — R79.89 ELEVATED BRAIN NATRIURETIC PEPTIDE (BNP) LEVEL: ICD-10-CM

## 2023-10-01 DIAGNOSIS — M25.551 HIP PAIN, ACUTE, RIGHT: ICD-10-CM

## 2023-10-01 DIAGNOSIS — I27.20 PULMONARY HTN (HCC): ICD-10-CM

## 2023-10-01 DIAGNOSIS — E87.1 HYPONATREMIA: ICD-10-CM

## 2023-10-01 LAB
2HR DELTA HS TROPONIN: -1 NG/L
4HR DELTA HS TROPONIN: 0 NG/L
ALBUMIN SERPL BCP-MCNC: 4.7 G/DL (ref 3.5–5)
ALP SERPL-CCNC: 95 U/L (ref 34–104)
ALT SERPL W P-5'-P-CCNC: 14 U/L (ref 7–52)
ANION GAP SERPL CALCULATED.3IONS-SCNC: 11 MMOL/L
ANION GAP SERPL CALCULATED.3IONS-SCNC: 11 MMOL/L
AST SERPL W P-5'-P-CCNC: 16 U/L (ref 13–39)
BACTERIA UR QL AUTO: NORMAL /HPF
BASOPHILS # BLD AUTO: 0.02 THOUSANDS/ÂΜL (ref 0–0.1)
BASOPHILS NFR BLD AUTO: 0 % (ref 0–1)
BILIRUB SERPL-MCNC: 1.02 MG/DL (ref 0.2–1)
BILIRUB UR QL STRIP: NEGATIVE
BNP SERPL-MCNC: 558 PG/ML (ref 0–100)
BUN SERPL-MCNC: 11 MG/DL (ref 5–25)
BUN SERPL-MCNC: 11 MG/DL (ref 5–25)
CALCIUM SERPL-MCNC: 9.2 MG/DL (ref 8.4–10.2)
CALCIUM SERPL-MCNC: 9.4 MG/DL (ref 8.4–10.2)
CARDIAC TROPONIN I PNL SERPL HS: 6 NG/L
CARDIAC TROPONIN I PNL SERPL HS: 7 NG/L
CARDIAC TROPONIN I PNL SERPL HS: 7 NG/L
CHLORIDE SERPL-SCNC: 84 MMOL/L (ref 96–108)
CHLORIDE SERPL-SCNC: 85 MMOL/L (ref 96–108)
CLARITY UR: CLEAR
CO2 SERPL-SCNC: 24 MMOL/L (ref 21–32)
CO2 SERPL-SCNC: 24 MMOL/L (ref 21–32)
COLOR UR: YELLOW
CREAT SERPL-MCNC: 0.6 MG/DL (ref 0.6–1.3)
CREAT SERPL-MCNC: 0.62 MG/DL (ref 0.6–1.3)
EOSINOPHIL # BLD AUTO: 0.01 THOUSAND/ÂΜL (ref 0–0.61)
EOSINOPHIL NFR BLD AUTO: 0 % (ref 0–6)
ERYTHROCYTE [DISTWIDTH] IN BLOOD BY AUTOMATED COUNT: 12 % (ref 11.6–15.1)
GFR SERPL CREATININE-BSD FRML MDRD: 82 ML/MIN/1.73SQ M
GFR SERPL CREATININE-BSD FRML MDRD: 83 ML/MIN/1.73SQ M
GLUCOSE SERPL-MCNC: 122 MG/DL (ref 65–140)
GLUCOSE SERPL-MCNC: 150 MG/DL (ref 65–140)
GLUCOSE UR STRIP-MCNC: NEGATIVE MG/DL
HCT VFR BLD AUTO: 37.1 % (ref 34.8–46.1)
HGB BLD-MCNC: 13.1 G/DL (ref 11.5–15.4)
HGB UR QL STRIP.AUTO: NEGATIVE
IMM GRANULOCYTES # BLD AUTO: 0.05 THOUSAND/UL (ref 0–0.2)
IMM GRANULOCYTES NFR BLD AUTO: 1 % (ref 0–2)
KETONES UR STRIP-MCNC: ABNORMAL MG/DL
LEUKOCYTE ESTERASE UR QL STRIP: NEGATIVE
LYMPHOCYTES # BLD AUTO: 0.83 THOUSANDS/ÂΜL (ref 0.6–4.47)
LYMPHOCYTES NFR BLD AUTO: 9 % (ref 14–44)
MAGNESIUM SERPL-MCNC: 1.7 MG/DL (ref 1.9–2.7)
MCH RBC QN AUTO: 32 PG (ref 26.8–34.3)
MCHC RBC AUTO-ENTMCNC: 35.3 G/DL (ref 31.4–37.4)
MCV RBC AUTO: 91 FL (ref 82–98)
MONOCYTES # BLD AUTO: 0.6 THOUSAND/ÂΜL (ref 0.17–1.22)
MONOCYTES NFR BLD AUTO: 7 % (ref 4–12)
NEUTROPHILS # BLD AUTO: 7.79 THOUSANDS/ÂΜL (ref 1.85–7.62)
NEUTS SEG NFR BLD AUTO: 83 % (ref 43–75)
NITRITE UR QL STRIP: NEGATIVE
NON-SQ EPI CELLS URNS QL MICRO: NORMAL /HPF
NRBC BLD AUTO-RTO: 0 /100 WBCS
OSMOLALITY UR/SERPL-RTO: 252 MMOL/KG (ref 282–298)
OSMOLALITY UR: 541 MMOL/KG
PH UR STRIP.AUTO: 6.5 [PH]
PLATELET # BLD AUTO: 303 THOUSANDS/UL (ref 149–390)
PMV BLD AUTO: 8.8 FL (ref 8.9–12.7)
POTASSIUM SERPL-SCNC: 3.6 MMOL/L (ref 3.5–5.3)
POTASSIUM SERPL-SCNC: 3.8 MMOL/L (ref 3.5–5.3)
PROT SERPL-MCNC: 7.3 G/DL (ref 6.4–8.4)
PROT UR STRIP-MCNC: ABNORMAL MG/DL
RBC # BLD AUTO: 4.09 MILLION/UL (ref 3.81–5.12)
RBC #/AREA URNS AUTO: NORMAL /HPF
SODIUM 24H UR-SCNC: 36 MOL/L
SODIUM SERPL-SCNC: 119 MMOL/L (ref 135–147)
SODIUM SERPL-SCNC: 120 MMOL/L (ref 135–147)
SP GR UR STRIP.AUTO: 1.02 (ref 1–1.03)
TSH SERPL DL<=0.05 MIU/L-ACNC: 4.09 UIU/ML (ref 0.45–4.5)
URATE SERPL-MCNC: 4.1 MG/DL (ref 2–7.5)
UROBILINOGEN UR QL STRIP.AUTO: 0.2 E.U./DL
WBC # BLD AUTO: 9.3 THOUSAND/UL (ref 4.31–10.16)
WBC #/AREA URNS AUTO: NORMAL /HPF

## 2023-10-01 PROCEDURE — 99222 1ST HOSP IP/OBS MODERATE 55: CPT | Performed by: INTERNAL MEDICINE

## 2023-10-01 PROCEDURE — 36415 COLL VENOUS BLD VENIPUNCTURE: CPT | Performed by: EMERGENCY MEDICINE

## 2023-10-01 PROCEDURE — 83930 ASSAY OF BLOOD OSMOLALITY: CPT | Performed by: EMERGENCY MEDICINE

## 2023-10-01 PROCEDURE — 84550 ASSAY OF BLOOD/URIC ACID: CPT | Performed by: INTERNAL MEDICINE

## 2023-10-01 PROCEDURE — 83935 ASSAY OF URINE OSMOLALITY: CPT | Performed by: EMERGENCY MEDICINE

## 2023-10-01 PROCEDURE — 80048 BASIC METABOLIC PNL TOTAL CA: CPT | Performed by: INTERNAL MEDICINE

## 2023-10-01 PROCEDURE — 83735 ASSAY OF MAGNESIUM: CPT | Performed by: EMERGENCY MEDICINE

## 2023-10-01 PROCEDURE — 99284 EMERGENCY DEPT VISIT MOD MDM: CPT

## 2023-10-01 PROCEDURE — 84300 ASSAY OF URINE SODIUM: CPT | Performed by: EMERGENCY MEDICINE

## 2023-10-01 PROCEDURE — 73502 X-RAY EXAM HIP UNI 2-3 VIEWS: CPT

## 2023-10-01 PROCEDURE — 83880 ASSAY OF NATRIURETIC PEPTIDE: CPT | Performed by: EMERGENCY MEDICINE

## 2023-10-01 PROCEDURE — 71045 X-RAY EXAM CHEST 1 VIEW: CPT

## 2023-10-01 PROCEDURE — 85025 COMPLETE CBC W/AUTO DIFF WBC: CPT | Performed by: EMERGENCY MEDICINE

## 2023-10-01 PROCEDURE — 94664 DEMO&/EVAL PT USE INHALER: CPT

## 2023-10-01 PROCEDURE — 84443 ASSAY THYROID STIM HORMONE: CPT | Performed by: INTERNAL MEDICINE

## 2023-10-01 PROCEDURE — 99285 EMERGENCY DEPT VISIT HI MDM: CPT | Performed by: EMERGENCY MEDICINE

## 2023-10-01 PROCEDURE — 81001 URINALYSIS AUTO W/SCOPE: CPT | Performed by: EMERGENCY MEDICINE

## 2023-10-01 PROCEDURE — 94760 N-INVAS EAR/PLS OXIMETRY 1: CPT

## 2023-10-01 PROCEDURE — 84484 ASSAY OF TROPONIN QUANT: CPT | Performed by: EMERGENCY MEDICINE

## 2023-10-01 PROCEDURE — 93005 ELECTROCARDIOGRAM TRACING: CPT

## 2023-10-01 PROCEDURE — 80053 COMPREHEN METABOLIC PANEL: CPT | Performed by: EMERGENCY MEDICINE

## 2023-10-01 RX ORDER — AMLODIPINE BESYLATE 5 MG/1
5 TABLET ORAL DAILY
Status: DISCONTINUED | OUTPATIENT
Start: 2023-10-02 | End: 2023-10-05 | Stop reason: HOSPADM

## 2023-10-01 RX ORDER — FUROSEMIDE 10 MG/ML
50 INJECTION INTRAMUSCULAR; INTRAVENOUS ONCE
Status: COMPLETED | OUTPATIENT
Start: 2023-10-01 | End: 2023-10-01

## 2023-10-01 RX ORDER — METOPROLOL TARTRATE 50 MG/1
50 TABLET, FILM COATED ORAL 2 TIMES DAILY
Status: DISCONTINUED | OUTPATIENT
Start: 2023-10-01 | End: 2023-10-05 | Stop reason: HOSPADM

## 2023-10-01 RX ORDER — ACETAMINOPHEN 325 MG/1
650 TABLET ORAL EVERY 6 HOURS PRN
Status: DISCONTINUED | OUTPATIENT
Start: 2023-10-01 | End: 2023-10-05 | Stop reason: HOSPADM

## 2023-10-01 RX ORDER — MAGNESIUM SULFATE HEPTAHYDRATE 40 MG/ML
2 INJECTION, SOLUTION INTRAVENOUS ONCE
Status: COMPLETED | OUTPATIENT
Start: 2023-10-01 | End: 2023-10-02

## 2023-10-01 RX ORDER — FUROSEMIDE 10 MG/ML
40 INJECTION INTRAMUSCULAR; INTRAVENOUS
Status: DISCONTINUED | OUTPATIENT
Start: 2023-10-02 | End: 2023-10-01

## 2023-10-01 RX ORDER — LEVALBUTEROL INHALATION SOLUTION 1.25 MG/3ML
1.25 SOLUTION RESPIRATORY (INHALATION)
Status: DISCONTINUED | OUTPATIENT
Start: 2023-10-01 | End: 2023-10-01

## 2023-10-01 RX ORDER — ONDANSETRON 2 MG/ML
4 INJECTION INTRAMUSCULAR; INTRAVENOUS EVERY 6 HOURS PRN
Status: DISCONTINUED | OUTPATIENT
Start: 2023-10-01 | End: 2023-10-05 | Stop reason: HOSPADM

## 2023-10-01 RX ORDER — LEVALBUTEROL INHALATION SOLUTION 1.25 MG/3ML
1.25 SOLUTION RESPIRATORY (INHALATION) EVERY 6 HOURS PRN
Status: DISCONTINUED | OUTPATIENT
Start: 2023-10-01 | End: 2023-10-05 | Stop reason: HOSPADM

## 2023-10-01 RX ORDER — PANTOPRAZOLE SODIUM 40 MG/1
40 TABLET, DELAYED RELEASE ORAL
Status: DISCONTINUED | OUTPATIENT
Start: 2023-10-02 | End: 2023-10-05 | Stop reason: HOSPADM

## 2023-10-01 RX ORDER — ENOXAPARIN SODIUM 100 MG/ML
40 INJECTION SUBCUTANEOUS DAILY
Status: DISCONTINUED | OUTPATIENT
Start: 2023-10-02 | End: 2023-10-05 | Stop reason: HOSPADM

## 2023-10-01 RX ORDER — LANOLIN ALCOHOL/MO/W.PET/CERES
100 CREAM (GRAM) TOPICAL DAILY
Status: DISCONTINUED | OUTPATIENT
Start: 2023-10-02 | End: 2023-10-05 | Stop reason: HOSPADM

## 2023-10-01 RX ORDER — BUMETANIDE 0.25 MG/ML
1 INJECTION INTRAMUSCULAR; INTRAVENOUS ONCE
Status: COMPLETED | OUTPATIENT
Start: 2023-10-01 | End: 2023-10-01

## 2023-10-01 RX ORDER — FOLIC ACID 1 MG/1
1 TABLET ORAL DAILY
Status: DISCONTINUED | OUTPATIENT
Start: 2023-10-02 | End: 2023-10-05 | Stop reason: HOSPADM

## 2023-10-01 RX ADMIN — METOPROLOL TARTRATE 50 MG: 50 TABLET, FILM COATED ORAL at 18:39

## 2023-10-01 RX ADMIN — BUMETANIDE 1 MG: 0.25 INJECTION INTRAMUSCULAR; INTRAVENOUS at 21:29

## 2023-10-01 RX ADMIN — FUROSEMIDE 50 MG: 10 INJECTION, SOLUTION INTRAMUSCULAR; INTRAVENOUS at 17:08

## 2023-10-01 RX ADMIN — MAGNESIUM SULFATE HEPTAHYDRATE 2 G: 40 INJECTION, SOLUTION INTRAVENOUS at 17:08

## 2023-10-01 NOTE — QUICK NOTE
Consult noted for hyponatremia. 76-year-old female with history of alcohol use disorder and hypertension presented with shortness of breath and lower extremity swelling for past few weeks. She was found to have a serum sodium of 120. Cardiac BNP elevated at 558. Chest x-ray with signs of mild congestion. Patient received IV Lasix 50 mg x 1 in ED. Currently ordered for IV Lasix 40 mg twice daily starting tomorrow. Etiology most likely in setting of hypervolemia. Agree with checking serum osmolality to confirm hypotonic hyponatremia. Agree with checking urine osmolality to confirm ADH dependent hyponatremia. Agree with checking urine sodium but this may be misleading after administration of Lasix. Agree with checking BMP every 4 hours. Fluid restrict 1200 cc per 24 hours. Goal serum sodium no more than 128 by 3 p.m. tomorrow. Full consult to be done in the morning. Addendum  Follow-up serum sodium 119 (corrected 120). Overall picture still consistent with hypervolemia. Give IV Bumex 1 mg x 1 and trend BMP.

## 2023-10-01 NOTE — ASSESSMENT & PLAN NOTE
· Complains of right hip pain, x-ray of the hip shows no fractures however if persistent pain can consider CT of the hip.   · Has a history of bilateral hip arthroplasty in the past.

## 2023-10-01 NOTE — ED PROVIDER NOTES
History  Chief Complaint   Patient presents with   • Leg Swelling      "Her legs are really swollen, like really bad. It has been like a month but gotten worse over the last few days. And she is having a hard time breathing since this morning. "      25-year-old female presents to the emergency department for evaluation of leg swelling. Patient reports that she has had worsening leg swelling over the past month. She reports that she was seen here in the emergency department earlier this month and then had an echo performed outpatient. She reports that since she had the echo she has had worsening leg swelling and now with associated shortness of breath. She also reports over the past week or 2 she has had right hip pain and difficulty with ambulation. She denies any recent falls or direct trauma to her hip. She has been compliant with her prescribed medications including Lasix. She denies chest pain, fever, chills, nausea, vomiting, diarrhea, diaphoresis and localized numbness, tingling or weakness. Prior to Admission Medications   Prescriptions Last Dose Informant Patient Reported? Taking?    Biotin 5000 MCG TABS  Self Yes Yes   Sig: Take by mouth every morning   Cholecalciferol (VITAMIN D) 2000 units CAPS  Self Yes Yes   Sig: Take by mouth 3 (three) times a week   GLUCOSAMINE HCL PO  Self Yes Yes   Sig: Take 1,200 mg by mouth daily    IBUPROFEN IB PO  Self Yes Yes   Si po daily PRN   Omega-3 Fatty Acids (FISH OIL) 1,000 mg  Self Yes Yes   Sig: Take 1,000 mg by mouth daily     amLODIPine (NORVASC) 5 mg tablet   No Yes   Sig: Take 1 tablet (5 mg total) by mouth daily   cyanocobalamin (VITAMIN B-12) 500 MCG tablet  Self No Yes   Sig: Take 1 tablet (500 mcg total) by mouth daily   escitalopram (LEXAPRO) 10 mg tablet Not Taking  No No   Sig: Take 1 tablet (10 mg total) by mouth daily   Patient not taking: Reported on 10/1/2023   esomeprazole (NexIUM) 40 MG capsule  Self No Yes   Sig: TAKE 1 CAPSULE BY MOUTH  DAILY   furosemide (LASIX) 40 mg tablet   No Yes   Sig: TAKE 1 TABLET BY MOUTH  DAILY AS NEEDED FOR EDEMA   metoprolol tartrate (LOPRESSOR) 50 mg tablet   No Yes   Sig: TAKE 1 TABLET BY MOUTH  TWICE DAILY   niacin 500 mg tablet  Self Yes No   Sig: Take 500 mg by mouth daily with breakfast   permethrin (ELIMITE) 5 % cream   No No   Sig: APPLY TOPICALLY ONCE FOR 1 DOSE   potassium chloride (Klor-Con M20) 20 mEq tablet   No No   Sig: Take 1 tablet (20 mEq total) by mouth daily   triamcinolone (KENALOG) 0.1 % cream   No No   Sig: APPLY TO AFFECTED AREA TWICE A DAY   zolpidem (AMBIEN) 5 mg tablet   No Yes   Sig: Take 1 tablet (5 mg total) by mouth daily at bedtime as needed for sleep      Facility-Administered Medications: None       Past Medical History:   Diagnosis Date   • Anxiety    • Arthritis    • Prajapati esophagus    • Cataract    • Continuous chronic alcoholism (HCC)    • GERD (gastroesophageal reflux disease)    • Heavy alcohol use    • Hyperlipidemia    • Hypertension    • Insomnia    • Palpitations        Past Surgical History:   Procedure Laterality Date   • BREAST IMPLANT Bilateral    • CATARACT EXTRACTION Right    • CHOLECYSTECTOMY      LAP   • COLONOSCOPY     • HYSTERECTOMY      age 69-[place]   • JOINT REPLACEMENT Bilateral     hips-2010 and 2017-left leg is shorter   • LASIK Bilateral     in her 50's   • SC XCAPSL CTRC RMVL INSJ IO LENS PROSTH W/O ECP Left 10/18/2018    Procedure: EXTRACTION EXTRACAPSULAR CATARACT PHACO INTRAOCULAR LENS (IOL);   Surgeon: Sammie Pacheco MD;  Location: [facility] MAIN OR;  Service: Ophthalmology   • TONSILLECTOMY     • TUBAL LIGATION         Family History   Problem Relation Age of Onset   • Cancer Mother         breast,kidney,lung (smoker)   • Heart disease Father 61        MI   • Cancer Sister         liver,pancreatic(smoker,ETOH)   • Diabetes Daughter    • No Known Problems Sister      I have reviewed and agree with the history as documented. E-Cigarette/Vaping   • E-Cigarette Use Never User      E-Cigarette/Vaping Substances   • Nicotine No    • THC No    • CBD No    • Flavoring No    • Other No    • Unknown No      Social History     Tobacco Use   • Smoking status: Never   • Smokeless tobacco: Never   Vaping Use   • Vaping Use: Never used   Substance Use Topics   • Alcohol use: Yes     Alcohol/week: 20.0 standard drinks of alcohol     Types: 20 Glasses of wine per week     Comment:  "wine every once in awhile"   • Drug use: No       Review of Systems   Constitutional: Negative for chills and fever. HENT: Negative for ear pain and sore throat. Eyes: Negative for pain and visual disturbance. Respiratory: Positive for shortness of breath. Negative for cough. Cardiovascular: Positive for leg swelling. Negative for chest pain and palpitations. Gastrointestinal: Negative for abdominal pain and vomiting. Genitourinary: Negative for dysuria and hematuria. Musculoskeletal: Negative for arthralgias and back pain. Skin: Negative for color change and rash. Neurological: Negative for seizures and syncope. All other systems reviewed and are negative. Physical Exam  Physical Exam  Vitals and nursing note reviewed. Constitutional:       General: She is not in acute distress. Appearance: She is well-developed. HENT:      Head: Normocephalic and atraumatic. Eyes:      Conjunctiva/sclera: Conjunctivae normal.   Cardiovascular:      Rate and Rhythm: Normal rate and regular rhythm. Pulmonary:      Effort: Pulmonary effort is normal. No respiratory distress. Breath sounds: Normal breath sounds. Abdominal:      Palpations: Abdomen is soft. Tenderness: There is no abdominal tenderness. Musculoskeletal:         General: No swelling. Cervical back: Neck supple. Right lower leg: Edema present. Left lower leg: Edema present. Skin:     General: Skin is warm and dry.       Capillary Refill: Capillary refill takes less than 2 seconds. Neurological:      Mental Status: She is alert.    Psychiatric:         Mood and Affect: Mood normal.         Vital Signs  ED Triage Vitals   Temperature Pulse Respirations Blood Pressure SpO2   10/01/23 1512 10/01/23 1512 10/01/23 1512 10/01/23 1515 10/01/23 1512   97.8 °F (36.6 °C) 76 22 (!) 176/91 97 %      Temp Source Heart Rate Source Patient Position - Orthostatic VS BP Location FiO2 (%)   10/01/23 1512 10/01/23 1512 10/01/23 1512 10/01/23 1512 --   Oral Monitor Sitting Right arm       Pain Score       10/01/23 1715       6           Vitals:    10/01/23 1512 10/01/23 1515 10/01/23 1715   BP:  (!) 176/91 159/76   Pulse: 76  68   Patient Position - Orthostatic VS: Sitting Sitting          Visual Acuity      ED Medications  Medications   magnesium sulfate 2 g/50 mL IVPB (premix) 2 g (2 g Intravenous New Bag 10/1/23 1708)   thiamine tablet 100 mg (has no administration in time range)   folic acid (FOLVITE) tablet 1 mg (has no administration in time range)   multivitamin-minerals (CENTRUM) tablet 1 tablet (has no administration in time range)   amLODIPine (NORVASC) tablet 5 mg (has no administration in time range)   cyanocobalamin (VITAMIN B-12) tablet 500 mcg (has no administration in time range)   pantoprazole (PROTONIX) EC tablet 40 mg (has no administration in time range)   metoprolol tartrate (LOPRESSOR) tablet 50 mg (has no administration in time range)   acetaminophen (TYLENOL) tablet 650 mg (has no administration in time range)   ondansetron (ZOFRAN) injection 4 mg (has no administration in time range)   enoxaparin (LOVENOX) subcutaneous injection 40 mg (has no administration in time range)   levalbuterol (XOPENEX) inhalation solution 1.25 mg (has no administration in time range)   ipratropium (ATROVENT) 0.02 % inhalation solution 0.5 mg (has no administration in time range)   furosemide (LASIX) injection 50 mg (50 mg Intravenous Given 10/1/23 1708) Diagnostic Studies  Results Reviewed     Procedure Component Value Units Date/Time    Uric acid [[id number]]  (Normal) Collected: 10/01/23 1527    Lab Status: Final result Specimen: Blood from Arm, Right Updated: 10/01/23 1727     Uric Acid 4.1 mg/dL     Narrative:      N-acetyl-p-benzoquinone imine (metabolite of Acetaminophen) will generate erroneously low results in samples for patients that have taken an overdose of Acetaminophen. HS Troponin I 2hr [[id number]] Collected: 10/01/23 1721    Lab Status: In process Specimen: Blood from Arm, Right Updated: 10/01/23 1723    Urine Microscopic [[id number]]  (Normal) Collected: 10/01/23 1619    Lab Status: Final result Specimen: Urine Updated: 10/01/23 1716     RBC, UA None Seen /hpf      WBC, UA None Seen /hpf      Epithelial Cells Occasional /hpf      Bacteria, UA None Seen /hpf     TSH baseline [[id number]] Collected: 10/01/23 1527    Lab Status: In process Specimen: Blood from Arm, Right Updated: 10/01/23 1709    UA (URINE) with reflex to Scope [[id number]]  (Abnormal) Collected: 10/01/23 1619    Lab Status: Final result Specimen: Urine Updated: 10/01/23 1707     Color, UA Yellow     Clarity, UA Clear     Specific Gravity, UA 1.020     pH, UA 6.5     Leukocytes, UA Negative     Nitrite, UA Negative     Protein, UA Trace mg/dl      Glucose, UA Negative mg/dl      Ketones, UA 40 (2+) mg/dl      Urobilinogen, UA 0.2 E.U./dl      Bilirubin, UA Negative     Occult Blood, UA Negative    Cortisol Level, AM Specimen [[id number]]     Lab Status: No result Specimen: Blood     Basic metabolic panel [[id number]]     Lab Status: No result Specimen: Blood     Sodium, urine, random [[id number]] Collected: 10/01/23 1638    Lab Status: In process Specimen: Urine, Clean Catch Updated: 10/01/23 1641    Osmolality, urine [[id number]] Collected: 10/01/23 1638    Lab Status:  In process Specimen: Urine, Clean Catch Updated: 10/01/23 1641    Osmolality-"If this is regarding a toxic alcohol, please STOP and consult medical  for further guidance." [[id number]] Collected: 10/01/23 1527    Lab Status:  In process Specimen: Blood from Arm, Right Updated: 10/01/23 1625    HS Troponin I 4hr [[id number]]     Lab Status: No result Specimen: Blood     B-Type Natriuretic Peptide(BNP) [[id number]]  (Abnormal) Collected: 10/01/23 1527    Lab Status: Final result Specimen: Blood from Arm, Right Updated: 10/01/23 1557      pg/mL     HS Troponin 0hr (reflex protocol) [[id number]]  (Normal) Collected: 10/01/23 1527    Lab Status: Final result Specimen: Blood from Arm, Right Updated: 10/01/23 1556     hs TnI 0hr 7 ng/L     Comprehensive metabolic panel [[id number]]  (Abnormal) Collected: 10/01/23 1527    Lab Status: Final result Specimen: Blood from Arm, Right Updated: 10/01/23 1552     Sodium 120 mmol/L      Potassium 3.8 mmol/L      Chloride 85 mmol/L      CO2 24 mmol/L      ANION GAP 11 mmol/L      BUN 11 mg/dL      Creatinine 0.60 mg/dL      Glucose 122 mg/dL      Calcium 9.4 mg/dL      AST 16 U/L      ALT 14 U/L      Alkaline Phosphatase 95 U/L      Total Protein 7.3 g/dL      Albumin 4.7 g/dL      Total Bilirubin 1.02 mg/dL      eGFR 83 ml/min/1.73sq m     Narrative:      [facility] (CKD):   •  Stage 1 with normal or high GFR (GFR > 90 mL/min/1.73 square meters)  •  Stage 2 Mild CKD (GFR = 60-89 mL/min/1.73 square meters)  •  Stage 3A Moderate CKD (GFR = 45-59 mL/min/1.73 square meters)  •  Stage 3B Moderate CKD (GFR = 30-44 mL/min/1.73 square meters)  •  Stage 4 Severe CKD (GFR = 15-29 mL/min/1.73 square meters)  •  Stage 5 End Stage CKD (GFR <15 mL/min/1.73 square meters)  Note: GFR calculation is accurate only with a steady state creatinine    Magnesium [[id number]]  (Abnormal) Collected: 10/01/23 1527    Lab Status: Final result Specimen: Blood from Arm, Right Updated: 10/01/23 1552     Magnesium 1.7 mg/dL     CBC and differential [[id number]]  (Abnormal) Collected: 10/01/23 1527    Lab Status: Final result Specimen: Blood from Arm, Right Updated: 10/01/23 1533     WBC 9.30 Thousand/uL      RBC 4.09 Million/uL      Hemoglobin 13.1 g/dL      Hematocrit 37.1 %      MCV 91 fL      MCH 32.0 pg      MCHC 35.3 g/dL      RDW 12.0 %      MPV 8.8 fL      Platelets 919 Thousands/uL      nRBC 0 /100 WBCs      Neutrophils Relative 83 %      Immat GRANS % 1 %      Lymphocytes Relative 9 %      Monocytes Relative 7 %      Eosinophils Relative 0 %      Basophils Relative 0 %      Neutrophils Absolute 7.79 Thousands/µL      Immature Grans Absolute 0.05 Thousand/uL      Lymphocytes Absolute 0.83 Thousands/µL      Monocytes Absolute 0.60 Thousand/µL      Eosinophils Absolute 0.01 Thousand/µL      Basophils Absolute 0.02 Thousands/µL                  XR chest 1 view portable   ED Interpretation by Earl Hernandez MD (10/01 1650)   Increased bilateral opacities      XR hip/pelv 2-3 vws right   ED Interpretation by Earl Hernandez MD (10/01 1650)   No acute fracture or dislocation. Hardware intact. Procedures  ECG 12 Lead Documentation Only    Date/Time: 10/1/2023 3:41 PM    Performed by: Earl Hernadnez MD  Authorized by: Earl Hernandez MD    ECG reviewed by me, the ED Provider: yes    Patient location:  ED  Previous ECG:     Previous ECG:  Compared to current    Similarity:  No change    Comparison to cardiac monitor: Yes    Interpretation:     Interpretation: abnormal    Rate:     ECG rate assessment: normal    Rhythm:     Rhythm: sinus rhythm    Ectopy:     Ectopy: PVCs    QRS:     QRS axis:  Normal  Conduction:     Conduction: normal    ST segments:     ST segments:  Non-specific  T waves:     T waves: normal               ED Course                 SBIRT 20yo+    Flowsheet Row Most Recent Value   Initial Alcohol Screen: US AUDIT-C     1. How often do you have a drink containing alcohol? 3 Filed at: 10/01/2023 1512   2. How many drinks containing alcohol do you have on a typical day you are drinking? 3 Filed at: 10/01/2023 1512   3b. FEMALE Any Age, or MALE 65+: How often do you have 4 or more drinks on one occassion? 3 Filed at: 10/01/2023 1512   Audit-C Score 9 Filed at: 10/01/2023 1512   Full Alcohol Screen: US AUDIT    4. How often during the last year have you found that you were not able to stop drinking once you had started? 0 Filed at: 10/01/2023 1512   5. How often during past year have you failed to do what was normally expected of you because of drinking? 0 Filed at: 10/01/2023 1512   6. How often in past year have you needed a first drink in the morning to get yourself going after a heavy drinking session? 0 Filed at: 10/01/2023 1512   7. How often in past year have you had feeling of guilt or remorse after drinking? 0 Filed at: 10/01/2023 1512   8. How often in past year have you been unable to remember what happened night before because you had been drinking? 0 Filed at: 10/01/2023 1512   9. Have you or someone else been injured as a result of your drinking? 0 Filed at: 10/01/2023 1512   10. Has a relative, friend, doctor or other health worker been concerned about your drinking and suggested you cut down?  0 Filed at: 10/01/2023 1512   AUDIT Total Score 9 Filed at: 10/01/2023 1512   [place]: How many times in the past year have you. .. Used an illegal drug or used a prescription medication for non-medical reasons? Never Filed at: 10/01/2023 1512                    Medical Decision Making  25-year-old female presented to the emergency department for evaluation of lower leg swelling and shortness of breath. On arrival the patient was awake, alert, oriented and in no acute distress. Initial vital signs show that the patient was hypertensive but otherwise her vital signs were within normal limits. On exam the patient had bilateral lower extremity edema. Physical exam was otherwise unremarkable.   EKG was ordered to evaluate for acute ischemia/arrhythmia. On my interpretation EKG with a sinus rhythm. No acute ischemic changes noted. Chest x-ray was ordered to evaluate for acute cardiopulmonary process including but not limited to edema, effusion, pneumonia, pneumothorax. On my interpretation chest x-ray with increased bilateral opacities when compared to the most recent chest x-ray. Work-up done in the emergency department showed the patient had an elevated BNP and was also hyponatremic and hypomagnesemic. All diagnostic studies were discussed with the patient in detail with recommendation for admission to the hospital for further treatment and evaluation. The patient was given a dose of IV Lasix and IV magnesium. The case was discussed with the admitting team who agreed to admit the patient for further treatment and evaluation. Bilateral lower extremity edema: acute illness or injury  Dyspnea: acute illness or injury  Elevated brain natriuretic peptide (BNP) level: acute illness or injury  Hyponatremia: acute illness or injury  Amount and/or Complexity of Data Reviewed  Labs: ordered. Radiology: ordered and independent interpretation performed. ECG/medicine tests: ordered and independent interpretation performed. Risk  Prescription drug management. Decision regarding hospitalization.           Disposition  Final diagnoses:   Bilateral lower extremity edema   Elevated brain natriuretic peptide (BNP) level   Hyponatremia   Dyspnea     Time reflects when diagnosis was documented in both MDM as applicable and the Disposition within this note     Time User Action Codes Description Comment    10/1/2023  4:52 PM Charly Nelson [R60.0] Bilateral lower extremity edema     10/1/2023  4:52 PM Charly Nelson [R79.89] Elevated brain natriuretic peptide (BNP) level     10/1/2023  4:52 PM Charly Nelson [E87.1] Hyponatremia     10/1/2023  4:53 PM Chraly Nelson [R06.00] Dyspnea       ED Disposition     ED Disposition   Admit    Condition   Stable    Date/Time   Sun Oct 1, 2023  4:54 PM    Comment   Case was discussed with candelario and the patient's admission status was agreed to be Admission Status: inpatient status to the service of Dr. Zabrina Islas . Follow-up Information    None         Current Discharge Medication List      CONTINUE these medications which have NOT CHANGED    Details   amLODIPine (NORVASC) 5 mg tablet Take 1 tablet (5 mg total) by mouth daily  Qty: 30 tablet, Refills: 5    Associated Diagnoses: Essential hypertension      Biotin 5000 MCG TABS Take by mouth every morning      Cholecalciferol (VITAMIN D) 2000 units CAPS Take by mouth 3 (three) times a week      cyanocobalamin (VITAMIN B-12) 500 MCG tablet Take 1 tablet (500 mcg total) by mouth daily  Qty: 30 tablet, Refills: 5    Associated Diagnoses: Paresthesia of both hands      esomeprazole (NexIUM) 40 MG capsule TAKE 1 CAPSULE BY MOUTH  DAILY  Qty: 90 capsule, Refills: 3    Comments: Requesting 1 year supply  Associated Diagnoses: Gastroesophageal reflux disease without esophagitis      furosemide (LASIX) 40 mg tablet TAKE 1 TABLET BY MOUTH  DAILY AS NEEDED FOR EDEMA  Qty: 90 tablet, Refills: 1    Comments: Please send a replace/new response with 90-Day Supply if appropriate to maximize member benefit. Requesting 1 year supply. Associated Diagnoses: Medication refill      GLUCOSAMINE HCL PO Take 1,200 mg by mouth daily       IBUPROFEN IB PO 1 po daily PRN      metoprolol tartrate (LOPRESSOR) 50 mg tablet TAKE 1 TABLET BY MOUTH  TWICE DAILY  Qty: 180 tablet, Refills: 1    Comments: Please send a replace/new response with 90-Day Supply if appropriate to maximize member benefit. Requesting 1 year supply.   Associated Diagnoses: Medication refill      Omega-3 Fatty Acids (FISH OIL) 1,000 mg Take 1,000 mg by mouth daily        zolpidem (AMBIEN) 5 mg tablet Take 1 tablet (5 mg total) by mouth daily at bedtime as needed for sleep  Qty: 90 tablet, Refills: 1    Associated Diagnoses: Insomnia, unspecified type      escitalopram (LEXAPRO) 10 mg tablet Take 1 tablet (10 mg total) by mouth daily  Qty: 30 tablet, Refills: 3    Associated Diagnoses: Anxiety; Obsession      niacin 500 mg tablet Take 500 mg by mouth daily with breakfast      permethrin (ELIMITE) 5 % cream APPLY TOPICALLY ONCE FOR 1 DOSE  Qty: 120 g, Refills: 3    Associated Diagnoses: Medication refill      potassium chloride (Klor-Con M20) 20 mEq tablet Take 1 tablet (20 mEq total) by mouth daily  Qty: 90 tablet, Refills: 2    Associated Diagnoses: Hypokalemia      triamcinolone (KENALOG) 0.1 % cream APPLY TO AFFECTED AREA TWICE A DAY  Qty: 30 g, Refills: 5    Associated Diagnoses: Rash             No discharge procedures on file.     PDMP Review       Value Time User    PDMP Reviewed  Yes 12/14/2022  8:20 PM Cole Mcneil PA-C          ED Provider  Electronically Signed by           Varghese Monterroso MD  10/01/23 9169

## 2023-10-01 NOTE — H&P
[address]  H&P  Name: Florencia Rock 78 y.o. female I MRN: [id number]  Unit/Bed#: -[facility] I Date of Admission: 10/1/2023   Date of Service: 10/1/2023 I Hospital Day: 0      Assessment/Plan   * Hyponatremia  Assessment & Plan  · Patient presents with increasing shortness of breath and lower extremity edema bilaterally. Leg swelling is thighs with 2-3+ edema noted. · Admits to drink 1 glass of wine every day for the past few years  · Hyponatremia likely due to fluid overload /hypervolemic,  · We will check TSH, cortisol level, urine lites and osmolality. ,  · Recent echocardiogram showed EF of 60% with normal systolic and diastolic function  · Patient is on Lasix 40 mg as needed however likely not compliant  · Also admits to take ibuprofen on a daily basis  · Patient received 50 mg of IV Lasix in the ED  · Will give IV Lasix 40 mg twice daily and monitor input output. ·  Chest x-ray shows bilateral pleural effusion with mild congestion,  · We will consult nephrology, will repeat BMP every 4 hours, monitor sodium level closely. ·     Hip pain, acute, right  Assessment & Plan  · Complains of right hip pain, x-ray of the hip shows no fractures however if persistent pain can consider CT of the hip. · Has a history of bilateral hip arthroplasty in the past.    Memory loss  Assessment & Plan  · Continue supportive care, patient has stopped taking Lexapro. Rash and nonspecific skin eruption  Assessment & Plan  · Rash noted on the lower extremity and on the abdominal wall, likely fluid overload and since chronic lymphedema of the lower extremity,  · Will place Ace wraps to the lower extremity. Monitor clinically. Dyslipidemia  Assessment & Plan  · Not on statin. Essential hypertension  Assessment & Plan  · Will monitor blood pressure closely, on lisinopril and metoprolol.     Heavy alcohol use  Assessment & Plan  · Patient reports to drink at least 1 glass of wine every day to help her to sleep. · Has no history of DTs, or seizures  · Patient denies of any recent falls. However complains of right hip pain. · Will place on CIWA protocol  · Will replace magnesium  · Will give thiamine and folate and multivitamin  · Advised about alcohol cessation. VTE Pharmacologic Prophylaxis:   Moderate Risk (Score 3-4) - Pharmacological DVT Prophylaxis Ordered: enoxaparin (Lovenox). Code Status: Level 1 - Full Code   Discussion with family: Updated  () at bedside. Anticipated Length of Stay: Patient will be admitted on an inpatient basis with an anticipated length of stay of greater than 2 midnights secondary to hyponatremia . Total Time Spent on Date of Encounter in care of patient: 56mins. This time was spent on one or more of the following: performing physical exam; counseling and coordination of care; obtaining or reviewing history; documenting in the medical record; reviewing/ordering tests, medications or procedures; communicating with other healthcare professionals and discussing with patient's family/caregivers. Chief Complaint: Shortness of breath and lower extremity edema    History of Present Illness:  Jose Haynes is a 78 y.o. female with a PMH of alcohol use disorder, hypertension, dyslipidemia, osteoarthritis, memory loss, presents with increasing shortness of breath of the lower extremity for the past few weeks, also she noticed that she has been having increasing shortness of breath with minimal exertion for the past week. Patient recently had a 2D echocardiogram which showed EF of 60% and normal systolic and diastolic function, patient admits to drink 1 glass of wine every day for many years to help her to go to sleep. In the ED patient was noted to have increased edema of the legs and elevated cardiac BNP, patient denied of any chest pain. Denies of any fever chills or cough.   Patient however complains of abdominal distention as well with increasing shortness of breath. Blood work in the ED which showed sodium of 120. Patient denies of any nausea vomiting or diarrhea. Denies of any headache but however has dizziness and lightheadedness. Also complains of right hip pain and does not remember of any recent falls. Patient at [facility] to take ibuprofen daily for chronic pain. Review of Systems:  Review of Systems   Constitutional: Positive for fatigue. Respiratory: Positive for shortness of breath. Cardiovascular: Positive for leg swelling. Gastrointestinal: Positive for abdominal distention. All other systems reviewed and are negative. Past Medical and Surgical History:   Past Medical History:   Diagnosis Date   • Anxiety    • Arthritis    • Prajapati esophagus    • Cataract    • Continuous chronic alcoholism ([address])    • GERD (gastroesophageal reflux disease)    • Heavy alcohol use    • Hyperlipidemia    • Hypertension    • Insomnia    • Palpitations        Past Surgical History:   Procedure Laterality Date   • BREAST IMPLANT Bilateral    • CATARACT EXTRACTION Right    • CHOLECYSTECTOMY      LAP   • COLONOSCOPY     • HYSTERECTOMY      age 69-[place]   • JOINT REPLACEMENT Bilateral     hips-2010 and 2017-left leg is shorter   • LASIK Bilateral     in her 50's   • CA XCAPSL CTRC RMVL INSJ IO LENS PROSTH W/O ECP Left 10/18/2018    Procedure: EXTRACTION EXTRACAPSULAR CATARACT PHACO INTRAOCULAR LENS (IOL); Surgeon: Sammie Pacheco MD;  Location: [facility] MAIN OR;  Service: Ophthalmology   • TONSILLECTOMY     • TUBAL LIGATION         Meds/Allergies:  Prior to Admission medications    Medication Sig Start Date End Date Taking?  Authorizing Provider   amLODIPine (NORVASC) 5 mg tablet Take 1 tablet (5 mg total) by mouth daily 9/7/23  Yes Avila Carson MD   Biotin 5000 MCG TABS Take by mouth every morning   Yes Historical Provider, MD   Cholecalciferol (VITAMIN D) 2000 units CAPS Take by mouth 3 (three) times a week   Yes Historical Provider, MD cyanocobalamin (VITAMIN B-12) 500 MCG tablet Take 1 tablet (500 mcg total) by mouth daily 3/29/22  Yes Marisol Christopher MD   esomeprazole (NexIUM) 40 MG capsule TAKE 1 CAPSULE BY MOUTH  DAILY 11/18/22  Yes Marisol Christopher MD   furosemide (LASIX) 40 mg tablet TAKE 1 TABLET BY MOUTH  DAILY AS NEEDED FOR EDEMA 9/18/23  Yes Marisol Christopher MD   GLUCOSAMINE HCL PO Take 1,200 mg by mouth daily    Yes Historical Provider, MD   IBUPROFEN IB PO 1 po daily PRN   Yes Historical Provider, MD   metoprolol tartrate (LOPRESSOR) 50 mg tablet TAKE 1 TABLET BY MOUTH  TWICE DAILY 9/18/23  Yes Marisol Christopher MD   Omega-3 Fatty Acids (FISH OIL) 1,000 mg Take 1,000 mg by mouth daily     Yes Historical Provider, MD   zolpidem (AMBIEN) 5 mg tablet Take 1 tablet (5 mg total) by mouth daily at bedtime as needed for sleep 9/22/23  Yes Marisol Christopher MD   escitalopram (LEXAPRO) 10 mg tablet Take 1 tablet (10 mg total) by mouth daily  Patient not taking: Reported on 10/1/2023 9/7/23   Marisol Christopher MD   niacin 500 mg tablet Take 500 mg by mouth daily with breakfast    Historical Provider, MD   permethrin (ELIMITE) 5 % cream APPLY TOPICALLY ONCE FOR 1 DOSE 9/19/23   Marisol Christopher MD   potassium chloride (Klor-Con M20) 20 mEq tablet Take 1 tablet (20 mEq total) by mouth daily 9/22/23   Marisol Christopher MD   triamcinolone (KENALOG) 0.1 % cream APPLY TO AFFECTED AREA TWICE A DAY 9/29/23   Marisol Christopher MD     I have reviewed home medications with patient personally.     Allergies: No Known Allergies    Social History:  Marital Status: /Civil Union   Occupation: retired  Patient Pre-hospital Living Situation: Home  Patient Pre-hospital Level of Mobility: walks  Patient Pre-hospital Diet Restrictions: nil  Substance Use History:   Social History     Substance and Sexual Activity   Alcohol Use Yes   • Alcohol/week: 20.0 standard drinks of alcohol   • Types: 20 Glasses of wine per week    Comment:  "wine every once in awhile" Social History     Tobacco Use   Smoking Status Never   Smokeless Tobacco Never     Social History     Substance and Sexual Activity   Drug Use No       Family History:  Family History   Problem Relation Age of Onset   • Cancer Mother         breast,kidney,lung (smoker)   • Heart disease Father 61        MI   • Cancer Sister         liver,pancreatic(smoker,ETOH)   • Diabetes Daughter    • No Known Problems Sister        Physical Exam:     Vitals:   Blood Pressure: 159/76 (10/01/23 1715)  Pulse: 68 (10/01/23 1715)  Temperature: 97.8 °F (36.6 °C) (10/01/23 1512)  Temp Source: Oral (10/01/23 1512)  Respirations: 20 (10/01/23 1715)  SpO2: 98 % (10/01/23 1715)    Physical Exam   HEENT-PERRLA, dry oral mucosa  Neck-supple, no JVD elevation   Respiratory-decreased air entry bilaterally   cardiovascular system-S1, S2 heard, no murmur or gallops or rubs  Abdomen-soft, nontender, no guarding or rigidity, bowel sounds heard  Extremities-bilateral lower extremity edema present 3+, rash noted on the lower extremity,  Peripheral pulses palpable  Musculoskeletal-no contractures  Central nervous system-no acute focal neurological deficit ,no sensory or motor deficit noted.   Skin-no rash noted        Additional Data:     Lab Results:  Results from last 7 days   Lab Units 10/01/23  1527   WBC Thousand/uL 9.30   HEMOGLOBIN g/dL 13.1   HEMATOCRIT % 37.1   PLATELETS Thousands/uL 303   NEUTROS PCT % 83*   LYMPHS PCT % 9*   MONOS PCT % 7   EOS PCT % 0     Results from last 7 days   Lab Units 10/01/23  1527   SODIUM mmol/L 120*   POTASSIUM mmol/L 3.8   CHLORIDE mmol/L 85*   CO2 mmol/L 24   BUN mg/dL 11   CREATININE mg/dL 0.60   ANION GAP mmol/L 11   CALCIUM mg/dL 9.4   ALBUMIN g/dL 4.7   TOTAL BILIRUBIN mg/dL 1.02*   ALK PHOS U/L 95   ALT U/L 14   AST U/L 16   GLUCOSE RANDOM mg/dL 122                       Lines/Drains:  Invasive Devices     Peripheral Intravenous Line  Duration           Peripheral IV 10/01/23 Right Antecubital <1 day Imaging: Reviewed radiology reports from this admission including: chest xray  XR chest 1 view portable   ED Interpretation by Alethea Gallego MD (10/01 1650)   Increased bilateral opacities      XR hip/pelv 2-3 vws right   ED Interpretation by Alethea Gallego MD (10/01 1650)   No acute fracture or dislocation. Hardware intact. EKG and Other Studies Reviewed on Admission:   · EKG: NSR. HR PVCs noted, no acute ST-T elevation noted. ** Please Note: This note has been constructed using a voice recognition system.  **

## 2023-10-01 NOTE — RESPIRATORY THERAPY NOTE
RT Protocol Note  Lin Locus 78 y.o. female MRN: [id number]  Unit/Bed#: -01 Encounter: [id number]    Assessment    Principal Problem:    Hyponatremia  Active Problems:    Heavy alcohol use    Essential hypertension    Dyslipidemia    Rash and nonspecific skin eruption    Memory loss    Hip pain, acute, right      Home Pulmonary Medications:  NONE   10/01/23 1915   Respiratory Protocol   Protocol Initiated? Yes   Protocol Selection Respiratory   Language Barrier? No   Medical & Social History Reviewed? Yes   Diagnostic Studies Reviewed? Yes   Physical Assessment Performed? Yes   Respiratory Plan No distress/Pulmonary history   Respiratory Assessment   Assessment Type Assess only   General Appearance Alert; Awake   Respiratory Pattern Normal   Chest Assessment Chest expansion symmetrical   Bilateral Breath Sounds Diminished   Resp Comments Pt assessed for Respiratory Protocol. BS diminiushed, SPO2 100% on room air. Pt without pulmonary history. D/C scheduled bronchodilators, add PRN Xopenex. Additional Assessments   Pulse 70   Respirations 18   SpO2 100 %            Past Medical History:   Diagnosis Date    Anxiety     Arthritis     Prajapati esophagus     Cataract     Continuous chronic alcoholism (HCC)     GERD (gastroesophageal reflux disease)     Heavy alcohol use     Hyperlipidemia     Hypertension     Insomnia     Palpitations                     Objective    Physical Exam:   Assessment Type: Assess only  General Appearance: Alert, Awake  Respiratory Pattern: Normal  Chest Assessment: Chest expansion symmetrical  Bilateral Breath Sounds: Diminished    Vitals:  Blood pressure 153/97, pulse 70, temperature 97.7 °F (36.5 °C), temperature source Oral, resp. rate 18, height 5' 6" (1.676 m), weight 70.9 kg (156 lb 4 oz), SpO2 100 %. Imaging and other studies: I have personally reviewed pertinent reports.             Plan    Respiratory Plan: No distress/Pulmonary history        Resp Comments: Pt assessed for Respiratory Protocol. BS diminiushed, SPO2 100% on room air. Pt without pulmonary history. D/C scheduled bronchodilators, add PRN Xopenex.

## 2023-10-01 NOTE — ASSESSMENT & PLAN NOTE
Ongoing SW/CM Assessment/Plan of Care Note     See SW/CM flowsheets for goals and other objective data.    Patient/Family discharge goal (s):  Goal #1: Psychosocial needs assessed  Goal #2: Home discharge arranged  Goal #3: Home Care arranged or issues addressed    PT Recommendation:  Recommendation for Discharge: PT: Post acute therapy, Intensive therapy program    OT Recommendation:  Recommendations for Discharge: OT: Post acute therapy    SLP Recommendation:  Recommendations for Discharge: SLP: Will monitor blood pressure closely, on lisinopril and metoprolol.

## 2023-10-01 NOTE — ASSESSMENT & PLAN NOTE
· Patient presents with increasing shortness of breath and lower extremity edema bilaterally. Leg swelling is thighs with 2-3+ edema noted. · Admits to drink 1 glass of wine every day for the past few years  · Hyponatremia likely due to fluid overload /hypervolemic,  · We will check TSH, cortisol level, urine lites and osmolality. ,  · Recent echocardiogram showed EF of 60% with normal systolic and diastolic function  · Patient is on Lasix 40 mg as needed however likely not compliant  · Also admits to take ibuprofen on a daily basis  · Patient received 50 mg of IV Lasix in the ED  · Will give IV Lasix 40 mg twice daily and monitor input output. ·  Chest x-ray shows bilateral pleural effusion with mild congestion,  · We will consult nephrology, will repeat BMP every 4 hours, monitor sodium level closely.   ·

## 2023-10-01 NOTE — PLAN OF CARE
Problem: PAIN - ADULT  Goal: Verbalizes/displays adequate comfort level or baseline comfort level  Description: Interventions:  - Encourage patient to monitor pain and request assistance  - Assess pain using appropriate pain scale  - Administer analgesics based on type and severity of pain and evaluate response  - Implement non-pharmacological measures as appropriate and evaluate response  - Consider cultural and social influences on pain and pain management  - Notify physician/advanced practitioner if interventions unsuccessful or patient reports new pain  Outcome: Progressing     Problem: INFECTION - ADULT  Goal: Absence or prevention of progression during hospitalization  Description: INTERVENTIONS:  - Assess and monitor for signs and symptoms of infection  - Monitor lab/diagnostic results  - Monitor all insertion sites, i.e. indwelling lines, tubes, and drains  - Monitor endotracheal if appropriate and nasal secretions for changes in amount and color  - [place] appropriate cooling/warming therapies per order  - Administer medications as ordered  - Instruct and encourage patient and family to use good hand hygiene technique  - Identify and instruct in appropriate isolation precautions for identified infection/condition  Outcome: Progressing     Problem: SAFETY ADULT  Goal: Patient will remain free of falls  Description: INTERVENTIONS:  - Educate patient/family on patient safety including physical limitations  - Instruct patient to call for assistance with activity   - Consult OT/PT to assist with strengthening/mobility   - Keep Call bell within reach  - Keep bed low and locked with side rails adjusted as appropriate  - Keep care items and personal belongings within reach  - Initiate and maintain comfort rounds  - Make Fall Risk Sign visible to staff  - Apply yellow socks and bracelet for high fall risk patients  - Consider moving patient to room near nurses station  Outcome: Progressing

## 2023-10-01 NOTE — ASSESSMENT & PLAN NOTE
· Rash noted on the lower extremity and on the abdominal wall, likely fluid overload and since chronic lymphedema of the lower extremity,  · Will place Ace wraps to the lower extremity. Monitor clinically.

## 2023-10-01 NOTE — ASSESSMENT & PLAN NOTE
· Patient reports to drink at least 1 glass of wine every day to help her to sleep. · Has no history of DTs, or seizures  · Patient denies of any recent falls. However complains of right hip pain. · Will place on CIWA protocol  · Will replace magnesium  · Will give thiamine and folate and multivitamin  · Advised about alcohol cessation.

## 2023-10-02 ENCOUNTER — APPOINTMENT (INPATIENT)
Dept: CT IMAGING | Facility: HOSPITAL | Age: 83
DRG: 644 | End: 2023-10-02
Payer: COMMERCIAL

## 2023-10-02 ENCOUNTER — APPOINTMENT (INPATIENT)
Dept: VASCULAR ULTRASOUND | Facility: HOSPITAL | Age: 83
DRG: 644 | End: 2023-10-02
Payer: COMMERCIAL

## 2023-10-02 LAB
ANION GAP SERPL CALCULATED.3IONS-SCNC: 11 MMOL/L
ANION GAP SERPL CALCULATED.3IONS-SCNC: 8 MMOL/L
ANION GAP SERPL CALCULATED.3IONS-SCNC: 8 MMOL/L
ANION GAP SERPL CALCULATED.3IONS-SCNC: 9 MMOL/L
BUN SERPL-MCNC: 7 MG/DL (ref 5–25)
BUN SERPL-MCNC: 8 MG/DL (ref 5–25)
BUN SERPL-MCNC: 8 MG/DL (ref 5–25)
BUN SERPL-MCNC: 9 MG/DL (ref 5–25)
CALCIUM SERPL-MCNC: 8.5 MG/DL (ref 8.4–10.2)
CALCIUM SERPL-MCNC: 8.5 MG/DL (ref 8.4–10.2)
CALCIUM SERPL-MCNC: 8.6 MG/DL (ref 8.4–10.2)
CALCIUM SERPL-MCNC: 8.7 MG/DL (ref 8.4–10.2)
CHLORIDE SERPL-SCNC: 84 MMOL/L (ref 96–108)
CHLORIDE SERPL-SCNC: 87 MMOL/L (ref 96–108)
CO2 SERPL-SCNC: 26 MMOL/L (ref 21–32)
CO2 SERPL-SCNC: 27 MMOL/L (ref 21–32)
CO2 SERPL-SCNC: 27 MMOL/L (ref 21–32)
CO2 SERPL-SCNC: 28 MMOL/L (ref 21–32)
CORTIS AM PEAK SERPL-MCNC: 15.2 UG/DL (ref 6.7–22.6)
CREAT SERPL-MCNC: 0.51 MG/DL (ref 0.6–1.3)
CREAT SERPL-MCNC: 0.55 MG/DL (ref 0.6–1.3)
CREAT SERPL-MCNC: 0.56 MG/DL (ref 0.6–1.3)
CREAT SERPL-MCNC: 0.6 MG/DL (ref 0.6–1.3)
GFR SERPL CREATININE-BSD FRML MDRD: 83 ML/MIN/1.73SQ M
GFR SERPL CREATININE-BSD FRML MDRD: 85 ML/MIN/1.73SQ M
GFR SERPL CREATININE-BSD FRML MDRD: 85 ML/MIN/1.73SQ M
GFR SERPL CREATININE-BSD FRML MDRD: 87 ML/MIN/1.73SQ M
GLUCOSE SERPL-MCNC: 107 MG/DL (ref 65–140)
GLUCOSE SERPL-MCNC: 117 MG/DL (ref 65–140)
GLUCOSE SERPL-MCNC: 117 MG/DL (ref 65–140)
GLUCOSE SERPL-MCNC: 98 MG/DL (ref 65–140)
MAGNESIUM SERPL-MCNC: 1.7 MG/DL (ref 1.9–2.7)
POTASSIUM SERPL-SCNC: 3 MMOL/L (ref 3.5–5.3)
POTASSIUM SERPL-SCNC: 3.1 MMOL/L (ref 3.5–5.3)
POTASSIUM SERPL-SCNC: 3.1 MMOL/L (ref 3.5–5.3)
POTASSIUM SERPL-SCNC: 3.4 MMOL/L (ref 3.5–5.3)
SODIUM SERPL-SCNC: 120 MMOL/L (ref 135–147)
SODIUM SERPL-SCNC: 120 MMOL/L (ref 135–147)
SODIUM SERPL-SCNC: 121 MMOL/L (ref 135–147)
SODIUM SERPL-SCNC: 122 MMOL/L (ref 135–147)

## 2023-10-02 PROCEDURE — 82533 TOTAL CORTISOL: CPT | Performed by: INTERNAL MEDICINE

## 2023-10-02 PROCEDURE — 99232 SBSQ HOSP IP/OBS MODERATE 35: CPT | Performed by: INTERNAL MEDICINE

## 2023-10-02 PROCEDURE — G1004 CDSM NDSC: HCPCS

## 2023-10-02 PROCEDURE — 74177 CT ABD & PELVIS W/CONTRAST: CPT

## 2023-10-02 PROCEDURE — 94664 DEMO&/EVAL PT USE INHALER: CPT

## 2023-10-02 PROCEDURE — 99223 1ST HOSP IP/OBS HIGH 75: CPT | Performed by: INTERNAL MEDICINE

## 2023-10-02 PROCEDURE — 83735 ASSAY OF MAGNESIUM: CPT | Performed by: INTERNAL MEDICINE

## 2023-10-02 PROCEDURE — 94760 N-INVAS EAR/PLS OXIMETRY 1: CPT

## 2023-10-02 PROCEDURE — 94640 AIRWAY INHALATION TREATMENT: CPT

## 2023-10-02 PROCEDURE — 71275 CT ANGIOGRAPHY CHEST: CPT

## 2023-10-02 PROCEDURE — 80048 BASIC METABOLIC PNL TOTAL CA: CPT | Performed by: INTERNAL MEDICINE

## 2023-10-02 RX ORDER — MAGNESIUM SULFATE HEPTAHYDRATE 40 MG/ML
2 INJECTION, SOLUTION INTRAVENOUS ONCE
Status: COMPLETED | OUTPATIENT
Start: 2023-10-02 | End: 2023-10-02

## 2023-10-02 RX ORDER — FUROSEMIDE 10 MG/ML
40 INJECTION INTRAMUSCULAR; INTRAVENOUS ONCE
Status: COMPLETED | OUTPATIENT
Start: 2023-10-02 | End: 2023-10-02

## 2023-10-02 RX ORDER — POTASSIUM CHLORIDE 20 MEQ/1
40 TABLET, EXTENDED RELEASE ORAL ONCE
Status: COMPLETED | OUTPATIENT
Start: 2023-10-02 | End: 2023-10-02

## 2023-10-02 RX ADMIN — IOHEXOL 85 ML: 350 INJECTION, SOLUTION INTRAVENOUS at 12:06

## 2023-10-02 RX ADMIN — CYANOCOBALAMIN TAB 500 MCG 500 MCG: 500 TAB at 08:51

## 2023-10-02 RX ADMIN — ACETAMINOPHEN 650 MG: 325 TABLET, FILM COATED ORAL at 21:40

## 2023-10-02 RX ADMIN — PANTOPRAZOLE SODIUM 40 MG: 40 TABLET, DELAYED RELEASE ORAL at 05:50

## 2023-10-02 RX ADMIN — Medication 1 TABLET: at 08:50

## 2023-10-02 RX ADMIN — ENOXAPARIN SODIUM 40 MG: 40 INJECTION SUBCUTANEOUS at 08:51

## 2023-10-02 RX ADMIN — POTASSIUM CHLORIDE 40 MEQ: 1500 TABLET, EXTENDED RELEASE ORAL at 01:01

## 2023-10-02 RX ADMIN — METOPROLOL TARTRATE 50 MG: 50 TABLET, FILM COATED ORAL at 17:33

## 2023-10-02 RX ADMIN — POTASSIUM CHLORIDE 40 MEQ: 1500 TABLET, EXTENDED RELEASE ORAL at 17:33

## 2023-10-02 RX ADMIN — LEVALBUTEROL HYDROCHLORIDE 1.25 MG: 1.25 SOLUTION RESPIRATORY (INHALATION) at 01:26

## 2023-10-02 RX ADMIN — AMLODIPINE BESYLATE 5 MG: 5 TABLET ORAL at 08:51

## 2023-10-02 RX ADMIN — POTASSIUM CHLORIDE 40 MEQ: 1500 TABLET, EXTENDED RELEASE ORAL at 08:50

## 2023-10-02 RX ADMIN — METOPROLOL TARTRATE 50 MG: 50 TABLET, FILM COATED ORAL at 08:50

## 2023-10-02 RX ADMIN — THIAMINE HCL TAB 100 MG 100 MG: 100 TAB at 08:50

## 2023-10-02 RX ADMIN — TOLVAPTAN 7.5 MG: 15 TABLET ORAL at 17:53

## 2023-10-02 RX ADMIN — FOLIC ACID 1 MG: 1 TABLET ORAL at 08:50

## 2023-10-02 RX ADMIN — MAGNESIUM SULFATE HEPTAHYDRATE 2 G: 40 INJECTION, SOLUTION INTRAVENOUS at 08:50

## 2023-10-02 RX ADMIN — FUROSEMIDE 40 MG: 10 INJECTION, SOLUTION INTRAMUSCULAR; INTRAVENOUS at 08:50

## 2023-10-02 NOTE — PLAN OF CARE
Problem: PAIN - ADULT  Goal: Verbalizes/displays adequate comfort level or baseline comfort level  Description: Interventions:  - Encourage patient to monitor pain and request assistance  - Assess pain using appropriate pain scale  - Administer analgesics based on type and severity of pain and evaluate response  - Implement non-pharmacological measures as appropriate and evaluate response  - Consider cultural and social influences on pain and pain management  - Notify physician/advanced practitioner if interventions unsuccessful or patient reports new pain  Outcome: Progressing     Problem: SAFETY ADULT  Goal: Patient will remain free of falls  Description: INTERVENTIONS:  - Educate patient/family on patient safety including physical limitations  - Instruct patient to call for assistance with activity   - Consult OT/PT to assist with strengthening/mobility   - Keep Call bell within reach  - Keep bed low and locked with side rails adjusted as appropriate  - Keep care items and personal belongings within reach  - Initiate and maintain comfort rounds  - Make Fall Risk Sign visible to staff  - Offer Toileting every  Hours, in advance of need  - Initiate/Maintain alarm  - Obtain necessary fall risk management equipment:   - Apply yellow socks and bracelet for high fall risk patients  - Consider moving patient to room near nurses station  Outcome: Progressing     Problem: DISCHARGE PLANNING  Goal: Discharge to home or other facility with appropriate resources  Description: INTERVENTIONS:  - Identify barriers to discharge w/patient and caregiver  - Arrange for needed discharge resources and transportation as appropriate  - Identify discharge learning needs (meds, wound care, etc.)  - Arrange for interpretive services to assist at discharge as needed  - Refer to Case Management Department for coordinating discharge planning if the patient needs post-hospital services based on physician/advanced practitioner order or complex needs related to functional status, cognitive ability, or social support system  Outcome: Progressing     Problem: Knowledge Deficit  Goal: Patient/family/caregiver demonstrates understanding of disease process, treatment plan, medications, and discharge instructions  Description: Complete learning assessment and assess knowledge base.   Interventions:  - Provide teaching at level of understanding  - Provide teaching via preferred learning methods  Outcome: Progressing     Problem: MOBILITY - ADULT  Goal: Maintain or return to baseline ADL function  Description: INTERVENTIONS:  -  Assess patient's ability to carry out ADLs; assess patient's baseline for ADL function and identify physical deficits which impact ability to perform ADLs (bathing, care of mouth/teeth, toileting, grooming, dressing, etc.)  - Assess/evaluate cause of self-care deficits   - Assess range of motion  - Assess patient's mobility; develop plan if impaired  - Assess patient's need for assistive devices and provide as appropriate  - Encourage maximum independence but intervene and supervise when necessary  - Involve family in performance of ADLs  - Assess for home care needs following discharge   - Consider OT consult to assist with ADL evaluation and planning for discharge  - Provide patient education as appropriate  Outcome: Progressing     Problem: NEUROSENSORY - ADULT  Goal: Achieves stable or improved neurological status  Description: INTERVENTIONS  - Monitor and report changes in neurological status  - Monitor vital signs such as temperature, blood pressure, glucose, and any other labs ordered   - Initiate measures to prevent increased intracranial pressure  - Monitor for seizure activity and implement precautions if appropriate      Outcome: Progressing

## 2023-10-02 NOTE — PROGRESS NOTES
[address]  Progress Note  Name: Kristal Buchanan  MRN: [id number]  Unit/Bed#: -01 I Date of Admission: 10/1/2023   Date of Service: 10/2/2023 I Hospital Day: 1    Assessment/Plan   * Hyponatremia  Assessment & Plan  · Patient presents with increasing shortness of breath and lower extremity edema bilaterally. Leg swelling is thighs with 2-3+ edema noted. · Admits to drink 1 glass of wine every day for the past few years  · Hyponatremia likely due to fluid overload /hypervolemic,  · We will check TSH, cortisol level, urine lites and osmolality. ,  · Recent echocardiogram showed EF of 60% with normal systolic and diastolic function  · Patient is on Lasix 40 mg as needed however likely not compliant  · Also admits to take ibuprofen on a daily basis  · Patient received 50 mg of IV Lasix in the ED  · Will give IV Lasix 40 mg twice daily and monitor input output. ·  Chest x-ray shows bilateral pleural effusion with mild congestion,  · Nephrology input highly appreciated. Urine studies were reviewed, shows urine sodium of 36, osmolality 541, serum osmolality of 252, TSH and cortisol level normal,  · Hypoosmolar hyponatremia with SIADH, multifactorial with use of NSAIDs and fluid overload with lower extremity edema and chronic alcohol use and right hip pain. · Sodium improved to 122 after diuretic. Repeat sodium level is 128 this afternoon and nephrology recommended Samsca 7.5 mg once this evening. Will repeat BMP in a.m. Continue on fluid restriction. ·   CT of the chest abdomen and pelvis shows no PE and bilateral moderate right pleural effusion and small left effusion with compressive atelectasis. Hip pain, acute, right  Assessment & Plan  · Complains of right hip pain, x-ray of the hip shows no fractures however if persistent pain can consider CT of the hip.   · Has a history of bilateral hip arthroplasty in the past.    Memory loss  Assessment & Plan  · Continue supportive care, patient has stopped taking Lexapro. Rash and nonspecific skin eruption  Assessment & Plan  · Rash noted on the lower extremity and on the abdominal wall, likely fluid overload and since chronic lymphedema of the lower extremity,  · Will place Ace wraps to the lower extremity. Monitor clinically. Dyslipidemia  Assessment & Plan  · Not on statin. Essential hypertension  Assessment & Plan  · Blood pressure is controlled on Norvasc and metoprolol. Heavy alcohol use  Assessment & Plan  · Patient reports to drink at least 1 glass of wine every day to help her to sleep. · Has no history of DTs, or seizures  · Patient denies of any recent falls. However complains of right hip pain. · Will place on CIWA protocol  · Will replace magnesium  · Will give thiamine and folate and multivitamin  · Advised about alcohol cessation. VTE Pharmacologic Prophylaxis: VTE Score: 8 Moderate Risk (Score 3-4) - Pharmacological DVT Prophylaxis Ordered: enoxaparin (Lovenox). Patient Centered Rounds: I performed bedside rounds with nursing staff today. Discussions with Specialists or Other Care Team Provider: d/w     Education and Discussions with Family / Patient: Updated  () at bedside. Total Time Spent on Date of Encounter in care of patient: 45 mins. This time was spent on one or more of the following: performing physical exam; counseling and coordination of care; obtaining or reviewing history; documenting in the medical record; reviewing/ordering tests, medications or procedures; communicating with other healthcare professionals and discussing with patient's family/caregivers. Current Length of Stay: 1 day(s)  Current Patient Status: Inpatient   Certification Statement: The patient will continue to require additional inpatient hospital stay due to hyponatremia  Discharge Plan: Anticipate discharge in 48-72 hrs to home.     Code Status: Level 1 - Full Code    Subjective: Patient is feeling better. However could not get sleep last night. Denies of any chest pain or shortness of breath currently. Has right hip pain. Objective:     Vitals:   Temp (24hrs), Av.1 °F (36.7 °C), Min:97.7 °F (36.5 °C), Max:98.6 °F (37 °C)    Temp:  [97.7 °F (36.5 °C)-98.6 °F (37 °C)] 98.2 °F (36.8 °C)  HR:  [65-81] 70  Resp:  [15-20] 15  BP: (128-153)/(73-97) 129/78  SpO2:  [94 %-100 %] 98 %  Body mass index is 25.22 kg/m². Input and Output Summary (last 24 hours): Intake/Output Summary (Last 24 hours) at 10/2/2023 1736  Last data filed at 10/2/2023 0901  Gross per 24 hour   Intake 670 ml   Output 1350 ml   Net -680 ml       Physical Exam:   Physical Exam   HEENT-PERRLA, moist oral mucosa  Neck-supple, no JVD elevation   Respiratory-decreased air entry bilaterally  Cardiovascular system-S1, S2 heard, no murmur or gallops or rubs  Abdomen-soft, nontender, no guarding or rigidity, bowel sounds heard  Extremities-bilateral 2+ edema up to the thigh  Peripheral pulses palpable  Musculoskeletal-no contractures  Central nervous system-no acute focal neurological deficit ,no sensory or motor deficit noted.   Skin-no rash noted        Additional Data:     Labs:  Results from last 7 days   Lab Units 10/01/23  1527   WBC Thousand/uL 9.30   HEMOGLOBIN g/dL 13.1   HEMATOCRIT % 37.1   PLATELETS Thousands/uL 303   NEUTROS PCT % 83*   LYMPHS PCT % 9*   MONOS PCT % 7   EOS PCT % 0     Results from last 7 days   Lab Units 10/02/23  1518 10/01/23  1932 10/01/23  1527   SODIUM mmol/L 120*   < > 120*   POTASSIUM mmol/L 3.4*   < > 3.8   CHLORIDE mmol/L 84*   < > 85*   CO2 mmol/L 28   < > 24   BUN mg/dL 8   < > 11   CREATININE mg/dL 0.56*   < > 0.60   ANION GAP mmol/L 8   < > 11   CALCIUM mg/dL 8.5   < > 9.4   ALBUMIN g/dL  --   --  4.7   TOTAL BILIRUBIN mg/dL  --   --  1.02*   ALK PHOS U/L  --   --  95   ALT U/L  --   --  14   AST U/L  --   --  16   GLUCOSE RANDOM mg/dL 117   < > 122    < > = values in this interval not displayed. Lines/Drains:  Invasive Devices     Peripheral Intravenous Line  Duration           Peripheral IV 10/01/23 Right Antecubital 1 day                  Telemetry:  Telemetry Orders (From admission, onward)             24 Hour Telemetry Monitoring  Continuous x 24 Hours (Telem)           Question:  Reason for 24 Hour Telemetry  Answer:  Decompensated CHF- and any one of the following: continuous diuretic infusion or total diuretic dose >200 mg daily, associated electrolyte derangement (I.e. K < 3.0), ionotropic drip (continuous infusion), hx of ventricular arrhythmia, or new EF < 35%                 Telemetry Reviewed: Normal Sinus Rhythm  Indication for Continued Telemetry Use: No indication for continued use. Will discontinue.               Imaging: Personally reviewed the following imaging: chest CT scan and abdominal/pelvic CT    Recent Cultures (last 7 days):         Last 24 Hours Medication List:   Current Facility-Administered Medications   Medication Dose Route Frequency Provider Last Rate   • acetaminophen  650 mg Oral Q6H PRN Ochoa Mckeon MD     • amLODIPine  5 mg Oral Daily Melanie Garzon MD     • cyanocobalamin  500 mcg Oral Daily Melanie Grazon MD     • enoxaparin  40 mg Subcutaneous Daily Melanie Garzon MD     • folic acid  1 mg Oral Daily Ochoa Mckeon MD     • levalbuterol  1.25 mg Nebulization Q6H PRN Ochoa Mckeon MD     • metoprolol tartrate  50 mg Oral BID Melanie Garzon MD     • multivitamin-minerals  1 tablet Oral Daily Melanie Garzon MD     • ondansetron  4 mg Intravenous Q6H PRN Ochoa Mckeon MD     • pantoprazole  40 mg Oral Early Morning Ochoa Mckeon MD     • thiamine  100 mg Oral Daily Ochoa Mckeon MD     • tolvaptan  7.5 mg Oral Once Ochoa Mckeon MD          Today, Patient Was Seen By: Ledy Lindsay MD    **Please Note: This note may have been constructed using a voice recognition system. **

## 2023-10-02 NOTE — CONSULTS
Consultation - Nephrology   Lyly Rodriguez Nichol 78 y.o. female MRN: [id number]  Unit/Bed#: -01 Encounter: [id number]    ASSESSMENT and PLAN:  Hyponatremia, POA  -Admission Sodium: 120 meq/L   -Baseline Sodium: 129-131 since dec 2022  -Work Up: Urine Na: 36, Urine Osmolality  541 , Serum Osmolality 252 , TSH 4.09  ,  amcortisol-> normal range at 15.2  -Etiology: Hyponatremia due to underlying SIADH from use of NSAIDs + component of fluid overload as having lower extremity edema. Also chronic alcohol intake contributing. also with hip pain which could be contributing  [facility] Course: received iv lasix and sodium improving to 122 meq/L today am   -Plan:   • Sodium level improved to 122 meq/L status post IV diuretics. Did receive another dose of IV Lasix 40 mg today a.m. Will monitor response, she does have lower extremity edema but chest x-ray official report does not suggest any evidence of fluid overload. Repeat BMP at 3 PM, if sodium level not improving appropriately, may consider tolvaptan. Placed on fluid restriction 1.2 L/day  •  Also potassium and magnesium were replaced which would also  help improve sodium level  • Recommend CT of chest abdomen pelvis considering that hyponatremia is chronic since December 2022, needs to rule out any underlying malignancy. Okay to use IV contrast as renal function is stable  • Above plan regarding another dose of IV Lasix and recommendation regarding CAT scan was discussed with primary team and agreed with the plan  • Renal function stable at cr 0.55 mg/dl. Hypokalemia/hypomagnesemia  -Potassium level was 3.1 meq/L and magnesium level was 1.7 mg/dL  -Potassium as well as magnesium was replaced. Continue to monitor and replace as needed    Primary hypertension  -Blood pressure stable  -Current treatment with amlodipine and metoprolol.   If lower extremity edema persist may consider switching amlodipine to lisinopril in future    Lower extremity edema  -Chest x-ray [x] [facility]  Outpatient Rehabilitation &  Therapy  [facility]   [place]: [phone]  F: [phone]    Physical Therapy Daily Treatment Note    Date:  2021  Patient Name:  Neri Nicholas    :  2006  MRN: [id number]  Physician: Dr. Chan Lobe: [facility] (Unlimited visits)  Medical Diagnosis: R medial epicondylitis               Rehab Codes: M77.01  Onset Date: 3/24/21                  Next 's appt.: N/A  Visit# / total visits:      Cancels/No Shows: 0/0    Subjective:    Pain:  [] Yes  [x] No Location: R UE Pain Rating: (0-10 scale) 4/10  Pain altered Tx:  [x] No  [] Yes  Action:  Comments: Patient arrived noting continued pain in the RUE elbow, pain levels still the same. Objective:  Exercises:  Exercise     R medial epicondylitits  Reps/ Time Weight/ Level Comments   Bike 10'                         Prone Bench          *Rows  2x10  5#     *Horiz ABD   2x10   5#     Extension   2x10   5#     Scap   2x10  2#             Mat table push ups  2x10             *Tband   IR/ER 90/90     2x10    Blue    90/90 circles  2x10  Blue    *Rows  2x10  Blue    *Ext  2x10  Blue    *Bicep  2x10  Blue    *Tricep  2x10  Blue    *Vic ER  2x10  Blue          TBand trunk rotation with PBall   2x10  Blue           Mat      Bridges 2x10     SL hip abd 2x10     Clam shells 2x10  Orange               Other:    Treatment Charges: Mins Units   []  Modalities     [x]  Ther Exercise 45 3   []  Manual Therapy 10 1   []  Ther Activities     []  Aquatics     []  Vasocompression     []  Other     Total Treatment time 55 4     Assessment: [x] Progressing toward goals. Pt needed cues throughout, focused on postural corrections with midback strengthening ex's. Patient continues to have pain and tightness distal to medial epicondyle to common flexor tendon, less tightness noted following manual today. [] No change.      [] Other:  [x] Patient would continue to benefit from skilled physical therapy services in order to: improve strength and decrease pain with sport. STG: (to be met in 10 treatments)  1. ? Pain: Pt to decrease R elbow pain levels to <2/10 after return to sport   2. ? ROM: Increase hip and ankle flexibility throughout to ease ADL progression  3. ? Strength: Patient to improve scapular strength to at least 4+/5 to ease pain and improve biomechanics of pitching   4. ? Strength: Patient to demonstrate plank on elbows for at least 1 minute with appropriate technique and no compensations   5. Independent with Home Exercise Programs     LTG: (to be met in 20 treatments)  1. Improve score of LEFS functional assessment tool from 8% impairment to 0% impairment   2. Patient to report no elbow pain after a full game of pitching      Patient goals: reduce pain, return to pitching     Pt. Education:  [x] Yes  [] No  [x] Reviewed Prior HEP/Ed  Education on posture, ex's   Method of Education: [x] Verbal  [] Demo  [x] Written  Comprehension of Education:  [x] Verbalizes understanding. [] Demonstrates understanding. [] Needs review. [] Demonstrates/verbalizes HEP/Ed previously given. Plan: [x] Continue current frequency toward long and short term goals. [x] Specific Instructions for subsequent treatments: continue strength progressions within tolerance.       Time In: 0800              Time Out: 7960    Electronically signed by:  Cierra Miller PT official report without any evidence of fluid overload, was personally reviewed by me there is finding of some haziness over both lung bases but she also has calcified bilateral breast implants  -UA with trace protein. Echocardiogram from September showed ejection fraction 60%, elevated right ventricular systolic pressure to 46 mmHg  -Still with bilateral lower extremity edema, also on amlodipine which could be contributing, status post IV Lasix, monitor response, continue limb elevation    Chronic alcohol abuse, recommended decreasing alcohol intake. Recommend fluid restriction. HISTORY OF PRESENT ILLNESS:  Requesting Physician: Magy Anderson*  Reason for Consult: Hyponatremia     Elif Cheung is a 78 y.o. female   history of alcohol use disorder, hypertension, dyslipidemia, osteoarthritis who was admitted to [facility] complaint of shortness of breath and   lower extremity edema for past few weeks. Shortness of breath has gradually been worsening and worse on exertion. .  She drinks 1 glass of wine every day to help go to sleep. Blood work done in the ED suggestive of sodium 120 meq/L, for  which nephrology was consulted.   She received IV Lasix as well as Bumex on admission for fluid overload    -Also takes NSAIDs for chronic pain    Still with leg edema     PAST MEDICAL HISTORY:  Past Medical History:   Diagnosis Date   • Anxiety    • Arthritis    • Prajapati esophagus    • Cataract    • Continuous chronic alcoholism ([address])    • GERD (gastroesophageal reflux disease)    • Heavy alcohol use    • Hyperlipidemia    • Hypertension    • Insomnia    • Palpitations        PAST SURGICAL HISTORY:  Past Surgical History:   Procedure Laterality Date   • BREAST IMPLANT Bilateral    • CATARACT EXTRACTION Right    • CHOLECYSTECTOMY      LAP   • COLONOSCOPY     • HYSTERECTOMY      age 69-[place]   • JOINT REPLACEMENT Bilateral     hips-2010 and 2017-left leg is shorter   • LASIK Bilateral     in her 50's • [place] XCAPSL CTRC RMVL INSJ IO LENS PROSTH W/O ECP Left 10/18/2018    Procedure: EXTRACTION EXTRACAPSULAR CATARACT PHACO INTRAOCULAR LENS (IOL);   Surgeon: Elizabeth Hayes MD;  Location: [facility] MAIN OR;  Service: Ophthalmology   • TONSILLECTOMY     • TUBAL LIGATION         SOCIAL HISTORY:  Social History     Substance and Sexual Activity   Alcohol Use Yes   • Alcohol/week: 20.0 standard drinks of alcohol   • Types: 20 Glasses of wine per week    Comment:  "wine every once in awhile"     Social History     Substance and Sexual Activity   Drug Use No     Social History     Tobacco Use   Smoking Status Never   Smokeless Tobacco Never       FAMILY HISTORY:  Family History   Problem Relation Age of Onset   • Cancer Mother         breast,kidney,lung (smoker)   • Heart disease Father 61        MI   • Cancer Sister         liver,pancreatic(smoker,ETOH)   • Diabetes Daughter    • No Known Problems Sister        ALLERGIES:  No Known Allergies    MEDICATIONS:    Current Facility-Administered Medications:   •  acetaminophen (TYLENOL) tablet 650 mg, 650 mg, Oral, Q6H PRN, Elias Tamez MD  •  amLODIPine (NORVASC) tablet 5 mg, 5 mg, Oral, Daily, Elias Tamez MD, 5 mg at 10/02/23 0851  •  cyanocobalamin (VITAMIN B-12) tablet 500 mcg, 500 mcg, Oral, Daily, Elias Tamez MD, 500 mcg at 10/02/23 0851  •  enoxaparin (LOVENOX) subcutaneous injection 40 mg, 40 mg, Subcutaneous, Daily, Elias Tamez MD, 40 mg at 08/93/69 4658  •  folic acid (FOLVITE) tablet 1 mg, 1 mg, Oral, Daily, Elias Tamez MD, 1 mg at 10/02/23 0850  •  levalbuterol (Rudy Ditch) inhalation solution 1.25 mg, 1.25 mg, Nebulization, Q6H PRN, Elias Tamez MD, 1.25 mg at 10/02/23 0126  •  magnesium sulfate 2 g/50 mL IVPB (premix) 2 g, 2 g, Intravenous, Once, Tyson Ace MD, Last Rate: 25 mL/hr at 10/02/23 0850, 2 g at 10/02/23 0850  •  metoprolol tartrate (LOPRESSOR) tablet 50 mg, 50 mg, Oral, BID, Geovanny Dyson MD, 50 mg at 10/02/23 0850  •  multivitamin-minerals (CENTRUM) tablet 1 tablet, 1 tablet, Oral, Daily, Geovanny Dyson MD, 1 tablet at 10/02/23 0850  •  ondansetron (ZOFRAN) injection 4 mg, 4 mg, Intravenous, Q6H PRN, Geovanny Dyson MD  •  pantoprazole (PROTONIX) EC tablet 40 mg, 40 mg, Oral, Early Morning, Geovanny Dyson MD, 40 mg at 10/02/23 0550  •  thiamine tablet 100 mg, 100 mg, Oral, Daily, Geovanny Dyson MD, 100 mg at 10/02/23 0850    REVIEW OF SYSTEMS:   Review of Systems   Constitutional: Negative for activity change, appetite change, chills, diaphoresis, fatigue and fever. HENT: Negative for congestion, facial swelling and nosebleeds. Eyes: Negative for pain and visual disturbance. Respiratory: Positive for shortness of breath. Negative for cough and chest tightness. Cardiovascular: Positive for leg swelling. Negative for chest pain and palpitations. Gastrointestinal: Negative for abdominal distention, abdominal pain, diarrhea, nausea and vomiting. Genitourinary: Negative for difficulty urinating, dysuria, flank pain, frequency and hematuria. Musculoskeletal: Negative for arthralgias, back pain and joint swelling. Skin: Negative for rash. Neurological: Negative for dizziness, seizures, syncope, weakness and headaches. Psychiatric/Behavioral: Negative for agitation and confusion. The patient is not nervous/anxious. All the systems were reviewed and were negative except as documented on the HPI.     PHYSICAL EXAM:  Current Weight: Weight - Scale: 70.9 kg (156 lb 4 oz)  First Weight: Weight - Scale: 70.9 kg (156 lb 4 oz)  Vitals:    10/01/23 2300 10/02/23 0126 10/02/23 0400 10/02/23 0700   BP: 148/74  128/73 136/74   BP Location: Left arm  Left arm Left arm   Pulse: 68  78 81   Resp: 15  16 15   Temp: 98.4 °F (36.9 °C)  97.9 °F (36.6 °C) 98.6 °F (37 °C)   TempSrc: Oral  Oral Oral SpO2: 98% 97% 96% 94%   Weight:       Height:           Intake/Output Summary (Last 24 hours) at 10/2/2023 1012  Last data filed at 10/2/2023 0901  Gross per 24 hour   Intake 670 ml   Output 1350 ml   Net -680 ml     Physical Exam  Constitutional:       General: She is not in acute distress. Appearance: Normal appearance. She is well-developed. HENT:      Head: Normocephalic and atraumatic. Nose: Nose normal.      Mouth/Throat:      Mouth: Mucous membranes are moist.   Eyes:      General: No scleral icterus. Conjunctiva/sclera: Conjunctivae normal.      Pupils: Pupils are equal, round, and reactive to light. Neck:      Thyroid: No thyromegaly. Vascular: No JVD. Cardiovascular:      Rate and Rhythm: Normal rate and regular rhythm. Heart sounds: Normal heart sounds. No murmur heard. No friction rub. Pulmonary:      Effort: Pulmonary effort is normal. No respiratory distress. Breath sounds: Normal breath sounds. No wheezing or rales. Abdominal:      General: Bowel sounds are normal. There is no distension. Palpations: Abdomen is soft. Tenderness: There is no abdominal tenderness. Musculoskeletal:         General: No deformity. Cervical back: Neck supple. Right lower leg: Edema present. Left lower leg: Edema present. Comments: Has ACE WRAP both legs    Skin:     General: Skin is warm and dry. Findings: No rash. Neurological:      Mental Status: She is alert and oriented to person, place, and time. Psychiatric:         Mood and Affect: Mood normal.         Behavior: Behavior normal.         Thought Content:  Thought content normal.           Invasive Devices:        Lab Results:   Results from last 7 days   Lab Units 10/02/23  0806 10/02/23  0412 10/01/23  2352 10/01/23  1932 10/01/23  1527   WBC Thousand/uL  --   --   --   --  9.30   HEMOGLOBIN g/dL  --   --   --   --  13.1   HEMATOCRIT %  --   --   --   --  37.1   PLATELETS Thousands/uL --   --   --   --  303   POTASSIUM mmol/L 3.1* 3.0* 3.1*   < > 3.8   CHLORIDE mmol/L 87* 84* 84*   < > 85*   CO2 mmol/L 27 27 26   < > 24   BUN mg/dL 7 8 9   < > 11   CREATININE mg/dL 0.55* 0.60 0.51*   < > 0.60   CALCIUM mg/dL 8.5 8.6 8.7   < > 9.4   MAGNESIUM mg/dL  --  1.7*  --   --  1.7*   ALK PHOS U/L  --   --   --   --  95   ALT U/L  --   --   --   --  14   AST U/L  --   --   --   --  16    < > = values in this interval not displayed. Other Studies: Chest x-ray with no acute cardiopulmonary disease. X-ray hip no acute osseous abnormality      Portions of the record may have been created with voice recognition software. Occasional wrong word or "sound a like" substitutions may have occurred due to the inherent limitations of voice recognition software. Read the chart carefully and recognize, using context, where substitutions have occurred. If you have any questions, please contact the dictating provider.

## 2023-10-02 NOTE — PLAN OF CARE
Problem: INFECTION - ADULT  Goal: Absence or prevention of progression during hospitalization  Description: INTERVENTIONS:  - Assess and monitor for signs and symptoms of infection  - Monitor lab/diagnostic results  - Monitor all insertion sites, i.e. indwelling lines, tubes, and drains  - Monitor endotracheal if appropriate and nasal secretions for changes in amount and color  - [place] appropriate cooling/warming therapies per order  - Administer medications as ordered  - Instruct and encourage patient and family to use good hand hygiene technique  - Identify and instruct in appropriate isolation precautions for identified infection/condition  Outcome: Progressing  Goal: Absence of fever/infection during neutropenic period  Description: INTERVENTIONS:  - Monitor WBC    Outcome: Progressing     Problem: SAFETY ADULT  Goal: Patient will remain free of falls  Description: INTERVENTIONS:  - Educate patient/family on patient safety including physical limitations  - Instruct patient to call for assistance with activity   - Consult OT/PT to assist with strengthening/mobility   - Keep Call bell within reach  - Keep bed low and locked with side rails adjusted as appropriate  - Keep care items and personal belongings within reach  - Initiate and maintain comfort rounds  - Make Fall Risk Sign visible to staff  - Initiate/Maintain bed alarm  - Obtain necessary fall risk management equipment:   - Apply yellow socks and bracelet for high fall risk patients  - Consider moving patient to room near nurses station  Outcome: Progressing      Problem: SAFETY ADULT  Goal: Maintain or return to baseline ADL function  Description: INTERVENTIONS:  -  Assess patient's ability to carry out ADLs; assess patient's baseline for ADL function and identify physical deficits which impact ability to perform ADLs (bathing, care of mouth/teeth, toileting, grooming, dressing, etc.)  - Assess/evaluate cause of self-care deficits   - Assess range of motion  - Assess patient's mobility; develop plan if impaired  - Assess patient's need for assistive devices and provide as appropriate  - Encourage maximum independence but intervene and supervise when necessary  - Involve family in performance of ADLs  - Assess for home care needs following discharge   - Consider OT consult to assist with ADL evaluation and planning for discharge  - Provide patient education as appropriate  Outcome: Progressing     Problem: METABOLIC, FLUID AND ELECTROLYTES - ADULT  Goal: Electrolytes maintained within normal limits  Description: INTERVENTIONS:  - Monitor labs and assess patient for signs and symptoms of electrolyte imbalances  - Administer electrolyte replacement as ordered  - Monitor response to electrolyte replacements, including repeat lab results as appropriate  - Instruct patient on fluid and nutrition as appropriate  Outcome: Progressing  Goal: Fluid balance maintained  Description: INTERVENTIONS:  - Monitor labs   - Monitor I/O and WT  - Instruct patient on fluid and nutrition as appropriate  - Assess for signs & symptoms of volume excess or deficit  Outcome: Progressing     Problem: NEUROSENSORY - ADULT  Goal: Achieves stable or improved neurological status  Description: INTERVENTIONS  - Monitor and report changes in neurological status  - Monitor vital signs such as temperature, blood pressure, glucose, and any other labs ordered   - Initiate measures to prevent increased intracranial pressure  - Monitor for seizure activity and implement precautions if appropriate      Outcome: Progressing  Goal: Achieves maximal functionality and self care  Description: INTERVENTIONS  - Monitor swallowing and airway patency with patient fatigue and changes in neurological status  - Encourage and assist patient to increase activity and self care.    - Encourage visually impaired, hearing impaired and aphasic patients to use assistive/communication devices  Outcome: Progressing

## 2023-10-02 NOTE — TREATMENT PLAN
Sodium level dropped to 120, ordered for tolvaptan 7.5 mg after discussion with primary team, replaced potassium, liberalized per restriction to 1.8 L/day to prevent overcorrection, check BMP in the morning

## 2023-10-02 NOTE — ASSESSMENT & PLAN NOTE
· Patient presents with increasing shortness of breath and lower extremity edema bilaterally. Leg swelling is thighs with 2-3+ edema noted. · Admits to drink 1 glass of wine every day for the past few years  · Hyponatremia likely due to fluid overload /hypervolemic,  · We will check TSH, cortisol level, urine lites and osmolality. ,  · Recent echocardiogram showed EF of 60% with normal systolic and diastolic function  · Patient is on Lasix 40 mg as needed however likely not compliant  · Also admits to take ibuprofen on a daily basis  · Patient received 50 mg of IV Lasix in the ED  · Will give IV Lasix 40 mg twice daily and monitor input output. ·  Chest x-ray shows bilateral pleural effusion with mild congestion,  · Nephrology input highly appreciated. Urine studies were reviewed, shows urine sodium of 36, osmolality 541, serum osmolality of 252, TSH and cortisol level normal,  · Hypoosmolar hyponatremia with SIADH, multifactorial with use of NSAIDs and fluid overload with lower extremity edema and chronic alcohol use and right hip pain. · Sodium improved to 122 after diuretic. Repeat sodium level is 128 this afternoon and nephrology recommended Samsca 7.5 mg once this evening. Will repeat BMP in a.m. Continue on fluid restriction. ·   CT of the chest abdomen and pelvis shows no PE and bilateral moderate right pleural effusion and small left effusion with compressive atelectasis.

## 2023-10-03 ENCOUNTER — APPOINTMENT (INPATIENT)
Dept: VASCULAR ULTRASOUND | Facility: HOSPITAL | Age: 83
DRG: 644 | End: 2023-10-03
Payer: COMMERCIAL

## 2023-10-03 LAB
ANION GAP SERPL CALCULATED.3IONS-SCNC: 7 MMOL/L
ATRIAL RATE: 69 BPM
BUN SERPL-MCNC: 7 MG/DL (ref 5–25)
CALCIUM SERPL-MCNC: 8.8 MG/DL (ref 8.4–10.2)
CHLORIDE SERPL-SCNC: 91 MMOL/L (ref 96–108)
CO2 SERPL-SCNC: 30 MMOL/L (ref 21–32)
CREAT SERPL-MCNC: 0.61 MG/DL (ref 0.6–1.3)
GFR SERPL CREATININE-BSD FRML MDRD: 82 ML/MIN/1.73SQ M
GLUCOSE SERPL-MCNC: 100 MG/DL (ref 65–140)
P AXIS: 8 DEGREES
POTASSIUM SERPL-SCNC: 4 MMOL/L (ref 3.5–5.3)
PR INTERVAL: 168 MS
QRS AXIS: 56 DEGREES
QRSD INTERVAL: 80 MS
QT INTERVAL: 432 MS
QTC INTERVAL: 462 MS
SODIUM SERPL-SCNC: 128 MMOL/L (ref 135–147)
SODIUM SERPL-SCNC: 131 MMOL/L (ref 135–147)
T WAVE AXIS: 42 DEGREES
VENTRICULAR RATE: 69 BPM

## 2023-10-03 PROCEDURE — 84295 ASSAY OF SERUM SODIUM: CPT | Performed by: PHYSICIAN ASSISTANT

## 2023-10-03 PROCEDURE — 99232 SBSQ HOSP IP/OBS MODERATE 35: CPT | Performed by: PHYSICIAN ASSISTANT

## 2023-10-03 PROCEDURE — 99222 1ST HOSP IP/OBS MODERATE 55: CPT | Performed by: PHYSICIAN ASSISTANT

## 2023-10-03 PROCEDURE — 99233 SBSQ HOSP IP/OBS HIGH 50: CPT | Performed by: STUDENT IN AN ORGANIZED HEALTH CARE EDUCATION/TRAINING PROGRAM

## 2023-10-03 PROCEDURE — 93010 ELECTROCARDIOGRAM REPORT: CPT | Performed by: INTERNAL MEDICINE

## 2023-10-03 PROCEDURE — 93970 EXTREMITY STUDY: CPT

## 2023-10-03 PROCEDURE — 99223 1ST HOSP IP/OBS HIGH 75: CPT | Performed by: INTERNAL MEDICINE

## 2023-10-03 PROCEDURE — 80048 BASIC METABOLIC PNL TOTAL CA: CPT | Performed by: INTERNAL MEDICINE

## 2023-10-03 RX ORDER — AMOXICILLIN 250 MG
1 CAPSULE ORAL
Status: DISCONTINUED | OUTPATIENT
Start: 2023-10-04 | End: 2023-10-05 | Stop reason: HOSPADM

## 2023-10-03 RX ORDER — FUROSEMIDE 40 MG/1
40 TABLET ORAL DAILY
Status: DISCONTINUED | OUTPATIENT
Start: 2023-10-03 | End: 2023-10-05 | Stop reason: HOSPADM

## 2023-10-03 RX ORDER — AMMONIUM LACTATE 12 G/100G
LOTION TOPICAL 2 TIMES DAILY PRN
Status: DISCONTINUED | OUTPATIENT
Start: 2023-10-03 | End: 2023-10-05 | Stop reason: HOSPADM

## 2023-10-03 RX ORDER — POLYETHYLENE GLYCOL 3350 17 G/17G
17 POWDER, FOR SOLUTION ORAL DAILY
Status: DISCONTINUED | OUTPATIENT
Start: 2023-10-03 | End: 2023-10-05 | Stop reason: HOSPADM

## 2023-10-03 RX ADMIN — PANTOPRAZOLE SODIUM 40 MG: 40 TABLET, DELAYED RELEASE ORAL at 05:43

## 2023-10-03 RX ADMIN — AMLODIPINE BESYLATE 5 MG: 5 TABLET ORAL at 08:44

## 2023-10-03 RX ADMIN — METOPROLOL TARTRATE 50 MG: 50 TABLET, FILM COATED ORAL at 17:39

## 2023-10-03 RX ADMIN — ACETAMINOPHEN 650 MG: 325 TABLET, FILM COATED ORAL at 08:44

## 2023-10-03 RX ADMIN — DEXTROSE 500 ML: 5 SOLUTION INTRAVENOUS at 15:30

## 2023-10-03 RX ADMIN — FOLIC ACID 1 MG: 1 TABLET ORAL at 08:44

## 2023-10-03 RX ADMIN — FUROSEMIDE 40 MG: 40 TABLET ORAL at 09:41

## 2023-10-03 RX ADMIN — ACETAMINOPHEN 650 MG: 325 TABLET, FILM COATED ORAL at 20:24

## 2023-10-03 RX ADMIN — Medication 1 TABLET: at 08:44

## 2023-10-03 RX ADMIN — CYANOCOBALAMIN TAB 500 MCG 500 MCG: 500 TAB at 08:45

## 2023-10-03 RX ADMIN — METOPROLOL TARTRATE 50 MG: 50 TABLET, FILM COATED ORAL at 08:45

## 2023-10-03 RX ADMIN — ENOXAPARIN SODIUM 40 MG: 40 INJECTION SUBCUTANEOUS at 08:45

## 2023-10-03 RX ADMIN — POLYETHYLENE GLYCOL 3350 17 G: 17 POWDER, FOR SOLUTION ORAL at 16:12

## 2023-10-03 RX ADMIN — THIAMINE HCL TAB 100 MG 100 MG: 100 TAB at 08:45

## 2023-10-03 NOTE — PLAN OF CARE
Problem: PAIN - ADULT  Goal: Verbalizes/displays adequate comfort level or baseline comfort level  Description: Interventions:  - Encourage patient to monitor pain and request assistance  - Assess pain using appropriate pain scale  - Administer analgesics based on type and severity of pain and evaluate response  - Implement non-pharmacological measures as appropriate and evaluate response  - Consider cultural and social influences on pain and pain management  - Notify physician/advanced practitioner if interventions unsuccessful or patient reports new pain  Outcome: Progressing     Problem: INFECTION - ADULT  Goal: Absence or prevention of progression during hospitalization  Description: INTERVENTIONS:  - Assess and monitor for signs and symptoms of infection  - Monitor lab/diagnostic results  - Monitor all insertion sites, i.e. indwelling lines, tubes, and drains  - Monitor endotracheal if appropriate and nasal secretions for changes in amount and color  - [place] appropriate cooling/warming therapies per order  - Administer medications as ordered  - Instruct and encourage patient and family to use good hand hygiene technique  - Identify and instruct in appropriate isolation precautions for identified infection/condition  Outcome: Progressing     Problem: SAFETY ADULT  Goal: Patient will remain free of falls  Description: INTERVENTIONS:  - Educate patient/family on patient safety including physical limitations  - Instruct patient to call for assistance with activity   - Consult OT/PT to assist with strengthening/mobility   - Keep Call bell within reach  - Keep bed low and locked with side rails adjusted as appropriate  - Keep care items and personal belongings within reach  - Initiate and maintain comfort rounds  - Make Fall Risk Sign visible to staff  - Offer Toileting every 2 Hours, in advance of need  - Initiate/Maintain bed alarm  - Obtain necessary fall risk management equipment: bed alarm, non-slip socks, walker when ambulating  - Apply yellow socks and bracelet for high fall risk patients  - Consider moving patient to room near nurses station  Outcome: Progressing  Goal: Maintain or return to baseline ADL function  Description: INTERVENTIONS:  -  Assess patient's ability to carry out ADLs; assess patient's baseline for ADL function and identify physical deficits which impact ability to perform ADLs (bathing, care of mouth/teeth, toileting, grooming, dressing, etc.)  - Assess/evaluate cause of self-care deficits   - Assess range of motion  - Assess patient's mobility; develop plan if impaired  - Assess patient's need for assistive devices and provide as appropriate  - Encourage maximum independence but intervene and supervise when necessary  - Involve family in performance of ADLs  - Assess for home care needs following discharge   - Consider OT consult to assist with ADL evaluation and planning for discharge  - Provide patient education as appropriate  Outcome: Progressing  Goal: Maintains/Returns to pre admission functional level  Description: INTERVENTIONS:  - Perform BMAT or MOVE assessment daily.   - Set and communicate daily mobility goal to care team and patient/family/caregiver.    - Collaborate with rehabilitation services on mobility goals if consulted  - Out of bed for toileting  - Record patient progress and toleration of activity level   Outcome: Progressing     Problem: DISCHARGE PLANNING  Goal: Discharge to home or other facility with appropriate resources  Description: INTERVENTIONS:  - Identify barriers to discharge w/patient and caregiver  - Arrange for needed discharge resources and transportation as appropriate  - Identify discharge learning needs (meds, wound care, etc.)  - Arrange for interpretive services to assist at discharge as needed  - Refer to Case Management Department for coordinating discharge planning if the patient needs post-hospital services based on physician/advanced practitioner order or complex needs related to functional status, cognitive ability, or social support system  Outcome: Progressing     Problem: Knowledge Deficit  Goal: Patient/family/caregiver demonstrates understanding of disease process, treatment plan, medications, and discharge instructions  Description: Complete learning assessment and assess knowledge base. Interventions:  - Provide teaching at level of understanding  - Provide teaching via preferred learning methods  Outcome: Progressing     Problem: MOBILITY - ADULT  Goal: Maintain or return to baseline ADL function  Description: INTERVENTIONS:  -  Assess patient's ability to carry out ADLs; assess patient's baseline for ADL function and identify physical deficits which impact ability to perform ADLs (bathing, care of mouth/teeth, toileting, grooming, dressing, etc.)  - Assess/evaluate cause of self-care deficits   - Assess range of motion  - Assess patient's mobility; develop plan if impaired  - Assess patient's need for assistive devices and provide as appropriate  - Encourage maximum independence but intervene and supervise when necessary  - Involve family in performance of ADLs  - Assess for home care needs following discharge   - Consider OT consult to assist with ADL evaluation and planning for discharge  - Provide patient education as appropriate  Outcome: Progressing  Goal: Maintains/Returns to pre admission functional level  Description: INTERVENTIONS:  - Perform BMAT or MOVE assessment daily.   - Set and communicate daily mobility goal to care team and patient/family/caregiver.    - Collaborate with rehabilitation services on mobility goals if consulted  - Out of bed for toileting  - Record patient progress and toleration of activity level   Outcome: Progressing     Problem: NEUROSENSORY - ADULT  Goal: Achieves stable or improved neurological status  Description: INTERVENTIONS  - Monitor and report changes in neurological status  - Monitor vital signs such as temperature, blood pressure, glucose, and any other labs ordered   - Initiate measures to prevent increased intracranial pressure  - Monitor for seizure activity and implement precautions if appropriate      Outcome: Progressing  Goal: Achieves maximal functionality and self care  Description: INTERVENTIONS  - Monitor swallowing and airway patency with patient fatigue and changes in neurological status  - Encourage and assist patient to increase activity and self care.    - Encourage visually impaired, hearing impaired and aphasic patients to use assistive/communication devices  Outcome: Progressing     Problem: METABOLIC, FLUID AND ELECTROLYTES - ADULT  Goal: Electrolytes maintained within normal limits  Description: INTERVENTIONS:  - Monitor labs and assess patient for signs and symptoms of electrolyte imbalances  - Administer electrolyte replacement as ordered  - Monitor response to electrolyte replacements, including repeat lab results as appropriate  - Instruct patient on fluid and nutrition as appropriate  Outcome: Progressing  Goal: Fluid balance maintained  Description: INTERVENTIONS:  - Monitor labs   - Monitor I/O and WT  - Instruct patient on fluid and nutrition as appropriate  - Assess for signs & symptoms of volume excess or deficit  Outcome: Progressing

## 2023-10-03 NOTE — PROGRESS NOTES
NEPHROLOGY PROGRESS NOTE   Zo Blanton 78 y.o. female MRN: [id number]  Unit/Bed#: -01 Encounter: [id number]  Reason for Consult: Hyponatremia    ASSESSMENT/PLAN:  1. Hyponatremia- secondary to SIADH with NSAIDs and pain + volume overload + alcohol  - admission sodium 120  - current sodium 128  - workup: urine sodium 36, urine osm 541, serum osm 252, tsh 4, am cortisol 15, uric acid 4.1, CT without malignancy   - agree with IV lasix as needed, okay to change to oral dosing from my standpoint  - s/p tolvaptan 7.5mg with good correction from 120 to 128  - restart fluid restriction and recheck BMP later today as goal correction in 24 hours already obtained  - goal by tomorrow morning is 135 max  2. Hypokalemia- replete as needed  3. Hypomagnesemia- replete as needed  4. Hypertension- BP stable, monitor   - monitor with lasix daily  5. LE edema- s/p IV lasix, start lasix 40mg daily   - home: lasix 40mg as needed  - monitor with amlodipine    Disposition:  Hold tolvaptan for now. FR of 1500ml/day. Lasix 40mg daily. Repeat sodium level at 2pm.      SUBJECTIVE:  Patient feeling well except for when she gets shooting pains. States she is eating okay. Legs seem at baseline per her report.       OBJECTIVE:  Current Weight: Weight - Scale: 70.9 kg (156 lb 4 oz)  Vitals:    10/03/23 0400 10/03/23 0500 10/03/23 0549 10/03/23 0748   BP: 150/88 156/88 156/88 142/74   BP Location: Left arm  Left arm Left arm   Pulse: 75 77 82 77   Resp: 18  18 18   Temp:    98.1 °F (36.7 °C)   TempSrc:    Oral   SpO2:    97%   Weight:       Height:           Intake/Output Summary (Last 24 hours) at 10/3/2023 0858  Last data filed at 10/3/2023 4939  Gross per 24 hour   Intake 247 ml   Output 2875 ml   Net -2628 ml     General: NAD  Skin: no rash  Eyes: anicteric  ENMT: mm moist  Neck: no masses  Respiratory: CTAB  Cardiac: RRR  Extremities: + bilateral LE edema  GI: soft nt nd  Neuro: alert awake  Psych: mood and affect appropriate    Medications:    Current Facility-Administered Medications:   •  acetaminophen (TYLENOL) tablet 650 mg, 650 mg, Oral, Q6H PRN, Kailee Reese MD, 650 mg at 10/03/23 0844  •  amLODIPine (NORVASC) tablet 5 mg, 5 mg, Oral, Daily, Kailee Reese MD, 5 mg at 10/03/23 0844  •  cyanocobalamin (VITAMIN B-12) tablet 500 mcg, 500 mcg, Oral, Daily, Kailee Reese MD, 500 mcg at 10/03/23 0845  •  enoxaparin (LOVENOX) subcutaneous injection 40 mg, 40 mg, Subcutaneous, Daily, Kailee Reese MD, 40 mg at 22/23/06 8637  •  folic acid (FOLVITE) tablet 1 mg, 1 mg, Oral, Daily, Kailee Reese MD, 1 mg at 10/03/23 0844  •  furosemide (LASIX) tablet 40 mg, 40 mg, Oral, Daily, [facility], [facility]  •  levalbuterol (XOPENEX) inhalation solution 1.25 mg, 1.25 mg, Nebulization, Q6H PRN, Kailee Reese MD, 1.25 mg at 10/02/23 0126  •  metoprolol tartrate (LOPRESSOR) tablet 50 mg, 50 mg, Oral, BID, Kailee Reese MD, 50 mg at 10/03/23 0845  •  multivitamin-minerals (CENTRUM) tablet 1 tablet, 1 tablet, Oral, Daily, Kailee Reese MD, 1 tablet at 10/03/23 0844  •  ondansetron (ZOFRAN) injection 4 mg, 4 mg, Intravenous, Q6H PRN, Kailee Reese MD  •  pantoprazole (PROTONIX) EC tablet 40 mg, 40 mg, Oral, Early Morning, Kailee Reese MD, 40 mg at 10/03/23 0543  •  thiamine tablet 100 mg, 100 mg, Oral, Daily, Kailee Reese MD, 100 mg at 10/03/23 0845    Laboratory Results:  Results from last 7 days   Lab Units 10/03/23  0545 10/02/23  1518 10/02/23  0806 10/02/23  0412 10/01/23  2352 10/01/23  1932 10/01/23  1527   WBC Thousand/uL  --   --   --   --   --   --  9.30   HEMOGLOBIN g/dL  --   --   --   --   --   --  13.1   HEMATOCRIT %  --   --   --   --   --   --  37.1   PLATELETS Thousands/uL  --   --   --   --   --   --  303   POTASSIUM mmol/L 4.0 3.4* 3.1* 3.0* 3.1* 3.6 3.8   CHLORIDE mmol/L 91* 84* 87* 84* 84* 84* 85*   CO2 mmol/L 30 28 27 27 26 24 24   BUN mg/dL 7 8 7 8 9 11 11   CREATININE mg/dL 0.61 0.56* 0.55* 0.60 0.51* 0.62 0.60   CALCIUM mg/dL 8.8 8.5 8.5 8.6 8.7 9.2 9.4   MAGNESIUM mg/dL  --   --   --  1.7*  --   --  1.7*     I have personally reviewed the blood work as stated above and in my note. I have personally reviewed renal and slim note.

## 2023-10-03 NOTE — PLAN OF CARE
Problem: PAIN - ADULT  Goal: Verbalizes/displays adequate comfort level or baseline comfort level  Description: Interventions:  - Encourage patient to monitor pain and request assistance  - Assess pain using appropriate pain scale  - Administer analgesics based on type and severity of pain and evaluate response  - Implement non-pharmacological measures as appropriate and evaluate response  - Consider cultural and social influences on pain and pain management  - Notify physician/advanced practitioner if interventions unsuccessful or patient reports new pain  Outcome: Progressing     Problem: INFECTION - ADULT  Goal: Absence or prevention of progression during hospitalization  Description: INTERVENTIONS:  - Assess and monitor for signs and symptoms of infection  - Monitor lab/diagnostic results  - Monitor all insertion sites, i.e. indwelling lines, tubes, and drains  - Monitor endotracheal if appropriate and nasal secretions for changes in amount and color  - [place] appropriate cooling/warming therapies per order  - Administer medications as ordered  - Instruct and encourage patient and family to use good hand hygiene technique  - Identify and instruct in appropriate isolation precautions for identified infection/condition  Outcome: Progressing  Goal: Absence of fever/infection during neutropenic period  Description: INTERVENTIONS:  - Monitor WBC    Outcome: Progressing     Problem: SAFETY ADULT  Goal: Patient will remain free of falls  Description: INTERVENTIONS:  - Educate patient/family on patient safety including physical limitations  - Instruct patient to call for assistance with activity   - Consult OT/PT to assist with strengthening/mobility   - Keep Call bell within reach  - Keep bed low and locked with side rails adjusted as appropriate  - Keep care items and personal belongings within reach  - Initiate and maintain comfort rounds  - Make Fall Risk Sign visible to staff  - Apply yellow socks and bracelet for high fall risk patients  - Consider moving patient to room near nurses station  Outcome: Progressing  Goal: Maintain or return to baseline ADL function  Description: INTERVENTIONS:  -  Assess patient's ability to carry out ADLs; assess patient's baseline for ADL function and identify physical deficits which impact ability to perform ADLs (bathing, care of mouth/teeth, toileting, grooming, dressing, etc.)  - Assess/evaluate cause of self-care deficits   - Assess range of motion  - Assess patient's mobility; develop plan if impaired  - Assess patient's need for assistive devices and provide as appropriate  - Encourage maximum independence but intervene and supervise when necessary  - Involve family in performance of ADLs  - Assess for home care needs following discharge   - Consider OT consult to assist with ADL evaluation and planning for discharge  - Provide patient education as appropriate  Outcome: Progressing  Goal: Maintains/Returns to pre admission functional level  Description: INTERVENTIONS:  - Perform BMAT or MOVE assessment daily.   - Set and communicate daily mobility goal to care team and patient/family/caregiver.    - Collaborate with rehabilitation services on mobility goals if consulted  - Out of bed for toileting  - Record patient progress and toleration of activity level   Outcome: Progressing     Problem: MOBILITY - ADULT  Goal: Maintain or return to baseline ADL function  Description: INTERVENTIONS:  -  Assess patient's ability to carry out ADLs; assess patient's baseline for ADL function and identify physical deficits which impact ability to perform ADLs (bathing, care of mouth/teeth, toileting, grooming, dressing, etc.)  - Assess/evaluate cause of self-care deficits   - Assess range of motion  - Assess patient's mobility; develop plan if impaired  - Assess patient's need for assistive devices and provide as appropriate  - Encourage maximum independence but intervene and supervise when necessary  - Involve family in performance of ADLs  - Assess for home care needs following discharge   - Consider OT consult to assist with ADL evaluation and planning for discharge  - Provide patient education as appropriate  Outcome: Progressing  Goal: Maintains/Returns to pre admission functional level  Description: INTERVENTIONS:  - Perform BMAT or MOVE assessment daily.   - Set and communicate daily mobility goal to care team and patient/family/caregiver.    - Collaborate with rehabilitation services on mobility goals if consulted  - Out of bed for toileting  - Record patient progress and toleration of activity level   Outcome: Progressing

## 2023-10-03 NOTE — CONSULTS
[facility] Cardiology  CONSULT    Patient Name: Cem Glez  Patient MRN: [id number]  Admission Date: 10/1/2023  3:05 PM  Attending Provider: Laura العراقي  Service: Cardiology    Reason for consult: elevated BNP, leg edema    HPI  This is a 78 y.o. female with memory loss, hypertension, dyslipidemia, alcohol use disorder, leg edema who presented with shortness of breath and worsening leg edema. On presentation she was found to have exam and imaging evidence of volume overload. She was hyponatremic on presentation. She was admitted and started on IV loop diuretics and received Tolvaptan x 1 dose. She has no acute complaints. I inquired about why she came to the hospital and she states that it was because of bug bites on her arms. She does admit to what appears to be chronic lower extremity swelling. She does not consistently take her home diuretic. She denies any lightheadedness, visual changes, chest pain, palpitations, orthopnea, PND. Review of Systems  10 point review of systems negative except as noted in HPI    Past Medical History:   Diagnosis Date    Anxiety     Arthritis     Prajapati esophagus     Cataract     Continuous chronic alcoholism (HCC)     GERD (gastroesophageal reflux disease)     Heavy alcohol use     Hyperlipidemia     Hypertension     Insomnia     Palpitations        Past Surgical History:   Procedure Laterality Date    BREAST IMPLANT Bilateral     CATARACT EXTRACTION Right     CHOLECYSTECTOMY      LAP    COLONOSCOPY      HYSTERECTOMY      age 69-[place]    JOINT REPLACEMENT Bilateral     hips-2010 and 2017-left leg is shorter    LASIK Bilateral     in her 50's    RI XCAPSL CTRC RMVL INSJ IO LENS PROSTH W/O ECP Left 10/18/2018    Procedure: EXTRACTION EXTRACAPSULAR CATARACT PHACO INTRAOCULAR LENS (IOL);   Surgeon: Steven Zelaya MD;  Location: [facility] MAIN OR;  Service: Ophthalmology    TONSILLECTOMY      TUBAL LIGATION         Family History   Problem Relation Age of Onset    Cancer Mother breast,kidney,lung (smoker)    Heart disease Father 61        MI    Cancer Sister         liver,pancreatic(smoker,ETOH)    Diabetes Daughter     No Known Problems Sister        Social History     Tobacco Use    Smoking status: Never    Smokeless tobacco: Never   Substance Use Topics    Alcohol use: Yes     Alcohol/week: 20.0 standard drinks of alcohol     Types: 20 Glasses of wine per week     Comment:  "wine every once in awhile"       Vitals:    10/03/23 0748   BP: 142/74   Pulse: 77   Resp: 18   Temp: 98.1 °F (36.7 °C)   SpO2: 97%        I/O last 3 completed shifts: In: 568 [P.O.:907;  I.V.:10]  Out: 3625 [Urine:3625]  I/O this shift:  In: -   Out: 300 [Urine:300]     Oxygen:  O2 Device: None (Room air)    Physical Exam  General Appearance: Alert, cooperative, no distress, appears stated age  Head: Normocephalic, atraumatic  Eyes: EOMI, sclerae anicteric  Neck: No JVD  Lungs: Clear to auscultation bilaterally, respirations unlabored, no wheezes, no rales  Heart: RRR, S1 and S2 normal, no murmur, no rub or gallop  Abdomen: Soft, non-tender, bowel sounds active  Extremities: Extremities normal, atraumatic, bilateral leg edema R>L  Pulses: 2+ and symmetric all extremities  Skin: Warm and dry      Current Medications:  Scheduled Meds:  Current Facility-Administered Medications   Medication Dose Route Frequency Provider Last Rate    acetaminophen  650 mg Oral Q6H PRN Melanie Newton MD      amLODIPine  5 mg Oral Daily Melanie Newton MD      cyanocobalamin  500 mcg Oral Daily Melanie Newton MD      enoxaparin  40 mg Subcutaneous Daily Melanie Newton MD      folic acid  1 mg Oral Daily Melanie Newton MD      furosemide  40 mg Oral Daily ALLISON Boland      levalbuterol  1.25 mg Nebulization Q6H PRN Lucrecia Appiah MD      metoprolol tartrate  50 mg Oral BID Melanie Newton MD      multivitamin-minerals  1 tablet Oral Daily Melanie Biggs MD      ondansetron  4 mg Intravenous Q6H PRN Velma Moon MD      pantoprazole  40 mg Oral Early Morning Velma Moon MD      thiamine  100 mg Oral Daily Velma Moon MD       Continuous Infusions:   PRN Meds:.  acetaminophen    levalbuterol    ondansetron    Laboratory Studies:  Lab Results   Component Value Date    WBC 9.30 10/01/2023    HGB 13.1 10/01/2023    HCT 37.1 10/01/2023    MCV 91 10/01/2023     10/01/2023       Lab Results   Component Value Date    CALCIUM 8.8 10/03/2023    SODIUM 128 (L) 10/03/2023    K 4.0 10/03/2023    CO2 30 10/03/2023    CL 91 (L) 10/03/2023    BUN 7 10/03/2023    CREATININE 0.61 10/03/2023       Lab Results   Component Value Date    HGBA1C 5.0 02/26/2022       No results found for: "TSH"    Lab Results   Component Value Date    HDL 56 02/26/2022    100 [facility] 97 02/26/2022    TRIG 76.8 02/26/2022       CARDIAC IMAGING:  Echocardiogram - 9/15/23:    Left Ventricle: Left ventricular cavity size is normal. Wall thickness is normal. The left ventricular ejection fraction is 60%. Systolic function is normal. Wall motion is normal. Diastolic function is normal.    Right Atrium: The atrium is mildly dilated. Aortic Valve: There is aortic valve sclerosis. Mitral Valve: There is mild annular calcification. There is mild regurgitation. Tricuspid Valve: There is mild to moderate regurgitation. The right ventricular systolic pressure is mildly to moderately elevated. The estimated right ventricular systolic pressure is 34.93 mmHg. ECG: SR, PVCs, nonspecific ST changes - no significant change from prior       IMAGING  CTA chest (pe study) abdomen pelvis contrast   Final Result      No pulmonary embolus. Small to moderate right pleural effusion and small left effusion, with mild bibasilar compressive atelectasis      No acute pathology in the abdomen or pelvis. Colonic diverticulosis. Anasarca. Workstation performed: [id number]         XR chest 1 view portable   ED Interpretation   Increased bilateral opacities      Final Result      No acute cardiopulmonary disease. Workstation performed: [id number]         XR hip/pelv 2-3 vws right   ED Interpretation   No acute fracture or dislocation. Hardware intact. Final Result      No acute osseous abnormality. Workstation performed: [id number]         VAS lower limb venous duplex study, complete bilateral    (Results Pending)        Assessment / Plan  This is a 78 y.o. female who presents with    #. Hyponatremia  #. Anasarca  #. Asymmetric bilateral leg edema R > L  #. Bilateral pleural effusions   #. Hypertension  #. Pulmonary hypertension   #. Alcohol use    CTA negative for PE. Agree with LE venous duplex. Albumin is normal. Renal function appears normal. She has anasarca and bilateral pleural effusions. Echocardiogram personally reviewed and she has normal biventricular size & systolic function. Diastolic function is normal and no significant valvular abnormalities. Sodium level improving with diuretics - being managed by Nephrology. Agree with continuation of loop diuretic today.     Principal Problem:    Hyponatremia  Active Problems:    Heavy alcohol use    Essential hypertension    Dyslipidemia    Rash and nonspecific skin eruption    Memory loss    Hip pain, acute, right      Code Status: Level 1 - Full Code    Jewel , MD

## 2023-10-03 NOTE — CONSULTS
Orthopedics   Omega Thomas 78 y.o. female MRN: [id number]  Unit/Bed#: [facility] VASCULAR LAB      Chief Complaint:   right hip pain    HPI:   78 y. o.female community ambulator complaining of right hip pain. She has a history of bilateral SOLITARIO, the right being done in 2010. She denies fall or trauma. She reports right hip pain over the lateral hip for the last 3 or 4 days. She denies numbness or tingling down the leg. She denies groin pain. She has trouble with walking and flexing her hip. She was elevating her feet in bed and noticed the onset of the pain. She denies knee pain. Review Of Systems:   · Skin: Normal  · Neuro: See HPI  · Musculoskeletal: See HPI  · 14 point review of systems negative except as stated above     Past Medical History:   Past Medical History:   Diagnosis Date   • Anxiety    • Arthritis    • Prajapati esophagus    • Cataract    • Continuous chronic alcoholism ([address])    • GERD (gastroesophageal reflux disease)    • Heavy alcohol use    • Hyperlipidemia    • Hypertension    • Insomnia    • Palpitations        Past Surgical History:   Past Surgical History:   Procedure Laterality Date   • BREAST IMPLANT Bilateral    • CATARACT EXTRACTION Right    • CHOLECYSTECTOMY      LAP   • COLONOSCOPY     • HYSTERECTOMY      age 69-[place]   • JOINT REPLACEMENT Bilateral     hips-2010 and 2017-left leg is shorter   • LASIK Bilateral     in her 50's   • [place] XCAPSL CTRC RMVL INSJ IO LENS PROSTH W/O ECP Left 10/18/2018    Procedure: EXTRACTION EXTRACAPSULAR CATARACT PHACO INTRAOCULAR LENS (IOL);   Surgeon: Lul Knapp MD;  Location: [facility] MAIN OR;  Service: Ophthalmology   • TONSILLECTOMY     • TUBAL LIGATION         Family History:  Family history reviewed and non-contributory  Family History   Problem Relation Age of Onset   • Cancer Mother         breast,kidney,lung (smoker)   • Heart disease Father 61        MI   • Cancer Sister         liver,pancreatic(smoker,ETOH)   • Diabetes Daughter    • No Known Problems Sister        Social History:  Social History     Socioeconomic History   • Marital status: /Civil Union     Spouse name: None   • Number of children: None   • Years of education: None   • Highest education level: None   Occupational History   • Occupation: Retired   Tobacco Use   • Smoking status: Never   • Smokeless tobacco: Never   Vaping Use   • Vaping Use: Never used   Substance and Sexual Activity   • Alcohol use: Yes     Alcohol/week: 20.0 standard drinks of alcohol     Types: 20 Glasses of wine per week     Comment:  "wine every once in awhile"   • Drug use: No   • Sexual activity: None   Other Topics Concern   • None   Social History Narrative    Occupation: retired    Marital status:     Exercise level: Occasional    Diet: Regular    General stress level: Low    Alcohol intake: Occasional    Caffeine intake: Occasional    Seat belts used routinely: Yes    Sunscreen used routinely: Yes    Smoke alarm in home: Yes    Advance directive: Yes     Social Determinants of Health     Financial Resource Strain: Not on file   Food Insecurity: No Food Insecurity (12/16/2022)    Hunger Vital Sign    • Worried About Running Out of Food in the Last Year: Never true    • Ran Out of Food in the Last Year: Never true   Transportation Needs: No Transportation Needs (12/16/2022)    PRAPARE - Transportation    • Lack of Transportation (Medical): No    • Lack of Transportation (Non-Medical):  No   Physical Activity: Not on file   Stress: Not on file   Social Connections: Not on file   Intimate Partner Violence: Not on file   Housing Stability: Low Risk  (12/16/2022)    Housing Stability Vital Sign    • Unable to Pay for Housing in the Last Year: No    • Number of Places Lived in the Last Year: 1    • Unstable Housing in the Last Year: No       Allergies:   No Known Allergies        Labs:  0   Lab Value Date/Time    HCT 37.1 10/01/2023 1527    HCT 34.5 (L) 09/11/2023 1607    HCT 36.7 03/02/2023 1550    HGB 13.1 10/01/2023 1527    HGB 11.8 09/11/2023 1607    HGB 13.1 03/02/2023 1550    INR 0.89 01/23/2023 0539    WBC 9.30 10/01/2023 1527    WBC 6.94 09/11/2023 1607    WBC 6.31 03/02/2023 1550       Meds:    Current Facility-Administered Medications:   •  acetaminophen (TYLENOL) tablet 650 mg, 650 mg, Oral, Q6H PRN, Christie Gonzalez MD, 650 mg at 10/03/23 0844  •  amLODIPine (NORVASC) tablet 5 mg, 5 mg, Oral, Daily, Christie Gonzalez MD, 5 mg at 10/03/23 0844  •  cyanocobalamin (VITAMIN B-12) tablet 500 mcg, 500 mcg, Oral, Daily, Christie Gonzalez MD, 500 mcg at 10/03/23 0845  •  enoxaparin (LOVENOX) subcutaneous injection 40 mg, 40 mg, Subcutaneous, Daily, Christie Gonzalez MD, 40 mg at 93/85/11 2278  •  folic acid (FOLVITE) tablet 1 mg, 1 mg, Oral, Daily, Christie Gonzalez MD, 1 mg at 10/03/23 0844  •  furosemide (LASIX) tablet 40 mg, 40 mg, Oral, Daily, [facility], 40 mg at 10/03/23 0941  •  levalbuterol (XOPENEX) inhalation solution 1.25 mg, 1.25 mg, Nebulization, Q6H PRN, Christie Gonzalez MD, 1.25 mg at 10/02/23 0126  •  metoprolol tartrate (LOPRESSOR) tablet 50 mg, 50 mg, Oral, BID, Christie Gonzalez MD, 50 mg at 10/03/23 0845  •  multivitamin-minerals (CENTRUM) tablet 1 tablet, 1 tablet, Oral, Daily, Christie Gonzalez MD, 1 tablet at 10/03/23 0844  •  ondansetron (ZOFRAN) injection 4 mg, 4 mg, Intravenous, Q6H PRN, Christie Gonzalez MD  •  pantoprazole (PROTONIX) EC tablet 40 mg, 40 mg, Oral, Early Morning, Christie Gonzalez MD, 40 mg at 10/03/23 0543  •  thiamine tablet 100 mg, 100 mg, Oral, Daily, Christie Gonzalez MD, 100 mg at 10/03/23 0845    Blood Culture:   No results found for: "BLOODCX"    Wound Culture:   No results found for: "WOUNDCULT"    Ins and Outs:  I/O last 24 hours:   In: 845 [P.O.:687; I.V.:10]  Out: 3125 [Urine:3125]          Physical Exam:   /74 (BP Location: Left arm)   Pulse 77   Temp 98.1 °F (36.7 °C) (Oral)   Resp 18   Ht 5' 6" (1.676 m)   Wt 70.9 kg (156 lb 4 oz)   SpO2 97%   BMI 25.22 kg/m²   Gen: No acute distress, resting comfortably in bed  HEENT: Eyes clear, moist mucus membranes, hearing intact  Respiratory: No audible wheezing or stridor  Cardiovascular: Well Perfused peripherally, 2+ distal pulse  Abdomen: nondistended, no peritoneal signs  Musculoskeletal: right lower extremity  · Skin dry, and intact, no erythema  · Painful active flexion however no pain with passive flexion. No pain with micromotion. No pain with rotation. Negative Stinchfield's. Positive LEO  · TTP along the greater troch bursa. No IT band TTP. · Sensation intact L3-S1  · 5/5 motor strength to hip flexion/extension, knee flexion/extension, ankle dorsi/plantar flexion  · 2+ DP/PT pulse  · Musculature is soft and compressible, no pain with passive stretch  · Leg lengths equal     Radiology:   I personally reviewed the films. X-rays AP/Lateral and views of right hip shows intact total hip prosthesis without fracture or signs of loosening    [unfilled]    Assessment:  78 y. o.female with right hip, greater troches bursitis    Plan:   · WBAT right lower extremity  · PT  · Pain control, Tylenol if unable to take NSAIDs due to renal function  · Ice to the right hip  · Body mass index is 25.22 kg/m². · Dispo: May follow-up as an outpatient with Dr. Graham Brown.        Angeli Santos PA-C

## 2023-10-03 NOTE — PROGRESS NOTES
[address]  Progress Note  Name: Cory Gunter  MRN: [id number]  Unit/Bed#: -01 I Date of Admission: 10/1/2023   Date of Service: 10/3/2023 I Hospital Day: 2    Assessment/Plan   Hip pain, acute, right  Assessment & Plan  · Complains of right hip pain, x-ray of the hip shows no fractures however if persistent pain can consider CT of the hip. · Has a history of bilateral hip arthroplasty in the past.    Memory loss  Assessment & Plan  · Continue supportive care, patient has stopped taking Lexapro. Rash and nonspecific skin eruption  Assessment & Plan  · Rash noted on the lower extremity and on the abdominal wall, likely fluid overload and since chronic lymphedema of the lower extremity,  · Will place Ace wraps to the lower extremity. Monitor clinically. Dyslipidemia  Assessment & Plan  · Not on statin. Essential hypertension  Assessment & Plan  · Blood pressure is controlled on Norvasc and metoprolol. Heavy alcohol use  Assessment & Plan  · Patient reports to drink at least 1 glass of wine every day to help her to sleep. · Has no history of DTs, or seizures  · Patient denies of any recent falls. However complains of right hip pain. · Will place on CIWA protocol  · Will replace magnesium  · Will give thiamine and folate and multivitamin  · Advised about alcohol cessation. * Hyponatremia  Assessment & Plan  · Patient presents with increasing shortness of breath and lower extremity edema bilaterally. Leg swelling is thighs with 2-3+ edema noted. · Admits to drink 1 glass of wine every day for the past few years  · Hyponatremia likely due to fluid overload /hypervolemic,  · We will check TSH, cortisol level, urine lites and osmolality. ,  · Recent echocardiogram showed EF of 60% with normal systolic and diastolic function  · Patient is on Lasix 40 mg as needed however likely not compliant  · Also admits to take ibuprofen on a daily basis  · Patient received 50 mg of IV Lasix in the ED  · Will give IV Lasix 40 mg twice daily and monitor input output. ·  Chest x-ray shows bilateral pleural effusion with mild congestion,  · Nephrology input highly appreciated. Urine studies were reviewed, shows urine sodium of 36, osmolality 541, serum osmolality of 252, TSH and cortisol level normal,  · Hypoosmolar hyponatremia with SIADH, multifactorial with use of NSAIDs and fluid overload with lower extremity edema and chronic alcohol use and right hip pain. · Sodium improved to 122 after diuretic. Repeat sodium level is 128 this afternoon and nephrology recommended Samsca 7.5 mg once this evening. Will repeat BMP in a.m. Continue on fluid restriction. ·   CT of the chest abdomen and pelvis shows no PE and bilateral moderate right pleural effusion and small left effusion with compressive atelectasis. VTE Pharmacologic Prophylaxis: VTE Score: 8 Moderate Risk (Score 3-4) - Pharmacological DVT Prophylaxis Ordered: enoxaparin (Lovenox). Patient Centered Rounds: I performed bedside rounds with nursing staff today. Discussions with Specialists or Other Care Team Provider: N/A    Education and Discussions with Family / Patient: Updated  ( and daughter) at bedside. Total Time Spent on Date of Encounter in care of patient: 55 mins. This time was spent on one or more of the following: performing physical exam; counseling and coordination of care; obtaining or reviewing history; documenting in the medical record; reviewing/ordering tests, medications or procedures; communicating with other healthcare professionals and discussing with patient's family/caregivers.     Current Length of Stay: 2 day(s)  Current Patient Status: Inpatient   Certification Statement: The patient will continue to require additional inpatient hospital stay due to Hyponatremia  Discharge Plan: Anticipate discharge in 24-48 hrs to discharge location to be determined pending rehab evaluations. Code Status: Level 1 - Full Code    Subjective:   Seen and examined at bedside this morning. Patient states she feels well and has no significant complaints. Patient may have had an episode of confusion earlier because multiple family members stated she called them and reported that the doctors told her she only had about 2 days left to live. I discussed with multiple family members at bedside that this was not the case and may have been some miscommunication or confusion. No other concerns    Objective:     Vitals:   Temp (24hrs), Av.1 °F (36.7 °C), Min:97.8 °F (36.6 °C), Max:98.2 °F (36.8 °C)    Temp:  [97.8 °F (36.6 °C)-98.2 °F (36.8 °C)] 97.8 °F (36.6 °C)  HR:  [64-82] 74  Resp:  [15-20] 18  BP: (120-156)/(63-88) 137/72  SpO2:  [95 %-99 %] 99 %  Body mass index is 25.22 kg/m². Input and Output Summary (last 24 hours): Intake/Output Summary (Last 24 hours) at 10/3/2023 1837  Last data filed at 10/3/2023 1636  Gross per 24 hour   Intake 365 ml   Output 3775 ml   Net -3410 ml       Physical Exam:   Physical Exam  Vitals reviewed. Constitutional:       General: She is not in acute distress. Appearance: She is not ill-appearing. HENT:      Head: Normocephalic. Mouth/Throat:      Mouth: Mucous membranes are moist.   Eyes:      General: No scleral icterus. Extraocular Movements: Extraocular movements intact. Cardiovascular:      Rate and Rhythm: Normal rate and regular rhythm. Pulses: Normal pulses. Heart sounds: No murmur heard. No friction rub. No gallop. Pulmonary:      Effort: Pulmonary effort is normal. No respiratory distress. Breath sounds: No wheezing, rhonchi or rales. Abdominal:      General: Abdomen is flat. Bowel sounds are normal. There is no distension. Palpations: Abdomen is soft. Tenderness: There is no abdominal tenderness. There is no guarding or rebound. Musculoskeletal:      Right lower leg: No edema.       Left lower leg: No edema. Comments: R hip pain to ROM   Skin:     General: Skin is warm. Capillary Refill: Capillary refill takes less than 2 seconds. Coloration: Skin is not jaundiced. Findings: No rash. Neurological:      General: No focal deficit present. Mental Status: She is alert and oriented to person, place, and time. Sensory: No sensory deficit. Motor: No weakness. Psychiatric:         Mood and Affect: Mood normal.         Behavior: Behavior normal.          Additional Data:     Labs:  Results from last 7 days   Lab Units 10/01/23  1527   WBC Thousand/uL 9.30   HEMOGLOBIN g/dL 13.1   HEMATOCRIT % 37.1   PLATELETS Thousands/uL 303   NEUTROS PCT % 83*   LYMPHS PCT % 9*   MONOS PCT % 7   EOS PCT % 0     Results from last 7 days   Lab Units 10/03/23  1414 10/03/23  0545 10/01/23  1932 10/01/23  1527   SODIUM mmol/L 131* 128*   < > 120*   POTASSIUM mmol/L  --  4.0   < > 3.8   CHLORIDE mmol/L  --  91*   < > 85*   CO2 mmol/L  --  30   < > 24   BUN mg/dL  --  7   < > 11   CREATININE mg/dL  --  0.61   < > 0.60   ANION GAP mmol/L  --  7   < > 11   CALCIUM mg/dL  --  8.8   < > 9.4   ALBUMIN g/dL  --   --   --  4.7   TOTAL BILIRUBIN mg/dL  --   --   --  1.02*   ALK PHOS U/L  --   --   --  95   ALT U/L  --   --   --  14   AST U/L  --   --   --  16   GLUCOSE RANDOM mg/dL  --  100   < > 122    < > = values in this interval not displayed. Lines/Drains:  Invasive Devices     Peripheral Intravenous Line  Duration           Peripheral IV 10/01/23 Right Antecubital 2 days                  Telemetry:  Telemetry Orders (From admission, onward)             24 Hour Telemetry Monitoring  Continuous x 24 Hours (Telem)        Question:  Reason for 24 Hour Telemetry  Answer:  Metabolic/electrolyte disturbance with high probability of dysrhythmia.  K level <3 or >6 OR KCL infusion >10mEq/hr                 Telemetry Reviewed: Normal Sinus Rhythm  Indication for Continued Telemetry Use: No indication for continued use. Will discontinue. Imaging: Reviewed radiology reports from this admission including: chest CT scan    Recent Cultures (last 7 days):         Last 24 Hours Medication List:   Current Facility-Administered Medications   Medication Dose Route Frequency Provider Last Rate   • acetaminophen  650 mg Oral Q6H PRN Baltazar Paiz MD     • amLODIPine  5 mg Oral Daily Baltazar Paiz MD     • ammonium lactate   Topical BID PRN Desmond Lambert     • cyanocobalamin  500 mcg Oral Daily Melanie Bland MD     • dextrose  500 mL Intravenous Once Merline Lindsay MD     • enoxaparin  40 mg Subcutaneous Daily Baltazar Paiz MD     • folic acid  1 mg Oral Daily Melanie Bland MD     • furosemide  40 mg Oral Daily Manuela Boland     • levalbuterol  1.25 mg Nebulization Q6H PRN Baltazar Paiz MD     • metoprolol tartrate  50 mg Oral BID Baltazar Paiz MD     • multivitamin-minerals  1 tablet Oral Daily Melanie Bland MD     • ondansetron  4 mg Intravenous Q6H PRN Baltazar Paiz MD     • pantoprazole  40 mg Oral Early Morning Melanie Bland MD     • polyethylene glycol  17 g Oral Daily Desmond Lambert     • [START ON 10/4/2023] senna-docusate sodium  1 tablet Oral HS Desmond Lambert     • thiamine  100 mg Oral Daily Baltazar Paiz MD          Today, Patient Was Seen By: Desmond Lambert    **Please Note: This note may have been constructed using a voice recognition system. **

## 2023-10-04 LAB
ANION GAP SERPL CALCULATED.3IONS-SCNC: 7 MMOL/L
BUN SERPL-MCNC: 6 MG/DL (ref 5–25)
CALCIUM SERPL-MCNC: 8.9 MG/DL (ref 8.4–10.2)
CHLORIDE SERPL-SCNC: 95 MMOL/L (ref 96–108)
CO2 SERPL-SCNC: 32 MMOL/L (ref 21–32)
CREAT SERPL-MCNC: 0.67 MG/DL (ref 0.6–1.3)
GFR SERPL CREATININE-BSD FRML MDRD: 80 ML/MIN/1.73SQ M
GLUCOSE SERPL-MCNC: 81 MG/DL (ref 65–140)
MAGNESIUM SERPL-MCNC: 1.9 MG/DL (ref 1.9–2.7)
POTASSIUM SERPL-SCNC: 3.7 MMOL/L (ref 3.5–5.3)
SODIUM SERPL-SCNC: 134 MMOL/L (ref 135–147)

## 2023-10-04 PROCEDURE — 99232 SBSQ HOSP IP/OBS MODERATE 35: CPT | Performed by: STUDENT IN AN ORGANIZED HEALTH CARE EDUCATION/TRAINING PROGRAM

## 2023-10-04 PROCEDURE — 99233 SBSQ HOSP IP/OBS HIGH 50: CPT | Performed by: INTERNAL MEDICINE

## 2023-10-04 PROCEDURE — 97535 SELF CARE MNGMENT TRAINING: CPT

## 2023-10-04 PROCEDURE — 83735 ASSAY OF MAGNESIUM: CPT | Performed by: INTERNAL MEDICINE

## 2023-10-04 PROCEDURE — 97167 OT EVAL HIGH COMPLEX 60 MIN: CPT

## 2023-10-04 PROCEDURE — 80048 BASIC METABOLIC PNL TOTAL CA: CPT | Performed by: PHYSICIAN ASSISTANT

## 2023-10-04 PROCEDURE — 93970 EXTREMITY STUDY: CPT | Performed by: SURGERY

## 2023-10-04 RX ADMIN — AMLODIPINE BESYLATE 5 MG: 5 TABLET ORAL at 08:50

## 2023-10-04 RX ADMIN — SENNOSIDES AND DOCUSATE SODIUM 1 TABLET: 50; 8.6 TABLET ORAL at 21:26

## 2023-10-04 RX ADMIN — FUROSEMIDE 40 MG: 40 TABLET ORAL at 08:50

## 2023-10-04 RX ADMIN — THIAMINE HCL TAB 100 MG 100 MG: 100 TAB at 08:50

## 2023-10-04 RX ADMIN — FOLIC ACID 1 MG: 1 TABLET ORAL at 08:50

## 2023-10-04 RX ADMIN — Medication 1 TABLET: at 08:50

## 2023-10-04 RX ADMIN — METOPROLOL TARTRATE 50 MG: 50 TABLET, FILM COATED ORAL at 08:50

## 2023-10-04 RX ADMIN — ENOXAPARIN SODIUM 40 MG: 40 INJECTION SUBCUTANEOUS at 08:51

## 2023-10-04 RX ADMIN — POLYETHYLENE GLYCOL 3350 17 G: 17 POWDER, FOR SOLUTION ORAL at 08:51

## 2023-10-04 RX ADMIN — METOPROLOL TARTRATE 50 MG: 50 TABLET, FILM COATED ORAL at 17:19

## 2023-10-04 RX ADMIN — PANTOPRAZOLE SODIUM 40 MG: 40 TABLET, DELAYED RELEASE ORAL at 05:42

## 2023-10-04 RX ADMIN — CYANOCOBALAMIN TAB 500 MCG 500 MCG: 500 TAB at 08:50

## 2023-10-04 NOTE — PLAN OF CARE
Problem: SAFETY ADULT  Goal: Patient will remain free of falls  Description: INTERVENTIONS:  - Educate patient/family on patient safety including physical limitations  - Instruct patient to call for assistance with activity   - Consult OT/PT to assist with strengthening/mobility   - Keep Call bell within reach  - Keep bed low and locked with side rails adjusted as appropriate  - Keep care items and personal belongings within reach  - Initiate and maintain comfort rounds  - Make Fall Risk Sign visible to staff  - Apply yellow socks and bracelet for high fall risk patients  - Consider moving patient to room near nurses station  Outcome: Progressing  Goal: Maintain or return to baseline ADL function  Description: INTERVENTIONS:  -  Assess patient's ability to carry out ADLs; assess patient's baseline for ADL function and identify physical deficits which impact ability to perform ADLs (bathing, care of mouth/teeth, toileting, grooming, dressing, etc.)  - Assess/evaluate cause of self-care deficits   - Assess range of motion  - Assess patient's mobility; develop plan if impaired  - Assess patient's need for assistive devices and provide as appropriate  - Encourage maximum independence but intervene and supervise when necessary  - Involve family in performance of ADLs  - Assess for home care needs following discharge   - Consider OT consult to assist with ADL evaluation and planning for discharge  - Provide patient education as appropriate  Outcome: Progressing       Problem: MOBILITY - ADULT  Goal: Maintain or return to baseline ADL function  Description: INTERVENTIONS:  -  Assess patient's ability to carry out ADLs; assess patient's baseline for ADL function and identify physical deficits which impact ability to perform ADLs (bathing, care of mouth/teeth, toileting, grooming, dressing, etc.)  - Assess/evaluate cause of self-care deficits   - Assess range of motion  - Assess patient's mobility; develop plan if impaired  - Assess patient's need for assistive devices and provide as appropriate  - Encourage maximum independence but intervene and supervise when necessary  - Involve family in performance of ADLs  - Assess for home care needs following discharge   - Consider OT consult to assist with ADL evaluation and planning for discharge  - Provide patient education as appropriate  Outcome: Progressing  Goal: Maintains/Returns to pre admission functional level  Description: INTERVENTIONS:  - Perform BMAT or MOVE assessment daily.   - Set and communicate daily mobility goal to care team and patient/family/caregiver.    - Collaborate with rehabilitation services on mobility goals if consulted  - Out of bed for toileting  - Record patient progress and toleration of activity level   Outcome: Progressing

## 2023-10-04 NOTE — CASE MANAGEMENT
Case Management Assessment & Discharge Planning Note    Patient name Archibald Dandy  Location /-95 MRN [id number]  : 1940 Date 10/4/2023       Current Admission Date: 10/1/2023  Current Admission Diagnosis:Hyponatremia   Patient Active Problem List    Diagnosis Date Noted   • Hip pain, acute, right 10/01/2023   • Cerebral atrophy ([address]) 2023   • Rash and nonspecific skin eruption 2023   • Memory loss 2023   • Displaced fracture of second metatarsal bone of left foot with routine healing 2022   • Closed displaced fracture of third metatarsal bone of left foot 2022   • Chondrocalcinosis of left knee 2022   • Cervical spondylosis 2022   • Closed displaced fracture of fourth metatarsal bone of left foot 2022   • Fall 2022   • Hyponatremia 2022   • Breast mass, right 2022   • Paresthesia of both hands    • Uncomplicated alcohol dependence ([address]) 2022   • Gastroesophageal reflux disease without esophagitis 03/15/2021   • Vitamin D deficiency 2021   • Other insomnia 09/15/2020   • Heavy alcohol use 09/15/2020   • Anxiety 09/15/2020   • Essential hypertension 09/15/2020   • Dyslipidemia 09/15/2020   • Premature atrial contractions 2020   • PVC's (premature ventricular contractions) 2020      LOS (days): 3  Geometric Mean LOS (GMLOS) (days): 3.60  Days to GMLOS:0.7     OBJECTIVE:    Risk of Unplanned Readmission Score: 14.62         Current admission status: Inpatient       Preferred Pharmacy:   [facility] ([address] [address]  Phone: [phone] Fax: [phone] [address] [facility] [address] [address] [facility]  Phone: [phone] Fax: [phone] [facility] - [address]  [place] [address]  Phone: [phone] Fax: [phone]    Primary Care Provider: Bravo Angelo MD    Primary Insurance: Janna Padron [facility]  Secondary Insurance:     ASSESSMENT:  [place] Proxies    There are no active Health Care Proxies on file. Readmission Root Cause  30 Day Readmission: No    Patient Information  Admitted from[de-identified] Home  Mental Status: Alert  During Assessment patient was accompanied by: Spouse  Assessment information provided by[de-identified] Patient, Spouse  Primary Caregiver: Spouse (Per patient  his son urszula helps him with taking care of the patient.)  Caregiver's Name[de-identified] Sultana Haji (Spouse)   [phone]  Caregiver's Relationship to Patient[de-identified] Significant Other  Caregiver's Telephone Number[de-identified] [phone]  Support Systems: Spouse/significant other, Self, Son  Home entry access options.  Select all that apply.: Stairs  Number of steps to enter home.: 3  Do the steps have railings?: No  Type of Current Residence: 2 story home  Upon entering residence, is there a bedroom on the main floor (no further steps)?: Yes  Upon entering residence, is there a bathroom on the main floor (no further steps)?: Yes  In the last 12 months, was there a time when you were not able to pay the mortgage or rent on time?: No  In the last 12 months, was there a time when you did not have a steady place to sleep or slept in a shelter (including now)?: No  Homeless/housing insecurity resource given?: N/A  Living Arrangements: Lives w/ Spouse/significant other, Lives w/ Son    Activities of Daily Living Prior to Admission  Functional Status: Independent  Completes ADLs independently?: Yes  Ambulates independently?: Yes (Patient reports she uses a walker, wheel chair and a commod as needed)  Does patient use assisted devices?: Yes  Assisted Devices (DME) used: Thor Nuñez, Wheelchair, Other (Comment) (raised toilet sit)  Does patient currently own DME?: Yes  What DME does the patient currently own?: Wheelchair, Yakov Mejia (Comment) (raised toilet sit)  Does patient have a history of Outpatient Therapy (PT/OT)?: Yes  Does the patient have a history of Short-Term Rehab?: No  Does patient have a history of HHC?: Yes  Does patient currently have [address]?: No         Patient Information Continued  Income Source: Pension/nursing home  Does patient have prescription coverage?: Yes  Within the past 12 months, you worried that your food would run out before you got the money to buy more.: Never true  Within the past 12 months, the food you bought just didn't last and you didn't have money to get more.: Never true  Food insecurity resource given?: No  Does patient receive dialysis treatments?: No  Does patient have a history of substance abuse?: No (patient reports she takes a glass of wine with her dinner)  Does patient have a history of Mental Health Diagnosis?: No         Means of Transportation  Means of Transport to Appts[de-identified] Family transport  In the past 12 months, has lack of transportation kept you from medical appointments or from getting medications?: No  In the past 12 months, has lack of transportation kept you from meetings, work, or from getting things needed for daily living?: No  Was application for public transport provided?: N/A        DISCHARGE DETAILS:    Discharge planning discussed with[de-identified] CM spoke with patient and spouse  Freedom of Choice: Yes  Comments - Freedom of Choice: CM reviewed role with patient and family. PT recommended home health care. Patient declined the recommendation. .  is aware and in agreement with patient decision.   CM contacted family/caregiver?: Yes  Were Treatment Team discharge recommendations reviewed with patient/caregiver?: Yes  Did patient/caregiver verbalize understanding of patient care needs?: Yes  Were patient/caregiver advised of the risks associated with not following Treatment Team discharge recommendations?: Yes    Contacts  Patient Contacts: Mira Juárez (Spouse) [phone]  Relationship to Patient[de-identified] Family  Contact Method:  In Person  Reason/Outcome: Discharge [address]         Is the patient interested in [address] [facility] at discharge?: No    DME Referral Provided  Referral made for DME?: No              Treatment Team Recommendation: Home with [address] (Pt declined home health care offer)  Discharge Destination Plan[de-identified] Home

## 2023-10-04 NOTE — PLAN OF CARE
Problem: PAIN - ADULT  Goal: Verbalizes/displays adequate comfort level or baseline comfort level  Description: Interventions:  - Encourage patient to monitor pain and request assistance  - Assess pain using appropriate pain scale  - Administer analgesics based on type and severity of pain and evaluate response  - Implement non-pharmacological measures as appropriate and evaluate response  - Consider cultural and social influences on pain and pain management  - Notify physician/advanced practitioner if interventions unsuccessful or patient reports new pain  Outcome: Progressing     Problem: INFECTION - ADULT  Goal: Absence or prevention of progression during hospitalization  Description: INTERVENTIONS:  - Assess and monitor for signs and symptoms of infection  - Monitor lab/diagnostic results  - Monitor all insertion sites, i.e. indwelling lines, tubes, and drains  - Monitor endotracheal if appropriate and nasal secretions for changes in amount and color  - Merrill appropriate cooling/warming therapies per order  - Administer medications as ordered  - Instruct and encourage patient and family to use good hand hygiene technique  - Identify and instruct in appropriate isolation precautions for identified infection/condition  Outcome: Progressing  Goal: Absence of fever/infection during neutropenic period  Description: INTERVENTIONS:  - Monitor WBC    Outcome: Progressing     Problem: SAFETY ADULT  Goal: Patient will remain free of falls  Description: INTERVENTIONS:  - Educate patient/family on patient safety including physical limitations  - Instruct patient to call for assistance with activity   - Consult OT/PT to assist with strengthening/mobility   - Keep Call bell within reach  - Keep bed low and locked with side rails adjusted as appropriate  - Keep care items and personal belongings within reach  - Initiate and maintain comfort rounds  - Make Fall Risk Sign visible to staff  - Offer Toileting every  Hours, in advance of need  - Initiate/Maintain alarm  - Obtain necessary fall risk management equipment:   - Apply yellow socks and bracelet for high fall risk patients  - Consider moving patient to room near nurses station  Outcome: Progressing  Goal: Maintain or return to baseline ADL function  Description: INTERVENTIONS:  -  Assess patient's ability to carry out ADLs; assess patient's baseline for ADL function and identify physical deficits which impact ability to perform ADLs (bathing, care of mouth/teeth, toileting, grooming, dressing, etc.)  - Assess/evaluate cause of self-care deficits   - Assess range of motion  - Assess patient's mobility; develop plan if impaired  - Assess patient's need for assistive devices and provide as appropriate  - Encourage maximum independence but intervene and supervise when necessary  - Involve family in performance of ADLs  - Assess for home care needs following discharge   - Consider OT consult to assist with ADL evaluation and planning for discharge  - Provide patient education as appropriate  Outcome: Progressing  Goal: Maintains/Returns to pre admission functional level  Description: INTERVENTIONS:  - Perform BMAT or MOVE assessment daily.   - Set and communicate daily mobility goal to care team and patient/family/caregiver. - Collaborate with rehabilitation services on mobility goals if consulted  - Perform Range of Motion  times a day. - Reposition patient every  hours.   - Dangle patient  times a day  - Stand patient times a day  - Ambulate patient  times a day  - Out of bed to chair  times a day   - Out of bed for meals  times a day  - Out of bed for toileting  - Record patient progress and toleration of activity level   Outcome: Progressing     Problem: DISCHARGE PLANNING  Goal: Discharge to home or other facility with appropriate resources  Description: INTERVENTIONS:  - Identify barriers to discharge w/patient and caregiver  - Arrange for needed discharge resources and transportation as appropriate  - Identify discharge learning needs (meds, wound care, etc.)  - Arrange for interpretive services to assist at discharge as needed  - Refer to Case Management Department for coordinating discharge planning if the patient needs post-hospital services based on physician/advanced practitioner order or complex needs related to functional status, cognitive ability, or social support system  Outcome: Progressing     Problem: Knowledge Deficit  Goal: Patient/family/caregiver demonstrates understanding of disease process, treatment plan, medications, and discharge instructions  Description: Complete learning assessment and assess knowledge base. Interventions:  - Provide teaching at level of understanding  - Provide teaching via preferred learning methods  Outcome: Progressing     Problem: MOBILITY - ADULT  Goal: Maintain or return to baseline ADL function  Description: INTERVENTIONS:  -  Assess patient's ability to carry out ADLs; assess patient's baseline for ADL function and identify physical deficits which impact ability to perform ADLs (bathing, care of mouth/teeth, toileting, grooming, dressing, etc.)  - Assess/evaluate cause of self-care deficits   - Assess range of motion  - Assess patient's mobility; develop plan if impaired  - Assess patient's need for assistive devices and provide as appropriate  - Encourage maximum independence but intervene and supervise when necessary  - Involve family in performance of ADLs  - Assess for home care needs following discharge   - Consider OT consult to assist with ADL evaluation and planning for discharge  - Provide patient education as appropriate  Outcome: Progressing  Goal: Maintains/Returns to pre admission functional level  Description: INTERVENTIONS:  - Perform BMAT or MOVE assessment daily.   - Set and communicate daily mobility goal to care team and patient/family/caregiver.    - Collaborate with rehabilitation services on mobility goals if consulted  - Perform Range of Motion  times a day. - Reposition patient every hours. - Dangle patient  times a day  - Stand patient  times a day  - Ambulate patient  times a day  - Out of bed to chair  times a day   - Out of bed for meals  times a day  - Out of bed for toileting  - Record patient progress and toleration of activity level   Outcome: Progressing     Problem: NEUROSENSORY - ADULT  Goal: Achieves stable or improved neurological status  Description: INTERVENTIONS  - Monitor and report changes in neurological status  - Monitor vital signs such as temperature, blood pressure, glucose, and any other labs ordered   - Initiate measures to prevent increased intracranial pressure  - Monitor for seizure activity and implement precautions if appropriate      Outcome: Progressing  Goal: Achieves maximal functionality and self care  Description: INTERVENTIONS  - Monitor swallowing and airway patency with patient fatigue and changes in neurological status  - Encourage and assist patient to increase activity and self care.    - Encourage visually impaired, hearing impaired and aphasic patients to use assistive/communication devices  Outcome: Progressing     Problem: METABOLIC, FLUID AND ELECTROLYTES - ADULT  Goal: Electrolytes maintained within normal limits  Description: INTERVENTIONS:  - Monitor labs and assess patient for signs and symptoms of electrolyte imbalances  - Administer electrolyte replacement as ordered  - Monitor response to electrolyte replacements, including repeat lab results as appropriate  - Instruct patient on fluid and nutrition as appropriate  Outcome: Progressing  Goal: Fluid balance maintained  Description: INTERVENTIONS:  - Monitor labs   - Monitor I/O and WT  - Instruct patient on fluid and nutrition as appropriate  - Assess for signs & symptoms of volume excess or deficit  Outcome: Progressing

## 2023-10-04 NOTE — PROGRESS NOTES
[address]  Progress Note  Name: Kristal Buchanan  MRN: [id number]  Unit/Bed#: -01 I Date of Admission: 10/1/2023   Date of Service: 10/4/2023 I Hospital Day: 3    Assessment/Plan   Hip pain, acute, right  Assessment & Plan  · Complains of right hip pain, x-ray of the hip shows no fractures however if persistent pain can consider CT of the hip. · Has a history of bilateral hip arthroplasty in the past.  · Ortho evaluated pt and recommending outpatient followup  · PT/OT ordered    Memory loss  Assessment & Plan  · Continue supportive care, patient has stopped taking Lexapro. Rash and nonspecific skin eruption  Assessment & Plan  · Rash noted on the lower extremity and on the abdominal wall, likely fluid overload and since chronic lymphedema of the lower extremity,  · Will place Ace wraps to the lower extremity. Monitor clinically. Dyslipidemia  Assessment & Plan  · Not on statin. Essential hypertension  Assessment & Plan  · Blood pressure is controlled on Norvasc and metoprolol. Heavy alcohol use  Assessment & Plan  · Patient reports to drink at least 1 glass of wine every day to help her to sleep. · Has no history of DTs, or seizures  · Patient denies of any recent falls. However complains of right hip pain. · Will place on CIWA protocol  · Will replace magnesium  · Will give thiamine and folate and multivitamin  · Advised about alcohol cessation. * Hyponatremia  Assessment & Plan  · Patient presents with increasing shortness of breath and lower extremity edema bilaterally. Leg swelling is thighs with 2-3+ edema noted. · Admits to drink 1 glass of wine every day for the past few years  · Hyponatremia likely due to fluid overload /hypervolemic,  · We will check TSH, cortisol level, urine lites and osmolality. ,  · Recent echocardiogram showed EF of 60% with normal systolic and diastolic function  · Patient is on Lasix 40 mg as needed however likely not compliant  · Also admits to take ibuprofen on a daily basis  · Patient received 50 mg of IV Lasix in the ED  · Will give IV Lasix 40 mg twice daily and monitor input output. ·  Chest x-ray shows bilateral pleural effusion with mild congestion,  · Nephrology input highly appreciated. Urine studies were reviewed, shows urine sodium of 36, osmolality 541, serum osmolality of 252, TSH and cortisol level normal,  · Hypoosmolar hyponatremia with SIADH, multifactorial with use of NSAIDs and fluid overload with lower extremity edema and chronic alcohol use and right hip pain. ·  today, appreciate nephrology comanagement             VTE Pharmacologic Prophylaxis: VTE Score: 8 Moderate Risk (Score 3-4) - Pharmacological DVT Prophylaxis Ordered: enoxaparin (Lovenox). Patient Centered Rounds: I performed bedside rounds with nursing staff today. Discussions with Specialists or Other Care Team Provider: N/A    Education and Discussions with Family / Patient: Patient declined call to . Total Time Spent on Date of Encounter in care of patient: 45 mins. This time was spent on one or more of the following: performing physical exam; counseling and coordination of care; obtaining or reviewing history; documenting in the medical record; reviewing/ordering tests, medications or procedures; communicating with other healthcare professionals and discussing with patient's family/caregivers. Current Length of Stay: 3 day(s)  Current Patient Status: Inpatient   Certification Statement: The patient will continue to require additional inpatient hospital stay due to Hyponatremia  Discharge Plan: Anticipate discharge in 24-48 hrs to discharge location to be determined pending rehab evaluations. Code Status: Level 1 - Full Code    Subjective:   Seen and examined at bedside, Chrissy First states she feels well, she feels like she is having trouble ambulating and standing up from bed given her right hip pain.   She is not fully sure she can comfortably return home. No new concerns. Objective:     Vitals:   Temp (24hrs), Av.3 °F (36.8 °C), Min:97.9 °F (36.6 °C), Max:98.6 °F (37 °C)    Temp:  [97.9 °F (36.6 °C)-98.6 °F (37 °C)] 98.6 °F (37 °C)  HR:  [66-88] 88  Resp:  [12-20] 20  BP: (114-151)/(72-85) 150/72  SpO2:  [95 %-99 %] 95 %  Body mass index is 25.22 kg/m². Input and Output Summary (last 24 hours): Intake/Output Summary (Last 24 hours) at 10/4/2023 1600  Last data filed at 10/4/2023 1045  Gross per 24 hour   Intake 591 ml   Output 2400 ml   Net -1809 ml       Physical Exam:   Physical Exam  Vitals reviewed. Constitutional:       General: She is not in acute distress. Appearance: She is not ill-appearing. HENT:      Head: Normocephalic. Mouth/Throat:      Mouth: Mucous membranes are moist.   Eyes:      General: No scleral icterus. Extraocular Movements: Extraocular movements intact. Cardiovascular:      Rate and Rhythm: Normal rate and regular rhythm. Pulses: Normal pulses. Heart sounds: No murmur heard. No friction rub. No gallop. Pulmonary:      Effort: Pulmonary effort is normal. No respiratory distress. Breath sounds: No wheezing, rhonchi or rales. Abdominal:      General: Abdomen is flat. Bowel sounds are normal. There is no distension. Palpations: Abdomen is soft. Tenderness: There is no abdominal tenderness. There is no guarding or rebound. Musculoskeletal:      Right lower leg: No edema. Left lower leg: No edema. Comments: R hip pain to ROM   Skin:     General: Skin is warm. Capillary Refill: Capillary refill takes less than 2 seconds. Coloration: Skin is not jaundiced. Findings: No rash. Neurological:      General: No focal deficit present. Mental Status: She is alert and oriented to person, place, and time. Sensory: No sensory deficit. Motor: No weakness.    Psychiatric:         Mood and Affect: Mood normal. Behavior: Behavior normal.          Additional Data:     Labs:  Results from last 7 days   Lab Units 10/01/23  1527   WBC Thousand/uL 9.30   HEMOGLOBIN g/dL 13.1   HEMATOCRIT % 37.1   PLATELETS Thousands/uL 303   NEUTROS PCT % 83*   LYMPHS PCT % 9*   MONOS PCT % 7   EOS PCT % 0     Results from last 7 days   Lab Units 10/04/23  0432 10/01/23  1932 10/01/23  1527   SODIUM mmol/L 134*   < > 120*   POTASSIUM mmol/L 3.7   < > 3.8   CHLORIDE mmol/L 95*   < > 85*   CO2 mmol/L 32   < > 24   BUN mg/dL 6   < > 11   CREATININE mg/dL 0.67   < > 0.60   ANION GAP mmol/L 7   < > 11   CALCIUM mg/dL 8.9   < > 9.4   ALBUMIN g/dL  --   --  4.7   TOTAL BILIRUBIN mg/dL  --   --  1.02*   ALK PHOS U/L  --   --  95   ALT U/L  --   --  14   AST U/L  --   --  16   GLUCOSE RANDOM mg/dL 81   < > 122    < > = values in this interval not displayed. Lines/Drains:  Invasive Devices     Peripheral Intravenous Line  Duration           Peripheral IV 10/01/23 Right Antecubital 3 days                  Telemetry:  Telemetry Orders (From admission, onward)             24 Hour Telemetry Monitoring  Continuous x 24 Hours (Telem)        Expiring   Question:  Reason for 24 Hour Telemetry  Answer:  Metabolic/electrolyte disturbance with high probability of dysrhythmia. K level <3 or >6 OR KCL infusion >10mEq/hr                 Telemetry Reviewed: Normal Sinus Rhythm  Indication for Continued Telemetry Use: No indication for continued use. Will discontinue.               Imaging: Reviewed radiology reports from this admission including: xray(s)    Recent Cultures (last 7 days):         Last 24 Hours Medication List:   Current Facility-Administered Medications   Medication Dose Route Frequency Provider Last Rate   • acetaminophen  650 mg Oral Q6H PRN Endy Menon MD     • amLODIPine  5 mg Oral Daily Endy Menon MD     • ammonium lactate   Topical BID PRN Shira Solano     • cyanocobalamin  500 mcg Oral Daily Christie Gonzalez MD     • enoxaparin  40 mg Subcutaneous Daily Christie Gonzalez MD     • folic acid  1 mg Oral Daily Melanie Newton MD     • furosemide  40 mg Oral Daily Manuela Boland     • levalbuterol  1.25 mg Nebulization Q6H PRN Christie Gonzalez MD     • metoprolol tartrate  50 mg Oral BID Christie Gonzalez MD     • multivitamin-minerals  1 tablet Oral Daily Melanie Newton MD     • ondansetron  4 mg Intravenous Q6H PRN Christie Gonzalez MD     • pantoprazole  40 mg Oral Early Morning Melanie Newton MD     • polyethylene glycol  17 g Oral Daily Cindy Silva     • senna-docusate sodium  1 tablet Oral HS Cindy Silva     • thiamine  100 mg Oral Daily Christie Gonzalez MD          Today, Patient Was Seen By: Cindy Silva    **Please Note: This note may have been constructed using a voice recognition system. **

## 2023-10-04 NOTE — ASSESSMENT & PLAN NOTE
· Patient presents with increasing shortness of breath and lower extremity edema bilaterally. Leg swelling is thighs with 2-3+ edema noted. · Admits to drink 1 glass of wine every day for the past few years  · Hyponatremia likely due to fluid overload /hypervolemic,  · We will check TSH, cortisol level, urine lites and osmolality. ,  · Recent echocardiogram showed EF of 60% with normal systolic and diastolic function  · Patient is on Lasix 40 mg as needed however likely not compliant  · Also admits to take ibuprofen on a daily basis  · Patient received 50 mg of IV Lasix in the ED  · Will give IV Lasix 40 mg twice daily and monitor input output. ·  Chest x-ray shows bilateral pleural effusion with mild congestion,  · Nephrology input highly appreciated. Urine studies were reviewed, shows urine sodium of 36, osmolality 541, serum osmolality of 252, TSH and cortisol level normal,  · Hypoosmolar hyponatremia with SIADH, multifactorial with use of NSAIDs and fluid overload with lower extremity edema and chronic alcohol use and right hip pain.   ·  today, appreciate nephrology comanagement

## 2023-10-04 NOTE — ASSESSMENT & PLAN NOTE
· Complains of right hip pain, x-ray of the hip shows no fractures however if persistent pain can consider CT of the hip.   · Has a history of bilateral hip arthroplasty in the past.  · Ortho evaluated pt and recommending outpatient followup  · PT/OT ordered

## 2023-10-04 NOTE — OCCUPATIONAL THERAPY NOTE
Occupational Therapy Evaluation/Treatment     Patient Name: Johnnie Shepherd  AVMARTHAO Date: 10/4/2023  Problem List  Principal Problem:    Hyponatremia  Active Problems:    Heavy alcohol use    Essential hypertension    Dyslipidemia    Rash and nonspecific skin eruption    Memory loss    Hip pain, acute, right    Past Medical History  Past Medical History:   Diagnosis Date    Anxiety     Arthritis     Prajapati esophagus     Cataract     Continuous chronic alcoholism (HCC)     GERD (gastroesophageal reflux disease)     Heavy alcohol use     Hyperlipidemia     Hypertension     Insomnia     Palpitations      Past Surgical History  Past Surgical History:   Procedure Laterality Date    BREAST IMPLANT Bilateral     CATARACT EXTRACTION Right     CHOLECYSTECTOMY      LAP    COLONOSCOPY      HYSTERECTOMY      age 69-[place]    JOINT REPLACEMENT Bilateral     hips-2010 and 2017-left leg is shorter    LASIK Bilateral     in her 50's    IN XCAPSL CTRC RMVL INSJ IO LENS PROSTH W/O ECP Left 10/18/2018    Procedure: EXTRACTION EXTRACAPSULAR CATARACT PHACO INTRAOCULAR LENS (IOL); Surgeon: Rosa Hernandez MD;  Location: [facility] MAIN OR;  Service: Ophthalmology    TONSILLECTOMY      TUBAL LIGATION             10/04/23 1210   OT Last Visit   OT Visit Date 10/04/23   Note Type   Note type Evaluation  (+ [id number])   Pain Assessment   Pain Assessment Tool 0-10   Pain Score 10 - Worst Possible Pain   Pain Location/Orientation Orientation: Right;Location: Hip  (0/10 at rest)   Pain Radiating Towards n/a   Pain Onset/Description Frequency: Intermittent; Descriptor: Discomfort   Effect of Pain on Daily Activities limits comfort, fnxl mobility, activity tolerance   Patient's Stated Pain Goal No pain   Hospital Pain Intervention(s) Repositioned; Ambulation/increased activity; Emotional support   Multiple Pain Sites No   Restrictions/Precautions   Weight Bearing Precautions Per Order Yes   RLE Weight Bearing Per Order WBAT  (Per ortho)   Other Precautions WBS; Chair Alarm; Bed Alarm;Cognitive;Multiple lines;Telemetry; Fall Risk;Pain   Home Living   Type of [address] [place] [address] Dr Performs ADLs on one level; Able to live on main level with bedroom/bathroom; One level;Stairs to enter with rails  (3 AR)   Bathroom Shower/Tub Tub/shower unit   Bathroom Toilet Standard   Bathroom Equipment Grab bars in shower;Commode   [address] Walker;Cane;Grab bars  (RW; SPC)   Prior Function   Level of [place] Independent with ADLs; Independent with functional mobility; Needs assistance with IADLS   Lives With Spouse  (Dtr lives on the second level of the home)   Receives Help From Family;Friend(s)   IADLs Independent with meal prep; Independent with medication management; Family/Friend/Other provides transportation  (Splits home managment task with spouse; spouse provides transportation)   Falls in the last 6 months 1 to 4  (Pt inconsistent with number of falls)   Vocational Retired   Comments Pt is poor/quetionable historian at time of eval. Pt reports being independent with ADLs and functional mobility for household distance w/o use of AD. Pts reports that her daughter lives upstairs and she plans for her to assist with needs when d/c'd from the hospital.   Lifestyle   Autonomy At baseline pt is (I) c ADLs/IADLs and functional mobility w/o use of AD. Lives with spouse in a [address] [facility]. (+) steps, falls; (-) driving. Reciprocal Relationships Supportive family and friends   Service to Others Retired   [employer]   Additional Pertinent History Pt admitted 10/1/2023 from home with B leg swelling, ambulatory dysfunction and increased R hip pain. x-ray of the hip shows no fractures. Family/Caregiver Present No   Additional General Comments PMHx: B SOLITARIO, cataracts, HLD, GERD, HTN, memory loss, falls   Subjective   Subjective "I put this make-up on for my visitors"   ADL   Eating Assistance 5  Supervision/Setup   Eating Deficit Setup; Beverage management   Grooming Assistance 5  Supervision/Setup   UB Bathing Assistance 5  Supervision/Setup   LB Bathing Assistance 4  Minimal Assistance   UB Dressing Assistance 5  Supervision/Setup    [place]  4  Minimal Assistance   Additional Comments Pt is limited by pain in R hip, decreased functional reach, impaired cognition and decreased activity tolerance. Bed Mobility   Supine to Sit 5  Supervision   Additional items Assist x 1; Increased time required;Verbal cues   Sit to Supine 4  Minimal assistance   Additional items Assist x 1;HOB elevated; Bedrails; Increased time required;Verbal cues;LE management  (R LE managment d/t high levels of pain)   Additional Comments S to scoot fwd towards EOB for optimal seated position and balance. Transfers   Sit to Stand 4  Minimal assistance   Additional items Assist x 1; Increased time required;Verbal cues   Stand to Sit 5  Supervision   Additional items Assist x 1; Increased time required;Verbal cues   Stand pivot 4  Minimal assistance  (CGA x 1)   Additional items Assist x 1; Increased time required;Verbal cues  (RW)   Additional Comments Verbal cues for optimal hand placement and body mechanics for safe transfers. Functional Mobility   Functional Mobility 4  Minimal assistance   Additional Comments ALFRED for functional household distance to the bathroom with use of RW.    Additional items Rolling walker   Balance   Static Sitting Fair +   Dynamic Sitting Fair   Static Standing Fair   Dynamic Standing Fair -   Activity Tolerance   Activity Tolerance Patient limited by fatigue;Patient limited by pain   Medical Staff Made Aware Spoke with care coordination, SLIM, PT   Nurse Made Aware Spoke with NU ELIZONDO Assessment   RUE Assessment WFL  (MMT 3+/5 grossly, fair  strength)   LUE Assessment   LUE Assessment WFL  (MMT 3+/5 grossly, fair  strength)   Hand Function   Gross Motor Coordination Functional   Fine Motor Coordination Functional   Sensation   Light Touch No apparent deficits   Vision-Basic Assessment   Visual History Cataracts   Cognition   Overall Cognitive Status Impaired   Arousal/Participation Alert; Responsive; Cooperative   Attention Attends with cues to redirect   Orientation Level Oriented to person;Oriented to time  (states she is in a hospital but does not know where; Reports correct year and month with extended time and cueing.)   Memory Decreased recall of precautions;Decreased short term memory;Decreased recall of recent events   Following Commands Follows one step commands with increased time or repetition   Comments Pt displays decreased insight into overall limitations and safety awareness. Assessment   Limitation Decreased ADL status; Decreased UE ROM; Decreased UE strength;Decreased Safe judgement during ADL;Decreased cognition;Decreased endurance;Decreased self-care trans;Decreased high-level ADLs   Prognosis Good   Assessment Patient is a 78 y.o. female seen for OT evaluation and treatment  at [facility] following admission on 10/1/2023  s/p Hyponatremia. Please see above for comprehensive list of comorbidities and significant PMHx impacting functional performance. At baseline, pt is independent with ADLs/ basic IADLs and functional mobility. Upon initial evaluation, pt appears to be performing below baseline functional status. Pt requires supervision for UB ADLs, ALFRED for LB ADLs, ALFRED for bed mobility, ALFRED for transfers and  [facility] for functional mobility household distance with RW. The AM-PAC & Barthel Index outcome tools were used to assist in determining pt safety w/self care /mobility and appropriate d/c recommendations, see above for score.  Occupational performance is affected by the following deficits: endurance ,  decreased muscular strength , decreased balance , decreased standing tolerance for self care tasks , decreased dynamic balance impacting functional reach, decreased functional reach , decreased activity tolerance , impaired memory , attention to task, impaired judgement and problem solving , decreased emotional regulation and coping skills , impaired safety awareness , (+) pain , impaired learning ability d/t current cognitive status , impaired global mental status and direction following. Personal/Environmental factors impacting D/C include: (+) Hx of falls , steps to enter/navigate the home, Assistance needed for ADL/IADLs, Assistance needed for ADLs and functional mobility, High fall risk , decreased insight toward deficits , decreased recall of precautions  and baseline cognitive deficits. Supporting factors include: able to maintain FFSU, accessible home environment and attitude towards recovery Patient would benefit from OT services within the acute care setting to maximize level of functional independence in the following areas fall prevention , self-care transfers, bed mobility , functional mobility, formal cognitive evaluation and ADLs. From OT standpoint, recommendation at time of D/C would be return to previous environment with home health rehabilitation. Goals   Patient Goals to return home   LTG Time Frame 10-14   Long Term Goal See below   Plan   Treatment Interventions ADL retraining;Functional transfer training;UE strengthening/ROM; Endurance training;Cognitive reorientation;Equipment evaluation/education;Patient/family training; Compensatory technique education; Energy conservation; Activityengagement   Goal Expiration Date 10/14/23   OT Treatment Day 1   OT Frequency 3-5x/wk   Recommendation   OT Discharge Recommendation Home with home health rehabilitation   Additional Comments  The patient's raw score on the AM-PAC Daily Activity Inpatient Short Form is 21. A raw score of greater than or equal to 19 suggests the patient may benefit from discharge to home. Please refer to the recommendation of the Occupational Therapist for safe discharge planning. AM-PAC Daily Activity Inpatient   Lower Body Dressing 3   Bathing 3   Toileting 3   Upper Body Dressing 4   Grooming 4   Eating 4   Daily Activity Raw Score 21   Daily Activity Standardized Score (Calc for Raw Score >=11) 44.27   AM-PAC Applied Cognition Inpatient   Following a Speech/Presentation 3   Understanding Ordinary Conversation 4   Taking Medications 3   Remembering Where Things Are Placed or Put Away 3   Remembering List of 4-5 Errands 3   Taking Care of Complicated Tasks 3   Applied Cognition Raw Score 19   Applied Cognition Standardized Score 39.77   Barthel Index   Feeding 10   Bathing 0   Grooming Score 5   Dressing Score 5   Bladder Score 10   Bowels Score 10   Toilet Use Score 5   Transfers (Bed/Chair) Score 10   Mobility (Level Surface) Score 0   Stairs Score 0   Barthel Index Score 55   Additional Treatment Session   Start Time 1200   End Time 1210   Treatment Assessment Pt participated in tx session following IE for ADL training and to further challenge activity tolerance. Pt completed ambulating toilet transfer to/from standard toilet with use of grab bar. CGA c verbal cueing for controlled sit/stand from standard toilet. S for pericare while seated on the toilet. ALFRED to [facility] underwear c S to pull over hips in stance w/o steadying. Pt tolerated standing at the sink for ~6 mins to complete face/hand washing/drying and oral care with setup/supervision. Pt with overall fair + balance w/o support or steadying needed. Pt ambulated functional household distance to the EOB w/o use of AD with variable CGA/ALFRED. Recommend pt uses the RW for functional mobility to improve steadiness and decrease risk of falling. Pt verbalized understanding. At the end of the session, pt remained supine in bed with all needs in reach and alarm donned.  At this time, pt will benefit from continued skilled OT services to address functional performance deficits and maximize independence in functional mobility and ADL tasks. End of Consult   Education Provided Yes   Patient Position at End of Consult Supine;Bed/Chair alarm activated; All needs within reach   Nurse Communication Nurse aware of consult       Goals established on initial evaluation in order to achieve pt's goal of returning to home. Pt will complete UB ADLs Independent   for increased ADL independence within 10 days. Pt will complete LB ADLs Mod independent   for increased ADL independence within 10 days. Pt will complete toileting Mod independent   with use of DME for increased ADL independence within 10 days. Pt will demonstrate proper body mechanics to complete self-care transfers and functional mobility with Mod independent  and use of LRAD for increased safety and functional independence within 10 days. Pt will demonstrate standing tolerance of 8 min with Mod independent  and use of LRAD for increased activity tolerance during ADL/IADL tasks within 10 days. Pt will complete bed mobility Mod independent  for increased independence in repositioning, pressure offloading, and managing comfort. Pt will demonstrate proper body mechanics and fall prevention strategies during 100% of tx sessions for increased safety awareness during ADL/IADLs    Pt will demonstrate activity tolerance of 30 min in therapeutic tasks for increased participation in meaningful activities upon D/C. Pt will receive education on use of compensatory memory strategies for increased safety and independent with IADL tasks. Pt will participate in ongoing cognitive assessments to assist with safe D/C planning and supervision/assistance recommendations. Pt will demonstrate OOB sitting tolerance of 2-4 hr/day for increased activity tolerance and engagement in leisure activities within 10 days.        Rakel Beatty, [address] [facility] OTR/L   [address] Licensure# [id number]

## 2023-10-04 NOTE — PROGRESS NOTES
NEPHROLOGY PROGRESS NOTE   Tae Ji 78 y.o. female MRN: [id number]  Unit/Bed#: -01 Encounter: [id number]    ASSESSMENT & PLAN:   78 y.o. 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 Also chronic alcohol intake contributing. -CT chest abdomen pelvis with contrast-no PE, small to moderate right pleural effusion and left pleural effusion. No malignancy  -Hospital Course: received iv lasix initially and was started on oral Lasix 40 daily on 10/3. Also received tolvaptan 7.5 mg on 10/2  -Plan:   • Sodium level improved status post tolvaptan and patient required D5 water on 10/3 due to overcorrection. Today sodium level is at acceptable range at 134 meq/L, renal function is stable at creatinine 0.67 mg/dL  • Continue Lasix 40 mg daily and continue fluid restriction 1.5 L/day. Overall good urine output in last 24 hours and maintaining negative balance  • Repeat BMP tomorrow a.m. to monitor sodium level. Overall this patient is stable from nephrology side. Currently she is on PPI which could also contribute to hyponatremia, would continue to monitor      Hypokalemia/hypomagnesemia  -Resolved status post replacement. Potassium level 3.7 meq/L today     Primary hypertension  -Blood pressure acceptable, slightly on the higher side today  -Current treatment with amlodipine and metoprolol.   Continue Lasix 40 mg daily.       Lower extremity edema/fluid overload  -Chest x-ray official report without any evidence of fluid overload but CT chest abdomen pelvis suggestive of bilateral pleural effusion. -UA with trace protein. Echocardiogram from September showed ejection fraction 60%, elevated right ventricular systolic pressure to 46 mmHg  -Also on amlodipine which could be contributing, would monitor  -Was started on Lasix 40 mg daily on 10/3 after initial treatment with IV Lasix. Continue current dose of Lasix 40 mg daily.     Chronic alcohol abuse, recommended decreasing alcohol intake. Recommend fluid restriction. Above plan regarding management of hyponatremia with use of oral Lasix 40 mg daily and fluid restriction was discussed with primary team and agreed with the plan       SUBJECTIVE:  Complaining of right thigh/hip pain . no chest pain or shortness of breath, no nausea vomiting    OBJECTIVE:  Current Weight: Weight - Scale: 70.9 kg (156 lb 4 oz)  Vitals:    10/04/23 0700   BP: 151/85   Pulse: 71   Resp: 12   Temp: 98.2 °F (36.8 °C)   SpO2: 96%       Intake/Output Summary (Last 24 hours) at 10/4/2023 0858  Last data filed at 10/3/2023 2300  Gross per 24 hour   Intake 118 ml   Output 1600 ml   Net -1482 ml       Physical Exam  General:  Ill looking, awake. Eyes: Conjunctivae pink,  Sclera anicteric  ENT: lips and mucous membranes moist  Neck: supple   Chest: Clear to Auscultation both lungs,  no crackles, ronchus or wheezing. CVS: S1 & S2 present, normal rate, regular rhythm, no murmur.   Abdomen: soft, non-tender, non-distended, Bowel sounds normoactive  Extremities: trace edema of  legs  Skin: no rash  Neuro: awake, alert, oriented x 3   Psych: Mood and affect appropriate     Medications:    Current Facility-Administered Medications:   •  acetaminophen (TYLENOL) tablet 650 mg, 650 mg, Oral, Q6H PRN, Yani Oconnor MD, 650 mg at 10/03/23 2024  •  amLODIPine (NORVASC) tablet 5 mg, 5 mg, Oral, Daily, Yani Oconnor MD, 5 mg at 10/04/23 0850  •  ammonium lactate (LAC-HYDRIN) 12 % lotion, , Topical, BID PRN, Apoorva Goldman  •  cyanocobalamin (VITAMIN B-12) tablet 500 mcg, 500 mcg, Oral, Daily, Dyllan Bailey MD, 500 mcg at 10/04/23 0850  •  enoxaparin (LOVENOX) subcutaneous injection 40 mg, 40 mg, Subcutaneous, Daily, Dyllan Bailey MD, 40 mg at 85/00/27 0908  •  folic acid (FOLVITE) tablet 1 mg, 1 mg, Oral, Daily, Dyllan Bailey MD, 1 mg at 10/04/23 0850  •  furosemide (LASIX) tablet 40 mg, 40 mg, Oral, Daily, [facility], 40 mg at 10/04/23 0850  •  levalbuterol (Ashley Abhishek) inhalation solution 1.25 mg, 1.25 mg, Nebulization, Q6H PRN, Dyllan Bailey MD, 1.25 mg at 10/02/23 0126  •  metoprolol tartrate (LOPRESSOR) tablet 50 mg, 50 mg, Oral, BID, Dyllan Bailey MD, 50 mg at 10/04/23 0850  •  multivitamin-minerals (CENTRUM) tablet 1 tablet, 1 tablet, Oral, Daily, Dyllan Bailey MD, 1 tablet at 10/04/23 0850  •  ondansetron (ZOFRAN) injection 4 mg, 4 mg, Intravenous, Q6H PRN, Dyllan Bailey MD  •  pantoprazole (PROTONIX) EC tablet 40 mg, 40 mg, Oral, Early Morning, Dyllan Bailey MD, 40 mg at 10/04/23 0542  •  polyethylene glycol (MIRALAX) packet 17 g, 17 g, Oral, Daily, Apoorva Goldman, 17 g at 10/04/23 8896  •  senna-docusate sodium (SENOKOT S) 8.6-50 mg per tablet 1 tablet, 1 tablet, Oral, HS, Apoorva Goldman  •  thiamine tablet 100 mg, 100 mg, Oral, Daily, Dyllan Bailey MD, 100 mg at 10/04/23 0850    Invasive Devices:        Lab Results:   Results from last 7 days   Lab Units 10/04/23  0432 10/03/23  0545 10/02/23  1518 10/02/23  0806 10/02/23  0412 10/01/23  1932 10/01/23  1527   WBC Thousand/uL  --   --   --   --   --   --  9.30   HEMOGLOBIN g/dL  --   --   --   --   --   --  13.1   HEMATOCRIT %  --   --   --   --   --   --  37.1   PLATELETS Thousands/uL  --   --   --   --   --   --  303   POTASSIUM mmol/L 3.7 4.0 3.4*   < > 3.0*   < > 3.8 CHLORIDE mmol/L 95* 91* 84*   < > 84*   < > 85*   CO2 mmol/L 32 30 28   < > 27   < > 24   BUN mg/dL 6 7 8   < > 8   < > 11   CREATININE mg/dL 0.67 0.61 0.56*   < > 0.60   < > 0.60   CALCIUM mg/dL 8.9 8.8 8.5   < > 8.6   < > 9.4   MAGNESIUM mg/dL 1.9  --   --   --  1.7*  --  1.7*   ALK PHOS U/L  --   --   --   --   --   --  95   ALT U/L  --   --   --   --   --   --  14   AST U/L  --   --   --   --   --   --  16    < > = values in this interval not displayed. Previous work up:         Portions of the record may have been created with voice recognition software. Occasional wrong word or "sound a like" substitutions may have occurred due to the inherent limitations of voice recognition software. Read the chart carefully and recognize, using context, where substitutions have occurred. If you have any questions, please contact the dictating provider.

## 2023-10-05 VITALS
BODY MASS INDEX: 25.11 KG/M2 | DIASTOLIC BLOOD PRESSURE: 84 MMHG | WEIGHT: 156.25 LBS | TEMPERATURE: 97.5 F | OXYGEN SATURATION: 97 % | RESPIRATION RATE: 18 BRPM | HEIGHT: 66 IN | HEART RATE: 84 BPM | SYSTOLIC BLOOD PRESSURE: 153 MMHG

## 2023-10-05 LAB
ANION GAP SERPL CALCULATED.3IONS-SCNC: 8 MMOL/L
BUN SERPL-MCNC: 7 MG/DL (ref 5–25)
CALCIUM SERPL-MCNC: 9 MG/DL (ref 8.4–10.2)
CHLORIDE SERPL-SCNC: 95 MMOL/L (ref 96–108)
CO2 SERPL-SCNC: 30 MMOL/L (ref 21–32)
CREAT SERPL-MCNC: 0.62 MG/DL (ref 0.6–1.3)
GFR SERPL CREATININE-BSD FRML MDRD: 82 ML/MIN/1.73SQ M
GLUCOSE SERPL-MCNC: 102 MG/DL (ref 65–140)
POTASSIUM SERPL-SCNC: 3.4 MMOL/L (ref 3.5–5.3)
SODIUM SERPL-SCNC: 133 MMOL/L (ref 135–147)

## 2023-10-05 PROCEDURE — 80048 BASIC METABOLIC PNL TOTAL CA: CPT | Performed by: STUDENT IN AN ORGANIZED HEALTH CARE EDUCATION/TRAINING PROGRAM

## 2023-10-05 PROCEDURE — 97110 THERAPEUTIC EXERCISES: CPT

## 2023-10-05 PROCEDURE — 99233 SBSQ HOSP IP/OBS HIGH 50: CPT | Performed by: PHYSICIAN ASSISTANT

## 2023-10-05 PROCEDURE — 99239 HOSP IP/OBS DSCHRG MGMT >30: CPT | Performed by: STUDENT IN AN ORGANIZED HEALTH CARE EDUCATION/TRAINING PROGRAM

## 2023-10-05 PROCEDURE — 97163 PT EVAL HIGH COMPLEX 45 MIN: CPT

## 2023-10-05 RX ORDER — POTASSIUM CHLORIDE 20 MEQ/1
20 TABLET, EXTENDED RELEASE ORAL DAILY
Status: DISCONTINUED | OUTPATIENT
Start: 2023-10-06 | End: 2023-10-05 | Stop reason: HOSPADM

## 2023-10-05 RX ORDER — FUROSEMIDE 40 MG/1
40 TABLET ORAL DAILY
Qty: 30 TABLET | Refills: 0 | Status: SHIPPED | OUTPATIENT
Start: 2023-10-06

## 2023-10-05 RX ORDER — POTASSIUM CHLORIDE 20 MEQ/1
40 TABLET, EXTENDED RELEASE ORAL ONCE
Status: COMPLETED | OUTPATIENT
Start: 2023-10-05 | End: 2023-10-05

## 2023-10-05 RX ORDER — ACETAMINOPHEN 325 MG/1
650 TABLET ORAL EVERY 6 HOURS PRN
Refills: 0
Start: 2023-10-05

## 2023-10-05 RX ADMIN — Medication 1 TABLET: at 08:38

## 2023-10-05 RX ADMIN — CYANOCOBALAMIN TAB 500 MCG 500 MCG: 500 TAB at 08:39

## 2023-10-05 RX ADMIN — ENOXAPARIN SODIUM 40 MG: 40 INJECTION SUBCUTANEOUS at 08:39

## 2023-10-05 RX ADMIN — AMLODIPINE BESYLATE 5 MG: 5 TABLET ORAL at 08:39

## 2023-10-05 RX ADMIN — PANTOPRAZOLE SODIUM 40 MG: 40 TABLET, DELAYED RELEASE ORAL at 05:18

## 2023-10-05 RX ADMIN — POTASSIUM CHLORIDE 40 MEQ: 1500 TABLET, EXTENDED RELEASE ORAL at 08:39

## 2023-10-05 RX ADMIN — ACETAMINOPHEN 650 MG: 325 TABLET, FILM COATED ORAL at 06:21

## 2023-10-05 RX ADMIN — FUROSEMIDE 40 MG: 40 TABLET ORAL at 08:39

## 2023-10-05 RX ADMIN — FOLIC ACID 1 MG: 1 TABLET ORAL at 08:38

## 2023-10-05 RX ADMIN — THIAMINE HCL TAB 100 MG 100 MG: 100 TAB at 08:39

## 2023-10-05 RX ADMIN — METOPROLOL TARTRATE 50 MG: 50 TABLET, FILM COATED ORAL at 08:39

## 2023-10-05 RX ADMIN — POLYETHYLENE GLYCOL 3350 17 G: 17 POWDER, FOR SOLUTION ORAL at 08:39

## 2023-10-05 NOTE — DISCHARGE INSTR - AVS FIRST PAGE
Nephrology team ordered repeat blood chemistry panel in 1 week and in 1 month to follow your sodium levels  The Lasix you are previously taking as needed is now ordered to be taken daily, a prescription will be sent to your pharmacy  Try to reduce use of ibuprofen, can use Tylenol for pain at this time

## 2023-10-05 NOTE — CASE MANAGEMENT
Case Management Discharge Planning Note    Patient name Omega Lawn  Location /-33 MRN [id number]  : 1940 Date 10/5/2023       Current Admission Date: 10/1/2023  Current Admission Diagnosis:Hyponatremia   Patient Active Problem List    Diagnosis Date Noted   • Hip pain, acute, right 10/01/2023   • Cerebral atrophy ([address]) 2023   • Rash and nonspecific skin eruption 2023   • Memory loss 2023   • Displaced fracture of second metatarsal bone of left foot with routine healing 2022   • Closed displaced fracture of third metatarsal bone of left foot 2022   • Chondrocalcinosis of left knee 2022   • Cervical spondylosis 2022   • Closed displaced fracture of fourth metatarsal bone of left foot 2022   • Fall 2022   • Hyponatremia 2022   • Breast mass, right 2022   • Paresthesia of both hands    • Uncomplicated alcohol dependence ([address]) 2022   • Gastroesophageal reflux disease without esophagitis 03/15/2021   • Vitamin D deficiency 2021   • Other insomnia 09/15/2020   • Heavy alcohol use 09/15/2020   • Anxiety 09/15/2020   • Essential hypertension 09/15/2020   • Dyslipidemia 09/15/2020   • Premature atrial contractions 2020   • PVC's (premature ventricular contractions) 2020      LOS (days): 4  Geometric Mean LOS (GMLOS) (days): 3.60  Days to GMLOS:-0.3     OBJECTIVE:  Risk of Unplanned Readmission Score: 15         Current admission status: Inpatient   Preferred Pharmacy:   [facility] [address] [address]  [address] [facility] [address]  Phone: [phone] Fax: [phone] [address] [facility], [address] [facility]  Phone: [phone] Fax: [phone] ESEQUIEL Chavez - [address] [address] [address] [facility] [address] [facility]  Phone: [phone] Fax: [phone]    Primary Care Provider: Shakila Hernandez MD    Primary Insurance: [facility]  Secondary Insurance:     DISCHARGE DETAILS:     IMM Given (Date):: 10/05/23 (IMM was reviewed with patient's  Poli Haas. Poli Pariselis verbalized understanding and copy was provided. Copy placed in scan bin for patient's chart.)  IMM Given to[de-identified] Patient    IMM reviewed with patient and caregiver, patient and caregiver agrees with discharge determination.

## 2023-10-05 NOTE — DISCHARGE SUMMARY
[facility]  Discharge- [facility] 1940, 78 y.o. female MRN: [id number]  Unit/Bed#: -01 Encounter: [id number]  Primary Care Provider: Daniella Padrno MD   Date and time admitted to hospital: 10/1/2023  3:05 PM    Hip pain, acute, right  Assessment & Plan  · Complains of right hip pain, x-ray of the hip shows no fractures however if persistent pain can consider CT of the hip. · Has a history of bilateral hip arthroplasty in the past.  · Ortho evaluated pt and recommending outpatient followup  · PT/OT ordered    Memory loss  Assessment & Plan  · Continue supportive care, patient has stopped taking Lexapro. Rash and nonspecific skin eruption  Assessment & Plan  · Rash noted on the lower extremity and on the abdominal wall, likely fluid overload and since chronic lymphedema of the lower extremity,  · Will place Ace wraps to the lower extremity. Monitor clinically. Dyslipidemia  Assessment & Plan  · Not on statin. Essential hypertension  Assessment & Plan  · Blood pressure is controlled on Norvasc and metoprolol. Heavy alcohol use  Assessment & Plan  · Patient reports to drink at least 1 glass of wine every day to help her to sleep. · Has no history of DTs, or seizures  · Patient denies of any recent falls. However complains of right hip pain. · Will place on CIWA protocol  · Will replace magnesium  · Will give thiamine and folate and multivitamin  · Advised about alcohol cessation. * Hyponatremia  Assessment & Plan  · Patient presents with increasing shortness of breath and lower extremity edema bilaterally. Leg swelling is thighs with 2-3+ edema noted. · Admits to drink 1 glass of wine every day for the past few years  · Hyponatremia likely due to fluid overload /hypervolemic,  · We will check TSH, cortisol level, urine lites and osmolality. ,  · Recent echocardiogram showed EF of 60% with normal systolic and diastolic function  · Patient is on Lasix 40 mg as needed however likely not compliant  · Also admits to take ibuprofen on a daily basis  · Patient received 50 mg of IV Lasix in the ED  · Will give IV Lasix 40 mg twice daily and monitor input output. ·  Chest x-ray shows bilateral pleural effusion with mild congestion,  · Nephrology input highly appreciated. Urine studies were reviewed, shows urine sodium of 36, osmolality 541, serum osmolality of 252, TSH and cortisol level normal,  · Hypoosmolar hyponatremia with SIADH, multifactorial with use of NSAIDs and fluid overload with lower extremity edema and chronic alcohol use and right hip pain. ·  today, appreciate nephrology comanagement        Medical Problems     Resolved Problems  Date Reviewed: 10/5/2023   None       Discharging Physician / Practitioner: Renate Leahy  PCP: Tiana Richmond MD  Admission Date:   Admission Orders (From admission, onward)     Ordered        10/01/23 [address]  Once                      Discharge Date: 10/05/23    Consultations During Hospital Stay:  · Cardiology  · Nephrology  · Orthopedics    Procedures Performed:   · None    Significant Findings / Test Results:   · Na 119 on admission -> 133 on discharge  · XR hip R- No acute osseous abnormality. Incidental Findings:   · N/A     Test Results Pending at Discharge (will require follow up): · None     Outpatient Tests Requested:  · Repeat BMP 1 week and 1 month    Complications:  None    Reason for Admission: R hip pain and dyspnea on exertion    Hospital Course:   Hilda Vyas is a 78 y.o. female patient who originally presented to the hospital on 10/1/2023 due to dyspnea and right hip pain. She has chronic right hip pain related to arthritis. At home, Bharti Cardona has been noting worsening dyspnea on exertion. She came to the ED where work-up was concerning for lower extremity edema, pleural effusion.   Suspected to be related to hypervolemia, but patient also reports use of NSAIDs and is suspected that there could be a component of medication induced hyponatremia. Nephrology and cardiology were consulted and recommended diuresis. Patient also had orthopedics evaluate her in house who recommended outpatient follow-up. Patient reported tolvaptan but ultimately had eventual improvement in her renal function to the point where she was appropriate for discharge home. She was evaluated by PT and OT given her right hip pain and was deemed appropriate for discharge home. Patient was offered home health services, but she declined    Please see above list of diagnoses and related plan for additional information. Condition at Discharge: fair    Discharge Day Visit / Exam:   Subjective: Sitting up at bedside, no acute distress  Vitals: Blood Pressure: 153/84 (10/05/23 0748)  Pulse: 84 (10/05/23 0748)  Temperature: 97.5 °F (36.4 °C) (10/05/23 0748)  Temp Source: Oral (10/05/23 0748)  Respirations: 18 (10/05/23 0748)  Height: 5' 6" (167.6 cm) (10/01/23 1747)  Weight - Scale: 70.9 kg (156 lb 4 oz) (10/01/23 1747)  SpO2: 97 % (10/05/23 0748)  Exam:   Physical Exam  Vitals reviewed. Constitutional:       General: She is not in acute distress. Appearance: She is not ill-appearing. HENT:      Head: Normocephalic. Mouth/Throat:      Mouth: Mucous membranes are moist.   Eyes:      General: No scleral icterus. Extraocular Movements: Extraocular movements intact. Cardiovascular:      Rate and Rhythm: Normal rate and regular rhythm. Pulses: Normal pulses. Heart sounds: No murmur heard. No friction rub. No gallop. Pulmonary:      Effort: Pulmonary effort is normal. No respiratory distress. Breath sounds: No wheezing, rhonchi or rales. Abdominal:      General: Abdomen is flat. Bowel sounds are normal. There is no distension. Palpations: Abdomen is soft. Tenderness: There is no abdominal tenderness. There is no guarding or rebound.    Musculoskeletal:      Right lower leg: No edema. Left lower leg: No edema. Comments: R hip pain to ROM   Skin:     General: Skin is warm. Capillary Refill: Capillary refill takes less than 2 seconds. Coloration: Skin is not jaundiced. Findings: No rash. Neurological:      General: No focal deficit present. Mental Status: She is alert and oriented to person, place, and time. Sensory: No sensory deficit. Motor: No weakness. Psychiatric:         Mood and Affect: Mood normal.         Behavior: Behavior normal.          Discussion with Family: Patient declined call to . Discharge instructions/Information to patient and family:   See after visit summary for information provided to patient and family. Provisions for Follow-Up Care:  See after visit summary for information related to follow-up care and any pertinent home health orders. Disposition:   Home    Planned Readmission: N/A     Discharge Statement:  I spent 55 minutes discharging the patient. This time was spent on the day of discharge. I had direct contact with the patient on the day of discharge. Greater than 50% of the total time was spent examining patient, answering all patient questions, arranging and discussing plan of care with patient as well as directly providing post-discharge instructions. Additional time then spent on discharge activities. Discharge Medications:  See after visit summary for reconciled discharge medications provided to patient and/or family.       **Please Note: This note may have been constructed using a voice recognition system**

## 2023-10-05 NOTE — PLAN OF CARE
Problem: PAIN - ADULT  Goal: Verbalizes/displays adequate comfort level or baseline comfort level  Description: Interventions:  - Encourage patient to monitor pain and request assistance  - Assess pain using appropriate pain scale  - Administer analgesics based on type and severity of pain and evaluate response  - Implement non-pharmacological measures as appropriate and evaluate response  - Consider cultural and social influences on pain and pain management  - Notify physician/advanced practitioner if interventions unsuccessful or patient reports new pain  Outcome: Progressing     Problem: SAFETY ADULT  Goal: Patient will remain free of falls  Description: INTERVENTIONS:  - Educate patient/family on patient safety including physical limitations  - Instruct patient to call for assistance with activity   - Consult OT/PT to assist with strengthening/mobility   - Keep Call bell within reach  - Keep bed low and locked with side rails adjusted as appropriate  - Keep care items and personal belongings within reach  - Initiate and maintain comfort rounds  - Make Fall Risk Sign visible to staff  - Offer Toileting every  Hours, in advance of need  - Initiate/Maintain alarm  - Obtain necessary fall risk management equipment:   - Apply yellow socks and bracelet for high fall risk patients  - Consider moving patient to room near nurses station  Outcome: Progressing     Problem: Knowledge Deficit  Goal: Patient/family/caregiver demonstrates understanding of disease process, treatment plan, medications, and discharge instructions  Description: Complete learning assessment and assess knowledge base.   Interventions:  - Provide teaching at level of understanding  - Provide teaching via preferred learning methods  Outcome: Progressing

## 2023-10-05 NOTE — PLAN OF CARE
Problem: PHYSICAL THERAPY ADULT  Goal: Performs mobility at highest level of function for planned discharge setting. See evaluation for individualized goals. Description: Treatment/Interventions: Functional transfer training, LE strengthening/ROM, Elevations, Therapeutic exercise, Endurance training, Patient/family training, Equipment eval/education, Bed mobility, Gait training          See flowsheet documentation for full assessment, interventions and recommendations. Note: Prognosis: Fair  Problem List: Decreased strength, Impaired balance, Decreased mobility, Decreased coordination, Impaired judgement, Decreased safety awareness, Pain  Assessment: Orders for PT eval and treat received. Pt with PMHx: OA, angela SOLITARIO, memory issues. Pt exhibits physical deficits noted in problem list above. Deficits listed contribute to functional limitations that are significant from the patient PLOF and include: tolerance for OOB functional activities, unsafe/inefficient ambulation, fall risk and Difficulty with bed mobility/repositioning due to likely right hamstring/gluteal muscle strain. During today's session, initiated supine HEP and discussed importance of tolerable exercises/activity for pain and muscle healing/strengthening. Pt tolerated exercises with modification or assist.  Educated and instructed on modified bed mobility and transfers. Good carry over. The AM-PAC & Barthel Index outcome tools were used to assist in determining pt safety w/ mobility/self care & appropriate d/c recommendations, see above for scores. Patient's clinical presentation is evolving due to significant acute change in functional independence, altered mental status, ongoing medical management needs and complicated social/support system.      Considering the patient's PLOF, co-morbidities, acute functional limitations, functional outcome measures, and/or goal to progress functional independence; this patient would benefit from skilled Physical Therapy intervention in the acute care setting. Despite deficits and current medical issues, pt would be appropriate to return home with appropriate assistance, as she is close to her functional baseline. Considerations for next session: progress exercise, transfer training, and ambulation as able. Barriers to Discharge: None     PT Discharge Recommendation: Home with home health rehabilitation    See flowsheet documentation for full assessment.

## 2023-10-05 NOTE — PLAN OF CARE
Problem: PAIN - ADULT  Goal: Verbalizes/displays adequate comfort level or baseline comfort level  Description: Interventions:  - Encourage patient to monitor pain and request assistance  - Assess pain using appropriate pain scale  - Administer analgesics based on type and severity of pain and evaluate response  - Implement non-pharmacological measures as appropriate and evaluate response  - Consider cultural and social influences on pain and pain management  - Notify physician/advanced practitioner if interventions unsuccessful or patient reports new pain  Outcome: Progressing     Problem: INFECTION - ADULT  Goal: Absence or prevention of progression during hospitalization  Description: INTERVENTIONS:  - Assess and monitor for signs and symptoms of infection  - Monitor lab/diagnostic results  - Monitor all insertion sites, i.e. indwelling lines, tubes, and drains  - Monitor endotracheal if appropriate and nasal secretions for changes in amount and color  - [place] appropriate cooling/warming therapies per order  - Administer medications as ordered  - Instruct and encourage patient and family to use good hand hygiene technique  - Identify and instruct in appropriate isolation precautions for identified infection/condition  Outcome: Progressing  Goal: Absence of fever/infection during neutropenic period  Description: INTERVENTIONS:  - Monitor WBC    Outcome: Progressing     Problem: SAFETY ADULT  Goal: Patient will remain free of falls  Description: INTERVENTIONS:  - Educate patient/family on patient safety including physical limitations  - Instruct patient to call for assistance with activity   - Consult OT/PT to assist with strengthening/mobility   - Keep Call bell within reach  - Keep bed low and locked with side rails adjusted as appropriate  - Keep care items and personal belongings within reach  - Initiate and maintain comfort rounds  - Make Fall Risk Sign visible to staff  - Offer Toileting every  Hours, in advance of need  - Initiate/Maintain alarm  - Obtain necessary fall risk management equipment:   - Apply yellow socks and bracelet for high fall risk patients  - Consider moving patient to room near nurses station  Outcome: Progressing  Goal: Maintain or return to baseline ADL function  Description: INTERVENTIONS:  -  Assess patient's ability to carry out ADLs; assess patient's baseline for ADL function and identify physical deficits which impact ability to perform ADLs (bathing, care of mouth/teeth, toileting, grooming, dressing, etc.)  - Assess/evaluate cause of self-care deficits   - Assess range of motion  - Assess patient's mobility; develop plan if impaired  - Assess patient's need for assistive devices and provide as appropriate  - Encourage maximum independence but intervene and supervise when necessary  - Involve family in performance of ADLs  - Assess for home care needs following discharge   - Consider OT consult to assist with ADL evaluation and planning for discharge  - Provide patient education as appropriate  Outcome: Progressing  Goal: Maintains/Returns to pre admission functional level  Description: INTERVENTIONS:  - Perform BMAT or MOVE assessment daily.   - Set and communicate daily mobility goal to care team and patient/family/caregiver. - Collaborate with rehabilitation services on mobility goals if consulted  - Perform Range of Motion  times a day. - Reposition patient every hours.   - Dangle patient times a day  - Stand patient  times a day  - Ambulate patient  times a day  - Out of bed to chair  times a day   - Out of bed for meals  times a day  - Out of bed for toileting  - Record patient progress and toleration of activity level   Outcome: Progressing     Problem: DISCHARGE PLANNING  Goal: Discharge to home or other facility with appropriate resources  Description: INTERVENTIONS:  - Identify barriers to discharge w/patient and caregiver  - Arrange for needed discharge resources and transportation as appropriate  - Identify discharge learning needs (meds, wound care, etc.)  - Arrange for interpretive services to assist at discharge as needed  - Refer to Case Management Department for coordinating discharge planning if the patient needs post-hospital services based on physician/advanced practitioner order or complex needs related to functional status, cognitive ability, or social support system  Outcome: Progressing     Problem: Knowledge Deficit  Goal: Patient/family/caregiver demonstrates understanding of disease process, treatment plan, medications, and discharge instructions  Description: Complete learning assessment and assess knowledge base. Interventions:  - Provide teaching at level of understanding  - Provide teaching via preferred learning methods  Outcome: Progressing     Problem: MOBILITY - ADULT  Goal: Maintain or return to baseline ADL function  Description: INTERVENTIONS:  -  Assess patient's ability to carry out ADLs; assess patient's baseline for ADL function and identify physical deficits which impact ability to perform ADLs (bathing, care of mouth/teeth, toileting, grooming, dressing, etc.)  - Assess/evaluate cause of self-care deficits   - Assess range of motion  - Assess patient's mobility; develop plan if impaired  - Assess patient's need for assistive devices and provide as appropriate  - Encourage maximum independence but intervene and supervise when necessary  - Involve family in performance of ADLs  - Assess for home care needs following discharge   - Consider OT consult to assist with ADL evaluation and planning for discharge  - Provide patient education as appropriate  Outcome: Progressing  Goal: Maintains/Returns to pre admission functional level  Description: INTERVENTIONS:  - Perform BMAT or MOVE assessment daily.   - Set and communicate daily mobility goal to care team and patient/family/caregiver.    - Collaborate with rehabilitation services on mobility goals if consulted  - Perform Range of Motion  times a day. - Reposition patient every  hours. - Dangle patient times a day  - Stand patient  times a day  - Ambulate patient  times a day  - Out of bed to chair  times a day   - Out of bed for meals  times a day  - Out of bed for toileting  - Record patient progress and toleration of activity level   Outcome: Progressing     Problem: NEUROSENSORY - ADULT  Goal: Achieves stable or improved neurological status  Description: INTERVENTIONS  - Monitor and report changes in neurological status  - Monitor vital signs such as temperature, blood pressure, glucose, and any other labs ordered   - Initiate measures to prevent increased intracranial pressure  - Monitor for seizure activity and implement precautions if appropriate      Outcome: Progressing  Goal: Achieves maximal functionality and self care  Description: INTERVENTIONS  - Monitor swallowing and airway patency with patient fatigue and changes in neurological status  - Encourage and assist patient to increase activity and self care.    - Encourage visually impaired, hearing impaired and aphasic patients to use assistive/communication devices  Outcome: Progressing     Problem: METABOLIC, FLUID AND ELECTROLYTES - ADULT  Goal: Electrolytes maintained within normal limits  Description: INTERVENTIONS:  - Monitor labs and assess patient for signs and symptoms of electrolyte imbalances  - Administer electrolyte replacement as ordered  - Monitor response to electrolyte replacements, including repeat lab results as appropriate  - Instruct patient on fluid and nutrition as appropriate  Outcome: Progressing  Goal: Fluid balance maintained  Description: INTERVENTIONS:  - Monitor labs   - Monitor I/O and WT  - Instruct patient on fluid and nutrition as appropriate  - Assess for signs & symptoms of volume excess or deficit  Outcome: Progressing

## 2023-10-05 NOTE — PROGRESS NOTES
NEPHROLOGY PROGRESS NOTE   Inga Bamberger 78 y.o. female MRN: [id number]  Unit/Bed#: -01 Encounter: [id number]  Reason for Consult: Hyponatremia    ASSESSMENT/PLAN:  1. 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 Hypokalemia- kdur 40meq today, kdur 20meq daily on discharge  3. Hypomagnesemia- replete as needed, improved  4. Hypertension- BP stable, monitor   - monitor with addition of daily lasix  5. LE edema- s/p IV lasix, started on lasix 40mg daily and continue on discharge  - home: lasix 40mg as needed  - monitor with amlodipine    Disposition:  Okay for discharge from a renal standpoint. BMP for next week and 1 month ordered. Office notified to schedule follow up appointment. Discussed with slim and they agree with plan from renal standpoint. SUBJECTIVE:  Patient feeling well. Denies SOB. Denies LE edema and is "happy to see my legs again".       OBJECTIVE:  Current Weight: Weight - Scale: 70.9 kg (156 lb 4 oz)  Vitals:    10/04/23 2120 10/04/23 2331 10/05/23 0350 10/05/23 0748   BP: 143/81 139/70 152/89 153/84   BP Location:  Left arm Left arm Left arm   Pulse:  68 78 84   Resp:  20 18 18   Temp:  97.7 °F (36.5 °C) 98.1 °F (36.7 °C) 97.5 °F (36.4 °C)   TempSrc:  Oral Oral Oral   SpO2:  95% 96% 97%   Weight:       Height:           Intake/Output Summary (Last 24 hours) at 10/5/2023 0908  Last data filed at 10/4/2023 1045  Gross per 24 hour   Intake --   Output 600 ml   Net -600 ml     General: NAD  Skin: no rash  Eyes: anicteric  ENMT: mm moist  Neck: no masses  Respiratory: CTAB  Cardiac: RRR  Extremities: trace LE edema  GI: soft nt nd  Neuro: alert awake  Psych: mood and affect appropriate    Medications:    Current Facility-Administered Medications:   •  acetaminophen (TYLENOL) tablet 650 mg, 650 mg, Oral, Q6H PRN, Christie Gonzalez MD, 650 mg at 10/05/23 1119  •  amLODIPine (NORVASC) tablet 5 mg, 5 mg, Oral, Daily, Christie Gonzalez MD, 5 mg at 10/05/23 3659  •  ammonium lactate (LAC-HYDRIN) 12 % lotion, , Topical, BID PRN, Cindy Silva  •  cyanocobalamin (VITAMIN B-12) tablet 500 mcg, 500 mcg, Oral, Daily, Christie Gonzalez MD, 500 mcg at 10/05/23 0839  •  enoxaparin (LOVENOX) subcutaneous injection 40 mg, 40 mg, Subcutaneous, Daily, Christie Gonzalez MD, 40 mg at 54/86/22 2440  •  folic acid (FOLVITE) tablet 1 mg, 1 mg, Oral, Daily, Christie Gonzalez MD, 1 mg at 10/05/23 4403  •  furosemide (LASIX) tablet 40 mg, 40 mg, Oral, Daily, [facility], [facility], 40 mg at 10/05/23 3909  •  levalbuterol ( Proffer) inhalation solution 1.25 mg, 1.25 mg, Nebulization, Q6H PRN, Christie Gonzalez MD, 1.25 mg at 10/02/23 0126  •  metoprolol tartrate (LOPRESSOR) tablet 50 mg, 50 mg, Oral, BID, Christie Gonzalez MD, 50 mg at 10/05/23 0839  •  multivitamin-minerals (CENTRUM) tablet 1 tablet, 1 tablet, Oral, Daily, Christie Gonzalez MD, 1 tablet at 10/05/23 0838  •  ondansetron (ZOFRAN) injection 4 mg, 4 mg, Intravenous, Q6H PRN, Christie Gonzalez MD  •  pantoprazole (PROTONIX) EC tablet 40 mg, 40 mg, Oral, Early Morning, Christie Gonzalez MD, 40 mg at 10/05/23 0518  •  polyethylene glycol (MIRALAX) packet 17 g, 17 g, Oral, Daily, Cindy Silva, 17 g at 10/05/23 0839  •  [START ON 10/6/2023] potassium chloride (K-DUR,KLOR-CON) CR tablet 20 mEq, 20 mEq, Oral, Daily, [facility], [place]  •  senna-docusate sodium (SENOKOT S) 8.6-50 mg per tablet 1 tablet, 1 tablet, Oral, HS, Cindy Silva, 1 tablet at 10/04/23 2126  •  thiamine tablet 100 mg, 100 mg, Oral, Daily, Delicia Song MD, 100 mg at 10/05/23 9271    Laboratory Results:  Results from last 7 days   Lab Units 10/05/23  0428 10/04/23  0432 10/03/23  0545 10/02/23  1518 10/02/23  0806 10/02/23  0412 10/01/23  2352 10/01/23  1932 10/01/23  1527   WBC Thousand/uL  --   --   --   --   --   --   --   --  9.30   HEMOGLOBIN g/dL  --   --   --   --   --   --   --   --  13.1   HEMATOCRIT %  --   --   --   --   --   --   --   --  37.1   PLATELETS Thousands/uL  --   --   --   --   --   --   --   --  303   POTASSIUM mmol/L 3.4* 3.7 4.0 3.4* 3.1* 3.0* 3.1*   < > 3.8   CHLORIDE mmol/L 95* 95* 91* 84* 87* 84* 84*   < > 85*   CO2 mmol/L 30 32 30 28 27 27 26   < > 24   BUN mg/dL 7 6 7 8 7 8 9   < > 11   CREATININE mg/dL 0.62 0.67 0.61 0.56* 0.55* 0.60 0.51*   < > 0.60   CALCIUM mg/dL 9.0 8.9 8.8 8.5 8.5 8.6 8.7   < > 9.4   MAGNESIUM mg/dL  --  1.9  --   --   --  1.7*  --   --  1.7*    < > = values in this interval not displayed. I have personally reviewed the blood work as stated above and in my note. I have personally reviewed slim note.

## 2023-10-05 NOTE — PHYSICAL THERAPY NOTE
PHYSICAL THERAPY Evaluation and Treatment  DATE: 10/05/23  TIME: 2067-6977    NAME:  Uzma Gandara  AGE:   78 y.o. Mrn:   [id number]  Length Of Stay: 4    ADMIT DX:  Hyponatremia [E87.1]  Dyspnea [R06.00]  Leg swelling [M79.89]  Elevated brain natriuretic peptide (BNP) level [R79.89]  Bilateral lower extremity edema [R60.0]    Past Medical History:   Diagnosis Date    Anxiety     Arthritis     [place] esophagus     Cataract     Continuous chronic alcoholism (HCC)     GERD (gastroesophageal reflux disease)     Heavy alcohol use     Hyperlipidemia     Hypertension     Insomnia     Palpitations      Past Surgical History:   Procedure Laterality Date    BREAST IMPLANT Bilateral     CATARACT EXTRACTION Right     CHOLECYSTECTOMY      LAP    COLONOSCOPY      HYSTERECTOMY      age 69-[place]    JOINT REPLACEMENT Bilateral     hips-2010 and 2017-left leg is shorter    LASIK Bilateral     in her 50's    OH XCAPSL CTRC RMVL INSJ IO LENS PROSTH W/O ECP Left 10/18/2018    Procedure: EXTRACTION EXTRACAPSULAR CATARACT PHACO INTRAOCULAR LENS (IOL);   Surgeon: Epifanio Araujo MD;  Location: [facility] MAIN OR;  Service: Ophthalmology    TONSILLECTOMY      TUBAL LIGATION          10/05/23 1032   PT Last Visit   PT Visit Date 10/05/23   Note Type   Note type Evaluation   Pain Assessment   Pain Assessment Tool 0-10   Pain Score 5   Pain Location/Orientation Orientation: Right;Location: Leg  (proximal lateral hamstring)   Effect of Pain on Daily Activities affects bed mobility and repositioning   Patient's Stated Pain Goal No pain   Hospital Pain Intervention(s) Ambulation/increased activity;Repositioned   Restrictions/Precautions   Weight Bearing Precautions Per Order Yes   RLE Weight Bearing Per Order WBAT   Other Precautions Cognitive;WBS;Fall Risk;Pain   Home Living   Type of [address] [place] [address]  One level  (3 steps to enter)   Bathroom Shower/Tub Tub/shower unit   Bathroom Toilet Standard   Bathroom Equipment Grab bars in shower;Grab bars around toilet   Home Equipment Walker;Cane   Prior Function   Level of [place] Independent with ADLs; Independent with functional mobility; Needs assistance with IADLS   Lives With Spouse   Receives Help From Family;Friend(s)   IADLs Independent with meal prep; Independent with medication management; Family/Friend/Other provides transportation   Falls in the last 6 months 1 to 4   Vocational Retired   Comments Pt is poor/quetionable historian at time of eval. Pt reports being independent with ADLs and functional mobility for household distance w/o use of AD. Pts reports that her daughter lives upstairs and she plans for her to assist with needs when d/c'd from the hospital.   General   Additional Pertinent History 77 y/o presents with BLE swelling ongoing a month, and now with worsening SOB. Additionally, c/o right hip pain and difficulty ambulating. Upon assessment: hypertensive, chest x-ray with increased bilateral opacities, hypoatremia, hypomagnesemia, elevated BNP. Admitted for IV Laxix and nephrology consult. Family/Caregiver Present No   Cognition   Overall Cognitive Status Impaired   Arousal/Participation Alert   Attention Difficulty attending to directions   Orientation Level Oriented to person;Oriented to time;Oriented to situation   Memory Decreased short term memory;Decreased recall of precautions   Following Commands Follows one step commands inconsistently   Subjective   Subjective Pt appears confused and has difficulty with word finding/memory. Pt reports right hip/thigh pain that limits her mobility/tolerance for repositioning. Pt required encouragement to participate. RUE Assessment   RUE Assessment WFL   LUE Assessment   LUE Assessment WFL   RLE Assessment   RLE Assessment   (ROM WFL.   Hip and knee movements limited due to muscular pain)   LLE Assessment   LLE Assessment WFL   Coordination   Movements are Fluid and Coordinated 0   Coordination and Movement Description deficits with standing balance challenges and coordionation when advancing steps. Multiple small LOB while ambulating   Sensation WFL   Bed Mobility   Rolling L 4  Minimal assistance   Additional items Bedrails; Increased time required;Verbal cues;LE management   Supine to Sit 4  Minimal assistance   Additional items Bedrails; Increased time required;Verbal cues;LE management   Sit to Supine 4  Minimal assistance   Additional items Bedrails; Increased time required; Impulsive;Verbal cues;LE management   Additional Comments Pt requires instruction for proper technique to effectively transfer and manage pain. Transfers   Sit to Stand 5  Supervision   Additional items Bedrails   Stand to Sit 5  Supervision   Additional items Verbal cues   Ambulation/Elevation   Gait Assistance 4  Minimal assist   Additional items Verbal cues; Tactile cues   Assistive Device None   Distance 100'   Stair Management Assistance 4  Minimal assist   Additional items Verbal cues; Tactile cues   Stair Management Technique Two rails; Step to pattern   Number of Stairs 3   Ambulation/Elevation Additional Comments Pt demonstrate short shuffled steps with multiple path deviations. Pt has limited hip rotation and arm swing. Mulitple path deviations requiring balance checks/pauses. assist to ensure safety. Balance   Static Sitting Fair +   Dynamic Sitting Fair +   Static Standing Fair   Dynamic Standing Fair   Ambulatory Fair   Endurance Deficit   Endurance Deficit No   Activity Tolerance   Activity Tolerance Patient limited by pain   Medical Staff Made Aware spoke with phyician/CC   Nurse Made Aware nurse aid aware of bed alarm malfunctioning   Assessment   Prognosis Fair   Problem List Decreased strength; Impaired balance;Decreased mobility; Decreased coordination; Impaired judgement;Decreased safety awareness;Pain   Assessment Orders for PT eval and treat received. Pt with PMHx: OA, angela SOLITARIO, memory issues.    Pt exhibits physical deficits noted in problem list above. Deficits listed contribute to functional limitations that are significant from the patient PLOF and include: tolerance for OOB functional activities, unsafe/inefficient ambulation, fall risk and Difficulty with bed mobility/repositioning due to likely right hamstring/gluteal muscle strain. During today's session, initiated supine HEP and discussed importance of tolerable exercises/activity for pain and muscle healing/strengthening. Pt tolerated exercises with modification or assist.  Educated and instructed on modified bed mobility and transfers. Good carry over. The AM-PAC & Barthel Index outcome tools were used to assist in determining pt safety w/ mobility/self care & appropriate d/c recommendations, see above for scores. Patient's clinical presentation is evolving due to significant acute change in functional independence, altered mental status, ongoing medical management needs and complicated social/support system. Considering the patient's PLOF, co-morbidities, acute functional limitations, functional outcome measures, and/or goal to progress functional independence; this patient would benefit from skilled Physical Therapy intervention in the acute care setting. Despite deficits and current medical issues, pt would be appropriate to return home with appropriate assistance, as she is close to her functional baseline. Considerations for next session: progress exercise, transfer training, and ambulation as able. Barriers to Discharge None   Goals   Patient Goals reduce LE pain   STG Expiration Date 10/15/23   Short Term Goal #1 1 : Pt will perform repositioning and scooting in bed, sup A.  2: Pt will perform supine<>sit transfer, sup A. 3: Pt will perform stand pivot transfer without AD/rails, sup A. 4: Pt will ambulate 150' without AD and managing minimal balance challenges, Sup A. 5: pt will ascend/descend 3 steps with rails, sup A. 6: Pt will perform HEP with instructions. Plan   Treatment/Interventions Functional transfer training;LE strengthening/ROM; Elevations; Therapeutic exercise; Endurance training;Patient/family training;Equipment eval/education; Bed mobility;Gait training   PT Frequency 2-3x/wk   Recommendation   PT Discharge Recommendation Home with home health rehabilitation   AM-PAC Basic Mobility Inpatient   Turning in Flat Bed Without Bedrails 3   Lying on Back to Sitting on Edge of Flat Bed Without Bedrails 3   Moving Bed to Chair 3   Standing Up From Chair Using Arms 4   Walk in Room 3   Climb 3-5 Stairs With Railing 3   Basic Mobility Inpatient Raw Score 19   Basic Mobility Standardized Score 42.48   Highest Level Of Mobility   JH-HLM Goal 6: Walk 10 steps or more   JH-HLM Achieved 7: Walk 25 feet or more   Barthel Index   Feeding 10   Bathing 0   Grooming Score 5   Dressing Score 5   Bladder Score 10   Bowels Score 10   Toilet Use Score 5   Transfers (Bed/Chair) Score 10   Mobility (Level Surface) Score 0   Stairs Score 5   Barthel Index Score 60   Exercises   Heelslides Supine;10 reps;AAROM; Left   Hip Flexion Supine;10 reps;Right;AAROM  (with use of sheet to assist in raising)   Hip Abduction Supine;15 reps;AROM; Bilateral  (hooklying position)   Hip Adduction Supine;15 reps;AROM; Bilateral  (hooklying position)   Bridging Supine;15 reps;Bilateral;AROM   End of Consult   Patient Position at End of Consult Supine; All needs within reach  (Nursing aware of malfunctioning bed alarm)   The patient's AM-PAC Basic Mobility Inpatient Short Form Raw Score is 19. A Raw score of greater than or equal to 16 suggests the patient may benefit from discharge to home. Please also refer to the recommendation of the Physical Therapist for safe discharge planning.     Krystin Cosby, PT, DPT  [place] Licensure #[id number]

## 2023-10-06 NOTE — UTILIZATION REVIEW
NOTIFICATION OF ADMISSION DISCHARGE   This is a Notification of Discharge from [address] [facility]. Please be advised that this patient has been discharge from our facility. Below you will find the admission and discharge date and time including the patient’s disposition. UTILIZATION REVIEW CONTACT:  Maria Antonia Franks  Utilization   Network Utilization Review Department  Phone: [phone] x carefully listen to the prompts. All voicemails are confidential.  Email: [email or web address]     ADMISSION INFORMATION  PRESENTATION DATE: 10/1/2023  3:05 PM  OBERVATION ADMISSION DATE:   INPATIENT ADMISSION DATE: 10/1/23  4:56 PM   DISCHARGE DATE: 10/5/2023  2:46 PM   DISPOSITION:Home/Self Care    Network Utilization Review Department  ATTENTION: Please call with any questions or concerns to [phone] and carefully listen to the prompts so that you are directed to the right person. All voicemails are confidential.   For Discharge needs, contact Care Management DC Support Team at [phone] opt. 2  Send all requests for admission clinical reviews, approved or denied determinations and any other requests to dedicated fax number below belonging to the campus where the patient is receiving treatment.  List of dedicated fax numbers for the Facilities:  [place] DENIALS (Administrative/Medical Necessity) [phone]   DISCHARGE SUPPORT TEAM (Network) [phone] [address] [facility] (Maternity/NICU/Pediatrics) [phone] [address] [facility] [address] [place] [address] [address]  [place] [phone]

## 2023-10-09 ENCOUNTER — TRANSITIONAL CARE MANAGEMENT (OUTPATIENT)
Dept: FAMILY MEDICINE CLINIC | Facility: CLINIC | Age: 83
End: 2023-10-09

## 2023-10-12 ENCOUNTER — OFFICE VISIT (OUTPATIENT)
Dept: FAMILY MEDICINE CLINIC | Facility: CLINIC | Age: 83
End: 2023-10-12
Payer: COMMERCIAL

## 2023-10-12 VITALS
WEIGHT: 146.6 LBS | OXYGEN SATURATION: 99 % | TEMPERATURE: 97.6 F | DIASTOLIC BLOOD PRESSURE: 80 MMHG | RESPIRATION RATE: 18 BRPM | HEIGHT: 66 IN | HEART RATE: 78 BPM | SYSTOLIC BLOOD PRESSURE: 130 MMHG | BODY MASS INDEX: 23.56 KG/M2

## 2023-10-12 DIAGNOSIS — E87.1 HYPONATREMIA: ICD-10-CM

## 2023-10-12 DIAGNOSIS — Z76.89 ENCOUNTER FOR SUPPORT AND COORDINATION OF TRANSITION OF CARE: Primary | ICD-10-CM

## 2023-10-12 DIAGNOSIS — F41.9 ANXIETY: ICD-10-CM

## 2023-10-12 DIAGNOSIS — R60.0 BILATERAL LOWER EXTREMITY EDEMA: ICD-10-CM

## 2023-10-12 PROCEDURE — 99495 TRANSJ CARE MGMT MOD F2F 14D: CPT

## 2023-10-12 RX ORDER — ESCITALOPRAM OXALATE 10 MG/1
10 TABLET ORAL DAILY
Qty: 90 TABLET | Refills: 1 | Status: SHIPPED | OUTPATIENT
Start: 2023-10-12 | End: 2024-04-09

## 2023-10-12 NOTE — ASSESSMENT & PLAN NOTE
Pt to schedule hosp d/c appt with nephrology ASAP, script for repeat BMP provided and counseled on adhering to fluid restriction and reducing NSAID intake.  Pt reports compliance with prescribed lasix 40 mg qd

## 2023-10-12 NOTE — ASSESSMENT & PLAN NOTE
Pt requesting refill for escitalopram 10 mg was on medication prior to hospitalization however was not continued at d/c. Medication was refilled 10/2 by PCP however pt states recently changed pharmacy therefore did not get script.

## 2023-10-12 NOTE — PROGRESS NOTES
Name: Kian Gongora      : 1940      MRN: [id number]  Encounter Provider: NUBIA Corona  Encounter Date: 10/12/2023   Encounter department: [facility] [address]    Assessment & Plan     1. Encounter for support and coordination of transition of care    2. Hyponatremia  Assessment & Plan:  Pt to schedule hosp d/c appt with nephrology ASAP, script for repeat BMP provided and counseled on adhering to fluid restriction and reducing NSAID intake. Pt reports compliance with prescribed lasix 40 mg qd    Orders:  -     Basic metabolic panel; Future; Expected date: 10/12/2023  -     Basic metabolic panel; Future; Expected date: 2023    3. Anxiety  Assessment & Plan:  Pt requesting refill for escitalopram 10 mg was on medication prior to hospitalization however was not continued at d/c. Medication was refilled 10/2 by PCP however pt states recently changed pharmacy therefore did not get script. Orders:  -     escitalopram (LEXAPRO) 10 mg tablet; Take 1 tablet (10 mg total) by mouth daily    4. Bilateral lower extremity edema  Assessment & Plan:  Pt with 2+ b/l pitting edema, reports compliance with lasix 40 mg qd, has script for repeat BMP. Pt denies SOB or cough, educate on compression therapy and elevating legs. Subjective      TCM Call     Date and time call was made  10/9/2023  5:04 PM    Hospital care reviewed  Records reviewed    Patient was hospitialized at  [address]    Date of Admission  10/01/23    Date of discharge  10/05/23    Diagnosis  Hyponatremia    Disposition  Home    Were the patients medications reviewed and updated  Yes    Current Symptoms  None      TCM Call     Post hospital issues  None    Should patient be enrolled in anticoag monitoring? No    Scheduled for follow up?   Yes    Referrals needed  Not at this time     Did you obtain your prescribed medications  Yes    Do you need help managing your prescriptions or medications  No Is transportation to your appointment needed  No    I have advised the patient to call PCP with any new or worsening symptoms    [address] or [facility] other    Support System  Spouse    The type of support provided  Emotional    Do you have social support  Yes, as much as I need    Are you recieving any outpatient services  No    Are you recieving home care services  No    Are you using any community resources  No    Current waiver services  No    Have you fallen in the last 12 months  Yes    How many times  1 or 2     Interperter language line needed  No    Counseling  Patient    Counseling topics  Diagnostic results    Comments  Needs follow up for additional evaluation for right sided   breast mass which was partially seen on CAT scan. Pt seen in hospital 10/1-10/5 secondary to b/l LE edema and hyponatremia. Pt has referral to nephrology for f/u needs to schedule appt. Pt was to complete BMP 10/9 to recheck Na levels however did not complete. New script provided for recheck tomorrow and 11/1/23. Educated on restricting fluid intake. B/l lower extremity with erythema and 2+ pitting edema, pt reports taking furosemide 40 mg qd as recommended on discharge. Educate on compression therapy and elevation and decreasing Nsaid intake. Pt requesting refill of escitalopram 10 mg that was not included at discharge states script filled 10/2 was sent to wrong pharmacy. Requesting medication sent to [facility]. Review of Systems   Constitutional:  Negative for activity change, appetite change, chills, diaphoresis, fatigue and fever. HENT:  Negative for congestion, drooling, ear pain, hearing loss, nosebleeds, postnasal drip, rhinorrhea, sinus pressure, sinus pain, sore throat, trouble swallowing and voice change. Eyes:  Negative for photophobia, pain, discharge, redness and visual disturbance.    Respiratory:  Positive for shortness of breath (with exertion). Negative for cough, choking, chest tightness, wheezing and stridor. Cardiovascular:  Positive for leg swelling. Negative for chest pain and palpitations. Gastrointestinal:  Negative for abdominal distention, abdominal pain, blood in stool, constipation, diarrhea, nausea, rectal pain and vomiting. Endocrine: Negative for cold intolerance, heat intolerance, polydipsia, polyphagia and polyuria. Genitourinary:  Negative for decreased urine volume, difficulty urinating, dysuria, flank pain, frequency, genital sores, hematuria, menstrual problem, pelvic pain, urgency, vaginal discharge and vaginal pain. Musculoskeletal:  Positive for arthralgias and gait problem. Negative for back pain, joint swelling, myalgias, neck pain and neck stiffness. Skin:  Negative for color change, rash and wound. Allergic/Immunologic: Negative for environmental allergies, food allergies and immunocompromised state. Neurological:  Negative for dizziness, tremors, seizures, syncope, facial asymmetry, weakness, light-headedness and numbness. Hematological:  Negative for adenopathy. Psychiatric/Behavioral:  Positive for decreased concentration. Negative for agitation, behavioral problems, confusion, dysphoric mood, hallucinations, self-injury, sleep disturbance and suicidal ideas. The patient is not nervous/anxious and is not hyperactive. All other systems reviewed and are negative. Current Outpatient Medications on File Prior to Visit   Medication Sig    amLODIPine (NORVASC) 5 mg tablet Take 1 tablet (5 mg total) by mouth daily    Biotin 5000 MCG TABS Take by mouth every morning    Cholecalciferol (VITAMIN D) 2000 units CAPS Take by mouth 3 (three) times a week    cyanocobalamin (VITAMIN B-12) 500 MCG tablet Take 1 tablet (500 mcg total) by mouth daily    furosemide (LASIX) 40 mg tablet Take 1 tablet (40 mg total) by mouth daily Do not start before October 6, 2023.     GLUCOSAMINE HCL PO Take 1,200 mg by mouth daily     metoprolol tartrate (LOPRESSOR) 50 mg tablet TAKE 1 TABLET BY MOUTH  TWICE DAILY    niacin 500 mg tablet Take 500 mg by mouth daily with breakfast    Omega-3 Fatty Acids (FISH OIL) 1,000 mg Take 1,000 mg by mouth daily      potassium chloride (Klor-Con M20) 20 mEq tablet Take 1 tablet (20 mEq total) by mouth daily    zolpidem (AMBIEN) 5 mg tablet Take 1 tablet (5 mg total) by mouth daily at bedtime as needed for sleep    acetaminophen (TYLENOL) 325 mg tablet Take 2 tablets (650 mg total) by mouth every 6 (six) hours as needed for mild pain (Patient not taking: Reported on 10/12/2023)    esomeprazole (NexIUM) 40 MG capsule TAKE 1 CAPSULE BY MOUTH  DAILY    permethrin (ELIMITE) 5 % cream APPLY TOPICALLY ONCE FOR 1 DOSE    triamcinolone (KENALOG) 0.1 % cream APPLY TO AFFECTED AREA TWICE A DAY (Patient not taking: Reported on 10/12/2023)       Objective     /80 (BP Location: Right arm, Patient Position: Sitting, Cuff Size: Standard)   Pulse 78   Temp 97.6 °F (36.4 °C) (Tympanic)   Resp 18   Ht 5' 6" (1.676 m)   Wt 66.5 kg (146 lb 9.6 oz)   SpO2 99%   BMI 23.66 kg/m²     Physical Exam  Vitals and nursing note reviewed. Constitutional:       General: She is not in acute distress. Appearance: Normal appearance. She is normal weight. She is not ill-appearing or toxic-appearing. HENT:      Head: Normocephalic and atraumatic. Right Ear: Tympanic membrane, ear canal and external ear normal. There is no impacted cerumen. Left Ear: Tympanic membrane, ear canal and external ear normal. There is no impacted cerumen. Nose: Nose normal. No congestion or rhinorrhea. Mouth/Throat:      Mouth: Mucous membranes are moist.      Pharynx: Oropharynx is clear. No oropharyngeal exudate or posterior oropharyngeal erythema. Eyes:      General:         Right eye: No discharge. Left eye: No discharge. Extraocular Movements: Extraocular movements intact. Conjunctiva/sclera: Conjunctivae normal.      Pupils: Pupils are equal, round, and reactive to light. Neck:      Vascular: Normal carotid pulses. No carotid bruit or JVD. Trachea: Trachea normal.   Cardiovascular:      Rate and Rhythm: Normal rate and regular rhythm. Pulses: Normal pulses. Heart sounds: Normal heart sounds. No murmur heard. Pulmonary:      Effort: Pulmonary effort is normal. No respiratory distress. Breath sounds: Normal breath sounds. No decreased air movement. No wheezing. Chest:      Chest wall: No tenderness. Abdominal:      General: Bowel sounds are normal. There is no distension. Palpations: Abdomen is soft. There is no mass. Tenderness: There is no abdominal tenderness. There is no right CVA tenderness or left CVA tenderness. Musculoskeletal:         General: No swelling or tenderness. Normal range of motion. Cervical back: Normal range of motion. No rigidity or tenderness. Right lower le+ Pitting Edema present. Left lower le+ Pitting Edema present. Lymphadenopathy:      Cervical: No cervical adenopathy. Skin:     General: Skin is warm. Capillary Refill: Capillary refill takes less than 2 seconds. Coloration: Skin is not jaundiced. Findings: Erythema present. No bruising or rash. Neurological:      General: No focal deficit present. Mental Status: She is alert and oriented to person, place, and time. Cranial Nerves: No cranial nerve deficit. Sensory: No sensory deficit. Coordination: Coordination normal.      Gait: Gait abnormal.   Psychiatric:         Mood and Affect: Mood normal.         Behavior: Behavior normal.         Thought Content:  Thought content normal.       [address]

## 2023-10-12 NOTE — ASSESSMENT & PLAN NOTE
Pt with 2+ b/l pitting edema, reports compliance with lasix 40 mg qd, has script for repeat BMP. Pt denies SOB or cough, educate on compression therapy and elevating legs.

## 2023-10-17 ENCOUNTER — TELEPHONE (OUTPATIENT)
Dept: FAMILY MEDICINE CLINIC | Facility: CLINIC | Age: 83
End: 2023-10-17

## 2023-10-17 DIAGNOSIS — E87.1 LOW SODIUM LEVELS: Primary | ICD-10-CM

## 2023-10-17 DIAGNOSIS — K21.9 GASTROESOPHAGEAL REFLUX DISEASE WITHOUT ESOPHAGITIS: ICD-10-CM

## 2023-10-17 NOTE — TELEPHONE ENCOUNTER
----- Message from Bud White, [address] [facility] sent at 10/17/2023  8:08 AM EDT -----  Sodium levels remains below normal need to restrict fluids to 1 L per day which is approx 4 cups. F/u with repeat BMP as ordered for 11/16 and nephrology appointment.

## 2023-10-18 ENCOUNTER — RA CDI HCC (OUTPATIENT)
Dept: OTHER | Facility: HOSPITAL | Age: 83
End: 2023-10-18

## 2023-10-18 RX ORDER — ESOMEPRAZOLE MAGNESIUM 40 MG/1
CAPSULE, DELAYED RELEASE ORAL
Qty: 90 CAPSULE | Refills: 1 | Status: SHIPPED | OUTPATIENT
Start: 2023-10-18

## 2023-10-18 NOTE — PROGRESS NOTES
[address] [facility] coding opportunities       Chart reviewed, no opportunity found: CHART REVIEWED, NO OPPORTUNITY FOUND        Patients Insurance     Medicare Insurance: [employer] Advantage        Previous suggestion used

## 2023-10-21 DIAGNOSIS — Z76.0 MEDICATION REFILL: ICD-10-CM

## 2023-10-23 RX ORDER — PERMETHRIN 50 MG/G
CREAM TOPICAL
Qty: 120 G | Refills: 3 | Status: SHIPPED | OUTPATIENT
Start: 2023-10-23 | End: 2023-10-25

## 2023-10-25 ENCOUNTER — OFFICE VISIT (OUTPATIENT)
Dept: FAMILY MEDICINE CLINIC | Facility: CLINIC | Age: 83
End: 2023-10-25
Payer: COMMERCIAL

## 2023-10-25 VITALS
BODY MASS INDEX: 22.98 KG/M2 | HEART RATE: 60 BPM | OXYGEN SATURATION: 96 % | DIASTOLIC BLOOD PRESSURE: 80 MMHG | SYSTOLIC BLOOD PRESSURE: 140 MMHG | RESPIRATION RATE: 16 BRPM | TEMPERATURE: 97.8 F | WEIGHT: 143 LBS | HEIGHT: 66 IN

## 2023-10-25 DIAGNOSIS — Z71.89 ACP (ADVANCE CARE PLANNING): ICD-10-CM

## 2023-10-25 DIAGNOSIS — F03.B0 MODERATE DEMENTIA, UNSPECIFIED DEMENTIA TYPE, UNSPECIFIED WHETHER BEHAVIORAL, PSYCHOTIC, OR MOOD DISTURBANCE OR ANXIETY (HCC): ICD-10-CM

## 2023-10-25 DIAGNOSIS — I10 ESSENTIAL HYPERTENSION: ICD-10-CM

## 2023-10-25 DIAGNOSIS — F10.90 HEAVY ALCOHOL USE: ICD-10-CM

## 2023-10-25 DIAGNOSIS — G31.9 CEREBRAL ATROPHY (HCC): ICD-10-CM

## 2023-10-25 DIAGNOSIS — Z00.00 MEDICARE ANNUAL WELLNESS VISIT, SUBSEQUENT: Primary | ICD-10-CM

## 2023-10-25 DIAGNOSIS — E87.1 HYPONATREMIA: ICD-10-CM

## 2023-10-25 DIAGNOSIS — R41.3 MEMORY LOSS: ICD-10-CM

## 2023-10-25 DIAGNOSIS — Z13.820 OSTEOPOROSIS SCREENING: ICD-10-CM

## 2023-10-25 DIAGNOSIS — R60.0 BILATERAL LOWER EXTREMITY EDEMA: ICD-10-CM

## 2023-10-25 DIAGNOSIS — E78.5 DYSLIPIDEMIA: ICD-10-CM

## 2023-10-25 DIAGNOSIS — N63.10 MASS OF RIGHT BREAST, UNSPECIFIED QUADRANT: ICD-10-CM

## 2023-10-25 DIAGNOSIS — F41.9 ANXIETY: ICD-10-CM

## 2023-10-25 PROCEDURE — G0439 PPPS, SUBSEQ VISIT: HCPCS | Performed by: INTERNAL MEDICINE

## 2023-10-25 PROCEDURE — 99214 OFFICE O/P EST MOD 30 MIN: CPT | Performed by: INTERNAL MEDICINE

## 2023-10-25 RX ORDER — DONEPEZIL HYDROCHLORIDE 10 MG/1
10 TABLET, FILM COATED ORAL
Qty: 30 TABLET | Refills: 3 | Status: SHIPPED | OUTPATIENT
Start: 2023-10-25

## 2023-10-25 RX ORDER — ESCITALOPRAM OXALATE 10 MG/1
10 TABLET ORAL DAILY
Qty: 90 TABLET | Refills: 1 | Status: SHIPPED | OUTPATIENT
Start: 2023-10-25 | End: 2024-04-22

## 2023-10-25 NOTE — PROGRESS NOTES
Assessment and Plan:     Ivon Mac here for [address] and ACP discussion, and also to discuss her recent hospitalization for low sodium, and edema and sob -today we spoke at length with Rahul Wilson, and her daughter Yamil Lui about her hyponatremia, and how she has to stay on lasix daily, and has to follow up with Nephrology and also how she has to restricgt herself to 1.5 L of fluid daily. Also discussed whether or not she wants to do breast imaging for the right breast mass that we know she has. She never got the [place] mammogram done when they were ordered. 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 Ivon Mac basically says she wouldn't do anything about it even if it was a cancer-had echo while in the hospital and it was ok, systolic and diastolic function were normal.  Discussed her dementia today too and will start Aricept to see if she stabilitzes at all. Depression Screening and Follow-up Plan: Patient was screened for depression during today's encounter. They screened negative with a PHQ-2 score of 0. Preventive health issues were discussed with patient, and age appropriate screening tests were ordered as noted in patient's After Visit Summary. Personalized health advice and appropriate referrals for health education or preventive services given if needed, as noted in patient's After Visit Summary.      History of Present Illness:     Patient presents for a Medicare Wellness Visit    Khushbu here for [address], ACP discussion, and to go over her recent hospitalization for altered mental status, sob, and ultimately hyponatremia-was hospitalized for a sodium of 120 and was treated with fluid restriction, lasix, and tolvaptan and sodium did come back up-she had labwork done this morning-Khushbu has a history of heavy alcohol use, altnough she says she has cut back now, and dementia-has known cerebral atrophy on MRI-daughter would like for her to be on namenda or aricept - I spoke at length with her daughter Madelin Hines on speaker phone and explained hyponatremia, and the reasoning behind the lasix, and I also talked with her about the right breast mass that Caterina Montanez has that was picked up upon peripherally on a CT/CTA and I told Judy another thing one has to consider is cancer causing low sodium and SIADH-at the time in 2022 I ordered a breast US and diagnostic mammogram but Caterina Montanez told me she wasn't going to do them and that even if she has something there she's at an age where it doesn't matter-does have a hx of breast implants b/l too       Patient Care Team:  Drew Cason MD as PCP - General (Internal Medicine)     Review of Systems:     Review of Systems   Constitutional: Negative. HENT: Negative. Respiratory: Negative. Cardiovascular:  Positive for leg swelling.         Problem List:     Patient Active Problem List   Diagnosis    Other insomnia    Heavy alcohol use    Anxiety    Essential hypertension    Dyslipidemia    Vitamin D deficiency    Gastroesophageal reflux disease without esophagitis    Uncomplicated alcohol dependence (HCC)    Paresthesia of both hands    Breast mass, right    Closed displaced fracture of fourth metatarsal bone of left foot    Fall    Hyponatremia    Premature atrial contractions    PVC's (premature ventricular contractions)    Displaced fracture of second metatarsal bone of left foot with routine healing    Closed displaced fracture of third metatarsal bone of left foot    Chondrocalcinosis of left knee    Cervical spondylosis    Rash and nonspecific skin eruption    Memory loss    Cerebral atrophy (HCC)    Hip pain, acute, right    Bilateral lower extremity edema      Past Medical and Surgical History:     Past Medical History:   Diagnosis Date    Anxiety     Arthritis     Prajapati esophagus     Cataract     Continuous chronic alcoholism (HCC)     GERD (gastroesophageal reflux disease)     Heavy alcohol use     Hyperlipidemia     Hypertension     Insomnia Palpitations      Past Surgical History:   Procedure Laterality Date    BREAST IMPLANT Bilateral     CATARACT EXTRACTION Right     CHOLECYSTECTOMY      LAP    COLONOSCOPY      HYSTERECTOMY      age 69-[place]    JOINT REPLACEMENT Bilateral     hips-2010 and 2017-left leg is shorter    LASIK Bilateral     in her 50's    CT XCAPSL CTRC RMVL INSJ IO LENS PROSTH W/O ECP Left 10/18/2018    Procedure: EXTRACTION EXTRACAPSULAR CATARACT PHACO INTRAOCULAR LENS (IOL); Surgeon: Mick Flynn MD;  Location: [facility] MAIN OR;  Service: Ophthalmology    TONSILLECTOMY      TUBAL LIGATION        Family History:     Family History   Problem Relation Age of Onset    Cancer Mother         breast,kidney,lung (smoker)    Heart disease Father 61        MI    Cancer Sister         liver,pancreatic(smoker,ETOH)    Diabetes Daughter     No Known Problems Sister       Social History:     Social History     Socioeconomic History    Marital status: /Civil Union     Spouse name: None    Number of children: None    Years of education: None    Highest education level: None   Occupational History    Occupation: Retired   Tobacco Use    Smoking status: Never    Smokeless tobacco: Never   Vaping Use    Vaping Use: Never used   Substance and Sexual Activity    Alcohol use:  Yes     Alcohol/week: 20.0 standard drinks of alcohol     Types: 20 Glasses of wine per week     Comment:  "wine every once in awhile"    Drug use: No    Sexual activity: Not Currently   Other Topics Concern    None   Social History Narrative    Occupation: retired    Marital status:     Exercise level: Occasional    Diet: Regular    General stress level: Low    Alcohol intake: Occasional    Caffeine intake: Occasional    Seat belts used routinely: Yes    Sunscreen used routinely: Yes    Smoke alarm in home: Yes    Advance directive: Yes     Social Determinants of Health     Financial Resource Strain: Low Risk  (10/25/2023)    Overall Financial Resource Strain (CARDIA)     Difficulty of Paying Living Expenses: Not hard at all   Food Insecurity: No Food Insecurity (10/4/2023)    Hunger Vital Sign     Worried About Running Out of Food in the Last Year: Never true     Ran Out of Food in the Last Year: Never true   Transportation Needs: No Transportation Needs (10/25/2023)    PRAPARE - Transportation     Lack of Transportation (Medical): No     Lack of Transportation (Non-Medical):  No   Physical Activity: Not on file   Stress: Not on file   Social Connections: Not on file   Intimate Partner Violence: Not on file   Housing Stability: Low Risk  (10/4/2023)    Housing Stability Vital Sign     Unable to Pay for Housing in the Last Year: No     Number of Places Lived in the Last Year: 1     Unstable Housing in the Last Year: No      Medications and Allergies:     Current Outpatient Medications   Medication Sig Dispense Refill    acetaminophen (TYLENOL) 325 mg tablet Take 2 tablets (650 mg total) by mouth every 6 (six) hours as needed for mild pain  0    amLODIPine (NORVASC) 5 mg tablet Take 1 tablet (5 mg total) by mouth daily 30 tablet 5    Biotin 5000 MCG TABS Take by mouth every morning      Cholecalciferol (VITAMIN D) 2000 units CAPS Take by mouth 3 (three) times a week      cyanocobalamin (VITAMIN B-12) 500 MCG tablet Take 1 tablet (500 mcg total) by mouth daily 30 tablet 5    donepezil (Aricept) 10 mg tablet Take 1 tablet (10 mg total) by mouth daily at bedtime 30 tablet 3    escitalopram (LEXAPRO) 10 mg tablet Take 1 tablet (10 mg total) by mouth daily 90 tablet 1    furosemide (LASIX) 40 mg tablet Take 1 tablet (40 mg total) by mouth daily Do not start before October 6, 2023. 30 tablet 0    GLUCOSAMINE HCL PO Take 1,200 mg by mouth daily       metoprolol tartrate (LOPRESSOR) 50 mg tablet TAKE 1 TABLET BY MOUTH  TWICE DAILY 180 tablet 1    niacin 500 mg tablet Take 500 mg by mouth daily with breakfast      Omega-3 Fatty Acids (FISH OIL) 1,000 mg Take 1,000 mg by mouth daily potassium chloride (Klor-Con M20) 20 mEq tablet Take 1 tablet (20 mEq total) by mouth daily 90 tablet 2    zolpidem (AMBIEN) 5 mg tablet Take 1 tablet (5 mg total) by mouth daily at bedtime as needed for sleep 90 tablet 1     No current facility-administered medications for this visit. No Known Allergies   Immunizations:     Immunization History   Administered Date(s) Administered    COVID-19 MODERNA VACC 0.5 ML IM 02/11/2021, 03/11/2021, 01/05/2022    INFLUENZA 10/06/2011, 10/16/2011, 10/10/2012, 10/09/2013, 10/21/2014, 10/15/2015, 10/01/2016, 11/06/2017, 10/09/2020, 10/13/2021    Influenza, high dose seasonal 0.7 mL 09/07/2023    Pneumococcal Conjugate 13-Valent 10/15/2015    Pneumococcal Polysaccharide PPV23 10/09/2016    Tdap 12/14/2022      Health Maintenance: There are no preventive care reminders to display for this patient. Topic Date Due    Hepatitis A Vaccine (1 of 2 - Risk 2-dose series) Never done    Hepatitis B Vaccine (1 of 3 - Risk 3-dose series) Never done    COVID-19 Vaccine (4 - Moderna series) 03/02/2022      Medicare Screening Tests and Risk Assessments:     Ivon Mac is here for her Subsequent Wellness visit. Health Risk Assessment:   Patient rates overall health as poor. Patient feels that their physical health rating is slightly worse. Patient is satisfied with their life. Eyesight was rated as same. Hearing was rated as same. Patient feels that their emotional and mental health rating is slightly better. Patients states they are never, rarely angry. Patient states they are sometimes unusually tired/fatigued. Pain experienced in the last 7 days has been some. Patient's pain rating has been 5/10. Patient states that she has experienced no weight loss or gain in last 6 months. Depression Screening:   PHQ-2 Score: 0      Fall Risk Screening:    In the past year, patient has experienced: history of falling in past year    Number of falls: 1  Injured during fall?: No    Feels unsteady when standing or walking?: No    Worried about falling?: Yes      Urinary Incontinence Screening:   Patient has not leaked urine accidently in the last six months. Home Safety:  Patient has trouble with stairs inside or outside of their home. Patient has working smoke alarms and has working carbon monoxide detector. Home safety hazards include: none. Nutrition:   Current diet is Regular. Medications:   Patient is currently taking over-the-counter supplements. OTC medications include: see medication list. Patient is able to manage medications. Activities of Daily Living (ADLs)/Instrumental Activities of Daily Living (IADLs):   Walk and transfer into and out of bed and chair?: Yes  Dress and groom yourself?: Yes    Bathe or shower yourself?: Yes    Feed yourself?  Yes  Do your laundry/housekeeping?: No  Manage your money, pay your bills and track your expenses?: Yes  Make your own meals?: Yes    Do your own shopping?: No    Previous Hospitalizations:   Any hospitalizations or ED visits within the last 12 months?: Yes    How many hospitalizations have you had in the last year?: 1-2    Advance Care Planning:   Living will: Yes    Advanced directive: Yes    Advanced directive counseling given: Yes    End of Life Decisions reviewed with patient: Yes      Cognitive Screening:   Provider or family/friend/caregiver concerned regarding cognition?: No    PREVENTIVE SCREENINGS      Cardiovascular Screening:    General: Screening Current      Diabetes Screening:     General: Screening Current      Colorectal Cancer Screening:     General: Screening Not Indicated      Breast Cancer Screening:     General: Patient Declines      Cervical Cancer Screening:    General: Screening Not Indicated      Abdominal Aortic Aneurysm (AAA) Screening:        General: Screening Not Indicated      Lung Cancer Screening:     General: Screening Not Indicated    Screening, Brief Intervention, and Referral to Treatment (SBIRT)    Screening  Typical number of drinks in a day: 1  Typical number of drinks in a week: 7  Interpretation: Low risk drinking behavior. Single Item Drug Screening:  How often have you used an illegal drug (including marijuana) or a prescription medication for non-medical reasons in the past year? never    Single Item Drug Screen Score: 0  Interpretation: Negative screen for possible drug use disorder    No results found. Physical Exam:     /80 (BP Location: Left arm, Patient Position: Sitting, Cuff Size: Standard)   Pulse 60   Temp 97.8 °F (36.6 °C) (Tympanic)   Resp 16   Ht 5' 6" (1.676 m)   Wt 64.9 kg (143 lb)   SpO2 96%   BMI 23.08 kg/m²     Physical Exam  Constitutional:       Appearance: Normal appearance. HENT:      Head: Normocephalic and atraumatic. Right Ear: External ear normal.      Left Ear: External ear normal.      Nose: Nose normal.      Mouth/Throat:      Mouth: Mucous membranes are moist.   Cardiovascular:      Rate and Rhythm: Normal rate and regular rhythm. Heart sounds: Normal heart sounds. Pulmonary:      Effort: Pulmonary effort is normal.      Breath sounds: Normal breath sounds. Abdominal:      Palpations: Abdomen is soft. Genitourinary:     Comments: B/L breast implants and definitely firm solidness to right breast, non tender  Musculoskeletal:      Cervical back: Normal range of motion and neck supple. Right lower leg: Edema present. Left lower leg: Edema present. Skin:     General: Skin is warm. Neurological:      General: No focal deficit present. Mental Status: She is alert. Mental status is at baseline.           Jared Garcia MD

## 2023-10-25 NOTE — ASSESSMENT & PLAN NOTE
Pt with dementia, daughter would like to try something like Aricept or Namenda-is supposed to see Neurology-had MRI in April showing microangiopathic changes and cerebral atrophy

## 2023-10-25 NOTE — ASSESSMENT & PLAN NOTE
Possibly related to fluid overload, alcohol use, nsaid use-was treated with lasix in the hospital and sent home on daily lasix-along with a 1.5 l fluid restriction-had labwork done this AM so will follow up on that -is supposed to see Nephrology

## 2023-10-25 NOTE — PATIENT INSTRUCTIONS
Medicare Preventive Visit Patient Instructions  Thank you for completing your Welcome to Medicare Visit or Medicare Annual Wellness Visit today. Your next wellness visit will be due in one year (10/25/2024). The screening/preventive services that you may require over the next 5-10 years are detailed below. Some tests may not apply to you based off risk factors and/or age. Screening tests ordered at today's visit but not completed yet may show as past due. Also, please note that scanned in results may not display below. Preventive Screenings:  Service Recommendations Previous Testing/Comments   Colorectal Cancer Screening  * Colonoscopy    * Fecal Occult Blood Test (FOBT)/Fecal Immunochemical Test (FIT)  * Fecal DNA/Cologuard Test  * Flexible Sigmoidoscopy Age: 43-73 years old   Colonoscopy: every 10 years (may be performed more frequently if at higher risk)  OR  FOBT/FIT: every 1 year  OR  Cologuard: every 3 years  OR  Sigmoidoscopy: every 5 years  Screening may be recommended earlier than age 39 if at higher risk for colorectal cancer. Also, an individualized decision between you and your healthcare provider will decide whether screening between the ages of 77-80 would be appropriate. Colonoscopy: Not on file  FOBT/FIT: Not on file  Cologuard: Not on file  Sigmoidoscopy: Not on file    Screening Not Indicated     Breast Cancer Screening Age: 36 years old  Frequency: every 1-2 years  Not required if history of left and right mastectomy Mammogram: Not on file    Screening Not Indicated   Cervical Cancer Screening Between the ages of 21-29, pap smear recommended once every 3 years. Between the ages of 32-69, can perform pap smear with HPV co-testing every 5 years.    Recommendations may differ for women with a history of total hysterectomy, cervical cancer, or abnormal pap smears in past. Pap Smear: Not on file    Screening Not Indicated   Hepatitis C Screening Once for adults born between [address] [place] [address] frequently in patients at high risk for Hepatitis C Hep C Antibody: Not on file        Diabetes Screening 1-2 times per year if you're at risk for diabetes or have pre-diabetes Fasting glucose: No results in last 5 years (No results in last 5 years)  A1C: 5.0 % (2/26/2022)  Screening Current   Cholesterol Screening Once every 5 years if you don't have a lipid disorder. May order more often based on risk factors. Lipid panel: 08/04/2023    Screening Current     Other Preventive Screenings Covered by Medicare:  Abdominal Aortic Aneurysm (AAA) Screening: covered once if your at risk. You're considered to be at risk if you have a family history of AAA. Lung Cancer Screening: covers low dose CT scan once per year if you meet all of the following conditions: (1) Age 48-67; (2) No signs or symptoms of lung cancer; (3) Current smoker or have quit smoking within the last 15 years; (4) You have a tobacco smoking history of at least 20 pack years (packs per day multiplied by number of years you smoked); (5) You get a written order from a healthcare provider. Glaucoma Screening: covered annually if you're considered high risk: (1) You have diabetes OR (2) Family history of glaucoma OR (3)  aged 48 and older OR (3)  American aged 72 and older  Osteoporosis Screening: covered every 2 years if you meet one of the following conditions: (1) You're estrogen deficient and at risk for osteoporosis based off medical history and other findings; (2) Have a vertebral abnormality; (3) On glucocorticoid therapy for more than 3 months; (4) Have primary hyperparathyroidism; (5) On osteoporosis medications and need to assess response to drug therapy. Last bone density test (DXA Scan): Not on file. HIV Screening: covered annually if you're between the age of 14-79. Also covered annually if you are younger than 13 and older than 72 with risk factors for HIV infection.  For pregnant patients, it is covered up to 3 times per pregnancy. Immunizations:  Immunization Recommendations   Influenza Vaccine Annual influenza vaccination during flu season is recommended for all persons aged >= 6 months who do not have contraindications   Pneumococcal Vaccine   * Pneumococcal conjugate vaccine = PCV13 (Prevnar 13), PCV15 (Vaxneuvance), PCV20 (Prevnar 20)  * Pneumococcal polysaccharide vaccine = PPSV23 (Pneumovax) Adults 52-85 yo with certain risk factors or if 69+ yo  If never received any pneumonia vaccine: recommend Prevnar 20 (PCV20)  Give PCV20 if previously received 1 dose of PCV13 or PPSV23   Hepatitis B Vaccine 3 dose series if at intermediate or high risk (ex: diabetes, end stage renal disease, liver disease)   Respiratory syncytial virus (RSV) Vaccine - COVERED BY MEDICARE PART D  * RSVPreF3 (Arexvy) CDC recommends that adults 61years of age and older may receive a single dose of RSV vaccine using shared clinical decision-making (SCDM)   Tetanus (Td) Vaccine - COST NOT COVERED BY MEDICARE PART B Following completion of primary series, a booster dose should be given every 10 years to maintain immunity against tetanus. Td may also be given as tetanus wound prophylaxis. Tdap Vaccine - COST NOT COVERED BY MEDICARE PART B Recommended at least once for all adults. For pregnant patients, recommended with each pregnancy. Shingles Vaccine (Shingrix) - COST NOT COVERED BY MEDICARE PART B  2 shot series recommended in those 19 years and older who have or will have weakened immune systems or those 50 years and older     Health Maintenance Due:  There are no preventive care reminders to display for this patient. Immunizations Due:      Topic Date Due   • Hepatitis A Vaccine (1 of 2 - Risk 2-dose series) Never done   • Hepatitis B Vaccine (1 of 3 - Risk 3-dose series) Never done   • COVID-19 Vaccine (4 - Moderna series) 03/02/2022     Advance Directives   What are advance directives?   Advance directives are legal documents that state your wishes and plans for medical care. These plans are made ahead of time in case you lose your ability to make decisions for yourself. Advance directives can apply to any medical decision, such as the treatments you want, and if you want to donate organs. What are the types of advance directives? There are many types of advance directives, and each state has rules about how to use them. You may choose a combination of any of the following:  Living will: This is a written record of the treatment you want. You can also choose which treatments you do not want, which to limit, and which to stop at a certain time. This includes surgery, medicine, IV fluid, and tube feedings. Durable power of  for [facility]): This is a written record that states who you want to make healthcare choices for you when you are unable to make them for yourself. This person, called a proxy, is usually a family member or a friend. You may choose more than 1 proxy. Do not resuscitate (DNR) order:  A DNR order is used in case your heart stops beating or you stop breathing. It is a request not to have certain forms of treatment, such as CPR. A DNR order may be included in other types of advance directives. Medical directive: This covers the care that you want if you are in a coma, near death, or unable to make decisions for yourself. You can list the treatments you want for each condition. Treatment may include pain medicine, surgery, blood transfusions, dialysis, IV or tube feedings, and a ventilator (breathing machine). Values history: This document has questions about your views, beliefs, and how you feel and think about life. This information can help others choose the care that you would choose. Why are advance directives important? An advance directive helps you control your care. Although spoken wishes may be used, it is better to have your wishes written down. Spoken wishes can be misunderstood, or not followed. Treatments may be given even if you do not want them. An advance directive may make it easier for your family to make difficult choices about your care. Fall Prevention    Fall prevention  includes ways to make your home and other areas safer. It also includes ways you can move more carefully to prevent a fall. Health conditions that cause changes in your blood pressure, vision, or muscle strength and coordination may increase your risk for falls. Medicines may also increase your risk for falls if they make you dizzy, weak, or sleepy. Fall prevention tips:   Stand or sit up slowly. Use assistive devices as directed. Wear shoes that fit well and have soles that . Wear a personal alarm. Stay active. Manage your medical conditions. Home Safety Tips:  Add items to prevent falls in the bathroom. Keep paths clear. Install bright lights in your home. Keep items you use often on shelves within reach. Paint or place reflective tape on the edges of your stairs. © Copyright [employer] 2018 Information is for End User's use only and may not be sold, redistributed or otherwise used for commercial purposes.  All illustrations and images included in CareNotes® are the copyrighted property of A.D.A.M., Inc. or  [address]

## 2023-10-27 ENCOUNTER — TELEPHONE (OUTPATIENT)
Dept: NEPHROLOGY | Facility: CLINIC | Age: 83
End: 2023-10-27

## 2023-10-27 NOTE — TELEPHONE ENCOUNTER
----- Message from Gaston Harris MD sent at 10/26/2023  4:54 PM EDT -----  We were not able to see the patient as a hospital discharge follow-up. Hospital discharge follow-up was for hyponatremia. Her most recent serum sodium is 137 which is normal.  Based on chart review, she is taking furosemide 40 mg daily. Can you please confirm with the patient that she is taking furosemide 40 mg daily. This lab is from 10/25/2023. Please advise the patient to repeat BMP in 2 weeks and have her follow-up in the office. You can offer her an appointment with me on next Wednesday at 12:30 PM.  Thank you.

## 2023-10-30 ENCOUNTER — TELEPHONE (OUTPATIENT)
Age: 83
End: 2023-10-30

## 2023-11-01 ENCOUNTER — OFFICE VISIT (OUTPATIENT)
Age: 83
End: 2023-11-01
Payer: COMMERCIAL

## 2023-11-01 VITALS
WEIGHT: 140.4 LBS | SYSTOLIC BLOOD PRESSURE: 140 MMHG | OXYGEN SATURATION: 99 % | TEMPERATURE: 97.2 F | BODY MASS INDEX: 23.39 KG/M2 | DIASTOLIC BLOOD PRESSURE: 86 MMHG | HEIGHT: 65 IN | HEART RATE: 64 BPM

## 2023-11-01 DIAGNOSIS — R26.2 AMBULATORY DYSFUNCTION: ICD-10-CM

## 2023-11-01 DIAGNOSIS — I10 ESSENTIAL HYPERTENSION: ICD-10-CM

## 2023-11-01 DIAGNOSIS — F10.20 UNCOMPLICATED ALCOHOL DEPENDENCE (HCC): ICD-10-CM

## 2023-11-01 DIAGNOSIS — F41.9 ANXIETY: ICD-10-CM

## 2023-11-01 DIAGNOSIS — F03.911 DEMENTIA WITH AGITATION, UNSPECIFIED DEMENTIA SEVERITY, UNSPECIFIED DEMENTIA TYPE (HCC): Primary | ICD-10-CM

## 2023-11-01 DIAGNOSIS — R42 DIZZINESS: ICD-10-CM

## 2023-11-01 DIAGNOSIS — G47.09 OTHER INSOMNIA: ICD-10-CM

## 2023-11-01 PROCEDURE — 99205 OFFICE O/P NEW HI 60 MIN: CPT | Performed by: NURSE PRACTITIONER

## 2023-11-01 RX ORDER — LISINOPRIL 5 MG/1
5 TABLET ORAL DAILY
COMMUNITY

## 2023-11-01 NOTE — PROGRESS NOTES
Assessment & Plan:   1. Dementia with agitation, unspecified dementia severity, unspecified dementia type ([address])  Assessment & Plan:  Pt independent with adl, dependent  for iadls. Memory loss:  STM loss over time, moreso recently  [facility] 11/30. Deficits in all domain  MRI brain, 4/2023:  Mild chronic microangiopathy. Cerebral atrophy. NeuroQuant analysis was performed: Normal hippocampal volume (between one and two standard deviation below the mean). Given additional findings of abnormal hippocampal occupancy score (HOC) and enlarged temporal horns recommend 6-12 months follow-up to assess interval changes. Gerilabs: Folate 9.8, TSH 4.092, B12 595  History, physical, and cognitive screening are most consistent with: Mild dementia  Contributing factors: Recent hospitalizations, ?compliance to meds  Further eval warrants the following: No further testing needed at this time  Cont supportive cares lives with , daughter lives in upstairs  Caregiver stress concerns: None per   SW needs this visit: See intake. Did request to patient that one of her daughters be available for care conference to review plan of care. She was agreeable  Discussed safe environment  Wandering: No concerns at this time. Some items to consider would be door video surveillance, ID bracelet, Tile GPS   Home: No concerns per . Ensure pt has phone and reinforce to not use stove or shower while gone. Driving safety: Patient gave up driving several years ago. No concerns  Fall preventions: Has had multiple falls, will refer to [facility] [place] [place] rehab PT  Medication management: Unclear if patient is doing this properly. Do recommend prepackaged medications for ease of administration. Continue to engage in physical, cognitive, social activity. Cont doing games, puzzles, trivia, current event discussions  Cont daily walks or exercise video  Cont outings with friends and family. Avoid social isolation.   Referral to cognitive therapy: We will refer to ST through [facility] rehab  Optimize chronic and acute conditions  Cont to f/u with providers and ensure medication compliance  Vascular risk factors: HTN, EtOH use  Medication review: Polypharmacy, etoh use-would consider gradual dose reduction/discontinuance of Ambien   Ensure good diet (ex Mediterranean diet) and adequate hydration  Monitor for changes in behavior/mood- if noted, notify provider for eval  Encourage mindfulness and positive socialization for general wellness. Avoid medications that worsen cognition - check with provider or pharmacist before starting new or OTC meds  Do not overwhelm pt with info. Do one task at a time. Use slower pace. Keep to one conversation at a time. Do not multitask. Encourage independence as able. Orders:  -     Ambulatory Referral to [facility]    2. Anxiety  Assessment & Plan:  Chronic, stable  Patient remains on Lexapro  Continue relaxation techniques, emotional support  Geriatric therapist is available should patient need talk therapy      3. Essential hypertension  Assessment & Plan:  BP stable without complaint of headache. Patient is complaining of dizziness today  Patient remains on metoprolol, lisinopril, furosemide  Recommend follow-up with PCP for chronic management and complaint of dizziness  Continue dietary lifestyle interventions      4. Other insomnia  Assessment & Plan:  Patient having chronic sleep problems  She does use Ambien, did check PDMP and she uses regularly  Use of benzodiazepines for sleep can increase cognitive decline and increased fall risk. Did  with patient and  toward this. Avoid abrupt cessation of medication, would work PCP for [facility] [facility] active during the day, decrease at bedtime use decrease daytime sleep ensure quiet and calm environment for resting at night  Avoid use of diphenhydramine products for sleep loss of      5.  Uncomplicated alcohol dependence ([address])  Assessment & Plan:  Discussed use of alcohol can impact cognition, fall risk  Recommend decreasing usage and cessation if possible  Patient reports her intake is stable, she is not forgetting and drinking extra, and she is happy with her current usage      6. Ambulatory dysfunction  Assessment & Plan:  Patient denies any trouble walking, reports a few falls that resulted from having "tripped"  Was recently inpt with hip pain which apparently has resolved. Pt reporting dizziness today  Will refer to PT through [facility] for eval  Fall precautions. 7. Dizziness  Assessment & Plan:  Patient reporting increased dizziness today  Assessment is benign and vital signs stable. She is going for blood work later today  Is unclear how well patient is being compliant with medications, multiple medications that she takes could be contributing to dizziness as well as EtOH use, recent hyponatremia  As patient has been to follow-up with PCP/ED if dizziness gets any worse. Did leave note with PCP to make her aware  Fall precautions      HPI:  We had the pleasure of evaluating Mariano Gandara who is a 78 y.o. female in Geriatric Consultation today. Previous MMSE 22/30. Comorbidities include dementia, insomnia, htn..   Ms. Amanda Snow is in the office with her , Tatyana Mac. Memory Concerns per family:   Has previously diagnosed dementia, has memory loss for about 6 months per . She used to be very sharp with math, but now can't do 1+1, forgets things. Independent with self care. Less cooking and cleaning. No concerns about stove safety. She doesn't do laundry because its down stairs. Not too much much trouble ambulating. Had one fall, no major injury. Not misplacing things more. Forgets why in room, but no more than usual.   takes care of dates and appts. She is not forgetting close relationships. She can learn new information pretty well. Recently has not been as active due to sore hip.   She did not do therapy (she declined). Doesn't do crossword puzzles. Doesn't do activities. Memory concerns per patient:  Has had memory issues compounded by dizziness. This has been ongoing for about a year. Independent with self care. Sleep is good - she does take ambien nightly. Sometimes doesn't need it. She does take care of bills at night. Does all cooking and cleaning. Very repetitive during eval.  Not a lot of anxiety or depression. She is very happy. She likes to do everything. She doesn't go out too much anymore. Last fall was a year ago. Went into the store and tripped over something on the ground. Not any pain. Was in the hospital for hip pain which is better now. Did not go to PT. Doesn't use AD. Daughter lives upstairs, she does a lot of cooking, she helps with some shopping. She doesn't usually go to appts with patient. Function Concerns:    She lives with . He is doing ok. ADL independent, iADL supported, Medication management: independent   Do you drive: No - she quit a couple years ago because she didn't want to drive anymore. Do you handle your own financial affairs such as balancing your checkbook, paying bills, investments: Yes - does with , but she does get confused with numbers  Have you noticed any gait or balance disorder:  No.  Uses no AD to assist with ambulation. Any urinary/stool incontinence:  no   Appetite/swallow: good/trouble with swallowing pills. Elimination issues:  no  Hearing issues:good Vision issues: readers     Psychosocial concerns:  Have you or your family noted any change in your mood or personality:No  Are you currently or have you been treated in the past for depression or anxiety: Yes  Any hallucination or delusion: Yes - sees people, this is not new, she is not afraid of them. Sleep Issues: No - pretty good sleeper  Pain:  hip has been bothersome, but getting better (was from a fall). At that time she had tripped on something.       Past Medical, surgical, social, medication and allergy history and patients previous records reviewed. She has some problems operating household appliance such as TV remote, kitchen appliances, computer. Does good with cell phone. Has smart phone, doesn't use a whole a lot. Patient requires repeat information or ask the same question repeatedly: Yes  Shehas difficulty finding the right word while speaking: Yes  Family Review of Behavior [facility]:    pacing. No    agressive/combative behavior. Yes - not combative (verbal). agitated. Yes   wandering. No   resistance to care. No   hoarding/hiding objects. No    suspicious  No  withdrawn Yes  misplacing/losing objects No - not more than usual  personal hygiene problems. No  forgetfulness of actions No    Family member with dementia and what type? yes - mom had nervous breakdown  Stroke history No  Have you had any head trauma Yes - has hit head a few times  Does patient have history of alcohol abuseNo - does drink one wine a night. ROS:  Review of Systems   Constitutional:  Negative for activity change, appetite change, chills and fatigue. HENT:  Negative for congestion, hearing loss and trouble swallowing. Eyes:  Negative for visual disturbance. Respiratory:  Negative for cough and shortness of breath. Cardiovascular:  Negative for chest pain. Gastrointestinal:  Negative for abdominal pain, constipation, diarrhea, nausea and vomiting. Genitourinary:  Negative for difficulty urinating. Musculoskeletal:  Negative for arthralgias (hip better), back pain and gait problem. Neurological:  Positive for dizziness. Negative for light-headedness. Psychiatric/Behavioral:  Positive for decreased concentration (forgetful) and hallucinations (visiual hallucinations, non scary. Sees things a night time. ). Negative for dysphoric mood and sleep disturbance. The patient is not nervous/anxious.         Allergies:   No Known Allergies    Medications: Current Outpatient Medications:     acetaminophen (TYLENOL) 325 mg tablet, Take 2 tablets (650 mg total) by mouth every 6 (six) hours as needed for mild pain, Disp: , Rfl: 0    Biotin 5000 MCG TABS, Take by mouth every morning, Disp: , Rfl:     Cholecalciferol (VITAMIN D) 2000 units CAPS, Take by mouth 3 (three) times a week, Disp: , Rfl:     cyanocobalamin (VITAMIN B-12) 500 MCG tablet, Take 1 tablet (500 mcg total) by mouth daily, Disp: 30 tablet, Rfl: 5    donepezil (Aricept) 10 mg tablet, Take 1 tablet (10 mg total) by mouth daily at bedtime, Disp: 30 tablet, Rfl: 3    esomeprazole (NexIUM) 20 mg capsule, Take 20 mg by mouth every morning before breakfast, Disp: , Rfl:     furosemide (LASIX) 40 mg tablet, Take 1 tablet (40 mg total) by mouth daily Do not start before October 6, 2023., Disp: 30 tablet, Rfl: 0    GLUCOSAMINE HCL PO, Take 1,200 mg by mouth daily , Disp: , Rfl:     lisinopril (ZESTRIL) 5 mg tablet, Take 5 mg by mouth daily Pt unsure of dosage, Disp: , Rfl:     metoprolol tartrate (LOPRESSOR) 50 mg tablet, TAKE 1 TABLET BY MOUTH  TWICE DAILY, Disp: 180 tablet, Rfl: 1    niacin 500 mg tablet, Take 500 mg by mouth daily with breakfast, Disp: , Rfl:     Omega-3 Fatty Acids (FISH OIL) 1,000 mg, Take 1,000 mg by mouth daily  , Disp: , Rfl:     potassium chloride (Klor-Con M20) 20 mEq tablet, Take 1 tablet (20 mEq total) by mouth daily, Disp: 90 tablet, Rfl: 2    zolpidem (AMBIEN) 5 mg tablet, Take 1 tablet (5 mg total) by mouth daily at bedtime as needed for sleep, Disp: 90 tablet, Rfl: 1    amLODIPine (NORVASC) 5 mg tablet, Take 1 tablet (5 mg total) by mouth daily, Disp: 30 tablet, Rfl: 5    escitalopram (LEXAPRO) 10 mg tablet, Take 1 tablet (10 mg total) by mouth daily, Disp: 90 tablet, Rfl: 1    Vitals:  Vitals:    11/01/23 1025   BP: 140/86   Pulse: 64   Temp: (!) 97.2 °F (36.2 °C)   SpO2: 99%       History:  Past Medical History:   Diagnosis Date    Anxiety     Arthritis     Prajapati esophagus Cataract     Continuous chronic alcoholism (HCC)     GERD (gastroesophageal reflux disease)     Heavy alcohol use     Hyperlipidemia     Hypertension     Insomnia     Palpitations      Past Surgical History:   Procedure Laterality Date    BREAST IMPLANT Bilateral     CATARACT EXTRACTION Right     CHOLECYSTECTOMY      LAP    COLONOSCOPY      HYSTERECTOMY      age 69-[place]    JOINT REPLACEMENT Bilateral     hips-2010 and 2017-left leg is shorter    LASIK Bilateral     in her 50's    OK XCAPSL CTRC RMVL INSJ IO LENS PROSTH W/O ECP Left 10/18/2018    Procedure: EXTRACTION EXTRACAPSULAR CATARACT PHACO INTRAOCULAR LENS (IOL); Surgeon: Dar Puente MD;  Location: [facility] MAIN OR;  Service: Ophthalmology    TONSILLECTOMY      TUBAL LIGATION       Family History   Problem Relation Age of Onset    Cancer Mother         breast,kidney,lung (smoker)    Heart disease Father 61        MI    Cancer Sister         liver,pancreatic(smoker,ETOH)    Diabetes Daughter     No Known Problems Sister      Social History     Socioeconomic History    Marital status: /Civil Union     Spouse name: Not on file    Number of children: Not on file    Years of education: Not on file    Highest education level: Not on file   Occupational History    Occupation: Retired   Tobacco Use    Smoking status: Never    Smokeless tobacco: Never   Vaping Use    Vaping Use: Never used   Substance and Sexual Activity    Alcohol use:  Yes     Alcohol/week: 20.0 standard drinks of alcohol     Types: 20 Glasses of wine per week     Comment:  "wine every once in awhile"    Drug use: No    Sexual activity: Not Currently   Other Topics Concern    Not on file   Social History Narrative    Occupation: retired    Marital status:     Exercise level: Occasional    Diet: Regular    General stress level: Low    Alcohol intake: Occasional    Caffeine intake: Occasional    Seat belts used routinely: Yes    Sunscreen used routinely: Yes    Smoke alarm in home: Yes    Advance directive: Yes     Social Determinants of Health     Financial Resource Strain: Low Risk  (10/25/2023)    Overall Financial Resource Strain (CARDIA)     Difficulty of Paying Living Expenses: Not hard at all   Food Insecurity: No Food Insecurity (10/4/2023)    Hunger Vital Sign     Worried About Running Out of Food in the Last Year: Never true     Ran Out of Food in the Last Year: Never true   Transportation Needs: No Transportation Needs (10/25/2023)    PRAPARE - Transportation     Lack of Transportation (Medical): No     Lack of Transportation (Non-Medical): No   Physical Activity: Not on file   Stress: Not on file   Social Connections: Not on file   Intimate Partner Violence: Not on file   Housing Stability: Low Risk  (10/4/2023)    Housing Stability Vital Sign     Unable to Pay for Housing in the Last Year: No     Number of Places Lived in the Last Year: 1     Unstable Housing in the Last Year: No       Past Surgical History:   Procedure Laterality Date    BREAST IMPLANT Bilateral     CATARACT EXTRACTION Right     CHOLECYSTECTOMY      LAP    COLONOSCOPY      HYSTERECTOMY      age [address] Bilateral     hips-2010 and 2017-left leg is shorter    LASIK Bilateral     in her 50's    MT XCAPSL CTRC RMVL INSJ IO LENS PROSTH W/O ECP Left 10/18/2018    Procedure: EXTRACTION EXTRACAPSULAR CATARACT PHACO INTRAOCULAR LENS (IOL); Surgeon: Dax Murguia MD;  Location: [facility] MAIN OR;  Service: Ophthalmology    TONSILLECTOMY      TUBAL LIGATION         Physical Exam  Observed Ambulation: Steady, normal pace, no AD  Physical Exam  Vitals and nursing note reviewed. Constitutional:       General: She is not in acute distress. Appearance: Normal appearance. She is well-developed. She is not diaphoretic. HENT:      Head: Normocephalic. Cardiovascular:      Rate and Rhythm: Normal rate. Heart sounds: No murmur heard. No friction rub. No gallop.    Pulmonary:      Effort: Pulmonary effort is normal. No respiratory distress. Breath sounds: Normal breath sounds. No wheezing or rales. Abdominal:      General: Bowel sounds are normal. There is no distension. Palpations: Abdomen is soft. Tenderness: There is no abdominal tenderness. There is no rebound. Musculoskeletal:         General: Normal range of motion. Skin:     General: Skin is warm and dry. Neurological:      General: No focal deficit present. Mental Status: She is alert. Mental status is at baseline.       Comments: Oriented to person, mostly tps  Pleasant, cooperative, forgetful   Psychiatric:         Mood and Affect: Mood normal.         Behavior: Behavior normal.

## 2023-11-01 NOTE — ASSESSMENT & PLAN NOTE
Chronic, stable  Patient remains on Lexapro  Continue relaxation techniques, emotional support  Geriatric therapist is available should patient need talk therapy

## 2023-11-01 NOTE — ASSESSMENT & PLAN NOTE
BP stable without complaint of headache.   Patient is complaining of dizziness today  Patient remains on metoprolol, lisinopril, furosemide  Recommend follow-up with PCP for chronic management and complaint of dizziness  Continue dietary lifestyle interventions

## 2023-11-01 NOTE — PROGRESS NOTES
[facility]  [address] [facility], [address] [address] [facility]  [phone]    Social Work Intake    Thanh Lindsey presents today for a geriatric assessment, accompanied by her  Jhony William. Social/Family History   Marital status:                                        Spouse's Name:  Jhony William     Does patient have children? Yes How Many? 6 pt had all of her children with her   (none with Jhony William); Farnaz [place]asley in [facility] Jamey in [place], [place], Baljeet in [place], [place], Alfredito in [place], [place], and B[place] in [facility] patient at home: pt is struggling with managing her finances (mixes up numbers easily, used to be really good at math per Jhony William); pt is ADL independent per Jhony William     Are any relationships causing problems right now: No   Who is available to provide care in case of illness or crisis (please specify relation to patient / level of care able to provide)? Pt's  Jhony William and then her daughter Baljeet         Employment and Education   Education: Highest Level of Education: [employer] (No Diploma)    Currently Employed: No    Retired: Yes                        Date of [facility]? 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 [employer]: Yes      Last served? Unsure of dates of service    Currently receiving benefits?  No pt's   was in [employer] per Syl[employer] and Organizations: none   Major life events in past two years (ex: [facility], death in family, major illness etc.): was hospitalized for 4 days in beginning of 2023, pt was diagnosed with breast cancer, pt had a fall and was hospitalized in 2022    Have you ever used a home care agency, meal delivery service, or respite care?: no   Services that assessment team feels would be beneficial to patient/family: possibly Meals on Wheels as Jhony William states pt does not like to cook any more     Financial   Total Monthly income: $0     Source of income: Social Security and annuity   Meet current needs? Yes     Funds available for home care? Yes     Funds available for nursing home? No, pt does not have any long-term care insurance   Do you rent or own your home? Own       End-Of-Life Checklist   Have wishes or desires for end-of-life care been discussed?  thinks so    Advanced directives: has NO advanced directive  - information provided to family will be provided at care conference    For all patients, we encourage seeing a  to establish a Financial Power of KOMAL PHAN, a [address], and an Advanced Directive (living will). Particularly for patients experiencing memory concerns, we strongly recommend that this is completed as soon as possible. Safety Assessment   Is the patient still driving? No    Is the patient taking medications as prescribed? Yes     Is there a system in place for medication management? Pt uses a pillbox and fills it herself per Edgar Phan. Are firearms or weapons in the home? Yes, one is loaded, is kept locked up   Recommendations per [employer]: removing them from the home, keep ammunition stored separately, encourage patient to consider "gifting" them, if necessary, ask local law enforcement for assistance in removing the firearms from the home. The family may receive compensation from a gun buy-back program.    Does the patient live alone? No, pt lives with her    What is the layout of the home? 2 story/2 family home, 2 steps to get inside, pt's bedroom and bathroom are on ground floor    Do you feel comfortable leaving the person home alone? Yes  would leave her by herself overnight   Have you noticed any burned pans or other signs of issues with the stove or other appliances? No   Do you have any concerns about the person’s cooking or eating habits?  No   Are there working smoke detectors and fire extinguishers in the home? Yes    Have you thought about when it will no longer be safe for the patient to live alone? Have not discussed yet     [place] Cognitive Assessment (MoCA) Version 8.1  Education: 11th grade    Points Earned POSSIBLE Points   Visuospatial/Executive   Alternating Jasper Making 0 1   Visuoconstructional skills 0 1   Visuoconstructional skills (clock) 1 3   Naming   Naming Animals 2 3   Attention   Digit Span 2 2   Vigilance (letters) 0 1   Serial 7 subtraction 2 3   Language   Sentence Repetition 0 2   Verbal fluency 0 1   Abstraction   Abstraction (word pairings) 0 2   Delayed recall   Delayed recall 0 5   Memory index score: 1/15   Orientation   Orientation 3 6   TOTAL SCORE: 11/30  (Normal ?26/30)   Additional notes:      Geriatric Depression Scale (GDS) completed today, 1/15.

## 2023-11-01 NOTE — ASSESSMENT & PLAN NOTE
Patient having chronic sleep problems  She does use Ambien, did check PDMP and she uses regularly  Use of benzodiazepines for sleep can increase cognitive decline and increased fall risk. Did  with patient and  toward this.   Avoid abrupt cessation of medication, would work PCP for [employer] active during the day, decrease at bedtime use decrease daytime sleep ensure quiet and calm environment for resting at night  Avoid use of diphenhydramine products for sleep loss of

## 2023-11-01 NOTE — ASSESSMENT & PLAN NOTE
Patient reporting increased dizziness today  Assessment is benign and vital signs stable. She is going for blood work later today  Is unclear how well patient is being compliant with medications, multiple medications that she takes could be contributing to dizziness as well as EtOH use, recent hyponatremia  As patient has been to follow-up with PCP/ED if dizziness gets any worse.   Did leave note with PCP to make her aware  Fall precautions

## 2023-11-01 NOTE — ASSESSMENT & PLAN NOTE
Pt independent with adl, dependent  for iadls. Memory loss:  STM loss over time, moreso recently  [facility] OF THE [facility] 11/30. Deficits in all domain  MRI brain, 4/2023:  Mild chronic microangiopathy. Cerebral atrophy. NeuroQuant analysis was performed: Normal hippocampal volume (between one and two standard deviation below the mean). Given additional findings of abnormal hippocampal occupancy score (HOC) and enlarged temporal horns recommend 6-12 months follow-up to assess interval changes. Gerilabs: Folate 9.8, TSH 4.092, B12 595  History, physical, and cognitive screening are most consistent with: Mild dementia  Contributing factors: Recent hospitalizations, ?compliance to meds  Further eval warrants the following: No further testing needed at this time  Cont supportive cares lives with , daughter lives in upstairs  Caregiver stress concerns: None per   SW needs this visit: See intake. Did request to patient that one of her daughters be available for care conference to review plan of care. She was agreeable  Discussed safe environment  Wandering: No concerns at this time. Some items to consider would be door video surveillance, ID bracelet, Tile GPS   Home: No concerns per . Ensure pt has phone and reinforce to not use stove or shower while gone. Driving safety: Patient gave up driving several years ago. No concerns  Fall preventions: Has had multiple falls, will refer to [facility] rehab PT  Medication management: Unclear if patient is doing this properly. Do recommend prepackaged medications for ease of administration. Continue to engage in physical, cognitive, social activity. Cont doing games, puzzles, trivia, current event discussions  Cont daily walks or exercise video  Cont outings with friends and family. Avoid social isolation. Referral to cognitive therapy:  We will refer to  through [facility] rehab  Optimize chronic and acute conditions  Cont to f/u with providers and ensure medication compliance  Vascular risk factors: HTN, EtOH use  Medication review: Polypharmacy, etoh use-would consider gradual dose reduction/discontinuance of Ambien   Ensure good diet (ex Mediterranean diet) and adequate hydration  Monitor for changes in behavior/mood- if noted, notify provider for eval  Encourage mindfulness and positive socialization for general wellness. Avoid medications that worsen cognition - check with provider or pharmacist before starting new or OTC meds  Do not overwhelm pt with info. Do one task at a time. Use slower pace. Keep to one conversation at a time. Do not multitask. Encourage independence as able.

## 2023-11-01 NOTE — ASSESSMENT & PLAN NOTE
Patient denies any trouble walking, reports a few falls that resulted from having "tripped"  Was recently inpt with hip pain which apparently has resolved. Pt reporting dizziness today  Will refer to PT through [facility] (OUTPATIENT CAMPUS) for eval  Fall precautions.

## 2023-11-10 ENCOUNTER — PATIENT MESSAGE (OUTPATIENT)
Dept: FAMILY MEDICINE CLINIC | Facility: CLINIC | Age: 83
End: 2023-11-10

## 2023-11-10 ENCOUNTER — TELEPHONE (OUTPATIENT)
Age: 83
End: 2023-11-10

## 2023-11-10 NOTE — TELEPHONE ENCOUNTER
Patient called with  on the phone and stated the she becomes sick after taking donepezil. The first time she took the medication she threw up, the next time she took it, she could not walk after. They would like to know what is suggested.

## 2023-11-13 DIAGNOSIS — F03.90 DEMENTIA, UNSPECIFIED DEMENTIA SEVERITY, UNSPECIFIED DEMENTIA TYPE, UNSPECIFIED WHETHER BEHAVIORAL, PSYCHOTIC, OR MOOD DISTURBANCE OR ANXIETY (HCC): Primary | ICD-10-CM

## 2023-11-13 RX ORDER — MEMANTINE HYDROCHLORIDE 5 MG/1
5 TABLET ORAL DAILY
Qty: 30 TABLET | Refills: 3 | Status: SHIPPED | OUTPATIENT
Start: 2023-11-13

## 2023-11-16 ENCOUNTER — OFFICE VISIT (OUTPATIENT)
Dept: FAMILY MEDICINE CLINIC | Facility: CLINIC | Age: 83
End: 2023-11-16
Payer: COMMERCIAL

## 2023-11-16 ENCOUNTER — NURSE TRIAGE (OUTPATIENT)
Dept: OTHER | Facility: OTHER | Age: 83
End: 2023-11-16

## 2023-11-16 VITALS
SYSTOLIC BLOOD PRESSURE: 138 MMHG | TEMPERATURE: 97.1 F | BODY MASS INDEX: 24.66 KG/M2 | RESPIRATION RATE: 14 BRPM | WEIGHT: 148 LBS | OXYGEN SATURATION: 97 % | HEART RATE: 68 BPM | HEIGHT: 65 IN | DIASTOLIC BLOOD PRESSURE: 80 MMHG

## 2023-11-16 DIAGNOSIS — L03.119 CELLULITIS OF LOWER EXTREMITY, UNSPECIFIED LATERALITY: Primary | ICD-10-CM

## 2023-11-16 DIAGNOSIS — I87.2 STASIS DERMATITIS OF BOTH LEGS: ICD-10-CM

## 2023-11-16 PROCEDURE — 99214 OFFICE O/P EST MOD 30 MIN: CPT

## 2023-11-16 RX ORDER — PERMETHRIN 50 MG/G
CREAM TOPICAL
COMMUNITY
Start: 2023-11-15 | End: 2023-11-21

## 2023-11-16 RX ORDER — TRIAMCINOLONE ACETONIDE 1 MG/G
1 CREAM TOPICAL 2 TIMES DAILY
COMMUNITY
Start: 2023-11-04 | End: 2023-11-21

## 2023-11-16 RX ORDER — CEPHALEXIN 500 MG/1
500 CAPSULE ORAL 3 TIMES DAILY
Qty: 21 CAPSULE | Refills: 0 | Status: SHIPPED | OUTPATIENT
Start: 2023-11-16 | End: 2023-11-23

## 2023-11-16 RX ORDER — CLOBETASOL PROPIONATE 0.5 MG/G
CREAM TOPICAL 2 TIMES DAILY
Qty: 15 G | Refills: 2 | Status: SHIPPED | OUTPATIENT
Start: 2023-11-16

## 2023-11-16 NOTE — TELEPHONE ENCOUNTER
Reason for Disposition  • MODERATE swelling of both ankles (e.g., swelling extends up to the knees) AND new-onset or worsening    Answer Assessment - Initial Assessment Questions  1. ONSET: "When did the swelling start?" (e.g., minutes, hours, days)      4 days ago   2. LOCATION: "What part of the leg is swollen?"  "Are both legs swollen or just one leg?"      Both legs   3. SEVERITY: "How bad is the swelling?" (e.g., localized; mild, moderate, severe)   - Localized - small area of swelling localized to one leg   - MILD pedal edema - swelling limited to foot and ankle, pitting edema < 1/4 inch (6 mm) deep, rest and elevation eliminate most or all swelling   - MODERATE edema - swelling of lower leg to knee, pitting edema > 1/4 inch (6 mm) deep, rest and elevation only partially reduce swelling   - SEVERE edema - swelling extends above knee, facial or hand swelling present       Severe   4. REDNESS: "Does the swelling look red or infected?"      Yes   5. PAIN: "Is the swelling painful to touch?" If Yes, ask: "How painful is it?"   (Scale 1-10; mild, moderate or severe)       No pain   7. CAUSE: "What do you think is causing the leg swelling?"       Frequent swelling on extremities. 8. MEDICAL HISTORY: "Do you have a history of heart failure, kidney disease, liver failure, or cancer?"      No   9. RECURRENT SYMPTOM: "Have you had leg swelling before?" If Yes, ask: "When was the last time?" "What happened that time?"      Recurrent   10. OTHER SYMPTOMS: "Do you have any other symptoms?" (e.g., chest pain, difficulty breathing)        No   11.  PREGNANCY: "Is there any chance you are pregnant?" "When was your last menstrual period?"       N/A    Protocols used: Leg Swelling and Edema-ADULT-[place]

## 2023-11-16 NOTE — ASSESSMENT & PLAN NOTE
Pt with 2+ pitting b/l LE edema, scaling and erythematous plaque on b/l LE. Start clobetasol cream as prescribed, continue daily 40 mg furosemide as prescribed.

## 2023-11-16 NOTE — ASSESSMENT & PLAN NOTE
Pt with 2+ b/l LE edema, redness and warmth. No open areas noted or drainage suspect infection secondary to venous statis dermatitis. Start  cephalexin 500 mg TID x 7 days encourage elevate legs and seek immediate medical care for worsening sxs.

## 2023-11-16 NOTE — PROGRESS NOTES
Name: Diogo Haile      : 1940      MRN: [id number]  Encounter Provider: NUBIA Marte  Encounter Date: 2023   Encounter department: [facility] [address] MEDICINE    Assessment & Plan     1. Cellulitis of lower extremity, unspecified laterality  Assessment & Plan:  Pt with 2+ b/l LE edema, redness and warmth. No open areas noted or drainage suspect infection secondary to venous statis dermatitis. Start  cephalexin 500 mg TID x 7 days encourage elevate legs and seek immediate medical care for worsening sxs. Orders:  -     cephalexin (KEFLEX) 500 mg capsule; Take 1 capsule (500 mg total) by mouth 3 (three) times a day for 7 days    2. Stasis dermatitis of both legs  Assessment & Plan:  Pt with 2+ pitting b/l LE edema, scaling and erythematous plaque on b/l LE. Start clobetasol cream as prescribed, continue daily 40 mg furosemide as prescribed. Orders:  -     clobetasol (TEMOVATE) 0.05 % cream; Apply topically 2 (two) times a day        Depression Screening and Follow-up Plan: Patient was screened for depression during today's encounter. They screened negative with a PHQ-2 score of 0. Subjective      Pt with chronic b/l LE swelling presents with worsening erythema and warmth for the past 4 days. Pt baumann snot believe swelling has increased however redness and scaling has worsened. Pt denies fever, SOB, cough, pain, drainage reports compliance with daily furosemide 40 mg. Pt states unable to wear compression stocking or ace wraps as too painful. Review of Systems   Constitutional:  Negative for chills, fatigue and fever. HENT:  Negative for congestion, sinus pressure, sore throat and trouble swallowing. Respiratory:  Negative for cough, chest tightness and shortness of breath. Cardiovascular:  Positive for leg swelling. Negative for chest pain and palpitations. Gastrointestinal:  Negative for nausea and vomiting.    Genitourinary:  Negative for difficulty urinating. Musculoskeletal:  Positive for arthralgias and joint swelling. Skin:  Positive for color change and rash. Negative for pallor and wound. Allergic/Immunologic: Negative for environmental allergies and immunocompromised state. Neurological:  Negative for dizziness, facial asymmetry and headaches. Psychiatric/Behavioral:  Positive for confusion. Negative for agitation and decreased concentration. Current Outpatient Medications on File Prior to Visit   Medication Sig    acetaminophen (TYLENOL) 325 mg tablet Take 2 tablets (650 mg total) by mouth every 6 (six) hours as needed for mild pain    amLODIPine (NORVASC) 5 mg tablet Take 1 tablet (5 mg total) by mouth daily    Biotin 5000 MCG TABS Take by mouth every morning    Cholecalciferol (VITAMIN D) 2000 units CAPS Take by mouth 3 (three) times a week    cyanocobalamin (VITAMIN B-12) 500 MCG tablet Take 1 tablet (500 mcg total) by mouth daily    escitalopram (LEXAPRO) 10 mg tablet Take 1 tablet (10 mg total) by mouth daily    esomeprazole (NexIUM) 20 mg capsule Take 20 mg by mouth every morning before breakfast    furosemide (LASIX) 40 mg tablet Take 1 tablet (40 mg total) by mouth daily Do not start before October 6, 2023.     GLUCOSAMINE HCL PO Take 1,200 mg by mouth daily     lisinopril (ZESTRIL) 5 mg tablet Take 5 mg by mouth daily Pt unsure of dosage    memantine (NAMENDA) 5 mg tablet Take 1 tablet (5 mg total) by mouth daily    metoprolol tartrate (LOPRESSOR) 50 mg tablet TAKE 1 TABLET BY MOUTH  TWICE DAILY    niacin 500 mg tablet Take 500 mg by mouth daily with breakfast    Omega-3 Fatty Acids (FISH OIL) 1,000 mg Take 1,000 mg by mouth daily      permethrin (ELIMITE) 5 % cream APPLY TOPICALLY ONCE FOR 1 DOSE    potassium chloride (Klor-Con M20) 20 mEq tablet Take 1 tablet (20 mEq total) by mouth daily    triamcinolone (KENALOG) 0.1 % cream Apply 1 Application topically 2 (two) times a day To affected area    zolpidem (AMBIEN) 5 mg tablet Take 1 tablet (5 mg total) by mouth daily at bedtime as needed for sleep       Objective     /80 (BP Location: Right arm, Patient Position: Sitting, Cuff Size: Standard)   Pulse 68   Temp (!) 97.1 °F (36.2 °C) (Tympanic)   Resp 14   Ht 5' 4.65" (1.642 m)   Wt 67.1 kg (148 lb)   SpO2 97%   BMI 24.90 kg/m²     Physical Exam  Vitals and nursing note reviewed. Constitutional:       General: She is not in acute distress. Appearance: Normal appearance. She is normal weight. She is not ill-appearing, toxic-appearing or diaphoretic. HENT:      Head: Normocephalic and atraumatic. Mouth/Throat:      Mouth: Mucous membranes are moist.      Pharynx: Oropharynx is clear. No oropharyngeal exudate. Eyes:      Extraocular Movements: Extraocular movements intact. Conjunctiva/sclera: Conjunctivae normal.      Pupils: Pupils are equal, round, and reactive to light. Cardiovascular:      Rate and Rhythm: Normal rate and regular rhythm. Chest Wall: PMI is not displaced. No thrill. Pulses: Normal pulses. Heart sounds: Normal heart sounds, S1 normal and S2 normal. No murmur heard. No friction rub. No gallop. Pulmonary:      Effort: Pulmonary effort is normal. No respiratory distress. Breath sounds: Normal breath sounds. No wheezing or rales. Abdominal:      General: Bowel sounds are normal.      Palpations: Abdomen is soft. Tenderness: There is no abdominal tenderness. Musculoskeletal:         General: Swelling present. No tenderness or deformity. Right lower le+ Edema present. Left lower le+ Edema present. Skin:     General: Skin is warm. Capillary Refill: Capillary refill takes less than 2 seconds. Coloration: Skin is not jaundiced or pale. Findings: Erythema and rash present. No bruising or lesion. Rash is scaling.       Comments: B/l LE scaling and erythema    Neurological:      Mental Status: She is alert and oriented to person, place, and time. Cranial Nerves: No cranial nerve deficit. Sensory: Sensation is intact. Motor: Motor function is intact. No weakness. Coordination: Coordination is intact. Coordination normal. Finger-Nose-Finger Test normal.      Gait: Gait abnormal (antalgic). Deep Tendon Reflexes: Reflexes are normal and symmetric. Reflexes normal.   Psychiatric:         Attention and Perception: Attention normal. She is attentive. Mood and Affect: Mood normal.         Speech: Speech normal.         Behavior: Behavior normal. Behavior is cooperative. Thought Content: Thought content normal. Thought content does not include suicidal ideation. Thought content does not include suicidal plan. Cognition and Memory: Memory is impaired.          Judgment: Judgment normal.       [address]

## 2023-11-17 DIAGNOSIS — E87.1 HYPONATREMIA: ICD-10-CM

## 2023-11-17 DIAGNOSIS — R60.9 EDEMA, UNSPECIFIED TYPE: Primary | ICD-10-CM

## 2023-11-29 ENCOUNTER — TELEPHONE (OUTPATIENT)
Age: 83
End: 2023-11-29

## 2023-11-29 NOTE — TELEPHONE ENCOUNTER
The BMP order is in the chart already, it just needs to be fax. I am not able to fax the order since the route option isn't available when I click on the order.

## 2023-11-29 NOTE — TELEPHONE ENCOUNTER
Khushbu's  Vishal Bundy called requesting the BMP order for  to get fax to [address]    Fax # [phone]

## 2023-11-30 DIAGNOSIS — I87.2 STASIS DERMATITIS OF BOTH LEGS: ICD-10-CM

## 2023-11-30 RX ORDER — CLOBETASOL PROPIONATE 0.5 MG/G
CREAM TOPICAL 2 TIMES DAILY
Qty: 90 G | Refills: 2 | Status: SHIPPED | OUTPATIENT
Start: 2023-11-30 | End: 2023-12-05 | Stop reason: SDUPTHER

## 2023-11-30 NOTE — PATIENT COMMUNICATION
Pt's daughter wondering if she can get 90gm tub instead fo 15gm of clobetasol. Swelling is better and abx helped.

## 2023-12-05 DIAGNOSIS — I87.2 STASIS DERMATITIS OF BOTH LEGS: ICD-10-CM

## 2023-12-05 RX ORDER — CLOBETASOL PROPIONATE 0.5 MG/G
CREAM TOPICAL 2 TIMES DAILY
Qty: 90 G | Refills: 5 | Status: SHIPPED | OUTPATIENT
Start: 2023-12-05 | End: 2023-12-05 | Stop reason: SDUPTHER

## 2023-12-05 RX ORDER — CLOBETASOL PROPIONATE 0.5 MG/G
CREAM TOPICAL 2 TIMES DAILY
Qty: 90 G | Refills: 5 | Status: SHIPPED | OUTPATIENT
Start: 2023-12-05

## 2023-12-06 ENCOUNTER — TELEPHONE (OUTPATIENT)
Age: 83
End: 2023-12-06

## 2023-12-06 NOTE — TELEPHONE ENCOUNTER
Spoke with spouse, Sultana Haji ([phone]) regarding Care Conference appointment that was cancelled for th 12/13/2023. I offered to reschedule the appointment. Sugar Basilio advised patient is sleeping and we are too far for her to travel. I advised we can do a virtual if you have a smart phone or tablet. Sugar Basilio advised he will have patient call us back.

## 2023-12-07 ENCOUNTER — TELEPHONE (OUTPATIENT)
Age: 83
End: 2023-12-07

## 2023-12-07 NOTE — TELEPHONE ENCOUNTER
called the Rx line. States that there was suppose to be an antibiotic called into the pharmacy for his wife for her swollen legs. Already picked up the cream, but there was suppose to be a pill. Please call and verify.

## 2023-12-07 NOTE — TELEPHONE ENCOUNTER
I'm having trouble locating the patient messages I answered for Jordan daughter -I know we sent over cream but not sure if they want oral antibiotic too?

## 2023-12-08 DIAGNOSIS — L03.90 CELLULITIS, UNSPECIFIED CELLULITIS SITE: Primary | ICD-10-CM

## 2023-12-08 RX ORDER — DOXYCYCLINE HYCLATE 100 MG/1
100 CAPSULE ORAL EVERY 12 HOURS SCHEDULED
Qty: 14 CAPSULE | Refills: 0 | Status: SHIPPED | OUTPATIENT
Start: 2023-12-08 | End: 2023-12-15

## 2023-12-08 NOTE — TELEPHONE ENCOUNTER
Spoke to patient's .  He said yes, they would like an antibiotic sent in for [facility]

## 2023-12-13 ENCOUNTER — TELEPHONE (OUTPATIENT)
Age: 83
End: 2023-12-13

## 2023-12-13 DIAGNOSIS — E87.1 SODIUM (NA) DEFICIENCY: Primary | ICD-10-CM

## 2023-12-13 NOTE — TELEPHONE ENCOUNTER
Patient  Javed Rodriguez called in requesting to fax to lab order to [facility] @ [phone]. Faxed the same thru [facility]. Thanks.
Patient called because she wanted to know the time of her appointment next week and she also wanted to know if she needed to get blood work done before. I advised her of the time and that there is a blood work order.
DISCHARGE

## 2023-12-14 ENCOUNTER — RA CDI HCC (OUTPATIENT)
Dept: OTHER | Facility: HOSPITAL | Age: 83
End: 2023-12-14

## 2023-12-14 NOTE — PROGRESS NOTES
[address] [facility] coding opportunities       Chart reviewed, no opportunity found: [address]        Patients Insurance     Medicare Insurance: [employer]

## 2023-12-19 ENCOUNTER — OFFICE VISIT (OUTPATIENT)
Dept: FAMILY MEDICINE CLINIC | Facility: CLINIC | Age: 83
End: 2023-12-19
Payer: COMMERCIAL

## 2023-12-19 VITALS
OXYGEN SATURATION: 100 % | SYSTOLIC BLOOD PRESSURE: 150 MMHG | TEMPERATURE: 97 F | BODY MASS INDEX: 25.52 KG/M2 | HEART RATE: 74 BPM | RESPIRATION RATE: 16 BRPM | HEIGHT: 63 IN | WEIGHT: 144 LBS | DIASTOLIC BLOOD PRESSURE: 88 MMHG

## 2023-12-19 DIAGNOSIS — F03.B11 MODERATE DEMENTIA WITH AGITATION, UNSPECIFIED DEMENTIA TYPE (HCC): ICD-10-CM

## 2023-12-19 DIAGNOSIS — N63.11 MASS OF UPPER OUTER QUADRANT OF RIGHT BREAST: ICD-10-CM

## 2023-12-19 DIAGNOSIS — F41.9 ANXIETY: ICD-10-CM

## 2023-12-19 DIAGNOSIS — G31.9 CEREBRAL ATROPHY (HCC): ICD-10-CM

## 2023-12-19 DIAGNOSIS — R60.0 BILATERAL LOWER EXTREMITY EDEMA: Primary | ICD-10-CM

## 2023-12-19 DIAGNOSIS — I10 ESSENTIAL HYPERTENSION: ICD-10-CM

## 2023-12-19 PROCEDURE — 99214 OFFICE O/P EST MOD 30 MIN: CPT | Performed by: INTERNAL MEDICINE

## 2023-12-19 RX ORDER — LISINOPRIL 10 MG/1
10 TABLET ORAL DAILY
Qty: 30 TABLET | Refills: 3 | Status: SHIPPED | OUTPATIENT
Start: 2023-12-19

## 2023-12-19 RX ORDER — AMLODIPINE BESYLATE 2.5 MG/1
2.5 TABLET ORAL DAILY
Qty: 30 TABLET | Refills: 3 | Status: SHIPPED | OUTPATIENT
Start: 2023-12-19

## 2023-12-19 NOTE — PROGRESS NOTES
Assessment/Plan:Overall, Khushbu is doing ok-her bp is elevated at home and here so I did go up on her lisinopril to 10 mg daily, continued the metoprolol bid, and decreased the amlodipine to 2.5 mg daily with the hope being that the well known side effect of lower extremity edema will improve.  She is taking furosemde 40 mg daily,and I told her she could try doing a few days (3) at a time when she increases the furosemide to 40 in the AM and 20 in the early evening.  Recent labwork (BMP) was reviewed with patient and told her her sodium levels had normalized. 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  Discussed with her trying to get up and move around some, and elevating her legs periodically. She refuses compression stockings.Told them that I would discuss things with her daughter Judy         Problem List Items Addressed This Visit       Anxiety    Essential hypertension    Relevant Medications    amLODIPine (NORVASC) 2.5 mg tablet    lisinopril (ZESTRIL) 10 mg tablet    Breast mass, right    Cerebral atrophy (HCC)    Bilateral lower extremity edema - Primary    Dementia with agitation (HCC)         Subjective:      Patient ID: Khushbu Gandara is a 83 y.o. female.    Khushbu here with hx of HTN, lower extremity swelling, hyponatremia, dementia, hx of delusional parasitosis-here with her  Yo.  She seems pretty good today, really.  She's on escitalopram and namenda and is doing ok with those.  Her legs still have pitting edema but better than it was. I went over the BMP labwork that was done yesterday with them, where sodium was normal and kidney function pretty good.  I think there may be issues with Khushbu's degree of compliance with her  medications-it doesn't really sound like anyone oversees them, she does them herself-BP high here today        The following portions of the patient's history were reviewed and updated as appropriate:   Past Medical History:  She has a past medical history of Anxiety, Arthritis, Prajapati esophagus, Cataract, Continuous chronic alcoholism (HCC), GERD (gastroesophageal reflux disease), Heavy alcohol use, Hyperlipidemia, Hypertension, Insomnia, and Palpitations.,  _______________________________________________________________________  Medical Problems:  does not have any pertinent problems on file.,  _______________________________________________________________________  Past Surgical History:   has a past surgical history that includes Cataract extraction (Right); LASIK (Bilateral); Joint replacement (Bilateral); Colonoscopy; Tonsillectomy; Tubal ligation; Hysterectomy; Cholecystectomy; pr xcapsl ctrc rmvl insj io lens prosth w/o ecp (Left, 10/18/2018); and BREAST IMPLANT (Bilateral).,  _______________________________________________________________________  Family History:  family history includes Cancer in her mother and sister; Diabetes in her daughter; Heart disease (age of onset: 60) in her father; No Known Problems in her sister.,  _______________________________________________________________________  Social History:   reports that she has never smoked. She has never used smokeless tobacco. She reports current alcohol use of about 20.0 standard drinks of alcohol per week. She reports that she does not use drugs.,  _______________________________________________________________________  Allergies:  has No Known Allergies..  _______________________________________________________________________  Current Outpatient Medications   Medication Sig Dispense Refill    acetaminophen (TYLENOL) 325 mg tablet Take 2 tablets (650 mg total) by mouth every 6 (six) hours as needed for mild pain  0    amLODIPine (NORVASC) 2.5  mg tablet Take 1 tablet (2.5 mg total) by mouth daily 30 tablet 3    Biotin 5000 MCG TABS Take by mouth every morning      Cholecalciferol (VITAMIN D) 2000 units CAPS Take by mouth 3 (three) times a week      clobetasol (TEMOVATE) 0.05 % cream Apply topically 2 (two) times a day 90 g 5    cyanocobalamin (VITAMIN B-12) 500 MCG tablet Take 1 tablet (500 mcg total) by mouth daily 30 tablet 5    escitalopram (LEXAPRO) 10 mg tablet Take 1 tablet (10 mg total) by mouth daily 90 tablet 1    esomeprazole (NexIUM) 20 mg capsule Take 20 mg by mouth every morning before breakfast      furosemide (LASIX) 40 mg tablet Take 1 tablet (40 mg total) by mouth daily Do not start before October 6, 2023. 30 tablet 0    GLUCOSAMINE HCL PO Take 1,200 mg by mouth daily       lisinopril (ZESTRIL) 10 mg tablet Take 1 tablet (10 mg total) by mouth daily 30 tablet 3    memantine (NAMENDA) 5 mg tablet Take 1 tablet (5 mg total) by mouth daily 30 tablet 3    metoprolol tartrate (LOPRESSOR) 50 mg tablet TAKE 1 TABLET BY MOUTH  TWICE DAILY 180 tablet 1    niacin 500 mg tablet Take 500 mg by mouth daily with breakfast      Omega-3 Fatty Acids (FISH OIL) 1,000 mg Take 1,000 mg by mouth daily        permethrin (ELIMITE) 5 % cream APPLY TOPICALLY ONCE FOR 1 DOSE 120 g 3    potassium chloride (Klor-Con M20) 20 mEq tablet Take 1 tablet (20 mEq total) by mouth daily 90 tablet 2    triamcinolone (KENALOG) 0.1 % cream APPLY TO AFFECTED AREA TWICE A DAY 30 g 3    zolpidem (AMBIEN) 5 mg tablet Take 1 tablet (5 mg total) by mouth daily at bedtime as needed for sleep 90 tablet 1     No current facility-administered medications for this visit.     _______________________________________________________________________  Review of Systems   Constitutional: Negative.    HENT: Negative.     Respiratory: Negative.     Cardiovascular:  Positive for leg swelling. Negative for chest pain.   Gastrointestinal: Negative.    Genitourinary: Negative.    Hematological:  "Negative.    Psychiatric/Behavioral:  Positive for confusion, decreased concentration and hallucinations. The patient is nervous/anxious.          Objective:  Vitals:    12/19/23 1122 12/19/23 1155   BP: 144/80 150/88   BP Location: Left arm    Patient Position: Sitting    Cuff Size: Standard    Pulse: 74    Resp: 16    Temp: (!) 97 °F (36.1 °C)    TempSrc: Tympanic    SpO2: 100%    Weight: 65.3 kg (144 lb)    Height: 5' 3\" (1.6 m)      Body mass index is 25.51 kg/m².     Physical Exam  HENT:      Head: Normocephalic and atraumatic.      Right Ear: External ear normal.      Left Ear: External ear normal.      Nose: Nose normal.      Mouth/Throat:      Mouth: Mucous membranes are moist.   Cardiovascular:      Rate and Rhythm: Normal rate and regular rhythm.      Heart sounds: Normal heart sounds.   Pulmonary:      Effort: Pulmonary effort is normal.      Breath sounds: Normal breath sounds.   Abdominal:      Palpations: Abdomen is soft.   Musculoskeletal:      Cervical back: Normal range of motion.      Right lower leg: Edema present.      Left lower leg: Edema present.   Skin:     Findings: Erythema and lesion present.   Neurological:      Mental Status: She is alert.      Motor: No weakness.      Gait: Gait normal.         "

## 2024-01-08 DIAGNOSIS — F03.90 DEMENTIA, UNSPECIFIED DEMENTIA SEVERITY, UNSPECIFIED DEMENTIA TYPE, UNSPECIFIED WHETHER BEHAVIORAL, PSYCHOTIC, OR MOOD DISTURBANCE OR ANXIETY (HCC): ICD-10-CM

## 2024-01-08 RX ORDER — MEMANTINE HYDROCHLORIDE 10 MG/1
10 TABLET ORAL DAILY
Qty: 30 TABLET | Refills: 5 | Status: SHIPPED | OUTPATIENT
Start: 2024-01-08

## 2024-01-30 ENCOUNTER — TELEPHONE (OUTPATIENT)
Dept: OTHER | Facility: OTHER | Age: 84
End: 2024-01-30

## 2024-01-30 ENCOUNTER — NURSE TRIAGE (OUTPATIENT)
Age: 84
End: 2024-01-30

## 2024-01-30 NOTE — TELEPHONE ENCOUNTER
"Reason for Disposition  • MILD dizziness (e.g., walking normally) AND has NOT been evaluated by physician for this (Exception: dizziness caused by heat exposure, sudden standing, or poor fluid intake)    Answer Assessment - Initial Assessment Questions  1. DESCRIPTION: \"Describe your dizziness.\"      lightheadedness  2. LIGHTHEADED: \"Do you feel lightheaded?\" (e.g., somewhat faint, woozy, weak upon standing)      woozy  3. VERTIGO: \"Do you feel like either you or the room is spinning or tilting?\" (i.e. vertigo)      no  4. SEVERITY: \"How bad is it?\"  \"Do you feel like you are going to faint?\" \"Can you stand and walk?\"    - MILD: Feels slightly dizzy, but walking normally.    - MODERATE: Feels very unsteady when walking, but not falling; interferes with normal activities (e.g., school, work) .    - SEVERE: Unable to walk without falling, or requires assistance to walk without falling; feels like passing out now.       mild  5. ONSET:  \"When did the dizziness begin?\"      A couple months   6. AGGRAVATING FACTORS: \"Does anything make it worse?\" (e.g., standing, change in head position)      Bends down   7. HEART RATE: \"Can you tell me your heart rate?\" \"How many beats in 15 seconds?\"  (Note: not all patients can do this)        157/82 pulse 71  8. CAUSE: \"What do you think is causing the dizziness?\"      Unknown   9. RECURRENT SYMPTOM: \"Have you had dizziness before?\" If Yes, ask: \"When was the last time?\" \"What happened that time?\"      Has had it before but doesn't recall when  10. OTHER SYMPTOMS: \"Do you have any other symptoms?\" (e.g., fever, chest pain, vomiting, diarrhea, bleeding)        Denies   11. PREGNANCY: \"Is there any chance you are pregnant?\" \"When was your last menstrual period?\"        N/a    Protocols used: Dizziness-ADULT-[place]    "

## 2024-01-30 NOTE — TELEPHONE ENCOUNTER
Regarding: dizziness for 3 days  ----- Message from Mehnaz Burger sent at 1/30/2024  5:12 PM EST -----  Patients spouse called in stating patient has had dizziness for a few days now. Patient is scheduled for tomorrow morning with Dr. Fish.

## 2024-01-30 NOTE — TELEPHONE ENCOUNTER
Patient  called and canceled his wife appointment because she is unable to make the appointment and that he or his wife will give the office a call back to reschedule the appointment.

## 2024-02-12 DIAGNOSIS — I10 ESSENTIAL HYPERTENSION: ICD-10-CM

## 2024-02-13 RX ORDER — LISINOPRIL 10 MG/1
10 TABLET ORAL DAILY
Qty: 90 TABLET | Refills: 1 | Status: SHIPPED | OUTPATIENT
Start: 2024-02-13

## 2024-02-15 ENCOUNTER — OFFICE VISIT (OUTPATIENT)
Dept: FAMILY MEDICINE CLINIC | Facility: CLINIC | Age: 84
End: 2024-02-15
Payer: COMMERCIAL

## 2024-02-15 VITALS
SYSTOLIC BLOOD PRESSURE: 128 MMHG | DIASTOLIC BLOOD PRESSURE: 82 MMHG | HEIGHT: 63 IN | WEIGHT: 142 LBS | BODY MASS INDEX: 25.16 KG/M2 | TEMPERATURE: 97.2 F

## 2024-02-15 DIAGNOSIS — I87.2 STASIS DERMATITIS OF BOTH LEGS: ICD-10-CM

## 2024-02-15 DIAGNOSIS — R42 DIZZINESS: ICD-10-CM

## 2024-02-15 DIAGNOSIS — L03.119 CELLULITIS OF LOWER EXTREMITY, UNSPECIFIED LATERALITY: Primary | ICD-10-CM

## 2024-02-15 PROBLEM — F10.20 UNCOMPLICATED ALCOHOL DEPENDENCE (HCC): Status: RESOLVED | Noted: 2022-01-26 | Resolved: 2024-02-15

## 2024-02-15 PROCEDURE — 99214 OFFICE O/P EST MOD 30 MIN: CPT

## 2024-02-15 RX ORDER — CLOBETASOL PROPIONATE 0.5 MG/G
CREAM TOPICAL 2 TIMES DAILY
Qty: 90 G | Refills: 5 | Status: SHIPPED | OUTPATIENT
Start: 2024-02-15

## 2024-02-15 RX ORDER — MECLIZINE HCL 12.5 MG/1
12.5 TABLET ORAL EVERY 8 HOURS PRN
Qty: 30 TABLET | Refills: 0 | Status: SHIPPED | OUTPATIENT
Start: 2024-02-15

## 2024-02-15 RX ORDER — CEPHALEXIN 500 MG/1
500 CAPSULE ORAL 3 TIMES DAILY
Qty: 21 CAPSULE | Refills: 0 | Status: SHIPPED | OUTPATIENT
Start: 2024-02-15 | End: 2024-02-22

## 2024-02-15 NOTE — ASSESSMENT & PLAN NOTE
Pt with 2+ b/l le pitting edema, redness and warmth. Pt report scratching legs a few days ago and since then noticed swelling has worsened.  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 Start cephalexin 500 mg tid x 7 days, encourage elevation and compression and advised f/u for worsening sxs.

## 2024-02-15 NOTE — ASSESSMENT & PLAN NOTE
Pt with 2+ pitting b/l LE edema legs with erythema and scaling. Pt reports was using clobetasol cream which was effective however no longer has medication. Refill sent to pharmacy pt advised to continue furosemide 40 mg qd and f/u for worsening sxs.

## 2024-02-15 NOTE — PROGRESS NOTES
Name: Khushbu Gandara      : 1940      MRN: [id number]  Encounter Provider: NUBIA Bustamante  Encounter Date: 2/15/2024   Encounter department: [facility] MEDICINE    Assessment & Plan     1. Cellulitis of lower extremity, unspecified laterality  Assessment & Plan:  Pt with 2+ b/l le pitting edema, redness and warmth. Pt report scratching legs a few days ago and since then noticed swelling has worsened.  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 Start cephalexin 500 mg tid x 7 days, encourage elevation and compression and advised f/u for worsening sxs.    Orders:  -     cephalexin (KEFLEX) 500 mg capsule; Take 1 capsule (500 mg total) by mouth 3 (three) times a day for 7 days    2. Stasis dermatitis of both legs  Assessment & Plan:  Pt with 2+ pitting b/l LE edema legs with erythema and scaling. Pt reports was using clobetasol cream which was effective however no longer has medication. Refill sent to pharmacy pt advised to continue furosemide 40 mg qd and f/u for worsening sxs.       Orders:  -     clobetasol (TEMOVATE) 0.05 % cream; Apply topically 2 (two) times a day    3. Dizziness  Assessment & Plan:  Pt reports dizziness secondary to  medications side effect, states no longer drinks daily only on occasion. Neuro exam wnl start meclizine 12.5 mg tid prn and advised f/u for worsening sxs.    Orders:  -     meclizine (ANTIVERT) 12.5 MG tablet; Take 1 tablet (12.5 mg total) by mouth every 8 (eight) hours as needed for dizziness           Subjective      Pt presents with c/o of worsening b/l LE swelling and redness. Pt states swelling and redness had improved with  clobetasol cream and  cephalexin in 2023. Pt stopped using cream and reports scratching legs a week ago and since then has had swelling and redness has returned. Pt denies any fever or pain and has been taking furosemide 40 mg daily.    Pt reports no longer  drinking daily only on special occasions has a glass of wine.    C/o intermittent dizziness secondary to  medications that she is taking mostly happens after takes medications. BP in office is stable and neuro exam wnl, will try meclizine prn and advised f/u  for worsening sxs.          Review of Systems   Constitutional:  Negative for activity change, appetite change, chills, diaphoresis, fatigue and fever.   HENT:  Negative for congestion, drooling, ear pain, hearing loss, nosebleeds, postnasal drip, rhinorrhea, sinus pressure, sinus pain, sore throat, trouble swallowing and voice change.    Eyes:  Negative for photophobia, pain, discharge, redness and visual disturbance.   Respiratory:  Negative for cough, chest tightness, shortness of breath and wheezing.    Cardiovascular:  Positive for leg swelling. Negative for chest pain and palpitations.   Gastrointestinal:  Negative for abdominal distention, abdominal pain, blood in stool, constipation, diarrhea, nausea, rectal pain and vomiting.   Endocrine: Negative for cold intolerance, heat intolerance, polydipsia, polyphagia and polyuria.   Genitourinary:  Negative for decreased urine volume, difficulty urinating, dysuria, flank pain, genital sores, hematuria, menstrual problem, pelvic pain, urgency, vaginal discharge and vaginal pain.   Musculoskeletal:  Negative for arthralgias, back pain, gait problem, joint swelling, myalgias, neck pain and neck stiffness.   Skin:  Positive for color change. Negative for rash and wound.   Allergic/Immunologic: Negative for environmental allergies, food allergies and immunocompromised state.   Neurological:  Positive for dizziness. Negative for tremors, seizures, syncope, facial asymmetry, weakness, light-headedness, numbness and headaches.   Hematological:  Negative for adenopathy.   Psychiatric/Behavioral:  Negative for agitation, behavioral problems, confusion, decreased concentration, dysphoric mood, hallucinations,  self-injury, sleep disturbance and suicidal ideas. The patient is not nervous/anxious and is not hyperactive.    All other systems reviewed and are negative.      Current Outpatient Medications on File Prior to Visit   Medication Sig    acetaminophen (TYLENOL) 325 mg tablet Take 2 tablets (650 mg total) by mouth every 6 (six) hours as needed for mild pain    amLODIPine (NORVASC) 2.5 mg tablet Take 1 tablet (2.5 mg total) by mouth daily    Biotin 5000 MCG TABS Take by mouth every morning    Cholecalciferol (VITAMIN D) 2000 units CAPS Take by mouth 3 (three) times a week    cyanocobalamin (VITAMIN B-12) 500 MCG tablet Take 1 tablet (500 mcg total) by mouth daily    escitalopram (LEXAPRO) 10 mg tablet Take 1 tablet (10 mg total) by mouth daily    esomeprazole (NexIUM) 20 mg capsule Take 20 mg by mouth every morning before breakfast    furosemide (LASIX) 40 mg tablet Take 1 tablet (40 mg total) by mouth daily Do not start before October 6, 2023.    GLUCOSAMINE HCL PO Take 1,200 mg by mouth daily     lisinopril (ZESTRIL) 10 mg tablet TAKE 1 TABLET BY MOUTH DAILY    memantine (NAMENDA) 10 mg tablet Take 1 tablet (10 mg total) by mouth daily    metoprolol tartrate (LOPRESSOR) 50 mg tablet TAKE 1 TABLET BY MOUTH  TWICE DAILY    niacin 500 mg tablet Take 500 mg by mouth daily with breakfast    Omega-3 Fatty Acids (FISH OIL) 1,000 mg Take 1,000 mg by mouth daily      permethrin (ELIMITE) 5 % cream APPLY TOPICALLY ONCE FOR 1 DOSE    potassium chloride (Klor-Con M20) 20 mEq tablet Take 1 tablet (20 mEq total) by mouth daily    triamcinolone (KENALOG) 0.1 % cream APPLY TO AFFECTED AREA TWICE A DAY    zolpidem (AMBIEN) 5 mg tablet Take 1 tablet (5 mg total) by mouth daily at bedtime as needed for sleep    [DISCONTINUED] clobetasol (TEMOVATE) 0.05 % cream Apply topically 2 (two) times a day       Objective     /82 (BP Location: Right arm, Patient Position: Sitting, Cuff Size: Standard)   Temp (!) 97.2 °F (36.2 °C)  "(Tympanic)   Ht 5' 3\" (1.6 m)   Wt 64.4 kg (142 lb)   BMI 25.15 kg/m²     Physical Exam  Vitals and nursing note reviewed.   Constitutional:       General: She is not in acute distress.     Appearance: Normal appearance. She is normal weight. She is not ill-appearing or toxic-appearing.   HENT:      Head: Normocephalic and atraumatic.      Right Ear: Tympanic membrane, ear canal and external ear normal. There is no impacted cerumen.      Left Ear: Tympanic membrane, ear canal and external ear normal. There is no impacted cerumen.      Nose: Nose normal. No congestion or rhinorrhea.      Mouth/Throat:      Mouth: Mucous membranes are moist.      Pharynx: No oropharyngeal exudate or posterior oropharyngeal erythema.   Eyes:      General: No visual field deficit.        Right eye: No discharge.         Left eye: No discharge.      Extraocular Movements: Extraocular movements intact.      Conjunctiva/sclera: Conjunctivae normal.      Pupils: Pupils are equal, round, and reactive to light.   Neck:      Vascular: No carotid bruit.   Cardiovascular:      Rate and Rhythm: Normal rate and regular rhythm.      Pulses: Normal pulses.      Heart sounds: Normal heart sounds. No murmur heard.  Pulmonary:      Effort: Pulmonary effort is normal. No respiratory distress.      Breath sounds: Normal breath sounds. No wheezing.   Chest:      Chest wall: No tenderness.   Abdominal:      General: Bowel sounds are normal. There is no distension.      Palpations: Abdomen is soft. There is no mass.      Tenderness: There is no abdominal tenderness. There is no right CVA tenderness or left CVA tenderness.   Musculoskeletal:         General: No swelling or tenderness. Normal range of motion.      Cervical back: Normal range of motion. No rigidity or tenderness.      Right lower leg: No deformity, lacerations, tenderness or bony tenderness. 2+ Pitting Edema present.      Left lower leg: No deformity, lacerations, tenderness or bony " tenderness. 2+ Pitting Edema present.   Lymphadenopathy:      Cervical: No cervical adenopathy.   Skin:     General: Skin is warm.      Capillary Refill: Capillary refill takes less than 2 seconds.      Coloration: Skin is not jaundiced.      Findings: Erythema and rash present. No abrasion, bruising, ecchymosis, petechiae or wound. Rash is scaling.      Comments: B/l lower extremity swelling with warmth, erythema and scaling   Neurological:      General: No focal deficit present.      Mental Status: She is alert and oriented to person, place, and time.      Cranial Nerves: Cranial nerves 2-12 are intact. No cranial nerve deficit, dysarthria or facial asymmetry.      Sensory: Sensation is intact. No sensory deficit.      Motor: Motor function is intact. No weakness, tremor, atrophy, abnormal muscle tone, seizure activity or pronator drift.      Coordination: Coordination is intact. Romberg sign negative. Coordination normal.      Deep Tendon Reflexes: Reflexes are normal and symmetric. Reflexes normal.   Psychiatric:         Mood and Affect: Mood normal.         Behavior: Behavior normal.         Thought Content: Thought content normal.       NUBIA Bustamante

## 2024-02-15 NOTE — ASSESSMENT & PLAN NOTE
Pt reports dizziness secondary to  medications side effect, states no longer drinks daily only on occasion. Neuro exam wnl start meclizine 12.5 mg tid prn and advised f/u for worsening sxs.

## 2024-02-28 DIAGNOSIS — Z76.0 MEDICATION REFILL: ICD-10-CM

## 2024-02-28 RX ORDER — METOPROLOL TARTRATE 50 MG/1
TABLET, FILM COATED ORAL
Qty: 180 TABLET | Refills: 1 | Status: SHIPPED | OUTPATIENT
Start: 2024-02-28

## 2024-02-29 DIAGNOSIS — Z13.89 ENCOUNTER FOR SCREENING FOR OTHER DISORDER: Primary | ICD-10-CM

## 2024-03-15 DIAGNOSIS — F41.9 ANXIETY: ICD-10-CM

## 2024-03-15 RX ORDER — ESCITALOPRAM OXALATE 10 MG/1
10 TABLET ORAL DAILY
Qty: 90 TABLET | Refills: 1 | Status: SHIPPED | OUTPATIENT
Start: 2024-03-15

## 2024-04-10 DIAGNOSIS — I10 ESSENTIAL HYPERTENSION: ICD-10-CM

## 2024-04-10 RX ORDER — AMLODIPINE BESYLATE 2.5 MG/1
2.5 TABLET ORAL DAILY
Qty: 30 TABLET | Refills: 5 | Status: SHIPPED | OUTPATIENT
Start: 2024-04-10

## 2024-05-09 ENCOUNTER — NURSE TRIAGE (OUTPATIENT)
Age: 84
End: 2024-05-09

## 2024-05-09 NOTE — TELEPHONE ENCOUNTER
Regarding: swelling in legs  ----- Message from Aniya Gillis sent at 5/9/2024  7:54 AM EDT -----  [employer] MESSAGE- Can we triage.  Thank you    My mom wanted to know if she could make appt to see Pili again for swelling in her legs. And also if they could order the bloodwork for her at the place near her home in [place].

## 2024-05-10 ENCOUNTER — OFFICE VISIT (OUTPATIENT)
Dept: FAMILY MEDICINE CLINIC | Facility: CLINIC | Age: 84
End: 2024-05-10
Payer: COMMERCIAL

## 2024-05-10 VITALS
OXYGEN SATURATION: 98 % | WEIGHT: 148 LBS | HEART RATE: 68 BPM | RESPIRATION RATE: 16 BRPM | BODY MASS INDEX: 25.27 KG/M2 | HEIGHT: 64 IN | TEMPERATURE: 97.9 F | DIASTOLIC BLOOD PRESSURE: 80 MMHG | SYSTOLIC BLOOD PRESSURE: 140 MMHG

## 2024-05-10 DIAGNOSIS — L03.119 CELLULITIS OF LOWER EXTREMITY, UNSPECIFIED LATERALITY: Primary | ICD-10-CM

## 2024-05-10 DIAGNOSIS — R41.3 MEMORY LOSS: ICD-10-CM

## 2024-05-10 DIAGNOSIS — F03.911 DEMENTIA WITH AGITATION, UNSPECIFIED DEMENTIA SEVERITY, UNSPECIFIED DEMENTIA TYPE (HCC): ICD-10-CM

## 2024-05-10 PROCEDURE — G2211 COMPLEX E/M VISIT ADD ON: HCPCS

## 2024-05-10 PROCEDURE — 99214 OFFICE O/P EST MOD 30 MIN: CPT

## 2024-05-10 RX ORDER — CEPHALEXIN 500 MG/1
500 CAPSULE ORAL 3 TIMES DAILY
Qty: 21 CAPSULE | Refills: 0 | Status: SHIPPED | OUTPATIENT
Start: 2024-05-10 | End: 2024-05-17

## 2024-05-10 NOTE — PROGRESS NOTES
Name: Khushbu Gandara      : 1940      MRN: [id number]  Encounter Provider: NUBIA Bustamante  Encounter Date: 5/10/2024   Encounter department: [facility] MEDICINE    Assessment & Plan     1. Cellulitis of lower extremity, unspecified laterality  Assessment & Plan:  Pt with 1+ b/l lower extremity pitting edema, warmth and erythema. Pt has a hx of cellulitis reports symptoms started 2 days ago. Start cephalexin 500 mg tid x 7 days. Pt to continue furosemide 40 mg qd, elevation and low Na diet    Orders:  -     cephalexin (KEFLEX) 500 mg capsule; Take 1 capsule (500 mg total) by mouth 3 (three) times a day for 7 days    2. Dementia with agitation, unspecified dementia severity, unspecified dementia type (HCC)    3. Memory loss  Assessment & Plan:  Pt without agitation has some short term memory loss, continue Namenda 10 mg qd.               Subjective      Pt presents with b/l lower extremity swelling redness and warmth x 3 days. Pt reports swelling improved today on exam pt has warmth and erythema, 1+ pitting edma b/l.       Review of Systems   Constitutional:  Negative for fever.   Cardiovascular:  Positive for leg swelling (1+ b/l lower extremity pitting edema).   Gastrointestinal:  Negative for vomiting.   Musculoskeletal:  Negative for arthralgias and joint swelling.   Skin:  Positive for color change (b/l lower extremity). Negative for wound.   Neurological:  Negative for weakness.   Psychiatric/Behavioral:  Negative for agitation, hallucinations, sleep disturbance and suicidal ideas.        Current Outpatient Medications on File Prior to Visit   Medication Sig    acetaminophen (TYLENOL) 325 mg tablet Take 2 tablets (650 mg total) by mouth every 6 (six) hours as needed for mild pain    amLODIPine (NORVASC) 2.5 mg tablet TAKE 1 TABLET BY MOUTH DAILY    Biotin 5000 MCG TABS Take by mouth every morning    Cholecalciferol (VITAMIN D) 2000 units CAPS Take by mouth 3 (three)  "times a week    clobetasol (TEMOVATE) 0.05 % cream Apply topically 2 (two) times a day    cyanocobalamin (VITAMIN B-12) 500 MCG tablet Take 1 tablet (500 mcg total) by mouth daily    escitalopram (LEXAPRO) 10 mg tablet TAKE 1 TABLET BY MOUTH DAILY    furosemide (LASIX) 40 mg tablet Take 1 tablet (40 mg total) by mouth daily Do not start before October 6, 2023.    GLUCOSAMINE HCL PO Take 1,200 mg by mouth daily     lisinopril (ZESTRIL) 10 mg tablet TAKE 1 TABLET BY MOUTH DAILY    meclizine (ANTIVERT) 12.5 MG tablet Take 1 tablet (12.5 mg total) by mouth every 8 (eight) hours as needed for dizziness    memantine (NAMENDA) 10 mg tablet Take 1 tablet (10 mg total) by mouth daily    metoprolol tartrate (LOPRESSOR) 50 mg tablet TAKE 1 TABLET BY MOUTH TWICE  DAILY    niacin 500 mg tablet Take 500 mg by mouth daily with breakfast    Omega-3 Fatty Acids (FISH OIL) 1,000 mg Take 1,000 mg by mouth daily      potassium chloride (Klor-Con M20) 20 mEq tablet Take 1 tablet (20 mEq total) by mouth daily    triamcinolone (KENALOG) 0.1 % cream APPLY TO AFFECTED AREA TWICE A DAY    zolpidem (AMBIEN) 5 mg tablet Take 1 tablet (5 mg total) by mouth daily at bedtime as needed for sleep    [DISCONTINUED] esomeprazole (NexIUM) 20 mg capsule Take 20 mg by mouth every morning before breakfast (Patient not taking: Reported on 5/10/2024)    [DISCONTINUED] permethrin (ELIMITE) 5 % cream APPLY TOPICALLY ONCE FOR 1 DOSE (Patient not taking: Reported on 5/10/2024)       Objective     /80 (BP Location: Left arm, Patient Position: Sitting, Cuff Size: Standard)   Pulse 68   Temp 97.9 °F (36.6 °C) (Tympanic)   Resp 16   Ht 5' 4\" (1.626 m)   Wt 67.1 kg (148 lb)   SpO2 98%   BMI 25.40 kg/m²     Physical Exam  Vitals and nursing note reviewed.   Constitutional:       General: She is not in acute distress.     Appearance: Normal appearance. She is normal weight. She is not ill-appearing or toxic-appearing.   HENT:      Head: Normocephalic and " atraumatic.      Right Ear: Tympanic membrane, ear canal and external ear normal. There is no impacted cerumen.      Left Ear: Tympanic membrane, ear canal and external ear normal. There is no impacted cerumen.      Nose: Nose normal. No congestion or rhinorrhea.      Mouth/Throat:      Mouth: Mucous membranes are moist.      Pharynx: No oropharyngeal exudate or posterior oropharyngeal erythema.   Eyes:      General:         Right eye: No discharge.         Left eye: No discharge.      Extraocular Movements: Extraocular movements intact.      Conjunctiva/sclera: Conjunctivae normal.      Pupils: Pupils are equal, round, and reactive to light.   Neck:      Vascular: No carotid bruit.   Cardiovascular:      Rate and Rhythm: Normal rate and regular rhythm.      Pulses: Normal pulses.      Heart sounds: Normal heart sounds, S1 normal and S2 normal. No murmur heard.  Pulmonary:      Effort: Pulmonary effort is normal. No respiratory distress.      Breath sounds: Normal breath sounds. No wheezing.   Chest:      Chest wall: No tenderness.   Abdominal:      General: Bowel sounds are normal. There is no distension.      Palpations: Abdomen is soft. There is no mass.      Tenderness: There is no abdominal tenderness. There is no right CVA tenderness, left CVA tenderness, guarding or rebound.      Hernia: No hernia is present.   Musculoskeletal:         General: No swelling, tenderness, deformity or signs of injury. Normal range of motion.      Cervical back: Normal range of motion. No rigidity or tenderness.      Right lower le+ Pitting Edema present.      Left lower le+ Edema present.   Lymphadenopathy:      Cervical: No cervical adenopathy.   Skin:     General: Skin is warm.      Capillary Refill: Capillary refill takes less than 2 seconds.      Coloration: Skin is not jaundiced or pale.      Findings: Erythema present. No bruising, lesion or rash.      Comments: B/l lower extremity erythema, swelling    Neurological:      General: No focal deficit present.      Mental Status: She is alert and oriented to person, place, and time.      Cranial Nerves: No cranial nerve deficit.      Sensory: No sensory deficit.      Motor: No weakness.      Coordination: Coordination normal.      Gait: Gait normal.      Deep Tendon Reflexes: Reflexes normal.   Psychiatric:         Attention and Perception: Attention normal. She is attentive. She does not perceive auditory or visual hallucinations.         Mood and Affect: Mood and affect normal. Affect is not inappropriate.         Speech: Speech normal.         Behavior: Behavior normal. Behavior is not agitated, aggressive or combative. Behavior is cooperative.         Thought Content: Thought content normal. Thought content is not delusional. Thought content does not include homicidal ideation.         Cognition and Memory: Memory is impaired.         Judgment: Judgment normal. Judgment is not inappropriate.       NUBIA Bustamante

## 2024-05-10 NOTE — ASSESSMENT & PLAN NOTE
Pt with 1+ b/l lower extremity pitting edema, warmth and erythema. Pt has a hx of cellulitis reports symptoms started 2 days ago. Start cephalexin 500 mg tid x 7 days. Pt to continue furosemide 40 mg qd, elevation and low Na diet

## 2024-06-04 NOTE — TELEPHONE ENCOUNTER
Patients GI provider:  Dr. Jaiyeola    Number to return call: [phone]    Reason for call: Wendy from [facility] called to reschedule pt's EGD.Instructions need to be faxed to [phone]     Scheduled procedure/appointment date if applicable: 8/5/2024       Pts daughter is not listed on her medical communication consent form so I can't talk to her

## 2024-06-21 ENCOUNTER — TELEPHONE (OUTPATIENT)
Dept: FAMILY MEDICINE CLINIC | Facility: CLINIC | Age: 84
End: 2024-06-21

## 2024-07-03 DIAGNOSIS — Z76.0 MEDICATION REFILL: ICD-10-CM

## 2024-07-03 DIAGNOSIS — G47.00 INSOMNIA, UNSPECIFIED TYPE: ICD-10-CM

## 2024-07-03 DIAGNOSIS — R60.0 BILATERAL LOWER EXTREMITY EDEMA: ICD-10-CM

## 2024-07-03 DIAGNOSIS — F41.9 ANXIETY: ICD-10-CM

## 2024-07-03 DIAGNOSIS — I10 ESSENTIAL HYPERTENSION: ICD-10-CM

## 2024-07-03 DIAGNOSIS — F03.90 DEMENTIA, UNSPECIFIED DEMENTIA SEVERITY, UNSPECIFIED DEMENTIA TYPE, UNSPECIFIED WHETHER BEHAVIORAL, PSYCHOTIC, OR MOOD DISTURBANCE OR ANXIETY (HCC): ICD-10-CM

## 2024-07-03 RX ORDER — LISINOPRIL 10 MG/1
10 TABLET ORAL DAILY
Qty: 100 TABLET | Refills: 1 | Status: SHIPPED | OUTPATIENT
Start: 2024-07-03

## 2024-07-03 RX ORDER — METOPROLOL TARTRATE 50 MG/1
50 TABLET, FILM COATED ORAL 2 TIMES DAILY
Qty: 200 TABLET | Refills: 1 | Status: SHIPPED | OUTPATIENT
Start: 2024-07-03

## 2024-07-03 RX ORDER — ESCITALOPRAM OXALATE 10 MG/1
10 TABLET ORAL DAILY
Qty: 100 TABLET | Refills: 1 | Status: SHIPPED | OUTPATIENT
Start: 2024-07-03

## 2024-07-03 RX ORDER — MEMANTINE HYDROCHLORIDE 10 MG/1
10 TABLET ORAL DAILY
Qty: 100 TABLET | Refills: 1 | Status: SHIPPED | OUTPATIENT
Start: 2024-07-03

## 2024-07-03 RX ORDER — FUROSEMIDE 40 MG/1
40 TABLET ORAL DAILY
Qty: 30 TABLET | Refills: 0 | Status: SHIPPED | OUTPATIENT
Start: 2024-07-03

## 2024-07-03 RX ORDER — ZOLPIDEM TARTRATE 5 MG/1
5 TABLET ORAL
Qty: 90 TABLET | Refills: 0 | Status: SHIPPED | OUTPATIENT
Start: 2024-07-03

## 2024-07-03 NOTE — TELEPHONE ENCOUNTER
Message sent via [employer].   Hilario Fish,   Is Khushbu taking amlodipine lisinopril and metoprolol?  They are all in her chart to refill?  And she would like a refill sent to [facility] for her furosemide too. It was not letting me put that rx in there. She also asked if she should come in before October to be seen?  She said her mind and her speech is getting progressively worse and wanted to come in sooner to get a check up?  Please let me know and I can relay the message about the questions I asked. Thanks,  Linsey

## 2024-07-15 ENCOUNTER — TELEPHONE (OUTPATIENT)
Age: 84
End: 2024-07-15

## 2024-07-15 NOTE — TELEPHONE ENCOUNTER
SVETA: Pt spouse called and stated pt blood pressure was 195/101 and every morning it is over 200, she even increased her blood pressure meds to 2 pills and still high. Spoke with office and they said she needs to go to the ER. Pt got on phone and adv her she needs to go to ER for blood pressure. Pt states she has bugs that are eating her blood. They tunnel under her skin and sometimes come out through her face and stool. She statead she is contagious and has been for 2 years.  in backround said she not contagious but I told her to wear a mask into the ER if she is contagious.She told the ER in ref to these bugs before and they were not able to see them because they are under the skin and behind the eyes and only see them if they tunnel there way out. But emphasized for patient to go to the ER especially for her blood pressure and to mention the bugs as well.

## 2024-07-16 DIAGNOSIS — I10 ESSENTIAL HYPERTENSION: ICD-10-CM

## 2024-07-16 RX ORDER — LISINOPRIL 10 MG/1
10 TABLET ORAL DAILY
Qty: 100 TABLET | Refills: 0 | Status: CANCELLED | OUTPATIENT
Start: 2024-07-16

## 2024-07-16 NOTE — TELEPHONE ENCOUNTER
Spoke with patient- she says she will just wait and see- I told her if anything gets worse call us or go to ER

## 2024-07-17 DIAGNOSIS — R60.0 BILATERAL LOWER EXTREMITY EDEMA: ICD-10-CM

## 2024-07-18 RX ORDER — FUROSEMIDE 40 MG/1
40 TABLET ORAL DAILY
Qty: 30 TABLET | Refills: 5 | Status: SHIPPED | OUTPATIENT
Start: 2024-07-18

## 2024-08-12 DIAGNOSIS — I10 ESSENTIAL HYPERTENSION: Primary | ICD-10-CM

## 2024-08-12 DIAGNOSIS — I10 ESSENTIAL HYPERTENSION: ICD-10-CM

## 2024-08-12 DIAGNOSIS — R60.0 BILATERAL LOWER EXTREMITY EDEMA: ICD-10-CM

## 2024-08-12 RX ORDER — FUROSEMIDE 40 MG
40 TABLET ORAL DAILY
Qty: 30 TABLET | Refills: 5 | Status: SHIPPED | OUTPATIENT
Start: 2024-08-12

## 2024-08-13 DIAGNOSIS — L03.90 CELLULITIS, UNSPECIFIED CELLULITIS SITE: Primary | ICD-10-CM

## 2024-08-13 RX ORDER — CEPHALEXIN 500 MG/1
500 CAPSULE ORAL 2 TIMES DAILY
Qty: 14 CAPSULE | Refills: 0 | Status: SHIPPED | OUTPATIENT
Start: 2024-08-13 | End: 2024-08-20

## 2024-08-13 RX ORDER — AMLODIPINE BESYLATE 2.5 MG/1
2.5 TABLET ORAL DAILY
Qty: 90 TABLET | Refills: 1 | Status: SHIPPED | OUTPATIENT
Start: 2024-08-13

## 2024-08-29 ENCOUNTER — RA CDI HCC (OUTPATIENT)
Dept: OTHER | Facility: HOSPITAL | Age: 84
End: 2024-08-29

## 2024-09-04 ENCOUNTER — TELEPHONE (OUTPATIENT)
Dept: FAMILY MEDICINE CLINIC | Facility: CLINIC | Age: 84
End: 2024-09-04

## 2024-09-04 ENCOUNTER — OFFICE VISIT (OUTPATIENT)
Dept: FAMILY MEDICINE CLINIC | Facility: CLINIC | Age: 84
End: 2024-09-04
Payer: COMMERCIAL

## 2024-09-04 VITALS
BODY MASS INDEX: 25.23 KG/M2 | HEIGHT: 66 IN | DIASTOLIC BLOOD PRESSURE: 80 MMHG | HEART RATE: 67 BPM | TEMPERATURE: 97.1 F | SYSTOLIC BLOOD PRESSURE: 120 MMHG | WEIGHT: 157 LBS

## 2024-09-04 DIAGNOSIS — R41.3 MEMORY LOSS: ICD-10-CM

## 2024-09-04 DIAGNOSIS — F41.9 ANXIETY: ICD-10-CM

## 2024-09-04 DIAGNOSIS — R14.0 ABDOMINAL DISTENTION: ICD-10-CM

## 2024-09-04 DIAGNOSIS — I10 ESSENTIAL HYPERTENSION: Primary | ICD-10-CM

## 2024-09-04 DIAGNOSIS — G31.9 CEREBRAL ATROPHY (HCC): ICD-10-CM

## 2024-09-04 DIAGNOSIS — R26.2 AMBULATORY DYSFUNCTION: ICD-10-CM

## 2024-09-04 DIAGNOSIS — E78.5 DYSLIPIDEMIA: ICD-10-CM

## 2024-09-04 DIAGNOSIS — F10.90 HEAVY ALCOHOL USE: ICD-10-CM

## 2024-09-04 DIAGNOSIS — E87.1 HYPONATREMIA: ICD-10-CM

## 2024-09-04 DIAGNOSIS — K21.9 GASTROESOPHAGEAL REFLUX DISEASE WITHOUT ESOPHAGITIS: ICD-10-CM

## 2024-09-04 PROCEDURE — 99214 OFFICE O/P EST MOD 30 MIN: CPT | Performed by: INTERNAL MEDICINE

## 2024-09-04 PROCEDURE — 3074F SYST BP LT 130 MM HG: CPT | Performed by: INTERNAL MEDICINE

## 2024-09-04 PROCEDURE — 1159F MED LIST DOCD IN RCRD: CPT | Performed by: INTERNAL MEDICINE

## 2024-09-04 PROCEDURE — 1160F RVW MEDS BY RX/DR IN RCRD: CPT | Performed by: INTERNAL MEDICINE

## 2024-09-04 PROCEDURE — 3079F DIAST BP 80-89 MM HG: CPT | Performed by: INTERNAL MEDICINE

## 2024-09-04 PROCEDURE — G2211 COMPLEX E/M VISIT ADD ON: HCPCS | Performed by: INTERNAL MEDICINE

## 2024-09-04 NOTE — PROGRESS NOTES
"Assessment/Plan:Today, with Khushbu we didn't do too much besides discuss her recent lab results, an echo from last Sept-she did mention the \"bugs\" she thinks are on her skin which may be more of a delusional parastitosis-the echo did show some elevated RV pressure and I think I will reach out to Cardiology to discuss-BNP was 400 and Na level was normal-I did tell her to bump up her furosemide to bid for 5 days and to limit her sodium intake-she is still drinking wine every night and with abdominal distention I did order an US to look for ascites         Problem List Items Addressed This Visit       Heavy alcohol use    Anxiety    Essential hypertension - Primary     BP well controlled today         Dyslipidemia    Gastroesophageal reflux disease without esophagitis    Hyponatremia    Memory loss    Cerebral atrophy (HCC)    Ambulatory dysfunction     Other Visit Diagnoses       Abdominal distention        Relevant Orders    US abdomen complete          Recent Results (from the past 8736 hour(s))   ECG 12 lead    Collection Time: 09/11/23  3:59 PM   Result Value Ref Range    Ventricular Rate 61 BPM    Atrial Rate 61 BPM    FL Interval 168 ms    QRSD Interval 86 ms    QT Interval 460 ms    QTC Interval 463 ms    P [place] 55 degrees    QRS Axis 10 degrees    T Wave Axis 23 degrees   CBC and differential    Collection Time: 09/11/23  4:07 PM   Result Value Ref Range    WBC 6.94 4.31 - 10.16 Thousand/uL    RBC 3.69 (L) 3.81 - 5.12 Million/uL    Hemoglobin 11.8 11.5 - 15.4 g/dL    Hematocrit 34.5 (L) 34.8 - 46.1 %    MCV 94 82 - 98 fL    MCH 32.0 26.8 - 34.3 pg    MCHC 34.2 31.4 - 37.4 g/dL    RDW 11.8 11.6 - 15.1 %    MPV 8.7 (L) 8.9 - 12.7 fL    Platelets 250 149 - 390 Thousands/uL    nRBC 0 /100 WBCs    Segmented % 71 43 - 75 %    Immature Grans % 0 0 - 2 %    Lymphocytes % 20 14 - 44 %    Monocytes % 8 4 - 12 %    Eosinophils Relative 0 0 - 6 %    Basophils Relative 1 0 - 1 %    Absolute Neutrophils 4.96 1.85 - 7.62 " Thousands/µL    Absolute Immature Grans 0.03 0.00 - 0.20 Thousand/uL    Absolute Lymphocytes 1.35 0.60 - 4.47 Thousands/µL    Absolute Monocytes 0.54 0.17 - 1.22 Thousand/µL    Eosinophils Absolute 0.02 0.00 - 0.61 Thousand/µL    Basophils Absolute 0.04 0.00 - 0.10 Thousands/µL   Comprehensive metabolic panel    Collection Time: 09/11/23  4:07 PM   Result Value Ref Range    Sodium 129 (L) 135 - 147 mmol/L    Potassium 3.6 3.5 - 5.3 mmol/L    Chloride 93 (L) 96 - 108 mmol/L    CO2 27 21 - 32 mmol/L    ANION GAP 9 mmol/L    BUN 11 5 - 25 mg/dL    Creatinine 0.68 0.60 - 1.30 mg/dL    Glucose 100 65 - 140 mg/dL    Calcium 8.9 8.4 - 10.2 mg/dL    AST 12 (L) 13 - 39 U/L    ALT 10 7 - 52 U/L    Alkaline Phosphatase 65 34 - 104 U/L    Total Protein 6.6 6.4 - 8.4 g/dL    Albumin 4.1 3.5 - 5.0 g/dL    Total Bilirubin 0.64 0.20 - 1.00 mg/dL    eGFR 80 ml/min/1.73sq m   B-Type Natriuretic Peptide(BNP)    Collection Time: 09/11/23  4:07 PM   Result Value Ref Range     (H) 0 - 100 pg/mL   Echo complete w/ contrast if indicated    Collection Time: 09/15/23  2:45 PM   Result Value Ref Range    A4C EF 63 %    LVIDd 4.00 cm    LVIDS 2.50 cm    IVSd 1.00 cm    LVPWd 0.90 cm    FS 38 28 - 44    MV E' Tissue Velocity Septal 9 cm/s    E wave deceleration time 252 ms    MV Peak E [place] 99 cm/s    MV Peak A [place] 0.54 m/s    RVID d 3.3 cm    Tricuspid annular plane systolic excursion 2.50 cm    LA size 4.3 cm    LA length (A2C) 4.80 cm    LA volume (BP) 46 mL    RAA A4C 18.5 cm2    MV mean gradient antegrade 1 mmHg    MV peak gradient antegrade 4 mmHg    MV VTI 33.47 cm    MV stenosis pressure 1/2 time 73 ms    MV valve area p 1/2 method 3.01     TR Peak [place] 2.8 m/s    Triscuspid Valve Regurgitation Peak Gradient 31.0 mmHg    Ao root 3.20 cm    Asc Ao 3.5 cm    Tricuspid valve peak regurgitation velocity 2.80 m/s    Left ventricular stroke volume (2D) 48.00 mL    IVS 1 cm    LEFT VENTRICLE SYSTOLIC VOLUME (MOD BIPLANE) 2D 22 mL    LV  DIASTOLIC VOLUME (MOD BIPLANE) 2D 70 mL    Left Atrium Area-systolic Four Chamber 17 cm2    Left Atrium Area-systolic Apical Two Chamber 16.7 cm2    LVSV, 2D 48 mL    LV EF 60     Est. RA pres 15.0 mmHg    Right Ventricular Peak Systolic Pressure 46.00 mmHg   ECG 12 lead    Collection Time: 10/01/23  3:15 PM   Result Value Ref Range    Ventricular Rate 69 BPM    Atrial Rate 69 BPM    MS Interval 168 ms    QRSD Interval 80 ms    QT Interval 432 ms    QTC Interval 462 ms    P Axis 8 degrees    QRS Axis 56 degrees    T Wave Axis 42 degrees   CBC and differential    Collection Time: 10/01/23  3:27 PM   Result Value Ref Range    WBC 9.30 4.31 - 10.16 Thousand/uL    RBC 4.09 3.81 - 5.12 Million/uL    Hemoglobin 13.1 11.5 - 15.4 g/dL    Hematocrit 37.1 34.8 - 46.1 %    MCV 91 82 - 98 fL    MCH 32.0 26.8 - 34.3 pg    MCHC 35.3 31.4 - 37.4 g/dL    RDW 12.0 11.6 - 15.1 %    MPV 8.8 (L) 8.9 - 12.7 fL    Platelets 303 149 - 390 Thousands/uL    nRBC 0 /100 WBCs    Segmented % 83 (H) 43 - 75 %    Immature Grans % 1 0 - 2 %    Lymphocytes % 9 (L) 14 - 44 %    Monocytes % 7 4 - 12 %    Eosinophils Relative 0 0 - 6 %    Basophils Relative 0 0 - 1 %    Absolute Neutrophils 7.79 (H) 1.85 - 7.62 Thousands/µL    Absolute Immature Grans 0.05 0.00 - 0.20 Thousand/uL    Absolute Lymphocytes 0.83 0.60 - 4.47 Thousands/µL    Absolute Monocytes 0.60 0.17 - 1.22 Thousand/µL    Eosinophils Absolute 0.01 0.00 - 0.61 Thousand/µL    Basophils Absolute 0.02 0.00 - 0.10 Thousands/µL   Comprehensive metabolic panel    Collection Time: 10/01/23  3:27 PM   Result Value Ref Range    Sodium 120 (L) 135 - 147 mmol/L    Potassium 3.8 3.5 - 5.3 mmol/L    Chloride 85 (L) 96 - 108 mmol/L    CO2 24 21 - 32 mmol/L    ANION GAP 11 mmol/L    BUN 11 5 - 25 mg/dL    Creatinine 0.60 0.60 - 1.30 mg/dL    Glucose 122 65 - 140 mg/dL    Calcium 9.4 8.4 - 10.2 mg/dL    AST 16 13 - 39 U/L    ALT 14 7 - 52 U/L    Alkaline Phosphatase 95 34 - 104 U/L    Total Protein 7.3  "6.4 - 8.4 g/dL    Albumin 4.7 3.5 - 5.0 g/dL    Total Bilirubin 1.02 (H) 0.20 - 1.00 mg/dL    eGFR 83 ml/min/1.73sq m   Magnesium    Collection Time: 10/01/23  3:27 PM   Result Value Ref Range    Magnesium 1.7 (L) 1.9 - 2.7 mg/dL   HS Troponin 0hr (reflex protocol)    Collection Time: 10/01/23  3:27 PM   Result Value Ref Range    hs TnI 0hr 7 \"Refer to ACS Flowchart\"- see link ng/L   B-Type Natriuretic Peptide(BNP)    Collection Time: 10/01/23  3:27 PM   Result Value Ref Range     (H) 0 - 100 pg/mL   Osmolality-\"If this is regarding a toxic alcohol, please STOP and consult medical  for further guidance.\"    Collection Time: 10/01/23  3:27 PM   Result Value Ref Range    Osmolality Serum 252 (L) 282 - 298 mmol/KG   TSH baseline    Collection Time: 10/01/23  3:27 PM   Result Value Ref Range    TSH Baseline 4.092 0.450 - 4.500 uIU/mL   Uric acid    Collection Time: 10/01/23  3:27 PM   Result Value Ref Range    Uric Acid 4.1 2.0 - 7.5 mg/dL   UA (URINE) with reflex to Scope    Collection Time: 10/01/23  4:19 PM   Result Value Ref Range    Color, UA Yellow Yellow    Clarity, UA Clear Clear    Specific Gravity, UA 1.020 1.001 - 1.030    pH, UA 6.5 5.0, 5.5, 6.0, 6.5, 7.0, 7.5, 8.0    Leukocytes, UA Negative Negative    Nitrite, UA Negative Negative    Protein, UA Trace (A) Negative, Interference- unable to analyze mg/dl    Glucose, UA Negative Negative mg/dl    Ketones, UA 40 (2+) (A) Negative mg/dl    Urobilinogen, UA 0.2 0.2, 1.0 E.U./dl E.U./dl    Bilirubin, UA Negative Negative    Occult Blood, UA Negative Negative   Urine Microscopic    Collection Time: 10/01/23  4:19 PM   Result Value Ref Range    RBC, UA None Seen None Seen, 0-1, 1-2, 2-4, 0-5 /hpf    WBC, UA None Seen None Seen, 0-1, 1-2, 0-5, 2-4 /hpf    Epithelial Cells Occasional None Seen, Occasional /hpf    Bacteria, UA None Seen None Seen, Occasional /hpf   Osmolality, urine    Collection Time: 10/01/23  4:38 PM   Result Value Ref Range    " "Osmolality, Ur 541 250 - 900 mmol/KG   Sodium, urine, random    Collection Time: 10/01/23  4:38 PM   Result Value Ref Range    Sodium, Ur 36 Reference range not established.   HS Troponin I 2hr    Collection Time: 10/01/23  5:21 PM   Result Value Ref Range    hs TnI 2hr 6 \"Refer to ACS Flowchart\"- see link ng/L    Delta 2hr hsTnI -1 <20 ng/L   HS Troponin I 4hr    Collection Time: 10/01/23  7:32 PM   Result Value Ref Range    hs TnI 4hr 7 \"Refer to ACS Flowchart\"- see link ng/L    Delta 4hr hsTnI 0 <20 ng/L   Basic metabolic panel    Collection Time: 10/01/23  7:32 PM   Result Value Ref Range    Sodium 119 (L) 135 - 147 mmol/L    Potassium 3.6 3.5 - 5.3 mmol/L    Chloride 84 (L) 96 - 108 mmol/L    CO2 24 21 - 32 mmol/L    ANION GAP 11 mmol/L    BUN 11 5 - 25 mg/dL    Creatinine 0.62 0.60 - 1.30 mg/dL    Glucose 150 (H) 65 - 140 mg/dL    Calcium 9.2 8.4 - 10.2 mg/dL    eGFR 82 ml/min/1.73sq m   Basic metabolic panel    Collection Time: 10/01/23 11:52 PM   Result Value Ref Range    Sodium 121 (L) 135 - 147 mmol/L    Potassium 3.1 (L) 3.5 - 5.3 mmol/L    Chloride 84 (L) 96 - 108 mmol/L    CO2 26 21 - 32 mmol/L    ANION GAP 11 mmol/L    BUN 9 5 - 25 mg/dL    Creatinine 0.51 (L) 0.60 - 1.30 mg/dL    Glucose 117 65 - 140 mg/dL    Calcium 8.7 8.4 - 10.2 mg/dL    eGFR 87 ml/min/1.73sq m   Basic metabolic panel    Collection Time: 10/02/23  4:12 AM   Result Value Ref Range    Sodium 120 (L) 135 - 147 mmol/L    Potassium 3.0 (L) 3.5 - 5.3 mmol/L    Chloride 84 (L) 96 - 108 mmol/L    CO2 27 21 - 32 mmol/L    ANION GAP 9 mmol/L    BUN 8 5 - 25 mg/dL    Creatinine 0.60 0.60 - 1.30 mg/dL    Glucose 107 65 - 140 mg/dL    Calcium 8.6 8.4 - 10.2 mg/dL    eGFR 83 ml/min/1.73sq m   Magnesium    Collection Time: 10/02/23  4:12 AM   Result Value Ref Range    Magnesium 1.7 (L) 1.9 - 2.7 mg/dL   Cortisol Level, AM Specimen    Collection Time: 10/02/23  8:06 AM   Result Value Ref Range    Cortisol - AM 15.2 6.7 - 22.6 ug/dL   Basic " metabolic panel    Collection Time: 10/02/23  8:06 AM   Result Value Ref Range    Sodium 122 (L) 135 - 147 mmol/L    Potassium 3.1 (L) 3.5 - 5.3 mmol/L    Chloride 87 (L) 96 - 108 mmol/L    CO2 27 21 - 32 mmol/L    ANION GAP 8 mmol/L    BUN 7 5 - 25 mg/dL    Creatinine 0.55 (L) 0.60 - 1.30 mg/dL    Glucose 98 65 - 140 mg/dL    Calcium 8.5 8.4 - 10.2 mg/dL    eGFR 85 ml/min/1.73sq m   Basic metabolic panel    Collection Time: 10/02/23  3:18 PM   Result Value Ref Range    Sodium 120 (L) 135 - 147 mmol/L    Potassium 3.4 (L) 3.5 - 5.3 mmol/L    Chloride 84 (L) 96 - 108 mmol/L    CO2 28 21 - 32 mmol/L    ANION GAP 8 mmol/L    BUN 8 5 - 25 mg/dL    Creatinine 0.56 (L) 0.60 - 1.30 mg/dL    Glucose 117 65 - 140 mg/dL    Calcium 8.5 8.4 - 10.2 mg/dL    eGFR 85 ml/min/1.73sq m   Basic metabolic panel    Collection Time: 10/03/23  5:45 AM   Result Value Ref Range    Sodium 128 (L) 135 - 147 mmol/L    Potassium 4.0 3.5 - 5.3 mmol/L    Chloride 91 (L) 96 - 108 mmol/L    CO2 30 21 - 32 mmol/L    ANION GAP 7 mmol/L    BUN 7 5 - 25 mg/dL    Creatinine 0.61 0.60 - 1.30 mg/dL    Glucose 100 65 - 140 mg/dL    Calcium 8.8 8.4 - 10.2 mg/dL    eGFR 82 ml/min/1.73sq m   Sodium    Collection Time: 10/03/23  2:14 PM   Result Value Ref Range    Sodium 131 (L) 135 - 147 mmol/L   Basic metabolic panel    Collection Time: 10/04/23  4:32 AM   Result Value Ref Range    Sodium 134 (L) 135 - 147 mmol/L    Potassium 3.7 3.5 - 5.3 mmol/L    Chloride 95 (L) 96 - 108 mmol/L    CO2 32 21 - 32 mmol/L    ANION GAP 7 mmol/L    BUN 6 5 - 25 mg/dL    Creatinine 0.67 0.60 - 1.30 mg/dL    Glucose 81 65 - 140 mg/dL    Calcium 8.9 8.4 - 10.2 mg/dL    eGFR 80 ml/min/1.73sq m   Magnesium    Collection Time: 10/04/23  4:32 AM   Result Value Ref Range    Magnesium 1.9 1.9 - 2.7 mg/dL   Basic metabolic panel    Collection Time: 10/05/23  4:28 AM   Result Value Ref Range    Sodium 133 (L) 135 - 147 mmol/L    Potassium 3.4 (L) 3.5 - 5.3 mmol/L    Chloride 95 (L) 96 -  108 mmol/L    CO2 30 21 - 32 mmol/L    ANION GAP 8 mmol/L    BUN 7 5 - 25 mg/dL    Creatinine 0.62 0.60 - 1.30 mg/dL    Glucose 102 65 - 140 mg/dL    Calcium 9.0 8.4 - 10.2 mg/dL    eGFR 82 ml/min/1.73sq m   BASIC METABOLIC PANEL    Collection Time: 12/01/23  9:20 AM   Result Value Ref Range    Glucose 101 (H) 65 - 99 mg/dL    BUN 13 7 - 25 mg/dL    Creatinine 0.65 0.40 - 1.10 mg/dL    Sodium 133 (L) 135 - 145 mmol/L    Potassium 3.8 3.5 - 5.2 mmol/L    Chloride 95 (L) 100 - 109 mmol/L    Carbon Dioxide 27 21 - 31 mmol/L    Calcium 9.6 8.5 - 10.1 mg/dL    ANION GAP 11 3 - 11    eGFRcr 86 >59    eGFR Comment Interpretive information: calculated GFR    BASIC METABOLIC PANEL    Collection Time: 12/15/23  3:13 PM   Result Value Ref Range    Glucose 96 65 - 99 mg/dL    BUN 15 7 - 25 mg/dL    Creatinine 0.63 0.40 - 1.10 mg/dL    Sodium 135 135 - 145 mmol/L    Potassium 4.0 3.5 - 5.2 mmol/L    Chloride 94 (L) 100 - 109 mmol/L    Carbon Dioxide 30 21 - 31 mmol/L    Calcium 9.7 8.5 - 10.1 mg/dL    ANION GAP 11 3 - 11    eGFRcr 86 >59    eGFR Comment Interpretive information: calculated GFR    COMPREHENSIVE METABOLIC PANEL    Collection Time: 08/27/24  2:21 PM   Result Value Ref Range    Glucose 108 (H) 65 - 99 mg/dL    BUN 13 7 - 25 mg/dL    Creatinine 0.80 0.40 - 1.10 mg/dL    Sodium 136 135 - 145 mmol/L    Potassium 4.0 3.5 - 5.2 mmol/L    Chloride 95 (L) 100 - 109 mmol/L    Carbon Dioxide 31 21 - 31 mmol/L    Calcium 9.5 8.5 - 10.1 mg/dL    Alkaline Phosphatase 62 35 - 120 U/L    ALBUMIN 4.5 3.5 - 5.7 g/dL    Total Bilirubin 0.8 0.2 - 1.0 mg/dL    Protein, Total 7.1 6.3 - 8.3 g/dL    AST 16 <41 U/L    ALT 14 <56 U/L    ANION GAP 10 3 - 11    eGFRcr 71 >59    eGFR Comment Interpretive information: calculated GFR        Laboratory Results: I have personally reviewed the pertinent laboratory results/reports     Radiology/Other Diagnostic Testing Results: I have personally reviewed pertinent reports.       Subjective:       "Patient ID: Khushbu Gandara is a 84 y.o. female.    Khushbu here with a hx of dementia, cerebral atrophy, Etoh use, lower extremity edema, breast mass, HL, HTN, hyponatremia-here with her -still has leg swelling and chronic venous stasis changes of her lower extremities-saw Cardiology recentlyand had echo last Sept which showed normal systolic and diastolic function but elevated RV pressure-she still thinks she has \"bugs\" on her skin, despite our conversation to the contrary        The following portions of the patient's history were reviewed and updated as appropriate:   Past Medical History:  She has a past medical history of Anxiety, Arthritis, Prajapati esophagus, Cataract, Continuous chronic alcoholism (HCC), GERD (gastroesophageal reflux disease), Heavy alcohol use, Hyperlipidemia, Hypertension, Insomnia, and Palpitations.,  _______________________________________________________________________  Medical Problems:  does not have any pertinent problems on file.,  _______________________________________________________________________  Past Surgical History:   has a past surgical history that includes Cataract extraction (Right); LASIK (Bilateral); Joint replacement (Bilateral); Colonoscopy; Tonsillectomy; Tubal ligation; Hysterectomy; Cholecystectomy; pr xcapsl ctrc rmvl insj io lens prosth w/o ecp (Left, 10/18/2018); and BREAST IMPLANT (Bilateral).,  _______________________________________________________________________  Family History:  family history includes Cancer in her mother and sister; Diabetes in her daughter; Heart disease (age of onset: 60) in her father; No Known Problems in her sister.,  _______________________________________________________________________  Social History:   reports that she has never smoked. She has never used smokeless tobacco. She reports current alcohol use of about 20.0 standard drinks of alcohol per week. She reports that she does not use " drugs.,  _______________________________________________________________________  Allergies:  has No Known Allergies..  _______________________________________________________________________  Current Outpatient Medications   Medication Sig Dispense Refill    acetaminophen (TYLENOL) 325 mg tablet Take 2 tablets (650 mg total) by mouth every 6 (six) hours as needed for mild pain  0    amLODIPine (NORVASC) 2.5 mg tablet Take 1 tablet (2.5 mg total) by mouth daily 90 tablet 1    Biotin 5000 MCG TABS Take by mouth every morning      Cholecalciferol (VITAMIN D) 2000 units CAPS Take by mouth 3 (three) times a week      clobetasol (TEMOVATE) 0.05 % cream Apply topically 2 (two) times a day 90 g 5    cyanocobalamin (VITAMIN B-12) 500 MCG tablet Take 1 tablet (500 mcg total) by mouth daily 30 tablet 5    escitalopram (LEXAPRO) 10 mg tablet Take 1 tablet (10 mg total) by mouth daily 100 tablet 1    furosemide (LASIX) 40 mg tablet Take 1 tablet (40 mg total) by mouth daily 30 tablet 5    GLUCOSAMINE HCL PO Take 1,200 mg by mouth daily       lisinopril (ZESTRIL) 10 mg tablet Take 1 tablet (10 mg total) by mouth daily 100 tablet 1    meclizine (ANTIVERT) 12.5 MG tablet Take 1 tablet (12.5 mg total) by mouth every 8 (eight) hours as needed for dizziness 30 tablet 0    memantine (NAMENDA) 10 mg tablet Take 1 tablet (10 mg total) by mouth daily 100 tablet 1    metoprolol tartrate (LOPRESSOR) 50 mg tablet Take 1 tablet (50 mg total) by mouth 2 (two) times a day 200 tablet 1    niacin 500 mg tablet Take 500 mg by mouth daily with breakfast      Omega-3 Fatty Acids (FISH OIL) 1,000 mg Take 1,000 mg by mouth daily        potassium chloride (Klor-Con M20) 20 mEq tablet Take 1 tablet (20 mEq total) by mouth daily 90 tablet 2    triamcinolone (KENALOG) 0.1 % cream APPLY TO AFFECTED AREA TWICE A DAY 30 g 3    zolpidem (AMBIEN) 5 mg tablet Take 1 tablet (5 mg total) by mouth daily at bedtime as needed for sleep 90 tablet 0     No current  "facility-administered medications for this visit.     _______________________________________________________________________  Review of Systems   Constitutional:  Positive for unexpected weight change.   Respiratory:  Negative for cough, shortness of breath and wheezing.    Cardiovascular:  Positive for leg swelling.   Gastrointestinal:  Positive for abdominal distention.   Musculoskeletal: Negative.    Neurological:  Positive for dizziness.   Psychiatric/Behavioral:  Positive for confusion.          Objective:  Vitals:    09/04/24 1554   BP: 120/80   Pulse: 67   Temp: (!) 97.1 °F (36.2 °C)   Weight: 71.2 kg (157 lb)   Height: 5' 6\" (1.676 m)     Body mass index is 25.34 kg/m².     Physical Exam  Constitutional:       Comments: frail   HENT:      Head: Normocephalic and atraumatic.      Right Ear: External ear normal.      Left Ear: External ear normal.      Nose: Nose normal.      Mouth/Throat:      Mouth: Mucous membranes are moist.   Eyes:      General: No scleral icterus.     Extraocular Movements: Extraocular movements intact.      Pupils: Pupils are equal, round, and reactive to light.   Cardiovascular:      Rate and Rhythm: Normal rate and regular rhythm.   Pulmonary:      Effort: Pulmonary effort is normal.      Breath sounds: Normal breath sounds.   Abdominal:      General: There is distension.      Palpations: There is no mass.      Tenderness: There is no abdominal tenderness.   Musculoskeletal:         General: No swelling.      Cervical back: Normal range of motion and neck supple.      Right lower leg: Edema present.      Left lower leg: Edema present.   Skin:     Findings: Rash present.      Comments: Cvs changes lower extremtities   Neurological:      General: No focal deficit present.      Mental Status: She is alert.         "

## 2024-09-18 ENCOUNTER — HOSPITAL ENCOUNTER (OUTPATIENT)
Dept: RADIOLOGY | Facility: MEDICAL CENTER | Age: 84
Discharge: HOME | End: 2024-09-18
Payer: COMMERCIAL

## 2024-09-18 DIAGNOSIS — R14.0 ABDOMINAL DISTENTION: ICD-10-CM

## 2024-09-18 PROCEDURE — 76700 US EXAM ABDOM COMPLETE: CPT

## 2024-09-25 DIAGNOSIS — R26.2 AMBULATORY DYSFUNCTION: Primary | ICD-10-CM

## 2024-09-26 DIAGNOSIS — F03.911 DEMENTIA WITH AGITATION, UNSPECIFIED DEMENTIA SEVERITY, UNSPECIFIED DEMENTIA TYPE (HCC): Primary | ICD-10-CM

## 2024-09-26 DIAGNOSIS — R19.7 DIARRHEA, UNSPECIFIED TYPE: ICD-10-CM

## 2024-10-01 DIAGNOSIS — F03.B0 MODERATE DEMENTIA, UNSPECIFIED DEMENTIA TYPE, UNSPECIFIED WHETHER BEHAVIORAL, PSYCHOTIC, OR MOOD DISTURBANCE OR ANXIETY (HCC): Primary | ICD-10-CM

## 2024-10-03 ENCOUNTER — TELEPHONE (OUTPATIENT)
Age: 84
End: 2024-10-03

## 2024-10-03 NOTE — TELEPHONE ENCOUNTER
Alex from [facility] calls stating patient refused services today.  She is unable to complete the start of care .

## 2024-10-20 ENCOUNTER — RA CDI HCC (OUTPATIENT)
Dept: OTHER | Facility: HOSPITAL | Age: 84
End: 2024-10-20

## 2024-10-28 ENCOUNTER — TELEPHONE (OUTPATIENT)
Age: 84
End: 2024-10-28

## 2024-10-28 DIAGNOSIS — F03.90 DEMENTIA, UNSPECIFIED DEMENTIA SEVERITY, UNSPECIFIED DEMENTIA TYPE, UNSPECIFIED WHETHER BEHAVIORAL, PSYCHOTIC, OR MOOD DISTURBANCE OR ANXIETY (HCC): ICD-10-CM

## 2024-10-28 RX ORDER — MEMANTINE HYDROCHLORIDE 10 MG/1
10 TABLET ORAL 2 TIMES DAILY
Qty: 100 TABLET | Refills: 1 | Status: SHIPPED | OUTPATIENT
Start: 2024-10-28

## 2024-10-28 NOTE — TELEPHONE ENCOUNTER
The patient and her  called the patient is refusing to come  in for her awv today at 4   she said she has dementia and she is talking different   I asked how long she has been talking different she said several weeks    no other symptoms  the patients  really wanted her to come in to see Dr Fish but the patient just doesn't want to   the  is asking if Dr Fish could please call him   I did not cancel her appointment in case she could be convinced to come in

## 2024-10-28 NOTE — TELEPHONE ENCOUNTER
I called Yo, patient's  back, and he told me Khushbu was going to come in today but wouldn't go when it came time to leave for the appt-he says she's having trouble finding words and completing sentences, and thinks her dementia is progressing. I spent awhile on the phone with him discussing safety issues for patient, such as leaving her home alone, wandering, etc and told him they should probably get the MRI done as was ordered in Sept-gave him the number for Central Scheduling to set that up. Will adjust the Namenda to 10 mg BID, although I explained to Yo I don't know how much this will do for her-also told him they should start looking into Home Care Companies that can provide them some assistance-both with helping her with her medications, ADLS, showering, and watching her

## 2024-11-03 DIAGNOSIS — F41.9 ANXIETY: ICD-10-CM

## 2024-11-04 RX ORDER — ESCITALOPRAM OXALATE 10 MG/1
10 TABLET ORAL DAILY
Qty: 90 TABLET | Refills: 1 | Status: SHIPPED | OUTPATIENT
Start: 2024-11-04

## 2024-11-11 DIAGNOSIS — G47.00 INSOMNIA, UNSPECIFIED TYPE: ICD-10-CM

## 2024-11-12 RX ORDER — ZOLPIDEM TARTRATE 5 MG/1
5 TABLET ORAL
Qty: 90 TABLET | Refills: 0 | Status: ON HOLD | OUTPATIENT
Start: 2024-11-12 | End: 2024-11-20

## 2024-11-17 ENCOUNTER — HOSPITAL ENCOUNTER (OUTPATIENT)
Dept: MRI IMAGING | Facility: HOSPITAL | Age: 84
Discharge: HOME | End: 2024-11-17
Attending: INTERNAL MEDICINE
Payer: COMMERCIAL

## 2024-11-17 ENCOUNTER — HOSPITAL ENCOUNTER (EMERGENCY)
Facility: HOSPITAL | Age: 84
End: 2024-11-17
Attending: EMERGENCY MEDICINE | Admitting: EMERGENCY MEDICINE
Payer: COMMERCIAL

## 2024-11-17 ENCOUNTER — HOSPITAL ENCOUNTER (INPATIENT)
Facility: HOSPITAL | Age: 84
LOS: 3 days | Discharge: HOME HEALTH | DRG: 083 | End: 2024-11-20
Attending: SURGERY | Admitting: SURGERY
Payer: COMMERCIAL

## 2024-11-17 ENCOUNTER — APPOINTMENT (EMERGENCY)
Dept: RADIOLOGY | Facility: HOSPITAL | Age: 84
End: 2024-11-17
Payer: COMMERCIAL

## 2024-11-17 VITALS
TEMPERATURE: 97.8 F | OXYGEN SATURATION: 98 % | SYSTOLIC BLOOD PRESSURE: 111 MMHG | BODY MASS INDEX: 25.72 KG/M2 | RESPIRATION RATE: 18 BRPM | DIASTOLIC BLOOD PRESSURE: 54 MMHG | HEIGHT: 66 IN | HEART RATE: 67 BPM | WEIGHT: 160.05 LBS

## 2024-11-17 DIAGNOSIS — S06.5XAA SDH (SUBDURAL HEMATOMA) (HCC): Primary | ICD-10-CM

## 2024-11-17 DIAGNOSIS — G47.00 INSOMNIA, UNSPECIFIED TYPE: ICD-10-CM

## 2024-11-17 DIAGNOSIS — F03.911 DEMENTIA WITH AGITATION, UNSPECIFIED DEMENTIA SEVERITY, UNSPECIFIED DEMENTIA TYPE (HCC): ICD-10-CM

## 2024-11-17 DIAGNOSIS — S06.5XAA SUBDURAL HEMATOMA (HCC): ICD-10-CM

## 2024-11-17 DIAGNOSIS — S06.5XAA SUBDURAL HEMATOMA (HCC): Primary | ICD-10-CM

## 2024-11-17 LAB
ALBUMIN SERPL BCG-MCNC: 4.4 G/DL (ref 3.5–5)
ALP SERPL-CCNC: 70 U/L (ref 34–104)
ALT SERPL W P-5'-P-CCNC: 14 U/L (ref 7–52)
AMMONIA PLAS-SCNC: 18 UMOL/L (ref 18–72)
ANION GAP SERPL CALCULATED.3IONS-SCNC: 12 MMOL/L (ref 4–13)
APTT PPP: 27 SECONDS (ref 23–34)
AST SERPL W P-5'-P-CCNC: 18 U/L (ref 13–39)
BASOPHILS # BLD AUTO: 0.05 THOUSANDS/ÂΜL (ref 0–0.1)
BASOPHILS NFR BLD AUTO: 1 % (ref 0–1)
BILIRUB SERPL-MCNC: 0.74 MG/DL (ref 0.2–1)
BILIRUB UR QL STRIP: NEGATIVE
BUN SERPL-MCNC: 10 MG/DL (ref 5–25)
CALCIUM SERPL-MCNC: 9.3 MG/DL (ref 8.4–10.2)
CHLORIDE SERPL-SCNC: 94 MMOL/L (ref 96–108)
CLARITY UR: CLEAR
CO2 SERPL-SCNC: 28 MMOL/L (ref 21–32)
COLOR UR: YELLOW
CREAT SERPL-MCNC: 0.81 MG/DL (ref 0.6–1.3)
EOSINOPHIL # BLD AUTO: 0.05 THOUSAND/ÂΜL (ref 0–0.61)
EOSINOPHIL NFR BLD AUTO: 1 % (ref 0–6)
ERYTHROCYTE [DISTWIDTH] IN BLOOD BY AUTOMATED COUNT: 12.1 % (ref 11.6–15.1)
GFR SERPL CREATININE-BSD FRML MDRD: 65 ML/MIN/1.73SQ M
GLUCOSE SERPL-MCNC: 115 MG/DL (ref 65–140)
GLUCOSE UR STRIP-MCNC: NEGATIVE MG/DL
HCT VFR BLD AUTO: 41.9 % (ref 34.8–46.1)
HGB BLD-MCNC: 14.2 G/DL (ref 11.5–15.4)
HGB UR QL STRIP.AUTO: NEGATIVE
IMM GRANULOCYTES # BLD AUTO: 0.04 THOUSAND/UL (ref 0–0.2)
IMM GRANULOCYTES NFR BLD AUTO: 1 % (ref 0–2)
INR PPP: 0.95 (ref 0.85–1.19)
KETONES UR STRIP-MCNC: NEGATIVE MG/DL
LEUKOCYTE ESTERASE UR QL STRIP: NEGATIVE
LYMPHOCYTES # BLD AUTO: 1.65 THOUSANDS/ÂΜL (ref 0.6–4.47)
LYMPHOCYTES NFR BLD AUTO: 19 % (ref 14–44)
MCH RBC QN AUTO: 33.6 PG (ref 26.8–34.3)
MCHC RBC AUTO-ENTMCNC: 33.9 G/DL (ref 31.4–37.4)
MCV RBC AUTO: 99 FL (ref 82–98)
MONOCYTES # BLD AUTO: 0.44 THOUSAND/ÂΜL (ref 0.17–1.22)
MONOCYTES NFR BLD AUTO: 5 % (ref 4–12)
NEUTROPHILS # BLD AUTO: 6.3 THOUSANDS/ÂΜL (ref 1.85–7.62)
NEUTS SEG NFR BLD AUTO: 73 % (ref 43–75)
NITRITE UR QL STRIP: NEGATIVE
NRBC BLD AUTO-RTO: 0 /100 WBCS
PH UR STRIP.AUTO: 6.5 [PH]
PLATELET # BLD AUTO: 231 THOUSANDS/UL (ref 149–390)
PMV BLD AUTO: 9.1 FL (ref 8.9–12.7)
POTASSIUM SERPL-SCNC: 3.9 MMOL/L (ref 3.5–5.3)
PROT SERPL-MCNC: 7.6 G/DL (ref 6.4–8.4)
PROT UR STRIP-MCNC: NEGATIVE MG/DL
PROTHROMBIN TIME: 13.1 SECONDS (ref 12.3–15)
RBC # BLD AUTO: 4.23 MILLION/UL (ref 3.81–5.12)
SODIUM SERPL-SCNC: 134 MMOL/L (ref 135–147)
SP GR UR STRIP.AUTO: 1.01 (ref 1–1.03)
UROBILINOGEN UR QL STRIP.AUTO: 0.2 E.U./DL
WBC # BLD AUTO: 8.53 THOUSAND/UL (ref 4.31–10.16)

## 2024-11-17 PROCEDURE — 96365 THER/PROPH/DIAG IV INF INIT: CPT

## 2024-11-17 PROCEDURE — 85610 PROTHROMBIN TIME: CPT | Performed by: PHYSICIAN ASSISTANT

## 2024-11-17 PROCEDURE — 36415 COLL VENOUS BLD VENIPUNCTURE: CPT | Performed by: PHYSICIAN ASSISTANT

## 2024-11-17 PROCEDURE — 99284 EMERGENCY DEPT VISIT MOD MDM: CPT

## 2024-11-17 PROCEDURE — 70551 MRI BRAIN STEM W/O DYE: CPT

## 2024-11-17 PROCEDURE — 81003 URINALYSIS AUTO W/O SCOPE: CPT | Performed by: PHYSICIAN ASSISTANT

## 2024-11-17 PROCEDURE — 96375 TX/PRO/DX INJ NEW DRUG ADDON: CPT

## 2024-11-17 PROCEDURE — 99285 EMERGENCY DEPT VISIT HI MDM: CPT

## 2024-11-17 PROCEDURE — 93005 ELECTROCARDIOGRAM TRACING: CPT

## 2024-11-17 PROCEDURE — 99285 EMERGENCY DEPT VISIT HI MDM: CPT | Performed by: PHYSICIAN ASSISTANT

## 2024-11-17 PROCEDURE — 85025 COMPLETE CBC W/AUTO DIFF WBC: CPT | Performed by: PHYSICIAN ASSISTANT

## 2024-11-17 PROCEDURE — 82140 ASSAY OF AMMONIA: CPT | Performed by: PHYSICIAN ASSISTANT

## 2024-11-17 PROCEDURE — 80053 COMPREHEN METABOLIC PANEL: CPT | Performed by: PHYSICIAN ASSISTANT

## 2024-11-17 PROCEDURE — 85730 THROMBOPLASTIN TIME PARTIAL: CPT | Performed by: PHYSICIAN ASSISTANT

## 2024-11-17 PROCEDURE — 71046 X-RAY EXAM CHEST 2 VIEWS: CPT

## 2024-11-17 RX ORDER — LEVETIRACETAM 500 MG/5ML
1000 INJECTION, SOLUTION, CONCENTRATE INTRAVENOUS ONCE
Status: COMPLETED | OUTPATIENT
Start: 2024-11-17 | End: 2024-11-17

## 2024-11-17 RX ORDER — HYDRALAZINE HYDROCHLORIDE 20 MG/ML
10 INJECTION INTRAMUSCULAR; INTRAVENOUS ONCE
Status: DISCONTINUED | OUTPATIENT
Start: 2024-11-17 | End: 2024-11-17

## 2024-11-17 RX ORDER — DESMOPRESSIN ACETATE 4 UG/ML
INJECTION, SOLUTION INTRAVENOUS; SUBCUTANEOUS
Status: COMPLETED
Start: 2024-11-17 | End: 2024-11-17

## 2024-11-17 RX ORDER — LEVETIRACETAM 500 MG/1
500 TABLET ORAL EVERY 12 HOURS SCHEDULED
Status: DISCONTINUED | OUTPATIENT
Start: 2024-11-17 | End: 2024-11-20 | Stop reason: HOSPADM

## 2024-11-17 RX ORDER — HYDRALAZINE HYDROCHLORIDE 20 MG/ML
5 INJECTION INTRAMUSCULAR; INTRAVENOUS ONCE
Status: COMPLETED | OUTPATIENT
Start: 2024-11-17 | End: 2024-11-17

## 2024-11-17 RX ORDER — SODIUM CHLORIDE 9 MG/ML
75 INJECTION, SOLUTION INTRAVENOUS CONTINUOUS
Status: DISCONTINUED | OUTPATIENT
Start: 2024-11-17 | End: 2024-11-17 | Stop reason: HOSPADM

## 2024-11-17 RX ADMIN — DESMOPRESSIN ACETATE 29.2 MCG: 4 SOLUTION INTRAVENOUS at 21:59

## 2024-11-17 RX ADMIN — LEVETIRACETAM 1000 MG: 100 INJECTION, SOLUTION INTRAVENOUS at 17:58

## 2024-11-17 RX ADMIN — HYDRALAZINE HYDROCHLORIDE 5 MG: 20 INJECTION INTRAMUSCULAR; INTRAVENOUS at 18:37

## 2024-11-17 NOTE — ED NOTES
Pt given warm blankets and BP dropped. Provider notified. Hydralazine pending next BP cycle     Nano Llanes RN  11/17/24 [id number]

## 2024-11-17 NOTE — ED PROVIDER NOTES
Time reflects when diagnosis was documented in both MDM as applicable and the Disposition within this note       Time User Action Codes Description Comment    11/17/2024  5:14 PM Alvaro Alford Add [S06.5XAA] Subdural hematoma (HCC)           ED Disposition       ED Disposition   Transfer to Another Facility-In Network    Condition   --    Date/Time   Sun Nov 17, 2024  5:14 PM    Comment   Khushbu Gandara should be transferred out to [facility].               Assessment & Plan       Medical Decision Making  84-year-old female transferred to the emergency department from the MRI suite secondary to incidental subdural hematoma noted in the left frontal region.  Daughter accompanies her to the emergency department.  Daughter states remote history of fall from toilet approximately 1 month ago.  Per family there was no injury to the head at the time of fall.  They did not seek medical care at that time.  Daughter states that patient does take aspirin daily.  Patient was obtaining MRI this day secondary to recent dementia workup as well as word finding difficulties.  Patient is bright alert in the emergency department.  Labs have been obtained.  MRI reviewed.  Discussed case with neurosurgery and recommendations are to be transferred to [facility] ICU team.  Through secure chat discussed patient with ICU attending.  Recommendations are to provide blood pressure management with systolic to remain between 150 and 110.  Provide 1 g of Keppra.  Obtain ammonia level.  DDAVP provided requested trauma.  Spoke with Dr. Cottrell trauma surgeon and will have patient transferred to [facility] emergency department..  Signed out to attending physician.  Repeat neurological exam patient remained stable.  Patient now pending transfer to [facility] emergency department.  Due to chronicity and stability of patient's neurological evaluation feel that [facility] is safe for transport.      Amount and/or Complexity of Data  Reviewed  Labs: ordered.  Radiology: ordered.    Risk  Prescription drug management.        ED Course as of 11/17/24 2107   Sun Nov 17, 2024 2024  Neurological reevaluation.  Patient remained stable in the department at this time.  Continued expressive aphasia.  Patient remotely responds appropriately.  Is bright smiling reports no pain.  Nurse call bell provided as patient states that she knows to press the button if she needs to urinate.  Awaiting transport to [facility].       Medications   sodium chloride 0.9 % infusion (75 mL/hr Intravenous New Bag 11/17/24 1806)   desmopressin (DDAVP) 29.2 mcg in sodium chloride 0.9 % 50 mL IVPB (has no administration in time range)   levETIRAcetam (KEPPRA) injection 1,000 mg (1,000 mg Intravenous Given 11/17/24 1758)   hydrALAZINE (APRESOLINE) injection 5 mg (5 mg Intravenous Given 11/17/24 1837)       ED Risk Strat Scores                          SBIRT 20yo+      Flowsheet Row Most Recent Value   Initial Alcohol Screen: US AUDIT-C     1. How often do you have a drink containing alcohol? 0 Filed at: 11/17/2024 1705   2. How many drinks containing alcohol do you have on a typical day you are drinking?  0 Filed at: 11/17/2024 1705   3a. Male UNDER 65: How often do you have five or more drinks on one occasion? 0 Filed at: 11/17/2024 1705   3b. FEMALE Any Age, or MALE 65+: How often do you have 4 or more drinks on one occassion? 0 Filed at: 11/17/2024 1705   Audit-C Score 0 Filed at: 11/17/2024 1705   [place]: How many times in the past year have you...    Used an illegal drug or used a prescription medication for non-medical reasons? Never Filed at: 11/17/2024 1705                            History of Present Illness       Chief Complaint   Patient presents with    Speech Problem     Pt presents to the ed after having an MRI today, abnormal read on scan, pt sent here for further eval,        Past Medical History:   Diagnosis Date    Anxiety     Arthritis     Prajapati  "esophagus     Cataract     Continuous chronic alcoholism (HCC)     GERD (gastroesophageal reflux disease)     Heavy alcohol use     Hyperlipidemia     Hypertension     Insomnia     Palpitations       Past Surgical History:   Procedure Laterality Date    BREAST IMPLANT Bilateral     CATARACT EXTRACTION Right     CHOLECYSTECTOMY      LAP    COLONOSCOPY      HYSTERECTOMY      age 63-[place]    JOINT REPLACEMENT Bilateral     hips-2010 and 2017-left leg is shorter    LASIK Bilateral     in her 50's    MI XCAPSL CTRC RMVL INSJ IO LENS PROSTH W/O ECP Left 10/18/2018    Procedure: EXTRACTION EXTRACAPSULAR CATARACT PHACO INTRAOCULAR LENS (IOL);  Surgeon: Giovanna Guerrero MD;  Location: [facility] MAIN OR;  Service: Ophthalmology    TONSILLECTOMY      TUBAL LIGATION        Family History   Problem Relation Age of Onset    Cancer Mother         breast,kidney,lung (smoker)    Heart disease Father 60        MI    Cancer Sister         liver,pancreatic(smoker,ETOH)    Diabetes Daughter     No Known Problems Sister       Social History     Tobacco Use    Smoking status: Never    Smokeless tobacco: Never   Vaping Use    Vaping status: Never Used   Substance Use Topics    Alcohol use: Yes     Alcohol/week: 20.0 standard drinks of alcohol     Types: 20 Glasses of wine per week     Comment:  \"wine every once in awhile\"    Drug use: No      E-Cigarette/Vaping    E-Cigarette Use Never User       E-Cigarette/Vaping Substances    Nicotine No     THC No     CBD No     Flavoring No     Other No     Unknown No       I have reviewed and agree with the history as documented.       History provided by:  Patient (Daughter)  Head Injury w/unknown LOC  Head/neck injury location: No pain, no headache.  Mechanism of injury comment:  Fall from toilet approximately 1 month ago  Pain details:     Timing:  Unable to specify    Progression:  Worsening  Chronicity:  New  Associated symptoms: no blurred vision, no difficulty breathing, no disorientation, no " double vision, no focal weakness, no headaches, no hearing loss, no loss of consciousness, no memory loss, no nausea, no neck pain, no numbness, no seizures, no tinnitus and no vomiting    Associated symptoms comment:  Speech changes.  Risk factors: aspirin        Review of Systems   Constitutional:  Negative for fever.   HENT:  Negative for hearing loss and tinnitus.    Eyes:  Negative for blurred vision and double vision.   Gastrointestinal:  Negative for nausea and vomiting.   Musculoskeletal:  Negative for neck pain.   Skin:  Negative for rash and wound.   Neurological:  Negative for focal weakness, seizures, loss of consciousness, numbness and headaches.   Psychiatric/Behavioral:  Negative for memory loss.    All other systems reviewed and are negative.          Objective       ED Triage Vitals   Temperature Pulse Blood Pressure Respirations SpO2 Patient Position - Orthostatic VS   11/17/24 1701 11/17/24 1701 11/17/24 1703 11/17/24 1701 11/17/24 1703 11/17/24 1715   97.8 °F (36.6 °C) 61 (!) 188/87 20 98 % Lying      Temp Source Heart Rate Source BP Location FiO2 (%) Pain Score    11/17/24 1701 11/17/24 1701 11/17/24 1715 -- 11/17/24 1715    Oral Monitor Left arm  No Pain      Vitals      Date and Time Temp Pulse SpO2 Resp BP Pain Score FACES Pain Rating User   11/17/24 2101 -- 64 95 % 18 117/58 -- --    11/17/24 2100 -- 62 -- -- 113/61 -- --    11/17/24 2030 -- 53 96 % 18 114/58 -- --    11/17/24 2016 -- 54 97 % 18 121/58 No Pain -- [facility]   11/17/24 1946 -- 55 97 % 18 134/63 No Pain -- [facility]   11/17/24 1923 -- -- -- -- -- No Pain -- VA   11/17/24 1915 -- 55 98 % 18 138/62 No Pain -- [place]   11/17/24 1900 -- 61 97 % 18 116/56 Provider notified. No action to take at this time. No Pain -- FN   11/17/24 1845 -- 57 98 % 18 132/61 No Pain -- FN   11/17/24 1833 -- 66 98 % 18 155/69 No Pain -- FN   11/17/24 1815 -- 65 98 % 20 156/72 No Pain -- FN   11/17/24 1814 -- 70 97 % 19 146/93 No Pain -- FN   11/17/24 1745 -- 63 99  % 18 177/72 -- -- FN   11/17/24 1738 -- -- -- -- -- No Pain -- FN   11/17/24 1734 -- 55 99 % 18 179/80 No Pain -- FN   11/17/24 1715 -- 58 97 % 15 170/77 No Pain -- FN   11/17/24 1703 -- -- 98 % -- 188/87 -- -- SD   11/17/24 1701 97.8 °F (36.6 °C) 61 -- 20 -- -- -- SD            Physical Exam  Constitutional:       Appearance: Normal appearance. She is normal weight.   HENT:      Right Ear: Tympanic membrane and external ear normal.      Left Ear: Tympanic membrane and external ear normal.      Ears:      Comments: No evidence of basilar skull fracture.     Nose: Nose normal.      Mouth/Throat:      Pharynx: Oropharynx is clear.   Eyes:      General: No visual field deficit.     Conjunctiva/sclera: Conjunctivae normal.   Cardiovascular:      Rate and Rhythm: Normal rate.   Pulmonary:      Effort: Pulmonary effort is normal.   Abdominal:      General: There is no distension.      Tenderness: There is no abdominal tenderness.   Musculoskeletal:         General: Normal range of motion.      Cervical back: Normal range of motion and neck supple.   Skin:     General: Skin is warm and dry.      Capillary Refill: Capillary refill takes less than 2 seconds.   Neurological:      General: No focal deficit present.      Mental Status: She is alert.      GCS: GCS eye subscore is 4. GCS verbal subscore is 4. GCS motor subscore is 6.      Cranial Nerves: No cranial nerve deficit or facial asymmetry.      Sensory: No sensory deficit.      Motor: No weakness, tremor, atrophy, seizure activity or pronator drift.      Coordination: Coordination normal. Finger-Nose-Finger Test abnormal.      Deep Tendon Reflexes: Reflexes normal.      Comments: Expressive aphasia.  Patient responds appropriate.   Psychiatric:         Mood and Affect: Mood normal.         Behavior: Behavior normal.         Judgment: Judgment normal.         Results Reviewed       Procedure Component Value Units Date/Time    Ammonia [[id number]]  (Normal) Collected:  11/17/24 1828    Lab Status: Final result Specimen: Blood from Arm, Right Updated: 11/17/24 1845     Ammonia 18 umol/L     Comprehensive metabolic panel [[id number]]  (Abnormal) Collected: 11/17/24 1720    Lab Status: Final result Specimen: Blood from Arm, Right Updated: 11/17/24 1740     Sodium 134 mmol/L      Potassium 3.9 mmol/L      Chloride 94 mmol/L      CO2 28 mmol/L      ANION GAP 12 mmol/L      BUN 10 mg/dL      Creatinine 0.81 mg/dL      Glucose 115 mg/dL      Calcium 9.3 mg/dL      AST 18 U/L      ALT 14 U/L      Alkaline Phosphatase 70 U/L      Total Protein 7.6 g/dL      Albumin 4.4 g/dL      Total Bilirubin 0.74 mg/dL      eGFR 65 ml/min/1.73sq m     Narrative:      National Kidney Disease Foundation guidelines for Chronic Kidney Disease (CKD):     Stage 1 with normal or high GFR (GFR > 90 mL/min/1.73 square meters)    Stage 2 Mild CKD (GFR = 60-89 mL/min/1.73 square meters)    Stage 3A Moderate CKD (GFR = 45-59 mL/min/1.73 square meters)    Stage 3B Moderate CKD (GFR = 30-44 mL/min/1.73 square meters)    Stage 4 Severe CKD (GFR = 15-29 mL/min/1.73 square meters)    Stage 5 End Stage CKD (GFR <15 mL/min/1.73 square meters)  Note: GFR calculation is accurate only with a steady state creatinine    Protime-INR [[id number]]  (Normal) Collected: 11/17/24 1720    Lab Status: Final result Specimen: Blood from Arm, Right Updated: 11/17/24 1734     Protime 13.1 seconds      INR 0.95    Narrative:      INR Therapeutic Range    Indication                                             INR Range      Atrial Fibrillation                                               2.0-3.0  Hypercoagulable State                                    2.0.2.3  Left Ventricular Asist Device                            2.0-3.0  Mechanical Heart Valve                                  -    Aortic(with afib, MI, embolism, HF, LA enlargement,    and/or coagulopathy)                                     2.0-3.0 (2.5-3.5)     Mitral                                                              2.5-3.5  Prosthetic/Bioprosthetic Heart Valve               2.0-3.0  Venous thromboembolism (VTE: VT, PE        2.0-3.0    APTT [[id number]]  (Normal) Collected: 11/17/24 1720    Lab Status: Final result Specimen: Blood from Arm, Right Updated: 11/17/24 1734     PTT 27 seconds     CBC and differential [[id number]]  (Abnormal) Collected: 11/17/24 1720    Lab Status: Final result Specimen: Blood from Arm, Right Updated: 11/17/24 1725     WBC 8.53 Thousand/uL      RBC 4.23 Million/uL      Hemoglobin 14.2 g/dL      Hematocrit 41.9 %      MCV 99 fL      MCH 33.6 pg      MCHC 33.9 g/dL      RDW 12.1 %      MPV 9.1 fL      Platelets 231 Thousands/uL      nRBC 0 /100 WBCs      Segmented % 73 %      Immature Grans % 1 %      Lymphocytes % 19 %      Monocytes % 5 %      Eosinophils Relative 1 %      Basophils Relative 1 %      Absolute Neutrophils 6.30 Thousands/µL      Absolute Immature Grans 0.04 Thousand/uL      Absolute Lymphocytes 1.65 Thousands/µL      Absolute Monocytes 0.44 Thousand/µL      Eosinophils Absolute 0.05 Thousand/µL      Basophils Absolute 0.05 Thousands/µL     UA w Reflex to Microscopic w Reflex to Culture [[id number]]     Lab Status: No result Specimen: Urine             XR chest 2 views    (Results Pending)       Procedures    ED Medication and Procedure Management   Prior to Admission Medications   Prescriptions Last Dose Informant Patient Reported? Taking?   Biotin 5000 MCG TABS  Self Yes No   Sig: Take by mouth every morning   Cholecalciferol (VITAMIN D) 2000 units CAPS  Self Yes No   Sig: Take by mouth 3 (three) times a week   GLUCOSAMINE HCL PO  Self Yes No   Sig: Take 1,200 mg by mouth daily    Omega-3 Fatty Acids (FISH OIL) 1,000 mg  Self Yes No   Sig: Take 1,000 mg by mouth daily     acetaminophen (TYLENOL) 325 mg tablet   No No   Sig: Take 2 tablets (650 mg total) by mouth every 6 (six) hours as needed for mild pain   amLODIPine (NORVASC) 2.5 mg tablet    No No   Sig: Take 1 tablet (2.5 mg total) by mouth daily   clobetasol (TEMOVATE) 0.05 % cream   No No   Sig: Apply topically 2 (two) times a day   cyanocobalamin (VITAMIN B-12) 500 MCG tablet  Self No No   Sig: Take 1 tablet (500 mcg total) by mouth daily   escitalopram (LEXAPRO) 10 mg tablet   No No   Sig: TAKE 1 TABLET BY MOUTH DAILY   furosemide (LASIX) 40 mg tablet   No No   Sig: Take 1 tablet (40 mg total) by mouth daily   lisinopril (ZESTRIL) 10 mg tablet   No No   Sig: Take 1 tablet (10 mg total) by mouth daily   meclizine (ANTIVERT) 12.5 MG tablet   No No   Sig: Take 1 tablet (12.5 mg total) by mouth every 8 (eight) hours as needed for dizziness   memantine (NAMENDA) 10 mg tablet   No No   Sig: Take 1 tablet (10 mg total) by mouth 2 (two) times a day   metoprolol tartrate (LOPRESSOR) 50 mg tablet   No No   Sig: Take 1 tablet (50 mg total) by mouth 2 (two) times a day   niacin 500 mg tablet  Self Yes No   Sig: Take 500 mg by mouth daily with breakfast   potassium chloride (Klor-Con M20) 20 mEq tablet   No No   Sig: Take 1 tablet (20 mEq total) by mouth daily   triamcinolone (KENALOG) 0.1 % cream   No No   Sig: APPLY TO AFFECTED AREA TWICE A DAY   zolpidem (AMBIEN) 5 mg tablet   No No   Sig: TAKE 1 TABLET BY MOUTH DAILY AT BEDTIME AS NEEDED FOR SLEEP      Facility-Administered Medications: None     Patient's Medications   Discharge Prescriptions    No medications on file     Outpatient Discharge Orders   Transfer to other facility     ED SEPSIS DOCUMENTATION   Time reflects when diagnosis was documented in both MDM as applicable and the Disposition within this note       Time User Action Codes Description Comment    11/17/2024  5:14 PM Alvaro Alford Add [S06.5XAA] Subdural hematoma (HCC)                  Alvaro Alford PA-C  11/17/24 2194

## 2024-11-17 NOTE — EMTALA/ACUTE CARE TRANSFER
[facility] EMERGENCY DEPARTMENT  [address] [place] [id number]  Dept: [phone]      EMTALA TRANSFER CONSENT    NAME Khushbu Gandara                                         1940                              MRN [id number]    I have been informed of my rights regarding examination, treatment, and transfer   by Dr. Alex Becerril MD    Benefits: Specialized equipment and/or services available at the receiving facility (Include comment)________________________    Risks: Potential for delay in receiving treatment, Potential deterioration of medical condition, Loss of IV, Possible worsening of condition or death during transfer      Transfer Request   I acknowledge that my medical condition has been evaluated and explained to me by the emergency department physician or other qualified medical person and/or my attending physician who has recommended and offered to me further medical examination and treatment. I understand the Hospital's obligation with respect to the treatment and stabilization of my emergency medical condition. I nevertheless request to be transferred. I release the Hospital, the doctor, and any other persons caring for me from all responsibility or liability for any injury or ill effects that may result from my transfer and agree to accept all responsibility for the consequences of my choice to transfer, rather than receive stabilizing treatment at the Hospital. I understand that because the transfer is my request, my insurance may not provide reimbursement for the services.  The Hospital will assist and direct me and my family in how to make arrangements for transfer, but the hospital is not liable for any fees charged by the transport service.  In spite of this understanding, I refuse to consent to further medical examination and treatment which has been offered to me, and request transfer to Accepting Facility Name, City & State : [facility]  [facility]. I authorize the performance of emergency medical procedures and treatments upon me in both transit and upon arrival at the receiving facility.  Additionally, I authorize the release of any and all medical records to the receiving facility and request they be transported with me, if possible.    I authorize the performance of emergency medical procedures and treatments upon me in both transit and upon arrival at the receiving facility.  Additionally, I authorize the release of any and all medical records to the receiving facility and request they be transported with me, if possible.  I understand that the safest mode of transportation during a medical emergency is an ambulance and that the Hospital advocates the use of this mode of transport. Risks of traveling to the receiving facility by car, including absence of medical control, life sustaining equipment, such as oxygen, and medical personnel has been explained to me and I fully understand them.    (CASEY CORRECT BOX BELOW)  [  ]  I consent to the stated transfer and to be transported by ambulance/helicopter.  [  ]  I consent to the stated transfer, but refuse transportation by ambulance and accept full responsibility for my transportation by car.  I understand the risks of non-ambulance transfers and I exonerate the Hospital and its staff from any deterioration in my condition that results from this refusal.    X___________________________________________    DATE  24  TIME________  Signature of patient or legally responsible individual signing on patient behalf           RELATIONSHIP TO PATIENT_________________________          Provider Certification    NAME Khushbu Gandara                                         1940                              MRN [id number]    A medical screening exam was performed on the above named patient.  Based on the examination:    Condition Necessitating Transfer The encounter diagnosis was Subdural hematoma  ([facility]).    Patient Condition:  (Stable.  Neurologically intact outside of noted in chart.  Doubt rapid deterioration of condition.)    Reason for Transfer: Level of Care needed not available at this facility (Critical care and neurosurgery)    Transfer Requirements: Facility [facility]   Space available and qualified personnel available for treatment as acknowledged by PAC center  Agreed to accept transfer and to provide appropriate medical treatment as acknowledged by       Dr. Chance  Appropriate medical records of the examination and treatment of the patient are provided at the time of transfer   STAFF INITIAL WHEN COMPLETED _______  Transfer will be performed by qualified personnel from    and appropriate transfer equipment as required, including the use of necessary and appropriate life support measures.    Provider Certification: I have examined the patient and explained the following risks and benefits of being transferred/refusing transfer to the patient/family:  General risk, such as traffic hazards, adverse weather conditions, rough terrain or turbulence, possible failure of equipment (including vehicle or aircraft), or consequences of actions of persons outside the control of the transport personnel, Risk of worsening condition, Unanticipated needs of medical equipment and personnel during transport      Based on these reasonable risks and benefits to the patient and/or the unborn child(yessy), and based upon the information available at the time of the patient’s examination, I certify that the medical benefits reasonably to be expected from the provision of appropriate medical treatments at another medical facility outweigh the increasing risks, if any, to the individual’s medical condition, and in the case of labor to the unborn child, from effecting the transfer.    X____________________________________________ DATE 11/17/24        TIME_______      ORIGINAL - SEND TO MEDICAL  RECORDS   COPY - SEND WITH PATIENT DURING TRANSFER

## 2024-11-18 ENCOUNTER — APPOINTMENT (INPATIENT)
Dept: RADIOLOGY | Facility: HOSPITAL | Age: 84
DRG: 083 | End: 2024-11-18
Payer: COMMERCIAL

## 2024-11-18 PROBLEM — S06.5XAA SUBDURAL HEMATOMA (HCC): Status: ACTIVE | Noted: 2024-11-18

## 2024-11-18 PROBLEM — F03.90 DEMENTIA (HCC): Status: ACTIVE | Noted: 2024-11-18

## 2024-11-18 PROBLEM — Z13.39 ENCOUNTER FOR DELIRIUM ELDERLY AT RISK (DEAR) SCREENING: Status: ACTIVE | Noted: 2024-11-18

## 2024-11-18 PROBLEM — G47.00 INSOMNIA: Status: ACTIVE | Noted: 2024-11-18

## 2024-11-18 PROBLEM — F32.A DEPRESSION: Status: ACTIVE | Noted: 2024-11-18

## 2024-11-18 PROBLEM — R54 FRAILTY SYNDROME IN GERIATRIC PATIENT: Status: ACTIVE | Noted: 2024-11-18

## 2024-11-18 PROBLEM — F10.10 ALCOHOL ABUSE: Status: ACTIVE | Noted: 2024-11-18

## 2024-11-18 LAB
ABO GROUP BLD: NORMAL
ABO GROUP BLD: NORMAL
ANION GAP SERPL CALCULATED.3IONS-SCNC: 10 MMOL/L (ref 4–13)
ATRIAL RATE: 111 BPM
ATRIAL RATE: 83 BPM
BASOPHILS # BLD AUTO: 0.04 THOUSANDS/ÂΜL (ref 0–0.1)
BASOPHILS NFR BLD AUTO: 1 % (ref 0–1)
BLD GP AB SCN SERPL QL: NEGATIVE
BUN SERPL-MCNC: 12 MG/DL (ref 5–25)
CALCIUM SERPL-MCNC: 8.6 MG/DL (ref 8.4–10.2)
CHLORIDE SERPL-SCNC: 96 MMOL/L (ref 96–108)
CO2 SERPL-SCNC: 29 MMOL/L (ref 21–32)
CREAT SERPL-MCNC: 0.86 MG/DL (ref 0.6–1.3)
EOSINOPHIL # BLD AUTO: 0.1 THOUSAND/ÂΜL (ref 0–0.61)
EOSINOPHIL NFR BLD AUTO: 1 % (ref 0–6)
ERYTHROCYTE [DISTWIDTH] IN BLOOD BY AUTOMATED COUNT: 12.4 % (ref 11.6–15.1)
FOLATE SERPL-MCNC: >22.3 NG/ML
GFR SERPL CREATININE-BSD FRML MDRD: 61 ML/MIN/1.73SQ M
GLUCOSE SERPL-MCNC: 120 MG/DL (ref 65–140)
HCT VFR BLD AUTO: 34.7 % (ref 34.8–46.1)
HGB BLD-MCNC: 12 G/DL (ref 11.5–15.4)
IMM GRANULOCYTES # BLD AUTO: 0.02 THOUSAND/UL (ref 0–0.2)
IMM GRANULOCYTES NFR BLD AUTO: 0 % (ref 0–2)
INR PPP: 0.92 (ref 0.85–1.19)
LYMPHOCYTES # BLD AUTO: 2.07 THOUSANDS/ÂΜL (ref 0.6–4.47)
LYMPHOCYTES NFR BLD AUTO: 26 % (ref 14–44)
MAGNESIUM SERPL-MCNC: 1.4 MG/DL (ref 1.9–2.7)
MCH RBC QN AUTO: 34 PG (ref 26.8–34.3)
MCHC RBC AUTO-ENTMCNC: 34.6 G/DL (ref 31.4–37.4)
MCV RBC AUTO: 98 FL (ref 82–98)
MONOCYTES # BLD AUTO: 0.56 THOUSAND/ÂΜL (ref 0.17–1.22)
MONOCYTES NFR BLD AUTO: 7 % (ref 4–12)
NEUTROPHILS # BLD AUTO: 5.2 THOUSANDS/ÂΜL (ref 1.85–7.62)
NEUTS SEG NFR BLD AUTO: 65 % (ref 43–75)
NRBC BLD AUTO-RTO: 0 /100 WBCS
P AXIS: -11 DEGREES
P AXIS: 81 DEGREES
PHOSPHATE SERPL-MCNC: 3.7 MG/DL (ref 2.3–4.1)
PLATELET # BLD AUTO: 196 THOUSANDS/UL (ref 149–390)
PMV BLD AUTO: 9.2 FL (ref 8.9–12.7)
POTASSIUM SERPL-SCNC: 3.3 MMOL/L (ref 3.5–5.3)
PR INTERVAL: 162 MS
PROTHROMBIN TIME: 12.7 SECONDS (ref 12.3–15)
QRS AXIS: -10 DEGREES
QRS AXIS: -7 DEGREES
QRSD INTERVAL: 86 MS
QRSD INTERVAL: 90 MS
QT INTERVAL: 418 MS
QT INTERVAL: 452 MS
QTC INTERVAL: 413 MS
QTC INTERVAL: 480 MS
RBC # BLD AUTO: 3.53 MILLION/UL (ref 3.81–5.12)
RH BLD: POSITIVE
RH BLD: POSITIVE
SODIUM SERPL-SCNC: 135 MMOL/L (ref 135–147)
SPECIMEN EXPIRATION DATE: NORMAL
T WAVE AXIS: -18 DEGREES
T WAVE AXIS: 23 DEGREES
VENTRICULAR RATE: 59 BPM
VENTRICULAR RATE: 68 BPM
VIT B12 SERPL-MCNC: 384 PG/ML (ref 180–914)
WBC # BLD AUTO: 7.99 THOUSAND/UL (ref 4.31–10.16)

## 2024-11-18 PROCEDURE — 99223 1ST HOSP IP/OBS HIGH 75: CPT | Performed by: NURSE PRACTITIONER

## 2024-11-18 PROCEDURE — 85025 COMPLETE CBC W/AUTO DIFF WBC: CPT | Performed by: STUDENT IN AN ORGANIZED HEALTH CARE EDUCATION/TRAINING PROGRAM

## 2024-11-18 PROCEDURE — 85610 PROTHROMBIN TIME: CPT | Performed by: STUDENT IN AN ORGANIZED HEALTH CARE EDUCATION/TRAINING PROGRAM

## 2024-11-18 PROCEDURE — 99222 1ST HOSP IP/OBS MODERATE 55: CPT | Performed by: NURSE PRACTITIONER

## 2024-11-18 PROCEDURE — 86900 BLOOD TYPING SEROLOGIC ABO: CPT

## 2024-11-18 PROCEDURE — 83735 ASSAY OF MAGNESIUM: CPT | Performed by: STUDENT IN AN ORGANIZED HEALTH CARE EDUCATION/TRAINING PROGRAM

## 2024-11-18 PROCEDURE — 93010 ELECTROCARDIOGRAM REPORT: CPT | Performed by: INTERNAL MEDICINE

## 2024-11-18 PROCEDURE — 82746 ASSAY OF FOLIC ACID SERUM: CPT

## 2024-11-18 PROCEDURE — 80048 BASIC METABOLIC PNL TOTAL CA: CPT | Performed by: STUDENT IN AN ORGANIZED HEALTH CARE EDUCATION/TRAINING PROGRAM

## 2024-11-18 PROCEDURE — NC001 PR NO CHARGE

## 2024-11-18 PROCEDURE — 70450 CT HEAD/BRAIN W/O DYE: CPT

## 2024-11-18 PROCEDURE — 84100 ASSAY OF PHOSPHORUS: CPT | Performed by: STUDENT IN AN ORGANIZED HEALTH CARE EDUCATION/TRAINING PROGRAM

## 2024-11-18 PROCEDURE — 86850 RBC ANTIBODY SCREEN: CPT

## 2024-11-18 PROCEDURE — 86901 BLOOD TYPING SEROLOGIC RH(D): CPT

## 2024-11-18 PROCEDURE — 36415 COLL VENOUS BLD VENIPUNCTURE: CPT

## 2024-11-18 PROCEDURE — 82607 VITAMIN B-12: CPT

## 2024-11-18 PROCEDURE — 99222 1ST HOSP IP/OBS MODERATE 55: CPT | Performed by: SURGERY

## 2024-11-18 RX ORDER — ACETAMINOPHEN 325 MG/1
975 TABLET ORAL EVERY 8 HOURS SCHEDULED
Status: DISCONTINUED | OUTPATIENT
Start: 2024-11-18 | End: 2024-11-18

## 2024-11-18 RX ORDER — CHLORHEXIDINE GLUCONATE ORAL RINSE 1.2 MG/ML
15 SOLUTION DENTAL EVERY 12 HOURS SCHEDULED
Status: DISCONTINUED | OUTPATIENT
Start: 2024-11-18 | End: 2024-11-20 | Stop reason: HOSPADM

## 2024-11-18 RX ORDER — POTASSIUM CHLORIDE 1500 MG/1
40 TABLET, EXTENDED RELEASE ORAL ONCE
Status: COMPLETED | OUTPATIENT
Start: 2024-11-18 | End: 2024-11-18

## 2024-11-18 RX ORDER — LABETALOL HYDROCHLORIDE 5 MG/ML
10 INJECTION, SOLUTION INTRAVENOUS EVERY 6 HOURS PRN
Status: DISCONTINUED | OUTPATIENT
Start: 2024-11-18 | End: 2024-11-20 | Stop reason: HOSPADM

## 2024-11-18 RX ORDER — SODIUM CHLORIDE, SODIUM LACTATE, POTASSIUM CHLORIDE, CALCIUM CHLORIDE 600; 310; 30; 20 MG/100ML; MG/100ML; MG/100ML; MG/100ML
75 INJECTION, SOLUTION INTRAVENOUS CONTINUOUS
Status: DISCONTINUED | OUTPATIENT
Start: 2024-11-19 | End: 2024-11-19

## 2024-11-18 RX ORDER — SODIUM CHLORIDE, SODIUM LACTATE, POTASSIUM CHLORIDE, CALCIUM CHLORIDE 600; 310; 30; 20 MG/100ML; MG/100ML; MG/100ML; MG/100ML
75 INJECTION, SOLUTION INTRAVENOUS CONTINUOUS
Status: DISCONTINUED | OUTPATIENT
Start: 2024-11-18 | End: 2024-11-18

## 2024-11-18 RX ORDER — LISINOPRIL 10 MG/1
10 TABLET ORAL DAILY
Status: DISCONTINUED | OUTPATIENT
Start: 2024-11-18 | End: 2024-11-18

## 2024-11-18 RX ORDER — POTASSIUM CHLORIDE 14.9 MG/ML
20 INJECTION INTRAVENOUS
Status: COMPLETED | OUTPATIENT
Start: 2024-11-18 | End: 2024-11-18

## 2024-11-18 RX ORDER — AMOXICILLIN 250 MG
2 CAPSULE ORAL 2 TIMES DAILY
Status: DISCONTINUED | OUTPATIENT
Start: 2024-11-18 | End: 2024-11-20 | Stop reason: HOSPADM

## 2024-11-18 RX ORDER — HYDROMORPHONE HCL IN WATER/PF 6 MG/30 ML
0.2 PATIENT CONTROLLED ANALGESIA SYRINGE INTRAVENOUS EVERY 2 HOUR PRN
Refills: 0 | Status: DISCONTINUED | OUTPATIENT
Start: 2024-11-18 | End: 2024-11-18

## 2024-11-18 RX ORDER — METOPROLOL TARTRATE 50 MG
50 TABLET ORAL 2 TIMES DAILY
Status: DISCONTINUED | OUTPATIENT
Start: 2024-11-18 | End: 2024-11-20 | Stop reason: HOSPADM

## 2024-11-18 RX ORDER — MEMANTINE HYDROCHLORIDE 10 MG/1
10 TABLET ORAL 2 TIMES DAILY
Status: DISCONTINUED | OUTPATIENT
Start: 2024-11-18 | End: 2024-11-20 | Stop reason: HOSPADM

## 2024-11-18 RX ORDER — AMLODIPINE BESYLATE 2.5 MG/1
2.5 TABLET ORAL DAILY
Status: DISCONTINUED | OUTPATIENT
Start: 2024-11-18 | End: 2024-11-20 | Stop reason: HOSPADM

## 2024-11-18 RX ORDER — ACETAMINOPHEN 325 MG/1
650 TABLET ORAL EVERY 6 HOURS PRN
Status: DISCONTINUED | OUTPATIENT
Start: 2024-11-18 | End: 2024-11-20 | Stop reason: HOSPADM

## 2024-11-18 RX ORDER — ESCITALOPRAM OXALATE 10 MG/1
10 TABLET ORAL DAILY
Status: DISCONTINUED | OUTPATIENT
Start: 2024-11-18 | End: 2024-11-20 | Stop reason: HOSPADM

## 2024-11-18 RX ORDER — POLYETHYLENE GLYCOL 3350 17 G/17G
17 POWDER, FOR SOLUTION ORAL DAILY
Status: DISCONTINUED | OUTPATIENT
Start: 2024-11-18 | End: 2024-11-20 | Stop reason: HOSPADM

## 2024-11-18 RX ORDER — HYDRALAZINE HYDROCHLORIDE 20 MG/ML
5 INJECTION INTRAMUSCULAR; INTRAVENOUS EVERY 6 HOURS PRN
Status: DISCONTINUED | OUTPATIENT
Start: 2024-11-18 | End: 2024-11-20 | Stop reason: HOSPADM

## 2024-11-18 RX ORDER — OXYCODONE HYDROCHLORIDE 5 MG/1
5 TABLET ORAL EVERY 4 HOURS PRN
Refills: 0 | Status: DISCONTINUED | OUTPATIENT
Start: 2024-11-18 | End: 2024-11-20 | Stop reason: HOSPADM

## 2024-11-18 RX ORDER — SODIUM CHLORIDE, SODIUM LACTATE, POTASSIUM CHLORIDE, CALCIUM CHLORIDE 600; 310; 30; 20 MG/100ML; MG/100ML; MG/100ML; MG/100ML
75 INJECTION, SOLUTION INTRAVENOUS CONTINUOUS
Status: DISCONTINUED | OUTPATIENT
Start: 2024-11-19 | End: 2024-11-18

## 2024-11-18 RX ORDER — FUROSEMIDE 40 MG/1
40 TABLET ORAL DAILY
Status: DISCONTINUED | OUTPATIENT
Start: 2024-11-18 | End: 2024-11-20 | Stop reason: HOSPADM

## 2024-11-18 RX ORDER — MAGNESIUM SULFATE HEPTAHYDRATE 40 MG/ML
4 INJECTION, SOLUTION INTRAVENOUS ONCE
Status: COMPLETED | OUTPATIENT
Start: 2024-11-18 | End: 2024-11-18

## 2024-11-18 RX ADMIN — SODIUM CHLORIDE, SODIUM LACTATE, POTASSIUM CHLORIDE, AND CALCIUM CHLORIDE 75 ML/HR: .6; .31; .03; .02 INJECTION, SOLUTION INTRAVENOUS at 03:01

## 2024-11-18 RX ADMIN — MEMANTINE 10 MG: 10 TABLET ORAL at 18:04

## 2024-11-18 RX ADMIN — MAGNESIUM SULFATE HEPTAHYDRATE 4 G: 40 INJECTION, SOLUTION INTRAVENOUS at 07:59

## 2024-11-18 RX ADMIN — MEMANTINE 10 MG: 10 TABLET ORAL at 10:25

## 2024-11-18 RX ADMIN — LEVETIRACETAM 500 MG: 500 TABLET, FILM COATED ORAL at 10:22

## 2024-11-18 RX ADMIN — FOLIC ACID 1 MG: 5 INJECTION, SOLUTION INTRAMUSCULAR; INTRAVENOUS; SUBCUTANEOUS at 15:56

## 2024-11-18 RX ADMIN — POTASSIUM CHLORIDE 40 MEQ: 1500 TABLET, EXTENDED RELEASE ORAL at 07:51

## 2024-11-18 RX ADMIN — SODIUM CHLORIDE, SODIUM LACTATE, POTASSIUM CHLORIDE, AND CALCIUM CHLORIDE 75 ML/HR: .6; .31; .03; .02 INJECTION, SOLUTION INTRAVENOUS at 23:17

## 2024-11-18 RX ADMIN — CHLORHEXIDINE GLUCONATE 0.12% ORAL RINSE 15 ML: 1.2 LIQUID ORAL at 22:20

## 2024-11-18 RX ADMIN — THIAMINE HYDROCHLORIDE 500 MG: 100 INJECTION, SOLUTION INTRAMUSCULAR; INTRAVENOUS at 16:05

## 2024-11-18 RX ADMIN — CHLORHEXIDINE GLUCONATE 0.12% ORAL RINSE 15 ML: 1.2 LIQUID ORAL at 03:01

## 2024-11-18 RX ADMIN — CHLORHEXIDINE GLUCONATE 0.12% ORAL RINSE 15 ML: 1.2 LIQUID ORAL at 10:22

## 2024-11-18 RX ADMIN — METOPROLOL TARTRATE 50 MG: 50 TABLET, FILM COATED ORAL at 10:25

## 2024-11-18 RX ADMIN — SENNOSIDES AND DOCUSATE SODIUM 2 TABLET: 50; 8.6 TABLET ORAL at 10:24

## 2024-11-18 RX ADMIN — LEVETIRACETAM 500 MG: 500 TABLET, FILM COATED ORAL at 22:18

## 2024-11-18 RX ADMIN — FUROSEMIDE 40 MG: 40 TABLET ORAL at 10:25

## 2024-11-18 RX ADMIN — LEVETIRACETAM 500 MG: 500 TABLET, FILM COATED ORAL at 00:01

## 2024-11-18 RX ADMIN — Medication 1 TABLET: at 10:24

## 2024-11-18 RX ADMIN — POTASSIUM CHLORIDE 20 MEQ: 14.9 INJECTION, SOLUTION INTRAVENOUS at 07:42

## 2024-11-18 RX ADMIN — ESCITALOPRAM OXALATE 10 MG: 10 TABLET ORAL at 10:24

## 2024-11-18 RX ADMIN — ACETAMINOPHEN 975 MG: 325 TABLET, FILM COATED ORAL at 02:03

## 2024-11-18 RX ADMIN — AMLODIPINE BESYLATE 2.5 MG: 2.5 TABLET ORAL at 10:24

## 2024-11-18 RX ADMIN — POTASSIUM CHLORIDE 20 MEQ: 14.9 INJECTION, SOLUTION INTRAVENOUS at 10:22

## 2024-11-18 RX ADMIN — SENNOSIDES AND DOCUSATE SODIUM 2 TABLET: 50; 8.6 TABLET ORAL at 18:04

## 2024-11-18 RX ADMIN — THIAMINE HYDROCHLORIDE 500 MG: 100 INJECTION, SOLUTION INTRAMUSCULAR; INTRAVENOUS at 23:17

## 2024-11-18 RX ADMIN — METOPROLOL TARTRATE 50 MG: 50 TABLET, FILM COATED ORAL at 22:18

## 2024-11-18 RX ADMIN — HYDRALAZINE HYDROCHLORIDE 5 MG: 20 INJECTION INTRAMUSCULAR; INTRAVENOUS at 19:31

## 2024-11-18 NOTE — ED NOTES
pickup is 2230    going to [place] for trauma    Dr Cottrell accepting     Number for report is [phone].      Marta RAMÍREZ RN  11/17/24 2119

## 2024-11-18 NOTE — UTILIZATION REVIEW
Initial Clinical Review    Admission: Date/Time/Statement:   Admission Orders (From admission, onward)       Ordered        11/17/24 2344  Inpatient Admission  Once                          Orders Placed This Encounter   Procedures    Inpatient Admission     Standing Status:   Standing     Number of Occurrences:   1     Level of Care:   Level 1 Stepdown [13]     Estimated length of stay:   More than 2 Midnights     Certification:   I certify that inpatient services are medically necessary for this patient for a duration of greater than two midnights. See H&P and MD Progress Notes for additional information about the patient's course of treatment.     ED Arrival Information       Expected   11/17/2024     Arrival   11/17/2024 23:08    Acuity   Emergent              Means of arrival   Ambulance    Escorted by   [facility] ([place])    Service   Trauma    Admission type   Emergency              Arrival complaint   Subdural hematoma             Chief Complaint   Patient presents with    Trauma     See trauma doc, tx from [place]       Initial Presentation: 84 y.o. female to [facility] Admitted Inpatient Lvl 1 step down Unit with Acute on chronic SDH after a fall several weeks ago.  Pt takes asa, got DDAVP. Had an op MRI showing acute worsening of her known SDH. Denies falls or there trauma. Neuro exam non focal aside from baseline mild expressive aphasia and difficulty following commands, Aox2, GCS15 which apparently is baseline per . , CL 94.  PMHx: dementia, alcohol abuse.  Plan: Q1H neuro checks. Nsx consulted. Keppra q12h for now. Tylenol and ox prn for pain. Repeat CTH in am. Continue pta po meds, holding lisinopril. IVFs. Monitor and replace electrolytes. I/Os. Npo currently. PT/OT jemma.     Date: 11/18   Day 2:   Nsx consult -- A: Mixed density left frontal parietal convexity SDH   Plan: continue Q1H neuro checks. STAT CT head with any neurological decline including drop GCS of 2pts within 1 hr. SBP  <160mmHg. Seizure ppx. Hold all AC/AP. Did briefly discuss possible left MMA embolization with pt and family. Will make patient NPO at mdn.    Date: 11/19  Day 3: Has surpassed a 2nd midnight with active treatments and services for tx of SDH  Pt is OOB in recliner chair. She is oriented x 1, calm and cooperative. Significant word finding on exam. No signs of withdrawal, tremors, periods of confusion, appears to be baseline. Nsx rec no further interventions, signed off. Continue CIWA protocol for ETOH w/d. Thiamine and folic acid. Continue Keppra, Namenda.  PT/OT evals for d/c disposition. Continue to monitor neuro exams, po meds, po meds, supportive care. [facility] [place]      ED Treatment-Medication Administration from 11/17/2024 2101 to 11/18/2024 0505         Date/Time Order Dose Route Action     11/18/2024 0001 levETIRAcetam (KEPPRA) tablet 500 mg 500 mg Oral Given     11/18/2024 0203 acetaminophen (TYLENOL) tablet 975 mg 975 mg Oral Given     11/18/2024 0428 oxyCODONE (ROXICODONE) IR tablet 5 mg -- Oral See Alternative     11/18/2024 0301 chlorhexidine (PERIDEX) 0.12 % oral rinse 15 mL 15 mL Mouth/Throat Given     11/18/2024 0301 lactated ringers infusion 75 mL/hr Intravenous New Bag         Scheduled Medications:  amLODIPine, 2.5 mg, Oral, Daily  chlorhexidine, 15 mL, Mouth/Throat, Q12H MELISSA  escitalopram, 10 mg, Oral, Daily  folic acid 1 mg in sodium chloride 0.9 % 50 mL IVPB, 1 mg, Intravenous, Daily  furosemide, 40 mg, Oral, Daily  levETIRAcetam, 500 mg, Oral, Q12H MELISSA  memantine, 10 mg, Oral, BID  metoprolol tartrate, 50 mg, Oral, BID  multivitamin-minerals, 1 tablet, Oral, Daily  polyethylene glycol, 17 g, Oral, Daily  senna-docusate sodium, 2 tablet, Oral, BID  thiamine, 500 mg, Intravenous, TID   Followed by  [START ON 11/21/2024] thiamine, 250 mg, Intravenous, Daily      Continuous IV Infusions:       PRN Meds:  acetaminophen, 650 mg, Oral, Q6H PRN  hydrALAZINE, 5 mg, Intravenous, Q6H PRN  labetalol, 10 mg,  Intravenous, Q6H PRN  oxyCODONE, 2.5 mg, Oral, Q4H PRN   Or  oxyCODONE, 5 mg, Oral, Q4H PRN      ED Triage Vitals [11/17/24 2315]   Temperature Pulse Respirations Blood Pressure SpO2 Pain Score   98.2 °F (36.8 °C) 79 18 122/64 98 % No Pain     Weight (last 2 days)       Date/Time Weight    11/19/24 0600 70.7 (155.87)    11/18/24 0557 69.1 (152.34)    11/18/24 0526 69.1 (152.34)            Vital Signs (last 3 days)       Date/Time Temp Pulse Resp BP MAP (mmHg) SpO2 O2 Device Patient Position - Orthostatic VS Carisa Coma Scale Score CIWA-Ar Total Pain    11/19/24 10:22:31 98 °F (36.7 °C) 63 18 126/66 86 98 % -- -- -- -- --    11/19/24 1000 -- -- -- -- -- -- -- -- 14 -- --    11/19/24 0901 -- -- -- -- -- -- -- -- -- -- No Pain    11/19/24 0900 -- -- -- -- -- -- -- -- 14 -- No Pain    11/19/24 0800 -- -- -- -- -- -- -- -- 14 -- --    11/19/24 07:23:27 98.2 °F (36.8 °C) 60 -- 175/82 113 97 % -- -- -- -- --    11/19/24 07:23:07 98.2 °F (36.8 °C) 61 -- 175/82 113 96 % -- -- -- -- --    11/19/24 07:21:06 98.2 °F (36.8 °C) 64 18 183/84 117 97 % -- -- -- -- No Pain    11/19/24 0702 -- -- -- -- -- -- -- -- 14 -- --    11/19/24 0700 -- -- -- -- -- -- -- -- -- 0 --    11/19/24 0615 -- -- -- -- -- -- -- -- 14 -- --    11/19/24 0515 -- -- -- -- -- -- -- -- 14 -- --    11/19/24 0430 -- -- -- -- -- -- -- -- 14 -- --    11/19/24 04:27:52 98.4 °F (36.9 °C) 54 16 140/76 97 96 % -- -- -- -- --    11/19/24 0330 -- -- -- -- -- -- -- -- 14 -- --    11/19/24 0300 -- -- -- -- -- -- -- -- -- 0 --    11/19/24 0234 -- -- -- -- -- -- -- -- 14 -- --    11/19/24 0131 -- -- -- -- -- -- -- -- 14 -- --    11/19/24 0028 -- -- -- -- -- -- -- -- 14 -- --    11/18/24 2310 -- -- -- -- -- -- -- -- 14 -- --    11/18/24 2300 -- -- -- -- -- -- -- -- -- -- No Pain    11/18/24 22:17:26 -- 69 -- 141/77 98 96 % -- -- -- 3 --    11/18/24 2200 -- -- -- -- -- -- -- -- 14 -- --    11/18/24 2100 -- -- -- -- -- -- -- -- 14 -- --    11/18/24 2000 -- -- -- -- -- -- --  -- 14 -- --    11/18/24 1931 -- -- -- 175/79 -- -- -- -- -- -- --    11/18/24 1900 98.3 °F (36.8 °C) -- -- -- -- -- -- -- -- -- --    11/18/24 1722 -- 65 18 159/72 103 97 % -- -- -- -- --    11/18/24 1610 -- 93 25 145/70 100 96 % -- -- -- -- --    11/18/24 1600 -- 66 25 175/72 104 96 % None (Room air) -- 14 -- --    11/18/24 1514 97.8 °F (36.6 °C) -- -- -- -- -- -- -- -- -- --    11/18/24 1500 -- 61 25 153/70 100 94 % -- -- 14 -- --    11/18/24 1400 -- 55 24 141/72 100 -- -- -- 14 -- --    11/18/24 1342 -- -- -- -- -- 94 % None (Room air) -- -- -- --    11/18/24 1331 -- 63 -- 159/84 -- -- -- -- -- 3 --    11/18/24 1300 -- 72 27 186/87 125 95 % -- -- 14 -- --    11/18/24 1200 -- 67 33 152/75 108 97 % None (Room air) -- 14 -- No Pain    11/18/24 1105 97.5 °F (36.4 °C) 59 33 140/64 92 99 % None (Room air) Lying -- -- --    11/18/24 1100 -- -- -- -- -- -- -- -- 14 -- --    11/18/24 1000 -- 67 36 144/58 84 98 % -- -- 14 -- --    11/18/24 0900 -- 60 25 -- -- 97 % -- -- 14 -- --    11/18/24 0840 -- 56 45 161/67 96 97 % -- -- -- -- --    11/18/24 0800 -- 58 39 -- -- 100 % -- -- 14 -- No Pain    11/18/24 0715 97.5 °F (36.4 °C) 66 24 122/56 81 98 % None (Room air) Lying -- -- --    11/18/24 0700 -- -- -- -- -- -- -- -- 14 -- --    11/18/24 0600 -- -- -- -- -- -- -- -- 14 -- --    11/18/24 0526 98.1 °F (36.7 °C) 52 20 124/60 -- -- -- -- -- -- --    11/18/24 0509 98.1 °F (36.7 °C) -- -- -- -- -- -- -- -- -- --    11/18/24 0500 -- -- -- -- -- -- -- -- 14 -- No Pain    11/18/24 0428 -- -- -- -- -- -- -- -- 14 -- --    11/18/24 0400 -- 60 14 112/58 76 96 % None (Room air) Lying -- -- --    11/18/24 0300 -- 58 13 128/56 80 94 % None (Room air) Lying 14 -- 7    11/18/24 0203 -- -- -- -- -- -- -- -- -- -- 5    11/18/24 0200 -- 62 18 107/53 77 95 % None (Room air) Lying 14 -- 5    11/18/24 0130 -- -- -- -- -- -- -- -- 14 -- 6    11/18/24 0100 -- 60 18 104/54 76 95 % None (Room air) Lying -- -- --    11/18/24 0000 -- 62 18 113/67 85 96  % None (Room air) Lying 14 -- No Pain    11/17/24 2326 -- -- -- -- -- -- -- -- 14 -- No Pain    11/17/24 23:15:34 98.2 °F (36.8 °C) 79 18 122/64 -- 98 % None (Room air) Lying 14 -- No Pain           CIWA-Ar Score       Row Name 11/19/24 0700 11/19/24 0300 11/18/24 22:17:26       CIWA-Ar    Nausea and Vomiting 0 0 0    Tactile Disturbances 0 0 0    Tremor 0 0 0    Auditory Disturbances 0 0 0    Paroxysmal Sweats 0 0 0    Visual Disturbances 0 0 0    Anxiety 0 0 0    Headache, Fullness in Head 0 0 0    Agitation 0 0 0    Orientation and Clouding of Sensorium 0  baseline confused 0  baseline confusion 3    CIWA-Ar Total 0 0 3      Row Name 11/18/24 1331             CIWA-Ar    /84      Pulse 63      Nausea and Vomiting 0      Tactile Disturbances 0      Tremor 0      Auditory Disturbances 0      Paroxysmal Sweats 0      Visual Disturbances 0      Anxiety 0      Headache, Fullness in Head 0      Agitation 0      Orientation and Clouding of Sensorium 3      CIWA-Ar Total 3                      Pertinent Labs/Diagnostic Test Results:   Radiology:  CT head wo contrast   Final Interpretation by Vlad Hoang MD (11/18 0836)      Stable left holohemispheric mixed density subdural hematoma with comparable degree of regional mass effect as above. No midline shift. Continued surveillance is advised.        MRI brain 11/17:  Large heterogeneous signal intensity left frontal parietal convexity subdural hematoma/collection, which is predominantly chronic, but has some T1 hyperintense subacute components, and measures at least 1.3 cm in maximal thickness. Mass effect on the left frontal lobe, but no midline shift noted.   No acute infarct. Mild chronic white matter microangiopathic changes.         Results from last 7 days   Lab Units 11/19/24  0438 11/18/24  0524 11/17/24  1720   WBC Thousand/uL 5.99 7.99 8.53   HEMOGLOBIN g/dL 11.7 12.0 14.2   HEMATOCRIT % 34.2* 34.7* 41.9   PLATELETS Thousands/uL 181 196 231   TOTAL  NEUT ABS Thousands/µL  --  5.20 6.30     Results from last 7 days   Lab Units 11/19/24  0438 11/18/24  0524 11/17/24  1720   SODIUM mmol/L 132* 135 134*   POTASSIUM mmol/L 3.4* 3.3* 3.9   CHLORIDE mmol/L 96 96 94*   CO2 mmol/L 29 29 28   ANION GAP mmol/L 7 10 12   BUN mg/dL 10 12 10   CREATININE mg/dL 0.68 0.86 0.81   EGFR ml/min/1.73sq m 79 61 65   CALCIUM mg/dL 8.3* 8.6 9.3   CALCIUM, IONIZED mmol/L 1.11*  --   --    MAGNESIUM mg/dL 1.9 1.4*  --    PHOSPHORUS mg/dL  --  3.7  --      Results from last 7 days   Lab Units 11/17/24  1828 11/17/24  1720   AST U/L  --  18   ALT U/L  --  14   ALK PHOS U/L  --  70   TOTAL PROTEIN g/dL  --  7.6   ALBUMIN g/dL  --  4.4   TOTAL BILIRUBIN mg/dL  --  0.74   AMMONIA umol/L 18  --        Results from last 7 days   Lab Units 11/19/24  0438 11/18/24  0524 11/17/24  1720   GLUCOSE RANDOM mg/dL 104 120 115     Results from last 7 days   Lab Units 11/18/24  0524 11/17/24  1720   PROTIME seconds 12.7 13.1   INR  0.92 0.95   PTT seconds  --  27     Results from last 7 days   Lab Units 11/17/24  2245   CLARITY UA  Clear   COLOR UA  Yellow   SPEC GRAV UA  1.010   PH UA  6.5   GLUCOSE UA mg/dl Negative   KETONES UA mg/dl Negative   BLOOD UA  Negative   PROTEIN UA mg/dl Negative   NITRITE UA  Negative   BILIRUBIN UA  Negative   UROBILINOGEN UA E.U./dl 0.2   LEUKOCYTES UA  Negative         Past Medical History:   Diagnosis Date    Anxiety     Arthritis     Prajapati esophagus     Cataract     Continuous chronic alcoholism (HCC)     GERD (gastroesophageal reflux disease)     Heavy alcohol use     Hyperlipidemia     Hypertension     Insomnia     Palpitations      Present on Admission:   Ambulatory dysfunction   Subdural hematoma (HCC)      Admitting Diagnosis: SDH (subdural hematoma) (HCC) [S06.5XAA]  Unspecified multiple injuries, initial encounter [T07.XXXA]  Age/Sex: 84 y.o. female    Network Utilization Review Department  ATTENTION: Please call with any questions or concerns to [phone]  and carefully listen to the prompts so that you are directed to the right person. All voicemails are confidential.   For Discharge needs, contact Care Management DC Support Team at [phone] opt. 2  Send all requests for admission clinical reviews, approved or denied determinations and any other requests to dedicated fax number below belonging to the campus where the patient is receiving treatment. List of dedicated fax numbers for the Facilities:  FACILITY NAME UR FAX NUMBER   ADMISSION DENIALS (Administrative/Medical Necessity) [phone]   DISCHARGE SUPPORT TEAM (NETWORK) [phone]   PARENT CHILD HEALTH (Maternity/NICU/Pediatrics) [phone]   [facility] [phone]   [facility] [phone]   [facility] [phone]   [facility] [phone]   [facility] [phone]   [facility] [phone]   [facility] [phone]   [facility] [phone]   [facility] [phone]   [facility] [phone]   [facility] [phone]   [facility] [phone]

## 2024-11-18 NOTE — ASSESSMENT & PLAN NOTE
Mixed density left frontal parietal convexity SDH  Presented to the hospital after an outpatient MRI brain was done for dementia workup which demonstrated above findings.  Per patient's daughter she has noticed her mother's memory has declined over the past year but more recently over the past month having more difficulty with word finding.  Patient does endorse about a month ago having a fall in the bathroom.  Per chart review patient was on aspirin reversed with DDAVP.  Per patient's daughter she is not on any blood thinners.  Per patient's daughter patient drinks wine daily unsure the amount she drinks. H/o dementia   Patient very poor historian but offers no complaints.    Imaging reviewed with :    MRI brain without 11/17/2024:Large heterogeneous signal intensity left frontal parietal convexity subdural hematoma/collection, which is predominantly chronic, but has some T1 hyperintense subacute components, and measures at least 1.3 cm in maximal thickness. Mass effect on the left frontal lobe, but no midline shift noted.No acute infarct. Mild chronic white matter microangiopathic changes.    CT head without 11/18/2024:Stable left holohemispheric mixed density subdural hematoma with comparable degree of regional mass effect as above. No midline shift. Continued surveillance is advised.     Plan:  Continue frequent neurological checks.   Reviewed imaging with patient and daughter at bedside as well as Judy over the phone.  STAT CT head with any neurological decline including drop GCS of 2pts within 1 hr.  Recommend SBP <160mmHg.  Seizure prophylaxis per trauma team  Hold all antiplatelet and anticoagulation medications.   Medical management and pain control per primary team  Mobilize with PT/OT  DVT PPX: SCDs   Did briefly discuss possible left MMA embolization with patient and family.  As well as Dr. Romero  Will make patient NPO at midnight    Neurosurgery will continue to follow, will further discuss  case and imaging with Dr. Romero, call with any further question or concerns.

## 2024-11-18 NOTE — ED CARE HANDOFF
Emergency Department Sign Out Note        Sign out and transfer of care from Alvaro Alford PA-C, Dr. Becerril. See Separate Emergency Department note.     The patient, Khushbu Gandara, was evaluated by the previous provider for finding of subdural hematoma on outpatient MRI earlier today.    Workup Completed:  Initial evaluation, labs, imaging, consult with neurosurgery, critical care, and trauma    ED Course / Workup Pending (followup):                                    ED Course as of 11/18/24 0424   Sun Nov 17, 2024   2104 SO: 85 y/o female. Had an outpatient MRI today, subdural hematoma found incidentally. Possible Fall 1 month ago without reported head strike. Neurosurgery, Critical Care, and Trauma involved at [facility]. SBP goals 110-150. DDAVP ordered.   2219 Patient is accepted to the trauma service at [facility] under the care of Dr. Cottrell.  I updated with a new EMTALA form and the patient signed for consent for transfer.  Transport ETA 2230.     Procedures  Medical Decision Making          Disposition  Final diagnoses:   Subdural hematoma (HCC)     Time reflects when diagnosis was documented in both MDM as applicable and the Disposition within this note       Time User Action Codes Description Comment    11/17/2024  5:14 PM Alvaro Alford Add [S06.5XAA] Subdural hematoma (HCC)           ED Disposition       ED Disposition   Transfer to Another Facility-In Network    Condition   --    Date/Time   Sun Nov 17, 2024  5:14 PM    Comment   Khushbu Gandara should be transferred out to [facility].               MD Documentation      Flowsheet Row Most Recent Value   Patient Condition The patient has been stabilized such that within reasonable medical probability, no material deterioration of the patient condition or the condition of the unborn child(yessy) is likely to result from the transfer   Reason for Transfer Level of Care needed not available at this facility   Benefits of Transfer Specialized equipment and/or services  available at the receiving facility (Include comment)________________________   Risks of Transfer Potential for delay in receiving treatment, Potential deterioration of medical condition, Increased discomfort during transfer, Loss of IV, Possible worsening of condition or death during transfer   Accepting Physician Dr. Cottrell   Accepting Facility Name, [facility], [place], [place]    (Name & Tel number) PAC center   Sending MD Alvaro Alford PA-C,  Dr. Becerril,  Dr. Lorenzo   Provider Certification Unanticipated needs of medical equipment and personnel during transport, Risk of worsening condition, General risk, such as traffic hazards, adverse weather conditions, rough terrain or turbulence, possible failure of equipment (including vehicle or aircraft), or consequences of actions of persons outside the control of the transport personnel, The possibility of a transport vehicle being unavailable          RN Documentation      Flowsheet Row Most Recent Value   Accepting Facility Name, [facility], [place], [place]    (Name & Tel number) PAC center   Transport Mode Ambulance          Follow-up Information    None       Patient's Medications   Discharge Prescriptions    No medications on file     Outpatient Discharge Orders   Transfer to other facility          ED Provider  Electronically Signed by     Jakub Lorenzo MD  11/18/24 2346

## 2024-11-18 NOTE — CONSULTS
Consultation - Geriatric Medicine   Name: Khushbu Gandara 84 y.o. female I MRN: [id number]  Unit/Bed#: [facility] 416-01 I Date of Admission: 11/17/2024   Date of Service: 11/18/2024 I Hospital Day: 1   Inpatient consult to Gerontology  Consult performed by: NUBIA Adams  Consult ordered by: Mike Lorenzo MD        Physician Requesting Evaluation: Laura Cottrell DO   Reason for Evaluation / Principal Problem: ISAR greater than 2    Assessment & Plan  Dementia (HCC)  MOCA 11/30 (11/1/23)  At risk secondary to chronic alcoholism, CAD  CT head (11/18/24) microvascular ischemic changes  Continue namenda  Recommend check TSH and vitamin B 12 level  Recommend start of  thiamine and folate  Insomnia  Chronic   Chronic use of ambien, although ambien not recommended in the older adult do not recommend abrupt withdrawal, recommend continuation with dose reduction to 2.5 mg po QHS as outpatient with goal to discontinue  Alcohol abuse  Per hx, heavy drinker  CIWA protocol  Recommend thiamine and folic acid  Recommend additive medicine for counseling  Ambulatory dysfunction  Exacerbated by fall  At risk secondary to age, alcohol use, frailty  Fall precautions  PT/OT  Subdural hematoma (HCC)  S/p fall on ASA  Given DDVAP  CT head (11/18/24) stable SDH  Neurosurgery on consult  [facility] for seizure ppx  Frailty syndrome in geriatric patient  Lives with , family assists with IADLs  Pt independent with ADLs  Albumin 4.4, maintain protein in diet  PT/OT  Encounter for delirium elderly at risk (DEAR) screening  Appears to be at baseline, calm and cooperative  Oriented x 1  Provide frequent redirection, reorientation, distraction techniques  Avoid deliriogenic medications such as tramadol, benzodiazepines, anticholinergics,  Benadryl  Treat pain, See geriatric pain protocol  Monitor for constipation and urinary retention  Encourage early and frequent moblization, OOB  Encourage Hydration/ Nutrition  Implement sleep hygiene, limit  night time interuptions, group activities  Depression  Doing well with escitalopram  Qtc 480      History of Present Illness   Hx and PE limited by: dementia  HPI: Khushbu Gandara is a 84 y.o. year old female who presents to [facility] after MRI showed 1.3 cm SDH in the frontal lobe. A month ago she sustained a fall off the toilet a month ago.     She has dementia, aphasia, chronic alcoholism, Barretts, GERD, HTN, hyperlipidemia.    Prior to arrival she lives at home with her . Her family provides IADLs. She is independent with ADLs. She has prior falls. She reports having 20 drinks daily.    She is lying in bed, daughter at bedside. She is oriented to name, hard of hearing, calm and cooperative.     Review of Systems   Constitutional:  Negative for unexpected weight change.   HENT:  Positive for hearing loss.    Eyes:  Negative for visual disturbance.   Respiratory:  Negative for cough.    Cardiovascular:  Negative for chest pain.   Gastrointestinal:  Negative for constipation.   Genitourinary:  Negative for difficulty urinating.   Musculoskeletal:  Positive for gait problem.   Psychiatric/Behavioral:  Negative for sleep disturbance.            I have reviewed the patient's PMH, PSH, Social History, Family History, Meds, and Allergies  Historical Information   Past Medical History:   Diagnosis Date    Anxiety     Arthritis     Prajapati esophagus     Cataract     Continuous chronic alcoholism (HCC)     GERD (gastroesophageal reflux disease)     Heavy alcohol use     Hyperlipidemia     Hypertension     Insomnia     Palpitations      Past Surgical History:   Procedure Laterality Date    BREAST IMPLANT Bilateral     CATARACT EXTRACTION Right     CHOLECYSTECTOMY      LAP    COLONOSCOPY      HYSTERECTOMY      age 63-[place]    JOINT REPLACEMENT Bilateral     hips-2010 and 2017-left leg is shorter    LASIK Bilateral     in her 50's    HI XCAPSL CTRC RMVL INSJ IO LENS PROSTH W/O ECP Left 10/18/2018    Procedure:  "EXTRACTION EXTRACAPSULAR CATARACT PHACO INTRAOCULAR LENS (IOL);  Surgeon: Giovanna Guerrero MD;  Location: [facility] MAIN OR;  Service: Ophthalmology    TONSILLECTOMY      TUBAL LIGATION       Social History     Tobacco Use    Smoking status: Never    Smokeless tobacco: Never   Vaping Use    Vaping status: Never Used   Substance and Sexual Activity    Alcohol use: Yes     Alcohol/week: 20.0 standard drinks of alcohol     Types: 20 Glasses of wine per week     Comment:  \"wine every once in awhile\"    Drug use: No    Sexual activity: Not Currently     E-Cigarette/Vaping    E-Cigarette Use Never User      E-Cigarette/Vaping Substances    Nicotine No     THC No     CBD No     Flavoring No     Other No     Unknown No      Family History   Problem Relation Age of Onset    Cancer Mother         breast,kidney,lung (smoker)    Heart disease Father 60        MI    Cancer Sister         liver,pancreatic(smoker,ETOH)    Diabetes Daughter     No Known Problems Sister        Meds/Allergies   Home medication review  [facility] pharmacy  Zolpidem 5 mg po QHS, last refill 11/12/24 # 90 tablets    Mail order:  Escitalopram 10 mg po daily, last refill 11/4/24  Memantine 10 mg po BID, last refill 10/28/24  Amlodipine 2.5 mg po daily, last refill 10/29/24   Lasix 40 mg po daily, last refill 10/19/24 # 90 days  Lisinopril 10 mg po daily, last refill 10/28/24 # 90 days  Metoprolol tartrate 50 mg po BID, last refill 8/27/24        Objective :  Temp:  [97.5 °F (36.4 °C)-98.2 °F (36.8 °C)] 97.5 °F (36.4 °C)  HR:  [52-79] 66  BP: (104-188)/(53-93) 122/56  Resp:  [13-24] 24  SpO2:  [94 %-99 %] 98 %  O2 Device: None (Room air)    Physical Exam  Vitals and nursing note reviewed.   HENT:      Head: Normocephalic.      Nose: No congestion.      Mouth/Throat:      Mouth: Mucous membranes are moist.   Eyes:      General:         Right eye: No discharge.         Left eye: No discharge.   Cardiovascular:      Rate and Rhythm: Normal rate and regular rhythm. "      Pulses: Normal pulses.   Pulmonary:      Effort: Pulmonary effort is normal.      Breath sounds: Normal breath sounds.   Abdominal:      General: Bowel sounds are normal.      Palpations: Abdomen is soft.   Musculoskeletal:         General: Normal range of motion.      Cervical back: Normal range of motion.   Skin:     General: Skin is warm and dry.   Neurological:      Mental Status: She is alert. Mental status is at baseline.   Psychiatric:         Mood and Affect: Mood normal.         Lab Results: I have reviewed the following results:CBC/BMP:   .     11/18/24  0524   WBC 7.99   HGB 12.0   HCT 34.7*      SODIUM 135   K 3.3*   CL 96   CO2 29   BUN 12   CREATININE 0.86   GLUC 120   MG 1.4*   PHOS 3.7     Imaging Results Review: I reviewed radiology reports from this admission including: CT head.  Other Study Results Review: EKG was reviewed.     VTE Prophylaxis: Sequential compression device (Venodyne)     Code Status: Level 1 - Full Code  Family and Social Support:   Living Arrangements: Lives w/ Spouse/significant other; Lives w/ Daughter  Support Systems: Spouse/significant other; Self; Daughter  Type of Current Residence: 2 story home  Discharge planning discussed with:: Judy Osman (Daughter) [phone] (W) [phone]  Freedom of Choice: Yes    I have spent a total time of 75 minutes in caring for this patient on the day of the visit/encounter including Diagnostic results, Prognosis, Risks and benefits of tx options, Instructions for management, Patient and family education, Importance of tx compliance, Risk factor reductions, Impressions, Counseling / Coordination of care, Documenting in the medical record, Reviewing / ordering tests, medicine, procedures  , Obtaining or reviewing history  , and Communicating with other healthcare professionals .

## 2024-11-18 NOTE — CASE MANAGEMENT
Case Management Assessment & Discharge Planning Note    Patient name Khushbu Gandara  Location [facility] 416-01 MRN [id number]  : 1940 Date 2024       Current Admission Date: 2024  Current Admission Diagnosis:SDH (subdural hematoma) (HCC), Unspecified multiple injuries, initial encounter   Patient Active Problem List    Diagnosis Date Noted Date Diagnosed    Stasis dermatitis of both legs 2023     Cellulitis of lower extremity 2023     Ambulatory dysfunction 2023     Dizziness 2023     Bilateral lower extremity edema 10/12/2023     Hip pain, acute, right 10/01/2023     Cerebral atrophy (HCC) 2023     Rash and nonspecific skin eruption 2023     Memory loss 2023     Displaced fracture of second metatarsal bone of left foot with routine healing 2022     Closed displaced fracture of third metatarsal bone of left foot 2022     Chondrocalcinosis of left knee 2022     Cervical spondylosis 2022     Closed displaced fracture of fourth metatarsal bone of left foot 2022     Fall 2022     Hyponatremia 2022     Breast mass, right 2022     Paresthesia of both hands 2022     Gastroesophageal reflux disease without esophagitis 03/15/2021     Vitamin D deficiency 2021     Other insomnia 09/15/2020     Heavy alcohol use 09/15/2020     Anxiety 09/15/2020     Essential hypertension 09/15/2020     Dyslipidemia 09/15/2020     Premature atrial contractions 2020     PVC's (premature ventricular contractions) 2020       LOS (days): 1  Geometric Mean LOS (GMLOS) (days): 1.9  Days to GMLOS:1.5     OBJECTIVE:    Risk of Unplanned Readmission Score: 15.06         Current admission status: Inpatient       Preferred Pharmacy:   [facility] - [address] [address]  Phone: [phone] Fax: [phone]    [facility] - [place], [place] - [address]  [address]  [facility] [address]  [facility] [address]  Phone: [phone] Fax: [phone]    [id number] [place] - [address] [address] [facility]  [place] [address]  Phone: [phone] Fax: [phone]    Primary Care Provider: Suly Fish MD    Primary Insurance: [facility]  Secondary Insurance:     ASSESSMENT:  Active Health Care Proxies    There are no active Health Care Proxies on file.       Advance Directives  Does patient have a Health Care POA?: No  Was patient offered paperwork?: Yes  Does patient currently have a Health Care decision maker?: Yes, please see Health Care Proxy section  Does patient have Advance Directives?: Yes  Advance Directives: Living will  Primary Contact: Judy Osman (Daughter) [phone] (W) [phone]    Readmission Root Cause  30 Day Readmission: No    Patient Information  Admitted from:: Home  Mental Status: Alert, Confused  During Assessment patient was accompanied by: Not accompanied during assessment  Assessment information provided by:: Daughter  Primary Caregiver: Self  Support Systems: Spouse/significant other, Self, Daughter  County of Residence: [place]  What city do you live in?: [place]  Home entry access options. Select all that apply.: Stairs  Number of steps to enter home.: 2  Do the steps have railings?: No  Type of Current Residence: [address] [place] [address] home  Upon entering residence, is there a bedroom on the main floor (no further steps)?: Yes  Upon entering residence, is there a bathroom on the main floor (no further steps)?: Yes  Living Arrangements: Lives w/ Spouse/significant other, Lives w/ Daughter  Is patient a ?: No    Activities of Daily Living Prior to Admission  Functional Status: Independent  Completes ADLs independently?: Yes  Ambulates independently?: Yes  Does patient use assisted devices?: No  Does patient currently own DME?: Yes  What DME does the patient  currently own?: Walker  Does patient have a history of Outpatient Therapy (PT/OT)?: Yes  Does the patient have a history of Short-Term Rehab?: No  Does patient have a history of HHC?: Yes  Does patient currently have HHC?: No    Patient Information Continued  Income Source: Pension/residential  Does patient have prescription coverage?: Yes  Does patient receive dialysis treatments?: No  Does patient have a history of substance abuse?: Yes  Historical substance use preference: Alcohol/ETOH  History of Withdrawal Symptoms: Delirium tremors  Is patient currently in treatment for substance abuse?: N/A - sober  Does patient have a history of Mental Health Diagnosis?: Yes (Anxiety)  Is patient receiving treatment for mental health?: No. Patient declined treatment information.  Has patient received inpatient treatment related to mental health in the last 2 years?: No    Means of Transportation  Means of Transport to Appts:: Family transport    DISCHARGE DETAILS:    Discharge planning discussed with:: Judy Osman (Daughter) [phone] (W) [phone]  Freedom of Choice: Yes     CM contacted family/caregiver?: Yes  Were Treatment Team discharge recommendations reviewed with patient/caregiver?: Yes     Were patient/caregiver advised of the risks associated with not following Treatment Team discharge recommendations?: Yes    Contacts  Patient Contacts: Judy Osman (Daughter) [phone] (W) [phone]  Relationship to Patient:: Family  Contact Method: Phone  Phone Number: [phone] (W) [phone]  Reason/Outcome: Continuity of Care, Emergency Contact, Discharge Planning    Requested Home Health Care         Is the patient interested in HHC at discharge?: No    DME Referral Provided  Referral made for DME?: No    CM spoke to pt's dtr, to discuss the role of CM as well as d/c planning    Pt lives with her spouse, and dtr, in a 2 story home which has 2STE but the pt remains on the 1st floor. Pt has a 1st floor  bathroom (tub/shower with standard toilet).  Pt doesn't drive. Pt is retired. Pt is independent for her ADLs but needs assistance for iADLS.   Pt's had 1-2 falls. Pt owns a walker. Pt doesn't have any hobbies. [facility] [place]  Pt has a living will. Pt has no hx of IP rehab. Pt's used [facility] in the past. Pt has documented hx of anxiety and remote ETOH abuse.    CM reviewed d/c planning process including the following: identifying help at home, patient preference for d/c planning needs, Discharge Lounge, [facility] Meds to Bed program, availability of treatment team to discuss questions or concerns patient and/or family may have regarding understanding medications and recognizing signs and symptoms once discharged.  CM also encouraged patient to follow up with all recommended appointments after discharge. Patient advised of importance for patient and family to participate in managing patient’s medical well being.

## 2024-11-18 NOTE — ASSESSMENT & PLAN NOTE
Per hx, heavy drinker  CIWA protocol  Recommend thiamine and folic acid  Recommend additive medicine for counseling

## 2024-11-18 NOTE — PHYSICAL THERAPY NOTE
Physical Therapy Cancellation Note           11/18/24 [id number]   PT Last Visit   PT Visit Date 11/18/24   Note Type   Note type Cancelled Session   Cancel Reasons Medical status       PT orders received and chart reviewed. Pt pending possible left MMA embolization. Will hold initial PT evaluation at this time. PT will continue to follow pt for initial evaluation as appropriate/able.     Dominick Eubanks PT, DPT

## 2024-11-18 NOTE — ASSESSMENT & PLAN NOTE
Chronic   Chronic use of ambien, although ambien not recommended in the older adult do not recommend abrupt withdrawal, recommend continuation with dose reduction to 2.5 mg po QHS as outpatient with goal to discontinue

## 2024-11-18 NOTE — ASSESSMENT & PLAN NOTE
S/p fall on ASA  Given DDVAP  CT head (11/18/24) stable SDH  Neurosurgery on consult  [facility]

## 2024-11-18 NOTE — EMTALA/ACUTE CARE TRANSFER
[facility] EMERGENCY DEPARTMENT  [address] [place] [address]  Dept: [phone]      EMTALA TRANSFER CONSENT    NAME Khushbu Gandara                                         1940                              MRN [id number]    I have been informed of my rights regarding examination, treatment, and transfer   by Dr. Alex Becerril MD    Benefits: Specialized equipment and/or services available at the receiving facility (Include comment)________________________    Risks: Potential for delay in receiving treatment, Potential deterioration of medical condition, Increased discomfort during transfer, Loss of IV, Possible worsening of condition or death during transfer      Consent for Transfer:  I acknowledge that my medical condition has been evaluated and explained to me by the emergency department physician or other qualified medical person and/or my attending physician, who has recommended that I be transferred to the service of  Accepting Physician: Dr. Cottrell at Accepting Facility Name, City & State : [place], [place]. The above potential benefits of such transfer, the potential risks associated with such transfer, and the probable risks of not being transferred have been explained to me, and I fully understand them.  The doctor has explained that, in my case, the benefits of transfer outweigh the risks.  I agree to be transferred.    I authorize the performance of emergency medical procedures and treatments upon me in both transit and upon arrival at the receiving facility.  Additionally, I authorize the release of any and all medical records to the receiving facility and request they be transported with me, if possible.  I understand that the safest mode of transportation during a medical emergency is an ambulance and that the Hospital advocates the use of this mode of transport. Risks of traveling to the receiving facility by car, including absence  of medical control, life sustaining equipment, such as oxygen, and medical personnel has been explained to me and I fully understand them.    (CASEY CORRECT BOX BELOW)  [  ]  I consent to the stated transfer and to be transported by ambulance/helicopter.  [  ]  I consent to the stated transfer, but refuse transportation by ambulance and accept full responsibility for my transportation by car.  I understand the risks of non-ambulance transfers and I exonerate the Hospital and its staff from any deterioration in my condition that results from this refusal.    X___________________________________________    DATE  24  TIME________  Signature of patient or legally responsible individual signing on patient behalf           RELATIONSHIP TO PATIENT_________________________          Provider Certification    NAME Khushbu Gandara                                         1940                              MRN [id number]    A medical screening exam was performed on the above named patient.  Based on the examination:    Condition Necessitating Transfer The encounter diagnosis was Subdural hematoma (HCC).    Patient Condition: The patient has been stabilized such that within reasonable medical probability, no material deterioration of the patient condition or the condition of the unborn child(yessy) is likely to result from the transfer    Reason for Transfer: Level of Care needed not available at this facility    Transfer Requirements: Facility [place], [place]   Space available and qualified personnel available for treatment as acknowledged by PAC center  Agreed to accept transfer and to provide appropriate medical treatment as acknowledged by       Dr. Cottrell  Appropriate medical records of the examination and treatment of the patient are provided at the time of transfer   STAFF INITIAL WHEN COMPLETED _______  Transfer will be performed by qualified personnel from    and appropriate transfer  equipment as required, including the use of necessary and appropriate life support measures.    Provider Certification: I have examined the patient and explained the following risks and benefits of being transferred/refusing transfer to the patient/family:  Unanticipated needs of medical equipment and personnel during transport, Risk of worsening condition, General risk, such as traffic hazards, adverse weather conditions, rough terrain or turbulence, possible failure of equipment (including vehicle or aircraft), or consequences of actions of persons outside the control of the transport personnel, The possibility of a transport vehicle being unavailable      Based on these reasonable risks and benefits to the patient and/or the unborn child(yessy), and based upon the information available at the time of the patient’s examination, I certify that the medical benefits reasonably to be expected from the provision of appropriate medical treatments at another medical facility outweigh the increasing risks, if any, to the individual’s medical condition, and in the case of labor to the unborn child, from effecting the transfer.    X____________________________________________ DATE 11/17/24        TIME_______      ORIGINAL - SEND TO MEDICAL RECORDS   COPY - SEND WITH PATIENT DURING TRANSFER

## 2024-11-18 NOTE — H&P
H&P - Critical Care/ICU   Name: Khushbu Gandara 84 y.o. female I MRN: [id number]  Unit/Bed#: ED 20 I Date of Admission: 11/17/2024   Date of Service: 11/18/2024 I Hospital Day: 1       Assessment & Plan   Neuro:   Diagnosis: Recurrent fall history with last documented fall 1 month ago.  MRI shows large left frontoparietal SDH which is predominantly chronic measuring 1.3 cm in thickness with left frontal lobe mass effect but no shift.  Progressive dementia and aphasia  Plan: Every hour neurochecks  Neurosurgery evaluation  Will discuss need for Keppra prophylaxis with neurosurgery, however it may not be indicated given the chronicity of the bleed  Will proceed with 500 mg every 12 for the time being  Tylenol 650 mg every 6 hours as needed mild pain  Oxycodone 2.5 and 5 mg for moderate and severe pain  Will repeat morning CTH    Diagnosis: History of depression  Plan: Home dose Lexapro 40 mg daily    CV:   Diagnosis: History of HTN and HLD  Plan: Home dose amlodipine 2.5 mg  Hold home dose lisinopril  Lasix 40 mg    Pulm:  No active issues    GI:   Diagnosis: No active issues  Plan: Senna bowel regimen    :   Diagnosis: No active issues.  Creatinine 0.81.  Plan: Strict I's and O's    F/E/N:   Diagnosis: No acute issues  Plan: LR 75 cc/h  Replete electrolytes as needed  N.p.o.    Heme/Onc:   Diagnosis: Chronic subdural hematoma  Plan: Hold DVT prophylaxis for now    Endo:   Diagnosis: No active issues    ID:   Diagnosis: No acute issues WBC is 8.5-remains afebrile.  Plan: Monitor WBC and fever curve    MSK/Skin:   Diagnosis: S/p fall  Plan: Will need geriatrics consult  PT/OT    Disposition: Critical care    History of Present Illness   Khushbu Gandara is a 84 y.o. with history of multiple falls, chronic alcoholism, HLD, HTN, who presents as a transfer from [facility].  Patient underwent a routine MRI as part of a dementia workup, and was noted to have a SDH measuring 1.3 cm in thickness with left frontal  lobe, but no midline shift.  Per patient's chart, and daughter, she sustained her most recent fall off the toilet, approximately 1 month ago.  Patient has had a prolonged history of falls and dizziness since her initial fall approximately 2 years ago.  Over the past 2 years she has had progressively worsening dementia, and aphasia.  On examination she is sensorimotor intact, with GCS of 14.    History obtained from child, chart review, and unobtainable from patient due to mental status.  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  No date: Cataract  No date: Continuous chronic alcoholism (HCC)  No date: GERD (gastroesophageal reflux disease)  No date: Heavy alcohol use  No date: Hyperlipidemia  No date: Hypertension  No date: Insomnia  No date: Palpitations Past Surgical History:  No date: BREAST IMPLANT; Bilateral  No date: CATARACT EXTRACTION; Right  No date: CHOLECYSTECTOMY      Comment:  LAP  No date: COLONOSCOPY  No date: HYSTERECTOMY      Comment:  age 63-[place]  No date: JOINT REPLACEMENT; Bilateral      Comment:  hips-2010 and 2017-left leg is shorter  No date: LASIK; Bilateral      Comment:  in her 50's  10/18/2018: ND XCAPSL CTRC RMVL INSJ IO LENS PROSTH W/O ECP; Left      Comment:  Procedure: EXTRACTION EXTRACAPSULAR CATARACT PHACO                INTRAOCULAR LENS (IOL);  Surgeon: Giovanna Guerrero MD;                 Location: [facility] MAIN OR;  Service: Ophthalmology  No date: TONSILLECTOMY  No date: TUBAL LIGATION   Current Outpatient Medications   Medication Instructions    acetaminophen (TYLENOL) 650 mg, Oral, Every 6 hours PRN    amLODIPine (NORVASC) 2.5 mg, Oral, Daily    Biotin 5000 MCG TABS Oral, Every morning    Cholecalciferol (VITAMIN D) 2000 units CAPS Oral, 3 times weekly    clobetasol (TEMOVATE) 0.05 % cream Topical, 2 times daily    cyanocobalamin (VITAMIN B-12) 500 mcg, Oral,  "Daily    escitalopram (LEXAPRO) 10 mg, Oral, Daily    fish oil 1,000 mg, Oral, Daily    furosemide (LASIX) 40 mg, Oral, Daily    GLUCOSAMINE HCL PO 1,200 mg, Oral, Daily    lisinopril (ZESTRIL) 10 mg, Oral, Daily    meclizine (ANTIVERT) 12.5 mg, Oral, Every 8 hours PRN    memantine (NAMENDA) 10 mg, Oral, 2 times daily    metoprolol tartrate (LOPRESSOR) 50 mg, Oral, 2 times daily    niacin 500 mg, Oral, Daily with breakfast    potassium chloride (Klor-Con M20) 20 mEq tablet 20 mEq, Oral, Daily    triamcinolone (KENALOG) 0.1 % cream 1 Application, Topical, 2 times daily, To affected area    zolpidem (AMBIEN) 5 mg, Oral, Daily at bedtime PRN    No Known Allergies   Social History     Tobacco Use    Smoking status: Never    Smokeless tobacco: Never   Vaping Use    Vaping status: Never Used   Substance Use Topics    Alcohol use: Yes     Alcohol/week: 20.0 standard drinks of alcohol     Types: 20 Glasses of wine per week     Comment:  \"wine every once in awhile\"    Drug use: No    Family History   Problem Relation Age of Onset    Cancer Mother         breast,kidney,lung (smoker)    Heart disease Father 60        MI    Cancer Sister         liver,pancreatic(smoker,ETOH)    Diabetes Daughter     No Known Problems Sister           Objective :                   Vitals I/O      Most Recent Min/Max in 24hrs   Temp 98.2 °F (36.8 °C) Temp  Min: 97.8 °F (36.6 °C)  Max: 98.2 °F (36.8 °C)   Pulse 62 Pulse  Min: 53  Max: 79   Resp 18 Resp  Min: 15  Max: 20   /53 BP  Min: 104/54  Max: 188/87   O2 Sat 95 % SpO2  Min: 95 %  Max: 99 %    No intake or output data in the 24 hours ending 11/18/24 0218    Diet NPO    Invasive Monitoring           Physical Exam   Physical Exam  Vitals reviewed.   Eyes:      Pupils: Pupils are equal, round, and reactive to light.   Skin:     Capillary Refill: Capillary refill takes less than 2 seconds.   HENT:      Head: Normocephalic and atraumatic.      Mouth/Throat:      Mouth: Mucous membranes " are dry.   Cardiovascular:      Pulses: Normal pulses.   Abdominal:      Palpations: Abdomen is soft.      Tenderness: There is no abdominal tenderness.   Pulmonary:      Effort: Pulmonary effort is normal.   Neurological:      General: No focal deficit present.      Mental Status: She is disoriented to person, disoriented to place, disoriented to time and disoriented to situation.      Cranial Nerves: No facial asymmetry.      Motor: Strength full and intact in all extremities. No motor deficit.          Diagnostic Studies        Lab Results: I have reviewed the following results:     Medications:  Scheduled PRN   acetaminophen, 975 mg, Q8H MELISSA  amLODIPine, 2.5 mg, Daily  chlorhexidine, 15 mL, Q12H MELISSA  escitalopram, 10 mg, Daily  furosemide, 40 mg, Daily  levETIRAcetam, 500 mg, Q12H MELISSA  lisinopril, 10 mg, Daily  memantine, 10 mg, BID  metoprolol tartrate, 50 mg, BID  senna-docusate sodium, 2 tablet, BID      HYDROmorphone, 0.2 mg, Q2H PRN  oxyCODONE, 2.5 mg, Q4H PRN   Or  oxyCODONE, 5 mg, Q4H PRN       Continuous    lactated ringers, 125 mL/hr         Labs:   CBC    Recent Labs     11/17/24  1720   WBC 8.53   HGB 14.2   HCT 41.9        BMP    Recent Labs     11/17/24  1720   SODIUM 134*   K 3.9   CL 94*   CO2 28   AGAP 12   BUN 10   CREATININE 0.81   CALCIUM 9.3       Coags    Recent Labs     11/17/24  1720   INR 0.95   PTT 27        Additional Electrolytes  No recent results       Blood Gas    No recent results  No recent results LFTs  Recent Labs     11/17/24  1720   ALT 14   AST 18   ALKPHOS 70   ALB 4.4   TBILI 0.74       Infectious  No recent results  Glucose  Recent Labs     11/17/24  1720   GLUC 115

## 2024-11-18 NOTE — QUICK NOTE
Patient seen and evaluated at bedside after transfer to floor initiated.    Subjective: No acute distress. Resting comfortably in bed. Eating dinner. Pain is well-controlled.    Objective:  Vitals:    11/18/24 1514 11/18/24 1600 11/18/24 1610 11/18/24 1722   BP:  (!) 175/72 145/70 159/72   BP Location:       Pulse:  66 93 65   Resp:  (!) 25 (!) 25 18   Temp: 97.8 °F (36.6 °C)      TempSrc: Oral      SpO2:  96% 96% 97%   Weight:       Height:         Physical Exam:  GENERAL: No acute distress, resting comfortably in bed  Neuro: AAOx2, person and time but not place   CV: Regular rate and rhythm  LUNGS: Nonlabored respirations  ABDOMEN: Abdomen soft, non-tender, nondistended.    Assessment and Plan:   Continue to monitor neuro exam  Continue CIWA protocol  PT/OT  Appreciate neurosurgery recommendations

## 2024-11-18 NOTE — PROGRESS NOTES
"Progress Note - Trauma   Name: Khushbu Gandara 84 y.o. female I MRN: [id number]  Unit/Bed#: [facility] 416-01 I Date of Admission: 11/17/2024   Date of Service: 11/18/2024 I Hospital Day: 1      TRAUMA TRANSFER FROM ICU AND TERTIARY SURVEY NOTE    VTE Prophylaxis: Hold per NSGY     Disposition: SD2    Code status:  Level 1 - Full Code    Consultants: IP CONSULT TO NEUROSURGERY  IP CONSULT TO CASE MANAGEMENT  IP CONSULT TO GERONTOLOGY    History of Present Illness   Mechanism of Injury:Fall  Summary of Diagnosed Injuries: L frontal lobe 1.3cm SDH     HPI/Last 24 hour events: Khushbu Gandara is a 84 y.o. with history of multiple falls, chronic alcoholism, HLD, HTN, who presents as a transfer from [facility].  Patient underwent a routine MRI as part of a dementia workup, and was noted to have a SDH measuring 1.3 cm in thickness with left frontal lobe, but no midline shift.  Per patient's chart, and daughter, she sustained her most recent fall off the toilet, approximately 1 month ago.  Patient has had a prolonged history of falls and dizziness since her initial fall approximately 2 years ago.  Over the past 2 years she has had progressively worsening dementia, and aphasia. On examination she is sensorimotor intact, with GCS of 14.\" Per Freda Luther MD    Reason for ICU admission: L SDH with frontal lobe mass effect     Summary of ICU clinical course: Admitted to ICU for SDH, had stable repeat CTH. Evaluated by NSGY, possible MMA in the future including tomorrow, pending family discussions. Patient will be NPO at midnight, hold all AC/AP.      Recent or scheduled procedures: None     Outstanding/pending diagnostics: None     Objective :  Temp:  [97.5 °F (36.4 °C)-98.2 °F (36.8 °C)] 97.5 °F (36.4 °C)  HR:  [52-79] 63  BP: (104-188)/(53-93) 159/84  Resp:  [13-45] 33  SpO2:  [94 %-100 %] 99 %  O2 Device: None (Room air)    Physical Exam  Vitals and nursing note reviewed.   Constitutional:       General: She is not in " "acute distress.     Appearance: She is well-developed.   HENT:      Head: Normocephalic and atraumatic.   Eyes:      Conjunctiva/sclera: Conjunctivae normal.   Cardiovascular:      Rate and Rhythm: Normal rate and regular rhythm.      Heart sounds: No murmur heard.  Pulmonary:      Effort: Pulmonary effort is normal. No respiratory distress.      Breath sounds: Normal breath sounds.   Abdominal:      Palpations: Abdomen is soft.      Tenderness: There is no abdominal tenderness.   Musculoskeletal:         General: No swelling.      Cervical back: Neck supple.   Skin:     General: Skin is warm and dry.      Capillary Refill: Capillary refill takes less than 2 seconds.   Neurological:      Cranial Nerves: No cranial nerve deficit or dysarthria.      Sensory: Sensation is intact. No sensory deficit.      Motor: Motor function is intact. No weakness, atrophy or seizure activity.      Comments: A and O to time and place   Not tremulous   Moves all extremities equally with   No focal deficits    Psychiatric:         Mood and Affect: Mood normal.         Rationale for remaining devices: peripheral IV    Did you order a geriatric consult if the score was 2 or greater?: yes       Lab Results: I have reviewed the following results:  Imaging Results Review: I reviewed radiology reports from this admission including: MRI brain.  Other Study Results Review: No additional pertinent studies reviewed.  11/18/24- CTH: \"Stable left holohemispheric mixed density subdural hematoma with comparable degree of regional mass effect as above. No midline shift. Continued surveillance is advised.\"  11/17/24- MRI Large heterogeneous signal intensity left frontal parietal convexity subdural hematoma/collection, which is predominantly chronic, but has some T1 hyperintense subacute components, and measures at least 1.3 cm in maximal thickness. Mass effect on the left frontal lobe, but no midline shift noted.     Patient seen and evaluated by " Critical Care today and deemed to be appropriate for transfer to Stepdown Level 2. Spoke to Philip Gregory PA-C from Trauma service regarding transfer. Critical Care can be contacted via SecureChat with any questions or concerns.

## 2024-11-18 NOTE — OCCUPATIONAL THERAPY NOTE
Occupational Therapy Cancel        Patient Name: Khushbu Gandara  Today's Date: 11/18/2024 11/18/24 1330   OT Last Visit   OT Visit Date 11/18/24   Note Type   Note type Cancelled Session   Additional Comments Pt pending possible left MMA embolization. Will hold initial OT evaluation at this time. OT will continue to follow pt for initial evaluation as appropriate/able.       LENORA Rodriguez, OTR/L

## 2024-11-18 NOTE — ASSESSMENT & PLAN NOTE
MOCA 11/30 (11/1/23)  At risk secondary to chronic alcoholism, CAD  CT head (11/18/24) microvascular ischemic changes  Continue namenda  Recommend check TSH and vitamin B 12 level  Recommend start of  thiamine and folate

## 2024-11-18 NOTE — ASSESSMENT & PLAN NOTE
Appears to be at baseline, calm and cooperative  Oriented x 1  Provide frequent redirection, reorientation, distraction techniques  Avoid deliriogenic medications such as tramadol, benzodiazepines, anticholinergics,  Benadryl  Treat pain, See geriatric pain protocol  Monitor for constipation and urinary retention  Encourage early and frequent moblization, OOB  Encourage Hydration/ Nutrition  Implement sleep hygiene, limit night time interuptions, group activities

## 2024-11-18 NOTE — ASSESSMENT & PLAN NOTE
Lives with , family assists with IADLs  Pt independent with ADLs  Albumin 4.4, maintain protein in diet  PT/OT

## 2024-11-18 NOTE — H&P
H&P - Trauma   Name: Khushbu Gandara 84 y.o. female I MRN: [id number]  Unit/Bed#: ED 20 I Date of Admission: 11/17/2024   Date of Service: 11/18/2024 I Hospital Day: 1     Assessment & Plan  SDH (subdural hematoma) (HCC)      Trauma Alert: Other transfer    Model of Arrival: Ambulance    Trauma Team: Attending Simba and Residents Bj  Consultants:     Neurosurgery: routine consult; Epic consult order placed;     History of Present Illness   Chief Complaint: outpatient radiographic finding  Mechanism:Other: no injury     Khushbu Gandara is a 84 y.o. female who presents as a transfer from [place] for acute on chronic SDH after a fall several weeks ago. Takes asa, got ddavp. Had outpatient MRI showing acute worsening of her known subdural hematoma earlier today. No new symtpoms, falls, other trauma. Neuro exam non focal aside from baseline mild expressive aphasia and difficulty following commands, Aox2, GCS15 which apparently is baseline per .    Review of Systems   All other systems reviewed and are negative.    I have reviewed the patient's PMH, PSH, Social History, Family History, Meds, and Allergies  Immunization History   Administered Date(s) Administered    COVID-19 MODERNA VACC 0.5 ML IM 02/11/2021, 03/11/2021, 01/05/2022    COVID-19 Pfizer mRNA vacc PF rossy-sucrose 12 yr and older (Comirnaty) 10/11/2024    INFLUENZA 10/06/2011, 10/16/2011, 10/10/2012, 10/09/2013, 10/21/2014, 10/15/2015, 10/01/2016, 11/06/2017, 11/13/2019, 10/09/2020, 09/24/2021, 10/13/2021, 09/07/2023    Influenza, high dose seasonal 0.7 mL 09/07/2023    Pneumococcal Conjugate 13-Valent 10/15/2015    Pneumococcal Polysaccharide PPV23 10/09/2016    Tdap 05/20/2019, 12/14/2022     1. Before the illness or injury that brought you to the Emergency, did you need someone to help you on a regular basis? 1=Yes   2. Since the illness or injury that brought you to the Emergency, have you needed more help than usual to take care of yourself? 0=No   3.  Have you been hospitalized for one or more nights during the past 6 months (excluding a stay in the Emergency Department)? 1=Yes   4. In general, do you see well? 0=Yes   5. In general, do you have serious problems with your memory? 1=Yes   6. Do you take more than three different medications everyday? 1=Yes   TOTAL   4     Did you order a geriatric consult if the score was 2 or greater?: yes       Objective :  Temp:  [97.8 °F (36.6 °C)-98.2 °F (36.8 °C)] 98.2 °F (36.8 °C)  HR:  [53-79] 62  BP: (104-188)/(53-93) 107/53  Resp:  [15-20] 18  SpO2:  [95 %-99 %] 95 %  O2 Device: None (Room air)    Initial Vitals:   Temperature: 98.2 °F (36.8 °C) (11/17/24 2315)  Pulse: 79 (11/17/24 2315)  Respirations: 18 (11/17/24 2315)  Blood Pressure: 122/64 (11/17/24 2315)    Primary Survey:   Airway:        Status: patent;        Pre-hospital Interventions: none        Hospital Interventions: none  Breathing:        Pre-hospital Interventions: none       Effort: normal       Right breath sounds: normal       Left breath sounds: normal  Circulation:        Rhythm: regular no murmur       Rate: regular   Right Pulses Left Pulses    R radial: 2+  R femoral: 2+  R pedal: 2+  R carotid: 2+  R popliteal: 2+ L radial: 2+  L femoral: 2+  L pedal: 2+  L carotid: 2+  L popliteal: 2+   Disability:        GCS: Eye: 4; Verbal: 5 Motor: 6 Total: 15       Right Pupil: round;  reactive         Left Pupil:  round;  reactive      R Motor Strength L Motor Strength    R : 5/5  R dorsiflex: 5/5  R plantarflex: 5/5 L : 5/5  L dorsiflex: 5/5  L plantarflex: 5/5        Sensory:  No sensory deficit  Exposure:       Completed: Yes      Secondary Survey:  Physical Exam  Vitals reviewed.   Constitutional:       General: She is not in acute distress.  HENT:      Head: Normocephalic and atraumatic.      Right Ear: External ear normal.      Left Ear: External ear normal.      Nose: Nose normal.      Mouth/Throat:      Mouth: Mucous membranes are moist.       Pharynx: Oropharynx is clear.   Eyes:      Extraocular Movements: Extraocular movements intact.      Conjunctiva/sclera: Conjunctivae normal.      Pupils: Pupils are equal, round, and reactive to light.   Cardiovascular:      Rate and Rhythm: Normal rate and regular rhythm.      Pulses: Normal pulses.   Pulmonary:      Effort: Pulmonary effort is normal.      Breath sounds: Normal breath sounds.   Abdominal:      General: Abdomen is flat. There is no distension.      Palpations: Abdomen is soft.   Musculoskeletal:         General: No swelling. Normal range of motion.      Cervical back: Normal range of motion and neck supple. No rigidity or tenderness.   Skin:     General: Skin is warm and dry.      Capillary Refill: Capillary refill takes less than 2 seconds.   Neurological:      General: No focal deficit present.      Mental Status: She is alert and oriented to person, place, and time.      Cranial Nerves: No cranial nerve deficit.             Lab Results: I have reviewed the following results:  Recent Labs     11/17/24  1720   WBC 8.53   HGB 14.2   HCT 41.9      SODIUM 134*   K 3.9   CL 94*   CO2 28   BUN 10   CREATININE 0.81   GLUC 115   AST 18   ALT 14   ALB 4.4   TBILI 0.74   ALKPHOS 70   PTT 27   INR 0.95       Imaging Results: I have personally reviewed pertinent images saved in PACS. CT scan findings (and other pertinent positive findings on images) were discussed with radiology. My interpretation of the images/reports are as follows:  Chest Xray(s): N/A   FAST exam(s): N/A   CT Scan(s): N/A   Additional Xray(s): N/A     Other Studies: Other Study Results Review: No additional pertinent studies reviewed.

## 2024-11-18 NOTE — CONSULTS
Consultation - Neurosurgery   Name: Khushbu Gandara 84 y.o. female I MRN: [id number]  Unit/Bed#: [facility] 416-01 I Date of Admission: 11/17/2024   Date of Service: 11/18/2024 I Hospital Day: 1   Inpatient consult to Neurosurgery  Consult performed by: NUBIA Iniguez  Consult ordered by: Mike Lorenzo MD        Physician Requesting Evaluation: Laura Cottrell DO   Reason for Evaluation / Principal Problem: L SDH    Assessment & Plan  Dementia (HCC)    Alcohol abuse    Subdural hematoma (HCC)  Mixed density left frontal parietal convexity SDH  Presented to the hospital after an outpatient MRI brain was done for dementia workup which demonstrated above findings.  Per patient's daughter she has noticed her mother's memory has declined over the past year but more recently over the past month having more difficulty with word finding.  Patient does endorse about a month ago having a fall in the bathroom.  Per chart review patient was on aspirin reversed with DDAVP.  Per patient's daughter she is not on any blood thinners.  Per patient's daughter patient drinks wine daily unsure the amount she drinks. H/o dementia   Patient very poor historian but offers no complaints.    Imaging reviewed with :    MRI brain without 11/17/2024:Large heterogeneous signal intensity left frontal parietal convexity subdural hematoma/collection, which is predominantly chronic, but has some T1 hyperintense subacute components, and measures at least 1.3 cm in maximal thickness. Mass effect on the left frontal lobe, but no midline shift noted.No acute infarct. Mild chronic white matter microangiopathic changes.    CT head without 11/18/2024:Stable left holohemispheric mixed density subdural hematoma with comparable degree of regional mass effect as above. No midline shift. Continued surveillance is advised.     Plan:  Continue frequent neurological checks.   Reviewed imaging with patient and daughter at bedside as well as Judy over the  phone.  STAT CT head with any neurological decline including drop GCS of 2pts within 1 hr.  Recommend SBP <160mmHg.  Seizure prophylaxis per trauma team  Hold all antiplatelet and anticoagulation medications.   Medical management and pain control per primary team  Mobilize with PT/OT  DVT PPX: SCDs   Did briefly discuss possible left MMA embolization with patient and family.  As well as Dr. Romero  Will make patient NPO at midnight    Neurosurgery will continue to follow, will further discuss case and imaging with Dr. Romero, call with any further question or concerns.  Depression        History of Present Illness   HPI: Khushbu Gandara is a 84 y.o.  female with past medical history significant for anxiety, arthritis, Prajapati esophagus, cataracts, alcoholism, GERD, hyperlipidemia, and hypertension.  Patient originally presented to [facility] yesterday afternoon after she went underwent an outpatient MRI for dementia workup when it demonstrated an incidental left frontal region acute on chronic subdural hematoma and she was directed to go to the ED.  Daughter presented with patient with remote history of a fall month ago.  Per family there was no injury or head strike and did not seek medical attention.  Per chart review daughter states she takes aspirin daily and was reversed with DDAVP.  Upon further questioning daughter states she is not on any blood thinners.  Daughter noted over the past year worsening memory as well as over the past month worsening word finding difficulty.  Patient was transferred to [facility] for further workup and evaluation.    Patient very poor his historian and says she has memory problems and can't answer questions.  Patient unable to answer questions even with choices.  She offers no complaints.      Review of Systems   Unable to perform ROS: Dementia         Objective :  Temp:  [97.5 °F (36.4 °C)-98.2 °F (36.8 °C)] 97.5 °F (36.4 °C)  HR:  [52-79] 59  BP:  (104-188)/(53-93) 140/64  Resp:  [13-45] 33  SpO2:  [94 %-100 %] 99 %  O2 Device: None (Room air)    Physical Exam  Vitals reviewed.   Constitutional:       General: She is awake. She is not in acute distress.     Appearance: Normal appearance. She is not ill-appearing.   HENT:      Head: Normocephalic and atraumatic.   Eyes:      Extraocular Movements: Extraocular movements intact.      Conjunctiva/sclera: Conjunctivae normal.      Pupils: Pupils are equal, round, and reactive to light.   Cardiovascular:      Rate and Rhythm: Normal rate.   Pulmonary:      Effort: Pulmonary effort is normal. No respiratory distress.   Chest:      Chest wall: No tenderness.   Abdominal:      General: There is no distension.      Palpations: Abdomen is soft.      Tenderness: There is no abdominal tenderness.   Musculoskeletal:         General: Normal range of motion.      Cervical back: Normal range of motion and neck supple.   Skin:     General: Skin is warm and dry.   Neurological:      Deep Tendon Reflexes:      Reflex Scores:       Bicep reflexes are 1+ on the right side and 1+ on the left side.       Patellar reflexes are 1+ on the right side and 1+ on the left side.     Comments: AX2, oriented to self and month   Psychiatric:         Behavior: Behavior is cooperative.      Comments: Expressive aphasia with history of dementia      Neurological Exam  Mental Status  Awake. Oriented only to person and time. Expressive aphasia present.    Cranial Nerves  CN III, IV, VI: Extraocular movements intact bilaterally. Pupils equal round and reactive to light bilaterally.  CN V: Facial sensation is normal.  CN VII: Full and symmetric facial movement.  CN VIII: Hearing is normal.  CN XI: Shoulder shrug strength is normal.  CN XII: Tongue midline without atrophy or fasciculations.    Motor    Strength 4+/5 throughout.    Sensory  Sensation is intact to light touch, pinprick, vibration and proprioception in all four extremities.    Reflexes                                             Right                      Left  Biceps                                 1+                         1+  Patellar                                1+                         1+    Right pathological reflexes: Kalpesh's absent. Ankle clonus absent.  Left pathological reflexes: Kalpesh's absent. Ankle clonus absent.    Coordination  Right: Finger-to-nose normal.Left: Finger-to-nose normal.  AX2, oriented to self and month.        Lab Results: I have reviewed the following results:  Recent Labs     11/17/24  1720 11/18/24  0524   WBC 8.53 7.99   HGB 14.2 12.0   HCT 41.9 34.7*    196   SODIUM 134* 135   K 3.9 3.3*   CL 94* 96   CO2 28 29   BUN 10 12   CREATININE 0.81 0.86   GLUC 115 120   MG  --  1.4*   PHOS  --  3.7   AST 18  --    ALT 14  --    ALB 4.4  --    TBILI 0.74  --    ALKPHOS 70  --    PTT 27  --    INR 0.95 0.92       Imaging Results Review: I personally reviewed the following image studies in PACS and associated radiology reports: CT head and MRI brain. My interpretation of the radiology images/reports is:  .      VTE Pharmacologic Prophylaxis: Sequential compression device (Venodyne)

## 2024-11-19 ENCOUNTER — TELEPHONE (OUTPATIENT)
Dept: NEUROSURGERY | Facility: CLINIC | Age: 84
End: 2024-11-19

## 2024-11-19 ENCOUNTER — HOME HEALTH ADMISSION (OUTPATIENT)
Dept: HOME HEALTH SERVICES | Facility: HOME HEALTHCARE | Age: 84
End: 2024-11-19
Payer: COMMERCIAL

## 2024-11-19 LAB
ANION GAP SERPL CALCULATED.3IONS-SCNC: 7 MMOL/L (ref 4–13)
ATRIAL RATE: 53 BPM
BUN SERPL-MCNC: 10 MG/DL (ref 5–25)
CA-I BLD-SCNC: 1.11 MMOL/L (ref 1.12–1.32)
CALCIUM SERPL-MCNC: 8.3 MG/DL (ref 8.4–10.2)
CHLORIDE SERPL-SCNC: 96 MMOL/L (ref 96–108)
CO2 SERPL-SCNC: 29 MMOL/L (ref 21–32)
CREAT SERPL-MCNC: 0.68 MG/DL (ref 0.6–1.3)
ERYTHROCYTE [DISTWIDTH] IN BLOOD BY AUTOMATED COUNT: 12.3 % (ref 11.6–15.1)
GFR SERPL CREATININE-BSD FRML MDRD: 79 ML/MIN/1.73SQ M
GLUCOSE SERPL-MCNC: 104 MG/DL (ref 65–140)
HCT VFR BLD AUTO: 34.2 % (ref 34.8–46.1)
HGB BLD-MCNC: 11.7 G/DL (ref 11.5–15.4)
MAGNESIUM SERPL-MCNC: 1.9 MG/DL (ref 1.9–2.7)
MCH RBC QN AUTO: 34.5 PG (ref 26.8–34.3)
MCHC RBC AUTO-ENTMCNC: 34.2 G/DL (ref 31.4–37.4)
MCV RBC AUTO: 101 FL (ref 82–98)
P AXIS: -27 DEGREES
PLATELET # BLD AUTO: 181 THOUSANDS/UL (ref 149–390)
PMV BLD AUTO: 9.8 FL (ref 8.9–12.7)
POTASSIUM SERPL-SCNC: 3.4 MMOL/L (ref 3.5–5.3)
PR INTERVAL: 156 MS
QRS AXIS: -13 DEGREES
QRSD INTERVAL: 86 MS
QT INTERVAL: 516 MS
QTC INTERVAL: 484 MS
RBC # BLD AUTO: 3.39 MILLION/UL (ref 3.81–5.12)
SODIUM SERPL-SCNC: 132 MMOL/L (ref 135–147)
T WAVE AXIS: 7 DEGREES
VENTRICULAR RATE: 53 BPM
WBC # BLD AUTO: 5.99 THOUSAND/UL (ref 4.31–10.16)

## 2024-11-19 PROCEDURE — 97163 PT EVAL HIGH COMPLEX 45 MIN: CPT

## 2024-11-19 PROCEDURE — 93010 ELECTROCARDIOGRAM REPORT: CPT | Performed by: INTERNAL MEDICINE

## 2024-11-19 PROCEDURE — 97167 OT EVAL HIGH COMPLEX 60 MIN: CPT

## 2024-11-19 PROCEDURE — 99232 SBSQ HOSP IP/OBS MODERATE 35: CPT | Performed by: NURSE PRACTITIONER

## 2024-11-19 PROCEDURE — 99232 SBSQ HOSP IP/OBS MODERATE 35: CPT | Performed by: SURGERY

## 2024-11-19 PROCEDURE — 80048 BASIC METABOLIC PNL TOTAL CA: CPT

## 2024-11-19 PROCEDURE — 82330 ASSAY OF CALCIUM: CPT

## 2024-11-19 PROCEDURE — 83735 ASSAY OF MAGNESIUM: CPT

## 2024-11-19 PROCEDURE — 92610 EVALUATE SWALLOWING FUNCTION: CPT

## 2024-11-19 PROCEDURE — 85027 COMPLETE CBC AUTOMATED: CPT

## 2024-11-19 RX ORDER — MAGNESIUM SULFATE 1 G/100ML
1 INJECTION INTRAVENOUS ONCE
Status: COMPLETED | OUTPATIENT
Start: 2024-11-19 | End: 2024-11-19

## 2024-11-19 RX ORDER — POTASSIUM CHLORIDE 14.9 MG/ML
20 INJECTION INTRAVENOUS
Status: COMPLETED | OUTPATIENT
Start: 2024-11-19 | End: 2024-11-19

## 2024-11-19 RX ORDER — CALCIUM GLUCONATE 20 MG/ML
1 INJECTION, SOLUTION INTRAVENOUS ONCE
Status: COMPLETED | OUTPATIENT
Start: 2024-11-19 | End: 2024-11-19

## 2024-11-19 RX ORDER — POTASSIUM CHLORIDE 1500 MG/1
40 TABLET, EXTENDED RELEASE ORAL ONCE
Status: COMPLETED | OUTPATIENT
Start: 2024-11-19 | End: 2024-11-19

## 2024-11-19 RX ADMIN — ESCITALOPRAM OXALATE 10 MG: 10 TABLET ORAL at 08:01

## 2024-11-19 RX ADMIN — POTASSIUM CHLORIDE 20 MEQ: 14.9 INJECTION, SOLUTION INTRAVENOUS at 10:06

## 2024-11-19 RX ADMIN — CHLORHEXIDINE GLUCONATE 0.12% ORAL RINSE 15 ML: 1.2 LIQUID ORAL at 08:01

## 2024-11-19 RX ADMIN — LEVETIRACETAM 500 MG: 500 TABLET, FILM COATED ORAL at 08:01

## 2024-11-19 RX ADMIN — SENNOSIDES AND DOCUSATE SODIUM 2 TABLET: 50; 8.6 TABLET ORAL at 08:01

## 2024-11-19 RX ADMIN — METOPROLOL TARTRATE 50 MG: 50 TABLET, FILM COATED ORAL at 08:01

## 2024-11-19 RX ADMIN — POTASSIUM CHLORIDE 20 MEQ: 14.9 INJECTION, SOLUTION INTRAVENOUS at 08:09

## 2024-11-19 RX ADMIN — CHLORHEXIDINE GLUCONATE 0.12% ORAL RINSE 15 ML: 1.2 LIQUID ORAL at 21:12

## 2024-11-19 RX ADMIN — CALCIUM GLUCONATE 1 G: 20 INJECTION, SOLUTION INTRAVENOUS at 08:17

## 2024-11-19 RX ADMIN — Medication 1 TABLET: at 16:30

## 2024-11-19 RX ADMIN — MEMANTINE 10 MG: 10 TABLET ORAL at 17:02

## 2024-11-19 RX ADMIN — THIAMINE HYDROCHLORIDE 500 MG: 100 INJECTION, SOLUTION INTRAMUSCULAR; INTRAVENOUS at 16:21

## 2024-11-19 RX ADMIN — THIAMINE HYDROCHLORIDE 500 MG: 100 INJECTION, SOLUTION INTRAMUSCULAR; INTRAVENOUS at 21:12

## 2024-11-19 RX ADMIN — THIAMINE HYDROCHLORIDE 500 MG: 100 INJECTION, SOLUTION INTRAMUSCULAR; INTRAVENOUS at 09:10

## 2024-11-19 RX ADMIN — FUROSEMIDE 40 MG: 40 TABLET ORAL at 08:01

## 2024-11-19 RX ADMIN — MAGNESIUM SULFATE HEPTAHYDRATE 1 G: 1 INJECTION, SOLUTION INTRAVENOUS at 09:09

## 2024-11-19 RX ADMIN — FOLIC ACID 1 MG: 5 INJECTION, SOLUTION INTRAMUSCULAR; INTRAVENOUS; SUBCUTANEOUS at 08:23

## 2024-11-19 RX ADMIN — LEVETIRACETAM 500 MG: 500 TABLET, FILM COATED ORAL at 21:12

## 2024-11-19 RX ADMIN — POTASSIUM CHLORIDE 40 MEQ: 1500 TABLET, EXTENDED RELEASE ORAL at 08:01

## 2024-11-19 RX ADMIN — AMLODIPINE BESYLATE 2.5 MG: 2.5 TABLET ORAL at 08:01

## 2024-11-19 RX ADMIN — MEMANTINE 10 MG: 10 TABLET ORAL at 08:01

## 2024-11-19 RX ADMIN — METOPROLOL TARTRATE 50 MG: 50 TABLET, FILM COATED ORAL at 21:12

## 2024-11-19 NOTE — PROGRESS NOTES
Progress Note - Trauma   Name: Khushbu Gandara 84 y.o. female I MRN: [id number]  Unit/Bed#: [facility] 602-01 I Date of Admission: 11/17/2024   Date of Service: 11/19/2024 I Hospital Day: 2    Assessment & Plan  Subdural hematoma (HCC)  - Neuro exam: GCS 15, non-focal  - Continue neurologic checks: Every 1 hours.  - Reversal agent administered: DDAVP  - CT scan of the head on 11/18 reviewed: stable  - Appreciate Neurosurgery evaluation and recommendations.   - 11/19 No plan for intervention at this time  - Complete 7 day course of Keppra for seizure prophylaxis  - Chemical DVT prophylaxis: Not cleared for chemical prophylaxis by neurosurgery at this time. Continue SCDs bilaterally.   - Hold all anticoagulants and anti platelet medications for 2 weeks and/or until cleared by Neurosurgery to resume.  - PT and OT (including cognitive evaluation) evaluation and treatment as indicated.    Ambulatory dysfunction  - Status post fall several weeks ago with the below noted injuries.  - Fall precautions.  - Geriatric Medicine consultation for evaluation, medication review and recommendations.  - PT and OT evaluation and treatment as indicated.  - Case Management consultation for disposition planning.    Dementia (HCC)  - Continue home medications  - Geriatrics consult, appreciate recommendations  - Delirium precautions  - Frequent reorientations  - PT/OT  Alcohol abuse  -Patient with history of alcohol abuse  -CIWA protocol  -Continue thiamine, folic acid, multivitamin  -Encourage cessation  -Outpatient follow-up with PCP  Depression  - Continue current medication regimen.  - Outpatient follow-up with PCP.      Bowel Regimen: Miralax, Senokot S  VTE Prophylaxis:Reason for no pharmacologic prophylaxis Not yet cleared by neurosurgery      Disposition: Continue current level of care. Appreciate ongoing neurosurgery evaluation.  Pending PT/OT evaluation.   Please contact the SecureChat role for the Trauma service with any  questions/concerns.    24 Hour Events : No acute events.   Subjective : Patient is doing well on exam this morning. She denies any pain at this time. She was evaluated by speech therapy and recommended a regular diet.     Objective :  Temp:  [97.5 °F (36.4 °C)-98.4 °F (36.9 °C)] 98.2 °F (36.8 °C)  HR:  [54-93] 61  BP: (140-186)/(58-87) 175/82  Resp:  [16-45] 18  SpO2:  [94 %-100 %] 96 %  O2 Device: None (Room air)    I/O         11/17 0701  11/18 0700 11/18 0701  11/19 0700 11/19 0701  11/20 0700    P.O.  60     I.V. (mL/kg)  1342.5 (19.4)     IV Piggyback  300     Total Intake(mL/kg)  1702.5 (24.6)     Urine (mL/kg/hr)  730 (0.4)     Total Output  730     Net  +972.5            Unmeasured Urine Occurrence  2 x             Physical Exam  Vitals and nursing note reviewed.   Constitutional:       General: She is not in acute distress.     Appearance: She is well-developed.   HENT:      Head: Normocephalic.   Eyes:      Conjunctiva/sclera: Conjunctivae normal.   Cardiovascular:      Rate and Rhythm: Normal rate and regular rhythm.      Heart sounds: No murmur heard.  Pulmonary:      Effort: Pulmonary effort is normal. No respiratory distress.      Breath sounds: Normal breath sounds.   Chest:      Chest wall: No tenderness.   Abdominal:      General: Abdomen is flat. There is no distension.      Palpations: Abdomen is soft.      Tenderness: There is no abdominal tenderness. There is no guarding.   Musculoskeletal:         General: No swelling. Normal range of motion.      Cervical back: Normal range of motion and neck supple.   Skin:     General: Skin is warm and dry.      Capillary Refill: Capillary refill takes less than 2 seconds.   Neurological:      General: No focal deficit present.      Mental Status: She is alert. Mental status is at baseline.      GCS: GCS eye subscore is 4. GCS verbal subscore is 4. GCS motor subscore is 6.      Sensory: No sensory deficit.   Psychiatric:         Mood and Affect: Mood normal.                Lab Results: I have reviewed the following results:  Recent Labs     11/17/24  1720 11/17/24  1720 11/18/24  0524 11/19/24  0438   WBC 8.53  --  7.99 5.99   HGB 14.2  --  12.0 11.7   HCT 41.9  --  34.7* 34.2*     --  196 181   SODIUM 134*  --  135 132*   K 3.9  --  3.3* 3.4*   CL 94*  --  96 96   CO2 28  --  29 29   BUN 10  --  12 10   CREATININE 0.81  --  0.86 0.68   GLUC 115  --  120 104   CAIONIZED  --   --   --  1.11*   MG  --    < > 1.4* 1.9   PHOS  --   --  3.7  --    AST 18  --   --   --    ALT 14  --   --   --    ALB 4.4  --   --   --    TBILI 0.74  --   --   --    ALKPHOS 70  --   --   --    PTT 27  --   --   --    INR 0.95  --  0.92  --     < > = values in this interval not displayed.       Imaging Results Review: I reviewed radiology reports from this admission including: CT head.  Other Study Results Review: No additional pertinent studies reviewed.

## 2024-11-19 NOTE — ASSESSMENT & PLAN NOTE
- Continue home medications  - Geriatrics consult, appreciate recommendations  - Delirium precautions  - Frequent reorientations  - PT/OT

## 2024-11-19 NOTE — PLAN OF CARE
Problem: OCCUPATIONAL THERAPY ADULT  Goal: Performs self-care activities at highest level of function for planned discharge setting.  See evaluation for individualized goals.  Description: Treatment Interventions: ADL retraining, Functional transfer training, Endurance training, Cognitive reorientation, Patient/family training, Equipment evaluation/education, Compensatory technique education, Energy conservation, Activityengagement          See flowsheet documentation for full assessment, interventions and recommendations.   Note: Limitation: Decreased ADL status, Decreased Safe judgement during ADL, Decreased cognition, Decreased endurance, Decreased self-care trans, Decreased high-level ADLs  Prognosis: Good  Assessment: 83 YO Female SEEN FOR INITIAL OCCUPATIONAL THERAPY EVALUATION FOLLOWING TXF FROM SLE-> [facility] FOLLOWING ROUTINE MRI AS PART OF HER DEMNTIA WORK-UP. PT FOUND TO HAVE SDH. OF NOTE, H/O MULTIPLE FALLS. PROBLEMS LIST/PMH INCLUDES BASELINE DEMENTIA, Anxiety, Arthritis, Prajapati esophagus, Cataract, Continuous chronic alcoholism (HCC), GERD (gastroesophageal reflux disease), Heavy alcohol use, Hyperlipidemia, Hypertension, Insomnia, and Palpitations. PT IS FROM HOME WITH FAMILY WHERE SHE IS OVERALL INDEPENDENT WITH ADLS AND HAS ASSIST WITH IADLS. PT CURRENTLY REQUIRES OVERALL MIN-MOD A WITH ADLS, AND MIN A WITH TRANSFERS / FUNCTIONAL MOBILITY WITH USE OF RW. PT IS LIMITED 2' PAIN, FATIGUE, IMPAIRED BALANCE, FALL RISK , LIMITED SAFETY AWARENESS/INSIGHT/JUDGEMENT, DISORIENTATION, OVERALL WEAKNESS/DECONDITIONING , INACCESSIBLE HOME ENVIRONMENT, and OVERALL LIMITED ACTIVITY TOLERANCE. PT EDUCATED ON DEEP BREATHING TECHNIQUES T/O ACTIVITY, SLOWING OF PACE, ENERGY CONSERVATION TECHNIQUES FOR CARRY OVER UPON D/C, INCREASED FAMILY SUPPORT, and CONTINUE PARTICIPATION IN SELF-CARE/MOBILITY WITH STAFF WHILE IN THE HOSPITAL . The patient's raw score on the AM-PAC Daily Activity Inpatient Short Form is 15. A raw score  of less than 19 suggests the patient may benefit from discharge to post-acute rehabilitation services. Please refer to the recommendation of the Occupational Therapist for safe discharge planning.  FROM AN OCCUPATIONAL THERAPY PERSPECTIVE, RECOMMEND LEVEL II RESOURCES UPON D/C. WILL CONT TO FOLLOW TO ADDRESS THE BELOW DESCRIBED GOALS.     Rehab Resource Intensity Level, OT: II (Moderate Resource Intensity)

## 2024-11-19 NOTE — PHYSICAL THERAPY NOTE
PHYSICAL THERAPY EVALUATION          Patient Name: Khushbu Gandara  Today's Date: 11/19/2024 11/19/24 0901   Note Type   Note type Evaluation   Pain Assessment   Pain Assessment Tool 0-10   Pain Score No Pain   Restrictions/Precautions   Weight Bearing Precautions Per Order No   Other Precautions Chair Alarm;Bed Alarm;Cognitive;Fall Risk   Home Living   Type of Home House   Home Layout Two level;Performs ADLs on one level;Able to live on main level with bedroom/bathroom  (2 steps to enter, first floor set-up)   Home Equipment Walker   Additional Comments Lives with spouse and daughter. Patient is a poor historian--all info taken from CM note.   Prior Function   Level of [place] Independent with ADLs;Needs assistance with IADLS   Lives With Spouse;Daughter   Receives Help From Family   Falls in the last 6 months 1 to 4   Vocational Retired   Comments No AD at baseline. Patient is a poor historian--all info taken from  note.   Cognition   Overall Cognitive Status Impaired   Orientation Level Oriented to person   Memory Decreased short term memory  (Could not remember month or year 5 minutes after being told)   Following Commands Follows one step commands with increased time or repetition   Subjective   Subjective Pt found resting in bed at beginning of session   RLE Assessment   RLE Assessment WFL   LLE Assessment   LLE Assessment WFL   Bed Mobility   Supine to Sit 4  Minimal assistance   Additional items Assist x 1;Increased time required;Verbal cues;LE management   Transfers   Sit to Stand 4  Minimal assistance   Additional items Assist x 1   Stand to Sit 4  Minimal assistance   Additional items Assist x 1   Ambulation/Elevation   Gait pattern Step to;Excessively slow;Short stride;Decreased foot clearance   Gait Assistance 4  Minimal assist   Additional items Assist x 2   Assistive Device Rolling walker   Distance 3'    Balance   Static Sitting Fair +   Static Standing Fair -   Ambulatory Poor +   Endurance Deficit   Endurance Deficit Yes   Endurance Deficit Description Weakness and cognition   Activity Tolerance   Activity Tolerance Patient limited by fatigue   Medical Staff Made Aware SHEILA Gamino; JOHN Allison   Nurse Made Aware Pt cleared for session per nursing   Assessment   Prognosis Good   Problem List Decreased strength;Decreased endurance;Impaired balance;Decreased mobility;Decreased cognition;Decreased safety awareness;Impaired judgement;Pain   Assessment Pt is a 84 y.o. female admitted to [facility] on 11/17/2024. Pt currently has a primary diagnosis of subdural hematoma following a mechanical fall with active problems of dementia, ambulatory dysfunction, insomnia, alcohol abuse, frailty, and depression. Pt  has a past medical history of Anxiety, Arthritis, Prajapati esophagus, Cataract, Continuous chronic alcoholism (HCC), GERD (gastroesophageal reflux disease), Heavy alcohol use, Hyperlipidemia, Hypertension, Insomnia, and Palpitations. Pt's prior level of mobility was independent. During the session, pt was able to perform all bed mobility with MinAx1, transfers with MinAx1, and ambulation with MinAx1 using a rolling walker. Pt has deficits primarily in cognition and balance, with further deficits in strength, endurance, and mobility. PT recommends discharge with level 2 resources and no equipment. Pt would be a good candidate for acute care PT to address the above deficits prior to discharge.   Goals   Patient Goals To go home   STG Expiration Date 12/03/24   Short Term Goal #1 In 14 days pt will complete: 1) Bed mobility skills with supervision to increase safety and independence as well as decrease caregiver burden. 2) Functional transfers with supervision to promote increased independence, safety, and QOL. 3) Ambulate 150' using least restrictive AD with supervision without LOB and stable vitals so that pt can negotiate  previous living environment safely and promote independence with functional mobility and return to PLOF. 4) Stair training up/down 2 steps using rails with supervision so that pt can enter/negotiate previous living environment safely and decrease fall risk. 5) Improve balance grades by 1/2 grade to increase safety with all mobility and decrease fall risk.  6) Improve BLE strength by 1/2 grade to help increase overall functional mobility and decrease fall risk.   Plan   Treatment/Interventions Functional transfer training;LE strengthening/ROM;Elevations;Therapeutic exercise;Endurance training;Cognitive reorientation;Patient/family training;Equipment eval/education;Bed mobility;Gait training;Continued evaluation;Spoke to nursing;OT   PT Frequency 3-5x/wk   Discharge Recommendation   Rehab Resource Intensity Level, PT II (Moderate Resource Intensity)   Equipment Recommended Walker   Walker Package Recommended Wheeled walker   Change/add to Walker Package? No   Additional Comments Pt already has at home   AM-PAC Basic Mobility Inpatient   Turning in Flat Bed Without Bedrails 3   Lying on Back to Sitting on Edge of Flat Bed Without Bedrails 3   Moving Bed to Chair 3   Standing Up From Chair Using Arms 3   Walk in Room 3   Climb 3-5 Stairs With Railing 2   Basic Mobility Inpatient Raw Score 17   Basic Mobility Standardized Score 39.67   [facility] Highest Level Of Mobility   -HLM Goal 5: Stand one or more mins   -HLM Achieved 4: Move to chair/commode   End of Consult   Patient Position at End of Consult Bedside chair;All needs within reach;Bed/Chair alarm activated     Tino Norman, SPT

## 2024-11-19 NOTE — CASE MANAGEMENT
Case Management Discharge Planning Note    Patient name Khushbu Gandara  Location [facility] 602-01 MRN [id number]  : 1940 Date 2024       Current Admission Date: 2024  Current Admission Diagnosis:Subdural hematoma (HCC)   Patient Active Problem List    Diagnosis Date Noted Date Diagnosed    Dementia (HCC) 2024     Insomnia 2024     Alcohol abuse 2024     Subdural hematoma (HCC) 2024     Frailty syndrome in geriatric patient 2024     Encounter for delirium elderly at risk (DEAR) screening 2024     Depression 2024     Stasis dermatitis of both legs 2023     Cellulitis of lower extremity 2023     Ambulatory dysfunction 2023     Dizziness 2023     Bilateral lower extremity edema 10/12/2023     Hip pain, acute, right 10/01/2023     Cerebral atrophy (HCC) 2023     Rash and nonspecific skin eruption 2023     Memory loss 2023     Displaced fracture of second metatarsal bone of left foot with routine healing 2022     Closed displaced fracture of third metatarsal bone of left foot 2022     Chondrocalcinosis of left knee 2022     Cervical spondylosis 2022     Closed displaced fracture of fourth metatarsal bone of left foot 2022     Fall 2022     Hyponatremia 2022     Breast mass, right 2022     Paresthesia of both hands 2022     Gastroesophageal reflux disease without esophagitis 03/15/2021     Vitamin D deficiency 2021     Other insomnia 09/15/2020     Heavy alcohol use 09/15/2020     Anxiety 09/15/2020     Essential hypertension 09/15/2020     Dyslipidemia 09/15/2020     Premature atrial contractions 2020     PVC's (premature ventricular contractions) 2020       LOS (days): 2  Geometric Mean LOS (GMLOS) (days): 3.3  Days to GMLOS:1.7     OBJECTIVE:  Risk of Unplanned Readmission Score: 18.53         Current admission status: Inpatient   Preferred  Pharmacy:   [facility] - [place], [place] - [address]  [address]  [address]  [facility] [address]  Phone: [phone] Fax: [phone]    [facility] - [place], [place] - [address]  [address]  [address]  [facility] [address]  Phone: [phone] Fax: [phone]    [id number] - [place], [place] - [address] [address]  Phone: [phone] Fax: [phone]    Primary Care Provider: Suly Fish MD    [facility]  Secondary Insurance:     DISCHARGE DETAILS:    Requested Home Health Care         Is the patient interested in HHC at discharge?: Yes  Home Health Discipline requested:: Physical Therapy, Nursing, Occupational Therapy  Home Health Agency Name:: [facility]  Home Health Follow-Up Provider:: PCP  Home Health Services Needed:: Evaluate Functional Status and Safety, Gait/ADL Training, Strengthening/Theraputic Exercises to Improve Function  Homebound Criteria Met:: Uses an Assist Device (i.e. cane, walker, etc), Requires the Assistance of Another Person for Safe Ambulation or to Leave the Home  Supporting Clincal Findings:: Limited Endurance, Fatigues Easliy in Short Distances    Other Referral/Resources/Interventions Provided:  Interventions: HHC      Treatment Team Recommendation: Home with Home Health Care  Discharge Destination Plan:: Home with Home Health Care     Pt accepted by [facility]

## 2024-11-19 NOTE — ASSESSMENT & PLAN NOTE
-Patient with history of alcohol abuse  -[facility] protocol  -Continue thiamine, folic acid, multivitamin  -Encourage cessation  -Outpatient follow-up with PCP

## 2024-11-19 NOTE — SPEECH THERAPY NOTE
Speech Language/Pathology  Speech-Language Pathology Bedside Swallow Evaluation      Patient Name: Khushbu Gandara    Today's Date: 11/19/2024     Problem List  Principal Problem:    Subdural hematoma (HCC)  Active Problems:    Ambulatory dysfunction    Dementia (HCC)    Insomnia    Alcohol abuse    Frailty syndrome in geriatric patient    Encounter for delirium elderly at risk (DEAR) screening    Depression      Past Medical History  Past Medical History:   Diagnosis Date    Anxiety     Arthritis     Prajapati esophagus     Cataract     Continuous chronic alcoholism (HCC)     GERD (gastroesophageal reflux disease)     Heavy alcohol use     Hyperlipidemia     Hypertension     Insomnia     Palpitations        Past Surgical History  Past Surgical History:   Procedure Laterality Date    BREAST IMPLANT Bilateral     CATARACT EXTRACTION Right     CHOLECYSTECTOMY      LAP    COLONOSCOPY      HYSTERECTOMY      age 63-[place]    JOINT REPLACEMENT Bilateral     hips-2010 and 2017-left leg is shorter    LASIK Bilateral     in her 50's    RI XCAPSL CTRC RMVL INSJ IO LENS PROSTH W/O ECP Left 10/18/2018    Procedure: EXTRACTION EXTRACAPSULAR CATARACT PHACO INTRAOCULAR LENS (IOL);  Surgeon: Giovanna Guerrero MD;  Location: [facility] MAIN OR;  Service: Ophthalmology    TONSILLECTOMY      TUBAL LIGATION         Summary   Pt presented with functional appearing oral and pharyngeal stage swallowing skills with materials administered today.  She is mildly impulsive with intake but is able to control and transfers the material without difficulty.        Risk/s for Aspiration: cognition     Recommended Diet: regular diet and thin liquids   Recommended Form of Meds: whole with liquid   Aspiration precautions and swallowing strategies: upright posture, slow rate of feeding, small bites/sips, and alternating bites and sips  Other Recommendations: Continue frequent oral care, please re consult if needed during stay        Current Medical Status  Pt is a 84  y.o. female who presented to [facility] as a transfer from [place], noted to have incidental subdural hematoma noted in the left frontal region on an MRI. Daughter accompanies her to the emergency department. Daughter states remote history of fall from toilet approximately 1 month ago. Per family there was no injury to the head at the time of fall. They did not seek medical care at that time.     Current Precautions:  Fall  Aspiration    Allergies:  No known food allergies    Past medical history:  Please see H&P for details    Special Studies:  CT head-Stable left holohemispheric mixed density subdural hematoma with comparable degree of regional mass effect as above. No midline shift. Continued surveillance is advised.          Social/Education/Vocational Hx:  Pt lives with family    Swallow Information   Current Risks for Dysphagia & Aspiration: AMS  Current Symptoms/Concerns:  pt w/ new neuro  Current Diet: NPO   Baseline Diet: regular diet and thin liquids      Baseline Assessment   Behavior/Cognition: alert  Speech/Language Status: able to participate in basic conversation and able to follow commands  Patient Positioning: upright in chair  Pain Status/Interventions/Response to Interventions:   No report of or nonverbal indications of pain.       Swallow Mechanism Exam  Facial: symmetrical  Labial: WFL  Lingual: WFL  Velum: unable to visualize  Mandible: adequate ROM  Dentition: adequate and upper dentures  Vocal quality: fair    Volitional Cough: unable to initiate volitional cough   Respiratory Status: on RA       Consistencies Assessed and Performance   Consistencies Administered: ice chips, thin liquids, puree, mechanical soft solids, and hard solids      Oral Stage: WFL-some reduced insight and some impulsive intake but overall able to control.  Mastication was adequate with the materials administered today.  Bolus formation and transfer were functional with no significant oral residue noted.  No  overt s/s reduced oral control.    Pharyngeal Stage: WFL  Swallow Mechanics:  Swallowing initiation appeared prompt.  Laryngeal rise was palpated and judged to be within functional limits.  No coughing, throat clearing, change in vocal quality or respiratory status noted today.     Esophageal Concerns: none reported    Strategies and Efficacy: -    Summary and Recommendations (see above)    Results Reviewed with: patient, RN, and PA     Treatment Recommended: not at this time

## 2024-11-19 NOTE — ASSESSMENT & PLAN NOTE
Mixed density left frontal parietal convexity SDH  Presented to the hospital after an outpatient MRI brain was done for dementia workup which demonstrated above findings.  Per patient's daughter she has noticed her mother's memory has declined over the past year but more recently over the past month having more difficulty with word finding.  Patient does endorse about a month ago having a fall in the bathroom.  Per chart review patient was on aspirin reversed with DDAVP.  Per patient's daughter she is not on any blood thinners.  Per patient's daughter patient drinks wine daily unsure the amount she drinks. H/o dementia   Patient very poor historian but offers no complaints.    Imaging reviewed with :    MRI brain without 11/17/2024:Large heterogeneous signal intensity left frontal parietal convexity subdural hematoma/collection, which is predominantly chronic, but has some T1 hyperintense subacute components, and measures at least 1.3 cm in maximal thickness. Mass effect on the left frontal lobe, but no midline shift noted.No acute infarct. Mild chronic white matter microangiopathic changes.    CT head without 11/18/2024:Stable left holohemispheric mixed density subdural hematoma with comparable degree of regional mass effect as above. No midline shift. Continued surveillance is advised.     Plan:  Continue frequent neurological checks.   STAT CT head with any neurological decline including drop GCS of 2pts within 1 hr.  Recommend SBP <160mmHg.  Seizure prophylaxis per trauma team  Hold all antiplatelet and anticoagulation medications.   Medical management and pain control per primary team  Mobilize with PT/OT  DVT PPX: SCDs and cleared from neurosurgical standpoint for pharmacological DVT prophylaxis  Both myself and Dr. Romero came and spoke with family.  No neurosurgical intervention warranted at this time.    Neurosurgery will sign off, patient will follow-up in 2 weeks with repeat CT head, call with  any further question or concerns.

## 2024-11-19 NOTE — PLAN OF CARE
Problem: PHYSICAL THERAPY ADULT  Goal: Performs mobility at highest level of function for planned discharge setting.  See evaluation for individualized goals.  Description: Treatment/Interventions: Functional transfer training, LE strengthening/ROM, Elevations, Therapeutic exercise, Endurance training, Cognitive reorientation, Patient/family training, Equipment eval/education, Bed mobility, Gait training, Continued evaluation, Spoke to nursing, OT  Equipment Recommended: Walker       See flowsheet documentation for full assessment, interventions and recommendations.  Note: Prognosis: Good  Problem List: Decreased strength, Decreased endurance, Impaired balance, Decreased mobility, Decreased cognition, Decreased safety awareness, Impaired judgement, Pain  Assessment: Pt is a 84 y.o. female admitted to [facility] on 11/17/2024. Pt currently has a primary diagnosis of subdural hematoma following a mechanical fall with active problems of dementia, ambulatory dysfunction, insomnia, alcohol abuse, frailty, and depression. Pt  has a past medical history of Anxiety, Arthritis, Prajapati esophagus, Cataract, Continuous chronic alcoholism (HCC), GERD (gastroesophageal reflux disease), Heavy alcohol use, Hyperlipidemia, Hypertension, Insomnia, and Palpitations. Pt's prior level of mobility was independent. During the session, pt was able to perform all bed mobility with MinAx1, transfers with MinAx1, and ambulation with MinAx1 using a rolling walker. Pt has deficits primarily in cognition and balance, with further deficits in strength, endurance, and mobility. PT recommends discharge with level 2 resources and no equipment. Pt would be a good candidate for acute care PT to address the above deficits prior to discharge.        Rehab Resource Intensity Level, PT: II (Moderate Resource Intensity)    See flowsheet documentation for full assessment.

## 2024-11-19 NOTE — ASSESSMENT & PLAN NOTE
Chronic use of ambien, although ambien not recommended in the older adult do not recommend abrupt withdrawal, recommend continuation with dose reduction to 2.5 mg po QHS as outpatient with goal to discontinue

## 2024-11-19 NOTE — OCCUPATIONAL THERAPY NOTE
Occupational Therapy Evaluation     Patient Name: Khushbu Gandara  Today's Date: 11/19/2024  Problem List  Principal Problem:    Subdural hematoma (HCC)  Active Problems:    Ambulatory dysfunction    Dementia (HCC)    Insomnia    Alcohol abuse    Frailty syndrome in geriatric patient    Encounter for delirium elderly at risk (DEAR) screening    Depression    Past Medical History  Past Medical History:   Diagnosis Date    Anxiety     Arthritis     Prajapati esophagus     Cataract     Continuous chronic alcoholism (HCC)     GERD (gastroesophageal reflux disease)     Heavy alcohol use     Hyperlipidemia     Hypertension     Insomnia     Palpitations      Past Surgical History  Past Surgical History:   Procedure Laterality Date    BREAST IMPLANT Bilateral     CATARACT EXTRACTION Right     CHOLECYSTECTOMY      LAP    COLONOSCOPY      HYSTERECTOMY      age 63-[place]    JOINT REPLACEMENT Bilateral     hips-2010 and 2017-left leg is shorter    LASIK Bilateral     in her 50's    AR XCAPSL CTRC RMVL INSJ IO LENS PROSTH W/O ECP Left 10/18/2018    Procedure: EXTRACTION EXTRACAPSULAR CATARACT PHACO INTRAOCULAR LENS (IOL);  Surgeon: Giovanna Guerrero MD;  Location: [facility] MAIN OR;  Service: Ophthalmology    TONSILLECTOMY      TUBAL LIGATION           11/19/24 0900   OT Last Visit   OT Visit Date 11/19/24   Note Type   Note type Evaluation   Pain Assessment   Pain Assessment Tool 0-10   Pain Score No Pain   Patient's Stated Pain Goal No pain   Hospital Pain Intervention(s) Repositioned;Ambulation/increased activity;Emotional support   Restrictions/Precautions   Weight Bearing Precautions Per Order No   Other Precautions Cognitive;Chair Alarm;Bed Alarm;Multiple lines;Fall Risk;Pain   Home Living   Type of Home House   Home Layout Two level;Able to live on main level with bedroom/bathroom;Stairs to enter with rails  (2 AR)   Home Equipment Walker   Additional Comments NO USE OF AD AT BASELINE   Prior Function   Level of [place]  "Independent with ADLs;Independent with functional mobility;Needs assistance with IADLS   Lives With Spouse;Daughter   Receives Help From Family   IADLs Family/Friend/Other provides transportation;Family/Friend/Other provides meals;Family/Friend/Other provides medication management   Falls in the last 6 months 1 to 4  (2)   Lifestyle   Autonomy PT IS A POOR HISTORIAN- INFORMATION OBTAINED FROM PT'S CHART. OVERALL INDEPENDENT WITH ADLS. HAS ASSIST WITH IADLS FROM FAMILY   Reciprocal Relationships LIVES WITH SPOUSE AND DAUGHTER. UNKNOWN LEVEL OF S/A   Service to Others STAY AT HOME MOM- REPORTS HAVING 6 CHILDREN   Intrinsic Gratification ENJOYS SPENDING TIME WITH FAMILY.   Subjective   Subjective \"I FEEL NORMAL\"   ADL   Eating Assistance 5  Supervision/Setup   Grooming Assistance 4  Minimal Assistance   UB Bathing Assistance 4  Minimal Assistance   LB Bathing Assistance 3  Moderate Assistance   UB Dressing Assistance 4  Minimal Assistance   LB Dressing Assistance 3  Moderate Assistance   Toileting Assistance  3  Moderate Assistance   Functional Assistance 3  Moderate Assistance   Bed Mobility   Supine to Sit 4  Minimal assistance   Additional items Assist x 1;Increased time required;Verbal cues;LE management   Sit to Supine Unable to assess   Additional Comments PT LEFT OOB WITH ALL NEEDS IN REACH + CHAIR ALARM ACTIVATED.   Transfers   Sit to Stand 4  Minimal assistance   Additional items Assist x 1;Increased time required;Verbal cues   Stand to Sit 4  Minimal assistance   Additional items Assist x 1;Increased time required;Verbal cues   Functional Mobility   Functional Mobility 4  Minimal assistance   Additional items Rolling walker   Balance   Static Sitting Fair +   Static Standing Fair -   Ambulatory Poor +   Activity Tolerance   Activity Tolerance Patient limited by fatigue  (COG)   Medical Staff Made Aware PT SEEN FOR CO-EVAL WITH SKILLED PHYSICAL THERAPIST 2' POLY-TRAUAMTIC INJURIES, NEW PRECAUTIONS/LIMITATIONS, " "AND LIMITED ACTIVITY TOLERANCE WHICH IMPACT PERFORMANCE AND ARE A REGRESSION FROM PT'S BASELINE.   Nurse Made Aware APPROPRIATE TO SEE PER RN.   RUE Assessment   RUE Assessment WFL   LUE Assessment   LUE Assessment WFL   Hand Function   Gross Motor Coordination Functional   Fine Motor Coordination Functional   Cognition   Overall Cognitive Status Impaired   Arousal/Participation Cooperative;Alert   Attention Attends with cues to redirect   Orientation Level Oriented to person;Oriented to place  (PERSON EXCLUDING AGE. GROSSLY ORINETED TO PLACE AS \"HOSPITAL\")   Memory Decreased recall of precautions;Decreased recall of recent events;Decreased short term memory   Following Commands Follows one step commands without difficulty   Comments PT IS PLEASANT AND COOPERATIVE. BASELINE DEMENTIA WITH ACUTE SDH. LIMITED INSIGHT INTO DEFICITS. ALARM ON FOR SAFETY AT ALL TIMES.   Assessment   Limitation Decreased ADL status;Decreased Safe judgement during ADL;Decreased cognition;Decreased endurance;Decreased self-care trans;Decreased high-level ADLs   Prognosis Good   Assessment 83 YO Female SEEN FOR INITIAL OCCUPATIONAL THERAPY EVALUATION FOLLOWING TXF FROM SLE-> [facility] FOLLOWING ROUTINE MRI AS PART OF HER DEMNTIA WORK-UP. PT FOUND TO HAVE SDH. OF NOTE, H/O MULTIPLE FALLS. PROBLEMS LIST/PMH INCLUDES BASELINE DEMENTIA, Anxiety, Arthritis, Prajapati esophagus, Cataract, Continuous chronic alcoholism (HCC), GERD (gastroesophageal reflux disease), Heavy alcohol use, Hyperlipidemia, Hypertension, Insomnia, and Palpitations. PT IS FROM HOME WITH FAMILY WHERE SHE IS OVERALL INDEPENDENT WITH ADLS AND HAS ASSIST WITH IADLS. PT CURRENTLY REQUIRES OVERALL MIN-MOD A WITH ADLS, AND MIN A WITH TRANSFERS / FUNCTIONAL MOBILITY WITH USE OF RW. PT IS LIMITED 2' PAIN, FATIGUE, IMPAIRED BALANCE, FALL RISK , LIMITED SAFETY AWARENESS/INSIGHT/JUDGEMENT, DISORIENTATION, OVERALL WEAKNESS/DECONDITIONING , INACCESSIBLE HOME ENVIRONMENT, and OVERALL LIMITED " ACTIVITY TOLERANCE. PT EDUCATED ON DEEP BREATHING TECHNIQUES T/O ACTIVITY, SLOWING OF PACE, ENERGY CONSERVATION TECHNIQUES FOR CARRY OVER UPON D/C, INCREASED FAMILY SUPPORT, and CONTINUE PARTICIPATION IN SELF-CARE/MOBILITY WITH STAFF WHILE IN THE HOSPITAL . The patient's raw score on the AM-PAC Daily Activity Inpatient Short Form is 15. A raw score of less than 19 suggests the patient may benefit from discharge to post-acute rehabilitation services. Please refer to the recommendation of the Occupational Therapist for safe discharge planning.  FROM AN OCCUPATIONAL THERAPY PERSPECTIVE, RECOMMEND LEVEL II RESOURCES UPON D/C. WILL CONT TO FOLLOW TO ADDRESS THE BELOW DESCRIBED GOALS.   Goals   Patient Goals TO RETURN HOME   LTG Time Frame 10-14   Long Term Goal #1 SEE BELOW   Plan   Treatment Interventions ADL retraining;Functional transfer training;Endurance training;Cognitive reorientation;Patient/family training;Equipment evaluation/education;Compensatory technique education;Energy conservation;Activityengagement   Goal Expiration Date 12/03/24   OT Frequency 2-3x/wk   Discharge Recommendation   Rehab Resource Intensity Level, OT II (Moderate Resource Intensity)   AM-PAC Daily Activity Inpatient   Lower Body Dressing 2   Bathing 2   Toileting 2   Upper Body Dressing 3   Grooming 3   Eating 3   Daily Activity Raw Score 15   Daily Activity Standardized Score (Calc for Raw Score >=11) 34.69   AM-PAC Applied Cognition Inpatient   Following a Speech/Presentation 2   Understanding Ordinary Conversation 3   Taking Medications 2   Remembering Where Things Are Placed or Put Away 2   Remembering List of 4-5 Errands 2   Taking Care of Complicated Tasks 2   Applied Cognition Raw Score 13   Applied Cognition Standardized Score 30.46       OCCUPATIONAL THERAPY GOALS TO BE MET WITHIN 14 DAYS:    -Pt will increase bed mobility to S to participate in functional activities.  -Pt will improve functional mobility and transfers to S  on/off all surfaces including toileting.  -Pt will increase independence in all ADLS to S with G balance sitting upright in chair.  -Pt will improve activity tolerance to G for 20 min txment sessions   -Pt will complete additional cognitive assessment with 100% attention to task in order to assist with safe d/c plan.       Documentation completed by EMY Cedillo, OTR/L  MOCA Certified ID# [id number]

## 2024-11-19 NOTE — PROGRESS NOTES
Progress Note - Geriatric Medicine   Name: Khushbu Gandara 84 y.o. female I MRN: [id number]  Unit/Bed#: [facility] 602-01 I Date of Admission: 11/17/2024   Date of Service: 11/19/2024 I Hospital Day: 2    Assessment & Plan  Dementia (HCC)  MOCA 11/30 (11/1/23)  At risk secondary to chronic alcoholism, CAD  CT head (11/18/24) microvascular ischemic changes  Continue namenda  Recommend check TSH and vitamin B 12 level  Recommend start of  thiamine and folate  Insomnia  Chronic use of ambien, although ambien not recommended in the older adult do not recommend abrupt withdrawal, recommend continuation with dose reduction to 2.5 mg po QHS as outpatient with goal to discontinue  Alcohol abuse  Per hx, heavy drinker  CIWA protocol  Recommend thiamine and folic acid  Recommend additive medicine for counseling  Ambulatory dysfunction  Exacerbated by fall  At risk secondary to age, alcohol use, frailty  Fall precautions  PT/OT  Subdural hematoma (HCC)  S/p fall on ASA  Given DDVAP  CT head (11/18/24) stable SDH  Neurosurgery on consult  Kera for seizure ppx  Frailty syndrome in geriatric patient  Lives with , family assists with IADLs  Pt independent with ADLs  Albumin 4.4, maintain protein in diet  PT/OT  Encounter for delirium elderly at risk (DEAR) screening  Appears to be at baseline, calm and cooperative  Oriented x 1  Provide frequent redirection, reorientation, distraction techniques  Avoid deliriogenic medications such as tramadol, benzodiazepines, anticholinergics,  Benadryl  Treat pain, See geriatric pain protocol  Monitor for constipation and urinary retention  Encourage early and frequent moblization, OOB  Encourage Hydration/ Nutrition  Implement sleep hygiene, limit night time interuptions, group activities  Depression  Doing well with escitalopram  Qtc 480    Subjective   She is oob in recliner chair. She is oriented x 1, calm and cooperative. Significant word finding on exam  Rounded with nursing who reports  no signs of withdrawal, tremors, periods of confusion, appears to be baseline.     Objective :  Temp:  [97.5 °F (36.4 °C)-98.4 °F (36.9 °C)] 98 °F (36.7 °C)  HR:  [54-93] 63  BP: (126-186)/(64-87) 126/66  Resp:  [16-33] 18  SpO2:  [94 %-99 %] 98 %  O2 Device: None (Room air)    Physical Exam  Vitals and nursing note reviewed.   HENT:      Head: Normocephalic.      Nose: No congestion.      Mouth/Throat:      Mouth: Mucous membranes are moist.   Eyes:      General:         Right eye: No discharge.         Left eye: No discharge.   Cardiovascular:      Rate and Rhythm: Normal rate and regular rhythm.      Pulses: Normal pulses.   Pulmonary:      Effort: Pulmonary effort is normal.      Breath sounds: Normal breath sounds.   Abdominal:      General: Bowel sounds are normal.      Palpations: Abdomen is soft.   Musculoskeletal:         General: Normal range of motion.      Cervical back: Normal range of motion.   Skin:     General: Skin is warm and dry.   Neurological:      Mental Status: She is alert and oriented to person, place, and time. Mental status is at baseline.   Psychiatric:         Mood and Affect: Mood normal.         Lab Results: I have reviewed the following results:CBC/BMP:   .     11/19/24  0438   WBC 5.99   HGB 11.7   HCT 34.2*      SODIUM 132*   K 3.4*   CL 96   CO2 29   BUN 10   CREATININE 0.68   GLUC 104   CAIONIZED 1.11*   MG 1.9        Therapies:   Basic Mobility Inpatient Raw Score: 11  -[facility] Goal: 4: Move to chair/commode  -HL Achieved: 6: Walk 10 steps or more      VTE Prophylaxis: Sequential compression device (Venodyne)     Code Status: Level 1 - Full Code      Family and Social Support:   Living Arrangements: Lives w/ Spouse/significant other; Lives w/ Daughter  Support Systems: Spouse/significant other; Self; Daughter  Type of Current Residence: 2 story home  Discharge planning discussed with:: Judy Osman (Daughter) [phone] (W) [phone]  Freedom of Choice: Yes     DEAN

## 2024-11-19 NOTE — PROGRESS NOTES
Progress Note - Neurosurgery   Name: Khushbu Gandara 84 y.o. female I MRN: [id number]  Unit/Bed#: [facility] 602-01 I Date of Admission: 11/17/2024   Date of Service: 11/19/2024 I Hospital Day: 2  Assessment & Plan  Dementia (HCC)    Alcohol abuse    Subdural hematoma (HCC)  Mixed density left frontal parietal convexity SDH  Presented to the hospital after an outpatient MRI brain was done for dementia workup which demonstrated above findings.  Per patient's daughter she has noticed her mother's memory has declined over the past year but more recently over the past month having more difficulty with word finding.  Patient does endorse about a month ago having a fall in the bathroom.  Per chart review patient was on aspirin reversed with DDAVP.  Per patient's daughter she is not on any blood thinners.  Per patient's daughter patient drinks wine daily unsure the amount she drinks. H/o dementia   Patient very poor historian but offers no complaints.    Imaging reviewed with :    MRI brain without 11/17/2024:Large heterogeneous signal intensity left frontal parietal convexity subdural hematoma/collection, which is predominantly chronic, but has some T1 hyperintense subacute components, and measures at least 1.3 cm in maximal thickness. Mass effect on the left frontal lobe, but no midline shift noted.No acute infarct. Mild chronic white matter microangiopathic changes.    CT head without 11/18/2024:Stable left holohemispheric mixed density subdural hematoma with comparable degree of regional mass effect as above. No midline shift. Continued surveillance is advised.     Plan:  Continue frequent neurological checks.   STAT CT head with any neurological decline including drop GCS of 2pts within 1 hr.  Recommend SBP <160mmHg.  Seizure prophylaxis per trauma team  Hold all antiplatelet and anticoagulation medications.   Medical management and pain control per primary team  Mobilize with PT/OT  DVT PPX: SCDs and cleared from  neurosurgical standpoint for pharmacological DVT prophylaxis  Both myself and Dr. Romero came and spoke with family.  No neurosurgical intervention warranted at this time.    Neurosurgery will sign off, patient will follow-up in 2 weeks with repeat CT head, call with any further question or concerns.  Depression        Subjective Patient offers no complaints    Objective : She is comfortably sitting out in the recliner, NAD.    Temp:  [97.8 °F (36.6 °C)-98.4 °F (36.9 °C)] 98 °F (36.7 °C)  HR:  [54-93] 63  BP: (126-186)/(66-87) 126/66  Resp:  [16-27] 18  SpO2:  [94 %-98 %] 98 %  O2 Device: None (Room air)    I/O         11/17 0701 11/18 0700 11/18 0701 11/19 0700 11/19 0701 11/20 0700    P.O.  60 0    I.V. (mL/kg)  1342.5 (19)     IV Piggyback  300 300    Total Intake(mL/kg)  1702.5 (24.1) 300 (4.2)    Urine (mL/kg/hr)  730 (0.4)     Total Output  730     Net  +972.5 +300           Unmeasured Urine Occurrence  3 x 1 x    Unmeasured Stool Occurrence   2 x            General appearance: alert, appears stated age, cooperative and no distress  Head: Normocephalic, without obvious abnormality, atraumatic  Eyes: EOMI, PERRL, conjugate gaze  Neck: supple, symmetrical, trachea midline  Lungs: non labored breathing  Heart: regular heart rate  Neurologic:   Mental status: Alert, oriented x3 when choices given, continues to have some expressive aphasia, following commands  Cranial nerves: grossly intact (Cranial nerves II-XII)  Sensory: normal to light touch all extremities x 4  Motor: moving all extremities without focal weakness, strength at least 4/5 throughout  Coordination: finger to nose normal bilaterally, no drift bilaterally     Lab Results: I have reviewed the following results:  Recent Labs     11/17/24  1720 11/17/24  1720 11/18/24  0524 11/19/24  0438   WBC 8.53  --  7.99 5.99   HGB 14.2  --  12.0 11.7   HCT 41.9  --  34.7* 34.2*     --  196 181   SODIUM 134*  --  135 132*   K 3.9  --  3.3* 3.4*   CL 94*   --  96 96   CO2 28  --  29 29   BUN 10  --  12 10   CREATININE 0.81  --  0.86 0.68   GLUC 115  --  120 104   CAIONIZED  --   --   --  1.11*   MG  --    < > 1.4* 1.9   PHOS  --   --  3.7  --    AST 18  --   --   --    ALT 14  --   --   --    ALB 4.4  --   --   --    TBILI 0.74  --   --   --    ALKPHOS 70  --   --   --    PTT 27  --   --   --    INR 0.95  --  0.92  --     < > = values in this interval not displayed.       VTE Pharmacologic Prophylaxis: Sequential compression device (Venodyne)

## 2024-11-19 NOTE — PROGRESS NOTES
Patient:  WALTER KLEIN    MRN:  [id number]    Aidin Request ID:  [id number]    Level of care reserved:  Home Health Agency    Partner Reserved:  [facility], [place] [address] [phone]    Clinical needs requested:    Geography searched:  [phone]    Start of Service:    Request sent:  1:36pm EST on 11/19/2024 by Donn Soto    Partner reserved:  2:31pm EST on 11/19/2024 by Donn Soto    Choice list shared:  2:31pm EST on 11/19/2024 by Donn Soto

## 2024-11-19 NOTE — DISCHARGE INSTR - AVS FIRST PAGE
Neurosurgery discharge instructions following traumatic head bleed:     Do not take any blood thinning medications (ie. No Advil. No motrin. No ibuprofen. No Aleve. No Aspirin. No fishoil. No heparin. No antiplatelet / no anticoagulation medication).  Refrain from activity that increases chance of trauma to head or falls. Recommend you take fall precaution.  No strenuous activity or sports.  Return to hospital Emergency Room if you experience worsening / new headache, nausea/vomiting, speech/vision change, seizure, confusion / mental status change, weakness, or other neurological changes.      Follow-up as scheduled with a repeat CT head without contrast to be completed 2-3 days prior to visit.  Prescription has been entered electronically.  Please call [phone] to schedule.     Additional Discharge Instructions:    Please follow-up as instructed. If you need a follow-up appointment, please call the office when you leave to schedule an appointment.    Activity:  - PT and OT evaluation and treatment as indicated.  - Walking and normal light activities are encouraged.  - No driving until cleared by a physician.    Diet:    - You may resume your normal diet.    Medications:  - You should continue your current medication regimen after discharge unless otherwise instructed. Please refer to your discharge medication list for further details. Please follow up with your PCP to discuss weaning off of Ambien as per Gerontology recommendations. On discharge, you should begin to wean the Ambien by taking 2.5 mg (1/2 tablet) rather than 5 mg daily.  - Please take the pain medications as directed.  - You may become constipated, especially if taking pain medications. You may take any over the counter stool softeners or laxatives as needed. Examples: Milk of Magnesia, Colace, Senna.

## 2024-11-19 NOTE — ASSESSMENT & PLAN NOTE
- Status post fall several weeks ago with the below noted injuries.  - Fall precautions.  - Geriatric Medicine consultation for evaluation, medication review and recommendations.  - PT and OT evaluation and treatment as indicated.  - Case Management consultation for disposition planning.

## 2024-11-19 NOTE — TELEPHONE ENCOUNTER
11/19/24 - PT IN [facility]  12/5/24 2 WK HFU W/CT HEAD     NUBIA Iniguez Neurosurgical Ger Clerical  Patient needs 2-week hospital follow-up with AP with CT head

## 2024-11-19 NOTE — ASSESSMENT & PLAN NOTE
S/p fall on ASA  Given DDVAP  CT head (11/18/24) stable SDH  Neurosurgery on consult  [facility]

## 2024-11-19 NOTE — ASSESSMENT & PLAN NOTE
- Neuro exam: GCS 15, non-focal  - Continue neurologic checks: Every 1 hours.  - Reversal agent administered: DDAVP  - CT scan of the head on 11/18 reviewed: stable  - Appreciate Neurosurgery evaluation and recommendations.   - 11/19 No plan for intervention at this time  - Complete 7 day course of Keppra for seizure prophylaxis  - Chemical DVT prophylaxis: Not cleared for chemical prophylaxis by neurosurgery at this time. Continue SCDs bilaterally.   - Hold all anticoagulants and anti platelet medications for 2 weeks and/or until cleared by Neurosurgery to resume.  - PT and OT (including cognitive evaluation) evaluation and treatment as indicated.

## 2024-11-20 VITALS
RESPIRATION RATE: 16 BRPM | HEART RATE: 61 BPM | OXYGEN SATURATION: 95 % | SYSTOLIC BLOOD PRESSURE: 172 MMHG | WEIGHT: 156.97 LBS | TEMPERATURE: 98.2 F | BODY MASS INDEX: 25.23 KG/M2 | HEIGHT: 66 IN | DIASTOLIC BLOOD PRESSURE: 98 MMHG

## 2024-11-20 LAB
ANION GAP SERPL CALCULATED.3IONS-SCNC: 8 MMOL/L (ref 4–13)
BASOPHILS # BLD AUTO: 0.03 THOUSANDS/ÂΜL (ref 0–0.1)
BASOPHILS NFR BLD AUTO: 1 % (ref 0–1)
BUN SERPL-MCNC: 9 MG/DL (ref 5–25)
CALCIUM SERPL-MCNC: 8.1 MG/DL (ref 8.4–10.2)
CHLORIDE SERPL-SCNC: 100 MMOL/L (ref 96–108)
CO2 SERPL-SCNC: 25 MMOL/L (ref 21–32)
CREAT SERPL-MCNC: 0.62 MG/DL (ref 0.6–1.3)
EOSINOPHIL # BLD AUTO: 0.13 THOUSAND/ÂΜL (ref 0–0.61)
EOSINOPHIL NFR BLD AUTO: 2 % (ref 0–6)
ERYTHROCYTE [DISTWIDTH] IN BLOOD BY AUTOMATED COUNT: 12.2 % (ref 11.6–15.1)
GFR SERPL CREATININE-BSD FRML MDRD: 81 ML/MIN/1.73SQ M
GLUCOSE SERPL-MCNC: 101 MG/DL (ref 65–140)
HCT VFR BLD AUTO: 33.9 % (ref 34.8–46.1)
HGB BLD-MCNC: 11.3 G/DL (ref 11.5–15.4)
IMM GRANULOCYTES # BLD AUTO: 0.02 THOUSAND/UL (ref 0–0.2)
IMM GRANULOCYTES NFR BLD AUTO: 0 % (ref 0–2)
LYMPHOCYTES # BLD AUTO: 1.77 THOUSANDS/ÂΜL (ref 0.6–4.47)
LYMPHOCYTES NFR BLD AUTO: 31 % (ref 14–44)
MAGNESIUM SERPL-MCNC: 1.7 MG/DL (ref 1.9–2.7)
MCH RBC QN AUTO: 33.6 PG (ref 26.8–34.3)
MCHC RBC AUTO-ENTMCNC: 33.3 G/DL (ref 31.4–37.4)
MCV RBC AUTO: 101 FL (ref 82–98)
MONOCYTES # BLD AUTO: 0.48 THOUSAND/ÂΜL (ref 0.17–1.22)
MONOCYTES NFR BLD AUTO: 8 % (ref 4–12)
NEUTROPHILS # BLD AUTO: 3.3 THOUSANDS/ÂΜL (ref 1.85–7.62)
NEUTS SEG NFR BLD AUTO: 58 % (ref 43–75)
NRBC BLD AUTO-RTO: 0 /100 WBCS
PHOSPHATE SERPL-MCNC: 2.8 MG/DL (ref 2.3–4.1)
PLATELET # BLD AUTO: 191 THOUSANDS/UL (ref 149–390)
PMV BLD AUTO: 9.8 FL (ref 8.9–12.7)
POTASSIUM SERPL-SCNC: 3.9 MMOL/L (ref 3.5–5.3)
RBC # BLD AUTO: 3.36 MILLION/UL (ref 3.81–5.12)
SODIUM SERPL-SCNC: 133 MMOL/L (ref 135–147)
WBC # BLD AUTO: 5.73 THOUSAND/UL (ref 4.31–10.16)

## 2024-11-20 PROCEDURE — 84100 ASSAY OF PHOSPHORUS: CPT

## 2024-11-20 PROCEDURE — NC001 PR NO CHARGE: Performed by: SURGERY

## 2024-11-20 PROCEDURE — 99239 HOSP IP/OBS DSCHRG MGMT >30: CPT | Performed by: PHYSICIAN ASSISTANT

## 2024-11-20 PROCEDURE — 85025 COMPLETE CBC W/AUTO DIFF WBC: CPT

## 2024-11-20 PROCEDURE — 83735 ASSAY OF MAGNESIUM: CPT

## 2024-11-20 PROCEDURE — 80048 BASIC METABOLIC PNL TOTAL CA: CPT

## 2024-11-20 PROCEDURE — 97530 THERAPEUTIC ACTIVITIES: CPT

## 2024-11-20 RX ORDER — LEVETIRACETAM 500 MG/1
500 TABLET ORAL EVERY 12 HOURS SCHEDULED
Qty: 7 TABLET | Refills: 0 | Status: SHIPPED | OUTPATIENT
Start: 2024-11-20 | End: 2024-11-24

## 2024-11-20 RX ORDER — ZOLPIDEM TARTRATE 5 MG/1
2.5 TABLET ORAL
Start: 2024-11-20 | End: 2024-11-30

## 2024-11-20 RX ADMIN — LEVETIRACETAM 500 MG: 500 TABLET, FILM COATED ORAL at 10:03

## 2024-11-20 RX ADMIN — HYDRALAZINE HYDROCHLORIDE 5 MG: 20 INJECTION INTRAMUSCULAR; INTRAVENOUS at 11:31

## 2024-11-20 RX ADMIN — POLYETHYLENE GLYCOL 3350 17 G: 17 POWDER, FOR SOLUTION ORAL at 10:02

## 2024-11-20 RX ADMIN — CHLORHEXIDINE GLUCONATE 0.12% ORAL RINSE 15 ML: 1.2 LIQUID ORAL at 10:03

## 2024-11-20 RX ADMIN — ESCITALOPRAM OXALATE 10 MG: 10 TABLET ORAL at 10:03

## 2024-11-20 RX ADMIN — AMLODIPINE BESYLATE 2.5 MG: 2.5 TABLET ORAL at 10:03

## 2024-11-20 RX ADMIN — Medication 1 TABLET: at 10:03

## 2024-11-20 RX ADMIN — MEMANTINE 10 MG: 10 TABLET ORAL at 10:03

## 2024-11-20 RX ADMIN — FUROSEMIDE 40 MG: 40 TABLET ORAL at 10:03

## 2024-11-20 RX ADMIN — THIAMINE HYDROCHLORIDE 500 MG: 100 INJECTION, SOLUTION INTRAMUSCULAR; INTRAVENOUS at 10:15

## 2024-11-20 RX ADMIN — METOPROLOL TARTRATE 50 MG: 50 TABLET, FILM COATED ORAL at 10:03

## 2024-11-20 RX ADMIN — FOLIC ACID 1 MG: 5 INJECTION, SOLUTION INTRAMUSCULAR; INTRAVENOUS; SUBCUTANEOUS at 10:15

## 2024-11-20 RX ADMIN — SENNOSIDES AND DOCUSATE SODIUM 2 TABLET: 50; 8.6 TABLET ORAL at 10:03

## 2024-11-20 NOTE — PROGRESS NOTES
Progress Note - Trauma   Name: Khushbu Gandara 84 y.o. female I MRN: [id number]  Unit/Bed#: [facility] 602-01 I Date of Admission: 11/17/2024   Date of Service: 11/20/2024 I Hospital Day: 3    Assessment & Plan  Subdural hematoma (HCC)  - Neuro exam: GCS 14 (4, 4, 6), due to confusion  - Continue neurologic checks: Every 4 hours.  - Reversal agent administered: DDAVP  - CT scan of the head on 11/18 reviewed: stable  - Appreciate Neurosurgery evaluation and recommendations.   - 11/19 No plan for intervention at this time  - Complete 7 day course of Keppra for seizure prophylaxis (end date 11/24)   - Chemical DVT prophylaxis: cleared from NSGY standpoint for pharmacological DVT ppx   - Hold all anticoagulants and anti platelet medications for 2 weeks and/or until cleared by Neurosurgery to resume.  - PT and OT (including cognitive evaluation) evaluation and treatment as indicated.  Ambulatory dysfunction  - Status post fall several weeks ago with the below noted injuries.  - Fall precautions.  - Geriatric Medicine consultation for evaluation, medication review and recommendations.  - PT and OT evaluation and treatment as indicated.  - Case Management consultation for disposition planning.  Dementia (HCC)  - Continue home medications  - Geriatrics consult, appreciate recommendations  - Delirium precautions  - Frequent reorientations  - PT/OT  Alcohol abuse  -Patient with history of alcohol abuse  -CIWA protocol  -Continue thiamine, folic acid, multivitamin  -Encourage cessation  -Outpatient follow-up with PCP  Depression  - Continue current medication regimen.  - Outpatient follow-up with PCP.    Bowel Regimen: Miralax, Senokot S   VTE Prophylaxis:Sequential compression device (Venodyne)      Disposition: Anticipate home discharge with [facility] today     24 Hour Events : NSGY advised against MMA embolization.   Subjective : patient has no complaints. Denies any pain, n/v, shortness of breath.     Objective :  Temp:  [97.7 °F (36.5  °C)-98.3 °F (36.8 °C)] 98.3 °F (36.8 °C)  HR:  [56-67] 58  BP: (125-147)/(66-79) 147/79  Resp:  [12-18] 16  SpO2:  [96 %-98 %] 97 %  O2 Device: None (Room air)    I/O         11/18 0701  11/19 0700 11/19 0701  11/20 0700    P.O. 60 600    I.V. (mL/kg) 1342.5 (19) 690 (9.7)    IV Piggyback 300 300    Total Intake(mL/kg) 1702.5 (24.1) 1590 (22.3)    Urine (mL/kg/hr) 730 (0.4)     Total Output 730     Net +972.5 +1590          Unmeasured Urine Occurrence 3 x 1 x    Unmeasured Stool Occurrence  2 x            Physical Exam  Vitals and nursing note reviewed.   Constitutional:       General: She is not in acute distress.     Appearance: Normal appearance. She is well-developed. She is not ill-appearing, toxic-appearing or diaphoretic.   HENT:      Head: Normocephalic and atraumatic.   Eyes:      Conjunctiva/sclera: Conjunctivae normal.   Cardiovascular:      Rate and Rhythm: Normal rate and regular rhythm.      Heart sounds: No murmur heard.  Pulmonary:      Effort: Pulmonary effort is normal. No respiratory distress.      Breath sounds: Normal breath sounds.   Abdominal:      Palpations: Abdomen is soft.      Tenderness: There is no abdominal tenderness.   Musculoskeletal:         General: No swelling. Normal range of motion.      Cervical back: Normal range of motion and neck supple. No rigidity.   Skin:     General: Skin is warm and dry.      Capillary Refill: Capillary refill takes less than 2 seconds.   Neurological:      General: No focal deficit present.      Mental Status: She is alert. Mental status is at baseline.      GCS: GCS eye subscore is 4. GCS verbal subscore is 4. GCS motor subscore is 6.      Sensory: No sensory deficit.      Motor: No weakness.   Psychiatric:         Mood and Affect: Mood normal.              Lab Results: I have reviewed the following results:  Recent Labs     11/17/24  1720 11/17/24  1720 11/18/24  0524 11/19/24  0438 11/20/24  0443   WBC 8.53  --  7.99 5.99 5.73   HGB 14.2  --  12.0  11.7 11.3*   HCT 41.9  --  34.7* 34.2* 33.9*     --  196 181 191   SODIUM 134*  --  135 132* 133*   K 3.9  --  3.3* 3.4* 3.9   CL 94*  --  96 96 100   CO2 28  --  29 29 25   BUN 10  --  12 10 9   CREATININE 0.81  --  0.86 0.68 0.62   GLUC 115  --  120 104 101   CAIONIZED  --   --   --  1.11*  --    MG  --    < > 1.4* 1.9 1.7*   PHOS  --    < > 3.7  --  2.8   AST 18  --   --   --   --    ALT 14  --   --   --   --    ALB 4.4  --   --   --   --    TBILI 0.74  --   --   --   --    ALKPHOS 70  --   --   --   --    PTT 27  --   --   --   --    INR 0.95  --  0.92  --   --     < > = values in this interval not displayed.

## 2024-11-20 NOTE — PLAN OF CARE
Problem: PHYSICAL THERAPY ADULT  Goal: Performs mobility at highest level of function for planned discharge setting.  See evaluation for individualized goals.  Description: Treatment/Interventions: Functional transfer training, LE strengthening/ROM, Elevations, Therapeutic exercise, Endurance training, Cognitive reorientation, Patient/family training, Equipment eval/education, Bed mobility, Gait training, Continued evaluation, Spoke to nursing, OT  Equipment Recommended: Walker       See flowsheet documentation for full assessment, interventions and recommendations.  Outcome: Progressing  Note: Prognosis: Good  Problem List:  (elevated BP - decrease cognition)  Assessment: pt OOb in chair - spouse present - pt incorrectly staes  spouse's name at first -  oriented to  person  and  hospital but not which one or name of hospital -  knows she is in the hospital 2* fall with head strike . pt   stood and reported dizziness - BP taken on  L  and R  UE with pressures 177/101 and  179/106. no further activity  - nurse made aware  of BPs        Rehab Resource Intensity Level, PT: II (Moderate Resource Intensity)    See flowsheet documentation for full assessment.

## 2024-11-20 NOTE — DISCHARGE SUMMARY
Discharge Summary - Trauma   Name: Khushbu Gandara 84 y.o. female I MRN: [id number]  Unit/Bed#: [facility] 602-01 I Date of Admission: 11/17/2024   Date of Service: 11/20/2024 I Hospital Day: 3      Discharge Summary - Trauma Service   Khushbu Gandara 84 y.o. female MRN: [id number]  Unit/Bed#: [facility] 602-01 Encounter: [id number]    Admission Date: 11/17/2024     Discharge Date: 11/20/2024    Admitting Diagnosis: SDH (subdural hematoma) (formerly [facility]) [S06.5XAA]  Unspecified multiple injuries, initial encounter [T07.XXXA]    Discharge Diagnosis:     History of ambulatory dysfunction and status post fall several weeks ago.  Subdural hematoma.  Chronic history of dementia.  Chronic history of alcohol abuse.  Chronic history of dementia.    Attending and Service: Dr. Sierra, [facility].    Consulting Physician(s):     Geriatric medicine.  Neurosurgery.    Imaging and Procedures Performed:     CT head wo contrast  Result Date: 11/18/2024  Impression: Stable left holohemispheric mixed density subdural hematoma with comparable degree of regional mass effect as above. No midline shift. Continued surveillance is advised. Workstation performed: [id number]     XR chest 2 views  Result Date: 11/18/2024  Impression: No acute cardiopulmonary disease. Workstation performed: [id number]     MRI brain wo contrast  Result Date: 11/17/2024  Impression: Large heterogeneous signal intensity left frontal parietal convexity subdural hematoma/collection, which is predominantly chronic, but has some T1 hyperintense subacute components, and measures at least 1.3 cm in maximal thickness. Mass effect on the left frontal lobe, but no midline shift noted. No acute infarct. Mild chronic white matter microangiopathic changes. I personally discussed this study with Alvaro IRAHETA on 11/17/2024 4:51 PM. Workstation performed: [id number]       Hospital Course: Khushbu Gandara is a 84-year-old female who presented to [facility] initially and  was subsequently transferred to [facility] for trauma evaluation and management.  During her initial evaluation at [facility], she was found to have an acute on chronic subdural hematoma in setting of a fall several weeks ago and was administered DDAVP due to history of aspirin use.  At baseline, she is noted to have some mild expressive aphasia and difficulty following commands.  On her initial evaluation by the trauma service at [facility], her primary survey was unremarkable.  On secondary survey, she was afebrile with normal vital signs.  Her initial workup included labs and the above and imaging studies.    She is admitted to the trauma service status post recent fall with acute on chronic subdural hematoma and multiple medical comorbidities as outlined above.  Neurosurgery was consulted and recommended conservative/nonoperative management.  They did discuss risks versus benefits of MMA embolization and it was not recommended at this time.  Geriatric medicine was consulted and assisted with medication review medical management.  PT and OT evaluated her and case management was consulted to assist with disposition planning.  The patient and her family are opting for discharge home with family support and home health services.  She is deemed stable for discharge on 11/20/2024.  For further details of her hospital encounter, please see her complete medical records.    On discharge, the patient is instructed to follow-up with the patient's primary care provider to review the events of the patient's recent hospitalization. The patient is instructed to follow-up in the Trauma Clinic as needed.  The patient is instructed to follow-up with neurosurgery in 2 weeks for reevaluation.  The patient should follow the provided discharge instructions.    Condition at Discharge: stable     Discharge instructions/Information to patient and family:   See after visit summary for information  provided to patient and family.      Provisions for Follow-Up Care:  See after visit summary for information related to follow-up care and any pertinent home health orders.      Disposition: See After Visit Summary for discharge disposition information.    Planned Readmission: No    Discharge Statement   I spent 25 minutes discharging the patient. This time was spent on the day of discharge. I had direct contact with the patient on the day of discharge. Additional documentation is required if more than 30 minutes were spent on discharge.     Discharge Medications:  See after visit summary for reconciled discharge medications provided to patient and family.      Philip Gregory PA-C  11/20/2024  11:24 AM

## 2024-11-20 NOTE — PHYSICAL THERAPY NOTE
"   Physical Therapy Progress Note    Patient's Name:Khushbu Gandara    Today's Date:11/20/24 11/20/24 1116   PT Last Visit   PT Visit Date 11/20/24   Note Type   Note Type Treatment   Pain Assessment   Pain Assessment Tool 0-10   Pain Score No Pain   General   Chart Reviewed Yes   Family/Caregiver Present Yes   Cognition   Overall Cognitive Status Impaired   Arousal/Participation Alert;Cooperative   Attention Within functional limits   Following Commands Follows one step commands without difficulty   Subjective   Subjective \" i  don't know \"  when asked how she was today   Transfers   Sit to Stand   (CG)   Additional Comments pt stood for  approx 1' and reported dizziness - unchanged within the min   Ambulation/Elevation   Ambulation/Elevation Additional Comments no amb today 2* high BP   Balance   Static Sitting Fair +   Static Standing Fair -   Exercises   Hip Flexion Bilateral;AROM;5 reps   Knee AROM Long Arc Quad Bilateral;AROM;5 reps   Assessment   Prognosis Good   Problem List   (elevated BP - decrease cognition)   Assessment pt OOb in chair - spouse present - pt incorrectly staes  spouse's name at first -  oriented to  person  and  hospital but not which one or name of hospital -  knows she is in the hospital 2* fall with head strike . pt   stood and reported dizziness - BP taken on  L  and R  UE with pressures 177/101 and  179/106. no further activity  - nurse made aware  of BPs   Goals   Patient Goals none stated   STG Expiration Date 12/03/24   PT Treatment Day 1   Plan   Treatment/Interventions Functional transfer training;LE strengthening/ROM;Elevations;Therapeutic exercise;Endurance training;Cognitive reorientation;Patient/family training;Bed mobility;Gait training;Spoke to nursing   Progress Slow progress, medical status limitations   PT Frequency 3-5x/wk   Discharge Recommendation   Rehab Resource Intensity Level, PT II (Moderate Resource Intensity)   AM-PAC Basic Mobility Inpatient   Turning in " Flat Bed Without Bedrails 3   Lying on Back to Sitting on Edge of Flat Bed Without Bedrails 3   Moving Bed to Chair 3   Standing Up From Chair Using Arms 3   Walk in Room 3   Climb 3-5 Stairs With Railing 2   Basic Mobility Inpatient Raw Score 17   Basic Mobility Standardized Score 39.67   [facility] Highest Level Of Mobility   -[facility] Goal 5: Stand one or more mins   -HLM Achieved 5: Stand (1 or more minutes)             Salma Brown, PTA

## 2024-11-20 NOTE — ASSESSMENT & PLAN NOTE
- Neuro exam: GCS 14 (4, 4, 6), due to confusion  - Continue neurologic checks: Every 4 hours.  - Reversal agent administered: DDAVP  - CT scan of the head on 11/18 reviewed: stable  - Appreciate Neurosurgery evaluation and recommendations.   - 11/19 No plan for intervention at this time  - Complete 7 day course of Keppra for seizure prophylaxis (end date 11/24)   - Chemical DVT prophylaxis: cleared from NSGY standpoint for pharmacological DVT ppx   - Hold all anticoagulants and anti platelet medications for 2 weeks and/or until cleared by Neurosurgery to resume.  - PT and OT (including cognitive evaluation) evaluation and treatment as indicated.

## 2024-11-20 NOTE — CERTIFIED RECOVERY SPECIALIST
"   Certified  Note    Patient name: Khushbu Gandara  Location: [facility] 602/[facility] 602-01  [place]: [facility]  Attending:  Kirstie Sierra MD MRN [id number]  : 1940  Age: 84 y.o.    Sex: female Date 2024         Substance Use History:     Social History     Substance and Sexual Activity   Alcohol Use Yes    Alcohol/week: 20.0 standard drinks of alcohol    Types: 20 Glasses of wine per week    Comment:  \"wine every once in awhile\"        Social History     Substance and Sexual Activity   Drug Use No     CRS involvement not appropriate         Anaid Jimenez"

## 2024-11-20 NOTE — CASE MANAGEMENT
Case Management Discharge Planning Note    Patient name Khushbu Gandara  Location [facility] 602/[facility] 602-01 MRN [id number]  : 1940 Date 2024       Current Admission Date: 2024  Current Admission Diagnosis:Subdural hematoma (HCC)   Patient Active Problem List    Diagnosis Date Noted Date Diagnosed    Dementia (HCC) 2024     Insomnia 2024     Alcohol abuse 2024     Subdural hematoma (HCC) 2024     Frailty syndrome in geriatric patient 2024     Encounter for delirium elderly at risk (DEAR) screening 2024     Depression 2024     Stasis dermatitis of both legs 2023     Cellulitis of lower extremity 2023     Ambulatory dysfunction 2023     Dizziness 2023     Bilateral lower extremity edema 10/12/2023     Hip pain, acute, right 10/01/2023     Cerebral atrophy (HCC) 2023     Rash and nonspecific skin eruption 2023     Memory loss 2023     Displaced fracture of second metatarsal bone of left foot with routine healing 2022     Closed displaced fracture of third metatarsal bone of left foot 2022     Chondrocalcinosis of left knee 2022     Cervical spondylosis 2022     Closed displaced fracture of fourth metatarsal bone of left foot 2022     Fall 2022     Hyponatremia 2022     Breast mass, right 2022     Paresthesia of both hands 2022     Gastroesophageal reflux disease without esophagitis 03/15/2021     Vitamin D deficiency 2021     Other insomnia 09/15/2020     Heavy alcohol use 09/15/2020     Anxiety 09/15/2020     Essential hypertension 09/15/2020     Dyslipidemia 09/15/2020     Premature atrial contractions 2020     PVC's (premature ventricular contractions) 2020       LOS (days): 3  Geometric Mean LOS (GMLOS) (days): 3.3  Days to GMLOS:0.8     OBJECTIVE:  Risk of Unplanned Readmission Score: 19.16         Current admission status: Inpatient   Preferred  Pharmacy:   [facility] - [place], [place] - [address]  [address]  [address]  [facility] [address]  Phone: [phone] Fax: [phone]    [facility] - [place], [place] - [address]  [address]  [address]  [facility] [address]  Phone: [phone] Fax: [phone]    [id number] - [place], [place] - [address] [place] [address]  Phone: [phone] Fax: [phone]    Primary Care Provider: Suly Fish MD    Primary Insurance: [employer]  Secondary Insurance:     DISCHARGE DETAILS:       Pt will d/c home today with family support and [facility]

## 2024-11-20 NOTE — ASSESSMENT & PLAN NOTE
-Patient with history of alcohol abuse  -[facility] protocol  -Continue thiamine, folic acid, multivitamin  -Encourage cessation  -Outpatient follow-up with PCP

## 2024-11-20 NOTE — PLAN OF CARE
Problem: Potential for Falls  Goal: Patient will remain free of falls  Description: INTERVENTIONS:  - Educate patient/family on patient safety including physical limitations  - Instruct patient to call for assistance with activity   - Consult OT/PT to assist with strengthening/mobility   - Keep Call bell within reach  - Keep bed low and locked with side rails adjusted as appropriate  - Keep care items and personal belongings within reach  - Initiate and maintain comfort rounds  - Make Fall Risk Sign visible to staff  - Offer Toileting every 2 Hours, in advance of need  - Initiate/Maintain bed alarm  - Obtain necessary fall risk management equipment: alarms/bracelets/socks  - Apply yellow socks and bracelet for high fall risk patients  - Consider moving patient to room near nurses station  Outcome: Progressing     Problem: Prexisting or High Potential for Compromised Skin Integrity  Goal: Skin integrity is maintained or improved  Description: INTERVENTIONS:  - Identify patients at risk for skin breakdown  - Assess and monitor skin integrity  - Assess and monitor nutrition and hydration status  - Monitor labs   - Assess for incontinence   - Turn and reposition patient  - Assist with mobility/ambulation  - Relieve pressure over bony prominences  - Avoid friction and shearing  - Provide appropriate hygiene as needed including keeping skin clean and dry  - Evaluate need for skin moisturizer/barrier cream  - Collaborate with interdisciplinary team   - Patient/family teaching  - Consider wound care consult   Outcome: Progressing     Problem: NEUROSENSORY - ADULT  Goal: Achieves stable or improved neurological status  Description: INTERVENTIONS  - Monitor and report changes in neurological status  - Monitor vital signs such as temperature, blood pressure, glucose, and any other labs ordered   - Initiate measures to prevent increased intracranial pressure  - Monitor for seizure activity and implement precautions if  appropriate      Outcome: Progressing

## 2024-11-21 ENCOUNTER — HOME CARE VISIT (OUTPATIENT)
Dept: HOME HEALTH SERVICES | Facility: HOME HEALTHCARE | Age: 84
End: 2024-11-21
Payer: COMMERCIAL

## 2024-11-21 VITALS
OXYGEN SATURATION: 98 % | RESPIRATION RATE: 20 BRPM | TEMPERATURE: 97.2 F | HEART RATE: 63 BPM | SYSTOLIC BLOOD PRESSURE: 138 MMHG | DIASTOLIC BLOOD PRESSURE: 70 MMHG

## 2024-11-21 PROCEDURE — G0299 HHS/HOSPICE OF RN EA 15 MIN: HCPCS

## 2024-11-21 PROCEDURE — 400013 VN SOC

## 2024-11-21 NOTE — TELEPHONE ENCOUNTER
11/21/2024- PT WAS DISCHARGED HOME. CALLED PT AND SPOKE WITH PT'S  CONFIRMING 12/05/2024 APT W/ CTH NEEDED PRIOR. PT'S  ASKED IF THE APT COULD BE CHANGED TO THE [place] OFFICE. PT'S APT WAS RESCHEDULED TO 12/12/2024 IN [place] WITH THE [facility] THAT IS SCHEDULED ON 12/05/2024.

## 2024-11-22 ENCOUNTER — HOME CARE VISIT (OUTPATIENT)
Dept: HOME HEALTH SERVICES | Facility: HOME HEALTHCARE | Age: 84
End: 2024-11-22
Payer: COMMERCIAL

## 2024-11-22 NOTE — CASE COMMUNICATION
PC to pt to attempt to schedule home PT initial evaluation visit. pt currently declined home visit for today due to being tired. [facility] office will cont to follow.    Thank you,  Coby Bird DPT, COS-C

## 2024-11-25 ENCOUNTER — HOME CARE VISIT (OUTPATIENT)
Dept: HOME HEALTH SERVICES | Facility: HOME HEALTHCARE | Age: 84
End: 2024-11-25
Payer: COMMERCIAL

## 2024-11-25 PROCEDURE — G0151 HHCP-SERV OF PT,EA 15 MIN: HCPCS

## 2024-11-26 ENCOUNTER — HOME CARE VISIT (OUTPATIENT)
Dept: HOME HEALTH SERVICES | Facility: HOME HEALTHCARE | Age: 84
End: 2024-11-26
Payer: COMMERCIAL

## 2024-11-26 VITALS — HEART RATE: 59 BPM | OXYGEN SATURATION: 95 % | SYSTOLIC BLOOD PRESSURE: 115 MMHG | DIASTOLIC BLOOD PRESSURE: 70 MMHG

## 2024-11-26 PROCEDURE — G0152 HHCP-SERV OF OT,EA 15 MIN: HCPCS

## 2024-11-27 ENCOUNTER — HOME CARE VISIT (OUTPATIENT)
Dept: HOME HEALTH SERVICES | Facility: HOME HEALTHCARE | Age: 84
End: 2024-11-27
Payer: COMMERCIAL

## 2024-11-27 VITALS
TEMPERATURE: 97.8 F | HEART RATE: 60 BPM | SYSTOLIC BLOOD PRESSURE: 136 MMHG | OXYGEN SATURATION: 98 % | RESPIRATION RATE: 17 BRPM | DIASTOLIC BLOOD PRESSURE: 86 MMHG

## 2024-11-27 PROCEDURE — G0299 HHS/HOSPICE OF RN EA 15 MIN: HCPCS

## 2024-12-02 ENCOUNTER — TELEPHONE (OUTPATIENT)
Age: 84
End: 2024-12-02

## 2024-12-02 ENCOUNTER — HOME CARE VISIT (OUTPATIENT)
Dept: HOME HEALTH SERVICES | Facility: HOME HEALTHCARE | Age: 84
End: 2024-12-02
Payer: COMMERCIAL

## 2024-12-02 PROCEDURE — G0299 HHS/HOSPICE OF RN EA 15 MIN: HCPCS

## 2024-12-02 NOTE — TELEPHONE ENCOUNTER
Home Care RN calls back today, asking for Dr. Fish's recommendation on the note below.      Please advise.       Ami Wallace RN to MD SOWMYA [facility]    11/27/24  6:16 PM  SN saw patient in the home today 11/27/24.  SN reviewed medications with patients spouse.  On AVS it states for patient to continue with the following medications but they are not in patients home  -Meclizine 12.5mg every 8 hours as needed  -Potassium 20meq daily  -Biotin 5000mcg daily  -Fish oil 1000 mg daily  -Niacin 500mg daily    Patient stated she is experiencing dizziness off and on.    Please advise if patient should be taking these medications and if so the ones that the patient cannot get over the counter can you please send a script to the patients pharmacy.    Thank you!

## 2024-12-03 VITALS
DIASTOLIC BLOOD PRESSURE: 98 MMHG | TEMPERATURE: 97.8 F | RESPIRATION RATE: 18 BRPM | SYSTOLIC BLOOD PRESSURE: 148 MMHG | OXYGEN SATURATION: 97 % | HEART RATE: 60 BPM

## 2024-12-05 ENCOUNTER — HOSPITAL ENCOUNTER (OUTPATIENT)
Dept: CT IMAGING | Facility: HOSPITAL | Age: 84
End: 2024-12-05
Payer: COMMERCIAL

## 2024-12-05 DIAGNOSIS — S06.5XAA SUBDURAL HEMATOMA (HCC): ICD-10-CM

## 2024-12-05 PROCEDURE — 70450 CT HEAD/BRAIN W/O DYE: CPT

## 2024-12-06 DIAGNOSIS — R42 DIZZINESS: ICD-10-CM

## 2024-12-06 RX ORDER — MECLIZINE HCL 12.5 MG 12.5 MG/1
12.5 TABLET ORAL EVERY 8 HOURS PRN
Qty: 90 TABLET | Refills: 0 | Status: SHIPPED | OUTPATIENT
Start: 2024-12-06

## 2024-12-09 ENCOUNTER — RA CDI HCC (OUTPATIENT)
Dept: OTHER | Facility: HOSPITAL | Age: 84
End: 2024-12-09

## 2024-12-09 ENCOUNTER — HOME CARE VISIT (OUTPATIENT)
Dept: HOME HEALTH SERVICES | Facility: HOME HEALTHCARE | Age: 84
End: 2024-12-09
Payer: COMMERCIAL

## 2024-12-09 VITALS
HEART RATE: 57 BPM | OXYGEN SATURATION: 96 % | SYSTOLIC BLOOD PRESSURE: 138 MMHG | TEMPERATURE: 97.3 F | DIASTOLIC BLOOD PRESSURE: 92 MMHG | RESPIRATION RATE: 16 BRPM

## 2024-12-09 PROCEDURE — G0299 HHS/HOSPICE OF RN EA 15 MIN: HCPCS

## 2024-12-12 ENCOUNTER — OFFICE VISIT (OUTPATIENT)
Dept: NEUROSURGERY | Facility: CLINIC | Age: 84
End: 2024-12-12
Payer: COMMERCIAL

## 2024-12-12 VITALS
OXYGEN SATURATION: 96 % | HEIGHT: 66 IN | HEART RATE: 73 BPM | SYSTOLIC BLOOD PRESSURE: 130 MMHG | BODY MASS INDEX: 25.07 KG/M2 | WEIGHT: 156 LBS | DIASTOLIC BLOOD PRESSURE: 70 MMHG | TEMPERATURE: 97.3 F

## 2024-12-12 DIAGNOSIS — S06.5XAA SUBDURAL HEMATOMA (HCC): Primary | ICD-10-CM

## 2024-12-12 PROCEDURE — 99214 OFFICE O/P EST MOD 30 MIN: CPT | Performed by: PHYSICIAN ASSISTANT

## 2024-12-12 NOTE — ASSESSMENT & PLAN NOTE
Patient presents for 2-week follow-up for acute on chronic left hemispheric subdural hematoma that was noted in November 17, 2024 when patient had workup for dementia with an MRI brain.  Per patient's family, there was a remote history of fall about a month prior to the MRI brain.  Patient was previously on aspirin which continues to be held due to the subdural hematoma.    Imaging reviewed personally. Final results below discussed with the patient.  CT head wo 12/5/2024: Slightly decreased size of the left hemispheric subdural hematoma with minimal mass effect.  No midline shift.  No new intercranial hemorrhage or infarction.    Plan  Recommend SBP goal <160mmHg. Pt to avoid high blood pressure.   Pain/headache management with OTC pain meds/prescribed medications as needed.  Discussed with patient/family to avoid any blood thinning medications and continue to hold ASA.  Recommend safety precautions to avoid trauma to head or falls. Patient to avoid strenuous activity.  Pt's daughter reports that MMA embolization was discussed in the hospital, however given pt's dementia and medical status, MMA embolization was deferred. Given stable imaging, will maintain the same plan and continue to defer embolization.   No neurosurgical intervention is anticipated at this time. Pt will require continued surveillance.   Pt will have follow up with neurosurgery in 4 weeks with repeat CTH wo.   Discussed with patient to return to hospital Emergency Room if they experience worsening / new headache, nausea/vomiting, speech/vision change, seizure, confusion / mental status change, weakness, or other neurological changes.   Patient and her family verbalized understanding and were in agreement with the plan.  Patient/family made aware to contact neurosurgery with any questions or concerns.      Orders:    CT head wo contrast; Future

## 2024-12-12 NOTE — PROGRESS NOTES
Name: Khushbu Gandara      : 1940      MRN: [id number]  Encounter Provider: Jacquie Gauthier PA-C  Encounter Date: 2024   Encounter department: [facility] NEUROSURGICAL [facility]  :  Assessment & Plan  Subdural hematoma (HCC)  Patient presents for 2-week follow-up for acute on chronic left hemispheric subdural hematoma that was noted in 2024 when patient had workup for dementia with an MRI brain.  Per patient's family, there was a remote history of fall about a month prior to the MRI brain.  Patient was previously on aspirin which continues to be held due to the subdural hematoma.    Imaging reviewed personally. Final results below discussed with the patient.  CT head wo 2024: Slightly decreased size of the left hemispheric subdural hematoma with minimal mass effect.  No midline shift.  No new intercranial hemorrhage or infarction.    Plan  Recommend SBP goal <160mmHg. Pt to avoid high blood pressure.   Pain/headache management with OTC pain meds/prescribed medications as needed.  Discussed with patient/family to avoid any blood thinning medications and continue to hold ASA.  Recommend safety precautions to avoid trauma to head or falls. Patient to avoid strenuous activity.  Pt's daughter reports that MMA embolization was discussed in the hospital, however given pt's dementia and medical status, MMA embolization was deferred. Given stable imaging, will maintain the same plan and continue to defer embolization.   No neurosurgical intervention is anticipated at this time. Pt will require continued surveillance.   Pt will have follow up with neurosurgery in 4 weeks with repeat CTH wo.   Discussed with patient to return to hospital Emergency Room if they experience worsening / new headache, nausea/vomiting, speech/vision change, seizure, confusion / mental status change, weakness, or other neurological changes.   Patient and her family verbalized understanding and were in agreement  with the plan.  Patient/family made aware to contact neurosurgery with any questions or concerns.      Orders:    CT head wo contrast; Future        History of Present Illness      Patient is an 84-year-old female with past medical history of anxiety, arthritis, Prajapati esophagus, cataracts, alcoholism, GERD, hyperlipidemia, and hypertension who presents to the neurosurgery office for follow-up for acute on chronic left subdural hematoma with that was noted when patient had workup with an MRI brain for dementia workup.  Per patient's daughter who accompany her to this visit, patient had a fall about a month prior to the MRI.  Patient was previously taking aspirin that was held due to the subdural hematoma and continues to be held.    Per patient's daughter, over the past year, patient has been having worsening memory and word finding difficulties.  Today, patient denies having any new visual disturbance.  Patient does report having some headache and dizziness at times.  Patient reports she continues to have speech difficulties and word finding difficulties.  Patient denies any new numbness, tingling or weakness.  Patient denies nausea or vomiting.          Review of Systems   Constitutional:  Positive for fatigue. Negative for appetite change (Decreased).   HENT: Negative.  Negative for tinnitus.    Eyes: Negative.  Negative for visual disturbance.   Respiratory: Negative.     Cardiovascular: Negative.    Gastrointestinal: Negative.    Endocrine: Negative.    Genitourinary: Negative.  Negative for frequency and urgency.   Musculoskeletal: Negative.    Skin: Negative.    Allergic/Immunologic: Negative.    Neurological:  Positive for dizziness (just sitting still), speech difficulty (word finding) and headaches.        2 wk HFU  Subdural hematoma    CT Head on 12/5/24  C/o left dise of her face hurting  No AC/Non smoker/No previous brain sx    Hematological: Negative.    Psychiatric/Behavioral:  Positive for confusion  "(Dementia dx). Negative for sleep disturbance.    All other systems reviewed and are negative.   I have personally reviewed the MA's review of systems and made changes as necessary.       Objective   /70 (Patient Position: Sitting, Cuff Size: Standard)   Pulse 73   Temp (!) 97.3 °F (36.3 °C) (Temporal)   Ht 5' 6\" (1.676 m)   Wt 70.8 kg (156 lb)   SpO2 96%   BMI 25.18 kg/m²     Physical Exam  Constitutional:       General: She is not in acute distress.     Appearance: She is well-developed.   HENT:      Head: Normocephalic.   Eyes:      Extraocular Movements: Extraocular movements intact.      Pupils: Pupils are equal, round, and reactive to light.   Neck:      Trachea: No tracheal deviation.   Cardiovascular:      Rate and Rhythm: Normal rate.   Pulmonary:      Effort: Pulmonary effort is normal.   Abdominal:      Palpations: Abdomen is soft.      Tenderness: There is no abdominal tenderness. There is no guarding.   Musculoskeletal:      Cervical back: Normal range of motion and neck supple.   Skin:     General: Skin is warm and dry.      Coloration: Skin is not pale.   Neurological:      Mental Status: She is alert.      Comments: GCS 14 (-1 confusion), Awake, Alert, Oriented x 2 to person and place, but not oriented to time.     Motor: PHIPPS, strength 5-/5 throughout    Sensation:  intact to LT X 4     Reflexes: 2+ and symmetric, no luciano's or clonus     Coordination: no drift bilateral upper extremities.          Psychiatric:         Mood and Affect: Mood normal.       Neurologic Exam     Cranial Nerves     CN III, IV, VI   Pupils are equal, round, and reactive to light.    Administrative Statements   I have spent a total time of 30 minutes in caring for this patient on the day of the visit/encounter including Diagnostic results, Risks and benefits of tx options, Instructions for management, Patient and family education, Importance of tx compliance, Risk factor reductions, Impressions, Counseling / " Coordination of care, Documenting in the medical record, Reviewing / ordering tests, medicine, procedures  , Obtaining or reviewing history  , and Communicating with other healthcare professionals .

## 2024-12-16 ENCOUNTER — TELEPHONE (OUTPATIENT)
Age: 84
End: 2024-12-16

## 2024-12-16 ENCOUNTER — OFFICE VISIT (OUTPATIENT)
Dept: FAMILY MEDICINE CLINIC | Facility: CLINIC | Age: 84
End: 2024-12-16
Payer: COMMERCIAL

## 2024-12-16 VITALS
TEMPERATURE: 98.1 F | SYSTOLIC BLOOD PRESSURE: 110 MMHG | DIASTOLIC BLOOD PRESSURE: 80 MMHG | HEIGHT: 65 IN | HEART RATE: 71 BPM | RESPIRATION RATE: 16 BRPM | BODY MASS INDEX: 25.66 KG/M2 | OXYGEN SATURATION: 94 % | WEIGHT: 154 LBS

## 2024-12-16 DIAGNOSIS — E78.5 DYSLIPIDEMIA: ICD-10-CM

## 2024-12-16 DIAGNOSIS — I10 ESSENTIAL HYPERTENSION: ICD-10-CM

## 2024-12-16 DIAGNOSIS — F03.90 DEMENTIA, UNSPECIFIED DEMENTIA SEVERITY, UNSPECIFIED DEMENTIA TYPE, UNSPECIFIED WHETHER BEHAVIORAL, PSYCHOTIC, OR MOOD DISTURBANCE OR ANXIETY (HCC): ICD-10-CM

## 2024-12-16 DIAGNOSIS — G31.9 CEREBRAL ATROPHY (HCC): ICD-10-CM

## 2024-12-16 DIAGNOSIS — F10.90 HEAVY ALCOHOL USE: ICD-10-CM

## 2024-12-16 DIAGNOSIS — G47.09 OTHER INSOMNIA: ICD-10-CM

## 2024-12-16 DIAGNOSIS — R14.0 ABDOMINAL DISTENTION: ICD-10-CM

## 2024-12-16 DIAGNOSIS — R54 FRAILTY SYNDROME IN GERIATRIC PATIENT: ICD-10-CM

## 2024-12-16 DIAGNOSIS — F41.9 ANXIETY: ICD-10-CM

## 2024-12-16 DIAGNOSIS — R60.0 BILATERAL LOWER EXTREMITY EDEMA: ICD-10-CM

## 2024-12-16 DIAGNOSIS — G47.00 INSOMNIA, UNSPECIFIED TYPE: ICD-10-CM

## 2024-12-16 DIAGNOSIS — I87.2 STASIS DERMATITIS OF BOTH LEGS: ICD-10-CM

## 2024-12-16 DIAGNOSIS — S06.5XAA SUBDURAL HEMATOMA (HCC): Primary | ICD-10-CM

## 2024-12-16 DIAGNOSIS — I07.1 TRICUSPID VALVE INSUFFICIENCY, UNSPECIFIED ETIOLOGY: ICD-10-CM

## 2024-12-16 PROCEDURE — 99214 OFFICE O/P EST MOD 30 MIN: CPT | Performed by: INTERNAL MEDICINE

## 2024-12-16 PROCEDURE — G2211 COMPLEX E/M VISIT ADD ON: HCPCS | Performed by: INTERNAL MEDICINE

## 2024-12-16 RX ORDER — ZOLPIDEM TARTRATE 5 MG/1
5 TABLET ORAL
Qty: 20 TABLET | Refills: 0 | Status: SHIPPED | OUTPATIENT
Start: 2024-12-16 | End: 2024-12-16

## 2024-12-16 NOTE — PROGRESS NOTES
Assessment/Plan:Have Khushbu return for MAWV as today's visit was too complicated to do it all         Problem List Items Addressed This Visit       Other insomnia    Pt really demanding her zolpidem-I told her I don't think it's a good idea at her age to stay on it but she's telling me she can't sleep without it, etc etc-finally agreed to get an rx for 20 pills to use very prn         Heavy alcohol use    Relevant Orders    CBC and differential    Comprehensive metabolic panel    Anxiety    Relevant Orders    CBC and differential    Essential hypertension    Well controlled on amlodipine and lisinopril, want sbp below 160         Dyslipidemia    Relevant Orders    CBC and differential    TSH, 3rd generation    Cerebral atrophy (HCC)    Bilateral lower extremity edema    Relevant Orders    Echo complete w/ contrast if indicated    Stasis dermatitis of both legs    On lasix, I think may be related to tricuspid regurg, last echo from 9/23 showed normal EF and diastolic function and mild-mod TR         Dementia (HCC)    Relevant Medications    zolpidem (AMBIEN) 5 mg tablet    Insomnia    Relevant Medications    zolpidem (AMBIEN) 5 mg tablet    Subdural hematoma (HCC) - Primary    Being followed by Neurosurgery, no surgical intervention recommended keep sbp below 160 and stay away from ASA and any thinners         Frailty syndrome in geriatric patient    Relevant Orders    CBC and differential    Comprehensive metabolic panel    TSH, 3rd generation     Other Visit Diagnoses         Abdominal distention        Relevant Orders    CT abdomen pelvis wo contrast      Tricuspid valve insufficiency, unspecified etiology        Relevant Orders    Echo complete w/ contrast if indicated              Subjective:      Patient ID: Khushbu Gandara is a 84 y.o. female.    Khushbu here with her  Yo and daughter Lubna-she has a hx of HTN, dementia, cerebral atrophy, hx of heavy alcohol use, insomnia, advanced age, recently found to  have a left sided subdural hematoma, chronic in appearance. Was hospitalized and sees Neurosurgery who reommended no neurosurgical intervention for now.  Recommended bp staying below 160 systolic and avoiding ASA and thinners-doing ok at home, no recent falls, has very poor insight and obvious cognitive impairment        The following portions of the patient's history were reviewed and updated as appropriate:   Past Medical History:  She has a past medical history of Anxiety, Arthritis, Prajapati esophagus, Cataract, Continuous chronic alcoholism (HCC), GERD (gastroesophageal reflux disease), Heavy alcohol use, Hyperlipidemia, Hypertension, Insomnia, and Palpitations.,  _______________________________________________________________________  Medical Problems:  does not have any pertinent problems on file.,  _______________________________________________________________________  Past Surgical History:   has a past surgical history that includes Cataract extraction (Right); LASIK (Bilateral); Joint replacement (Bilateral); Colonoscopy; Tonsillectomy; Tubal ligation; Hysterectomy; Cholecystectomy; pr xcapsl ctrc rmvl insj io lens prosth w/o ecp (Left, 10/18/2018); and BREAST IMPLANT (Bilateral).,  _______________________________________________________________________  Family History:  family history includes Cancer in her mother and sister; Diabetes in her daughter; Heart disease (age of onset: 60) in her father; No Known Problems in her sister.,  _______________________________________________________________________  Social History:   reports that she has never smoked. She has never used smokeless tobacco. She reports current alcohol use of about 20.0 standard drinks of alcohol per week. She reports that she does not use drugs.,  _______________________________________________________________________  Allergies:  has no known  allergies..  _______________________________________________________________________  Current Outpatient Medications   Medication Sig Dispense Refill    acetaminophen (TYLENOL) 325 mg tablet Take 2 tablets (650 mg total) by mouth every 6 (six) hours as needed for mild pain  0    amLODIPine (NORVASC) 2.5 mg tablet Take 1 tablet (2.5 mg total) by mouth daily 90 tablet 1    Calcium Carb-Cholecalciferol (CALCIUM + D3 PO) Take 1 tablet by mouth daily.      Cholecalciferol (VITAMIN D) 2000 units CAPS Take 1 capsule by mouth 3 (three) times a week Pt taking 5000iu daily      clobetasol (TEMOVATE) 0.05 % cream Apply topically 2 (two) times a day 90 g 5    escitalopram (LEXAPRO) 10 mg tablet TAKE 1 TABLET BY MOUTH DAILY 90 tablet 1    furosemide (LASIX) 40 mg tablet Take 1 tablet (40 mg total) by mouth daily 30 tablet 5    lisinopril (ZESTRIL) 10 mg tablet Take 1 tablet (10 mg total) by mouth daily 100 tablet 1    meclizine (ANTIVERT) 12.5 MG tablet Take 1 tablet (12.5 mg total) by mouth every 8 (eight) hours as needed for dizziness 90 tablet 0    memantine (NAMENDA) 10 mg tablet Take 1 tablet (10 mg total) by mouth 2 (two) times a day 100 tablet 1    metoprolol tartrate (LOPRESSOR) 50 mg tablet Take 1 tablet (50 mg total) by mouth 2 (two) times a day 200 tablet 1    zolpidem (AMBIEN) 5 mg tablet Take 1 tablet (5 mg total) by mouth daily at bedtime as needed for sleep for up to 20 days 20 tablet 0     No current facility-administered medications for this visit.     _______________________________________________________________________  Review of Systems   HENT: Negative.     Respiratory: Negative.     Cardiovascular:  Positive for leg swelling.   Gastrointestinal:  Positive for abdominal distention.   Neurological:  Positive for dizziness. Negative for headaches.   Psychiatric/Behavioral:  Positive for confusion and sleep disturbance.          Objective:  Vitals:    12/16/24 1432   BP: 110/80   BP Location: Left arm  "  Patient Position: Sitting   Cuff Size: Standard   Pulse: 71   Resp: 16   Temp: 98.1 °F (36.7 °C)   TempSrc: Tympanic   SpO2: 94%   Weight: 69.9 kg (154 lb)   Height: 5' 5\" (1.651 m)     Body mass index is 25.63 kg/m².     Physical Exam  HENT:      Head: Normocephalic and atraumatic.      Right Ear: External ear normal.      Left Ear: External ear normal.      Nose: Nose normal.      Mouth/Throat:      Mouth: Mucous membranes are moist.   Cardiovascular:      Rate and Rhythm: Normal rate.   Pulmonary:      Effort: Pulmonary effort is normal.      Breath sounds: Normal breath sounds.   Abdominal:      General: There is distension.      Tenderness: There is no abdominal tenderness. There is no guarding.   Musculoskeletal:      Cervical back: Normal range of motion and neck supple.      Right lower leg: Edema present.      Left lower leg: Edema present.   Skin:     Findings: Lesion present.   Neurological:      General: No focal deficit present.      Mental Status: She is alert. Mental status is at baseline.         "

## 2024-12-16 NOTE — ASSESSMENT & PLAN NOTE
Pt really demanding her zolpidem-I told her I don't think it's a good idea at her age to stay on it but she's telling me she can't sleep without it, etc etc-finally agreed to get an rx for 20 pills to use very prn

## 2024-12-16 NOTE — TELEPHONE ENCOUNTER
Pt daughter called in stating that she does not want pt Ambien  refilled as pt is not taking it and is instead giving to prescription to a family member. Please advise.

## 2024-12-16 NOTE — ASSESSMENT & PLAN NOTE
Being followed by Neurosurgery, no surgical intervention recommended keep sbp below 160 and stay away from ASA and any thinners

## 2024-12-16 NOTE — ASSESSMENT & PLAN NOTE
On lasix, I think may be related to tricuspid regurg, last echo from 9/23 showed normal EF and diastolic function and mild-mod TR

## 2024-12-17 ENCOUNTER — HOME CARE VISIT (OUTPATIENT)
Dept: HOME HEALTH SERVICES | Facility: HOME HEALTHCARE | Age: 84
End: 2024-12-17
Payer: COMMERCIAL

## 2024-12-17 VITALS
DIASTOLIC BLOOD PRESSURE: 82 MMHG | TEMPERATURE: 98.2 F | OXYGEN SATURATION: 97 % | SYSTOLIC BLOOD PRESSURE: 126 MMHG | RESPIRATION RATE: 16 BRPM | HEART RATE: 85 BPM

## 2024-12-17 PROCEDURE — G0299 HHS/HOSPICE OF RN EA 15 MIN: HCPCS

## 2024-12-17 NOTE — Clinical Note
Patient discharge from Home Health services today 12/17/24.  SN did not obtain blood work during SN visit due to just seeing this message now.    ----- Message -----  From: Eve Wyman RN  Sent: 12/17/2024  11:22 AM EST  To: Ami Wallace RN      12/16/24 -  As per order in Epic-  Obtain CBCD, CMP, TSH,3rd generation -  No specific date noted  Results to Dr. Suly Fish, pcp

## 2024-12-17 NOTE — CASE COMMUNICATION
12/16/24 -  As per order in Epic-  Obtain CBCD, CMP, TSH,3rd generation -  No specific date noted  Results to Dr. Suly Fish, pcp

## 2024-12-19 DIAGNOSIS — I87.2 STASIS DERMATITIS OF BOTH LEGS: ICD-10-CM

## 2024-12-19 DIAGNOSIS — F41.9 ANXIETY: ICD-10-CM

## 2024-12-19 DIAGNOSIS — F03.90 DEMENTIA, UNSPECIFIED DEMENTIA SEVERITY, UNSPECIFIED DEMENTIA TYPE, UNSPECIFIED WHETHER BEHAVIORAL, PSYCHOTIC, OR MOOD DISTURBANCE OR ANXIETY (HCC): ICD-10-CM

## 2024-12-19 DIAGNOSIS — R60.0 BILATERAL LOWER EXTREMITY EDEMA: ICD-10-CM

## 2024-12-19 DIAGNOSIS — I10 ESSENTIAL HYPERTENSION: ICD-10-CM

## 2024-12-19 DIAGNOSIS — Z76.0 MEDICATION REFILL: ICD-10-CM

## 2024-12-19 RX ORDER — AMLODIPINE BESYLATE 2.5 MG/1
2.5 TABLET ORAL DAILY
Qty: 90 TABLET | Refills: 1 | Status: SHIPPED | OUTPATIENT
Start: 2024-12-19

## 2024-12-19 RX ORDER — MEMANTINE HYDROCHLORIDE 10 MG/1
10 TABLET ORAL 2 TIMES DAILY
Qty: 100 TABLET | Refills: 1 | Status: SHIPPED | OUTPATIENT
Start: 2024-12-19

## 2024-12-19 RX ORDER — METOPROLOL TARTRATE 50 MG
50 TABLET ORAL 2 TIMES DAILY
Qty: 200 TABLET | Refills: 1 | Status: SHIPPED | OUTPATIENT
Start: 2024-12-19

## 2024-12-19 RX ORDER — ESCITALOPRAM OXALATE 10 MG/1
10 TABLET ORAL DAILY
Qty: 90 TABLET | Refills: 1 | Status: SHIPPED | OUTPATIENT
Start: 2024-12-19

## 2024-12-19 RX ORDER — CLOBETASOL PROPIONATE 0.5 MG/G
CREAM TOPICAL 2 TIMES DAILY
Qty: 90 G | Refills: 5 | Status: SHIPPED | OUTPATIENT
Start: 2024-12-19

## 2024-12-19 RX ORDER — FUROSEMIDE 40 MG/1
40 TABLET ORAL DAILY
Qty: 30 TABLET | Refills: 1 | Status: SHIPPED | OUTPATIENT
Start: 2024-12-19

## 2024-12-19 RX ORDER — LISINOPRIL 10 MG/1
10 TABLET ORAL DAILY
Qty: 100 TABLET | Refills: 1 | Status: SHIPPED | OUTPATIENT
Start: 2024-12-19

## 2024-12-27 ENCOUNTER — HOSPITAL ENCOUNTER (OUTPATIENT)
Dept: CT IMAGING | Facility: HOSPITAL | Age: 84
End: 2024-12-27
Attending: INTERNAL MEDICINE
Payer: COMMERCIAL

## 2024-12-27 DIAGNOSIS — R41.89 COGNITIVE DECLINE: Primary | ICD-10-CM

## 2024-12-27 DIAGNOSIS — R14.0 ABDOMINAL DISTENTION: ICD-10-CM

## 2024-12-27 PROCEDURE — 74176 CT ABD & PELVIS W/O CONTRAST: CPT

## 2025-01-03 ENCOUNTER — HOSPITAL ENCOUNTER (OUTPATIENT)
Dept: CT IMAGING | Facility: HOSPITAL | Age: 85
End: 2025-01-03
Payer: COMMERCIAL

## 2025-01-03 DIAGNOSIS — S06.5XAA SUBDURAL HEMATOMA (HCC): ICD-10-CM

## 2025-01-03 PROCEDURE — 70450 CT HEAD/BRAIN W/O DYE: CPT

## 2025-01-05 ENCOUNTER — RESULTS FOLLOW-UP (OUTPATIENT)
Dept: FAMILY MEDICINE CLINIC | Facility: CLINIC | Age: 85
End: 2025-01-05

## 2025-01-07 ENCOUNTER — TELEPHONE (OUTPATIENT)
Dept: NEUROSURGERY | Facility: CLINIC | Age: 85
End: 2025-01-07

## 2025-01-08 ENCOUNTER — TELEPHONE (OUTPATIENT)
Dept: NEUROSURGERY | Facility: CLINIC | Age: 85
End: 2025-01-08

## 2025-01-08 NOTE — TELEPHONE ENCOUNTER
1/8/25    Called pt's daughter (Judy, [phone]) to reschedule no show appt. Judy said the pt (Khushbu) no longer wants to be seen due to a family decision.

## 2025-01-13 ENCOUNTER — TELEPHONE (OUTPATIENT)
Age: 85
End: 2025-01-13

## 2025-01-13 NOTE — TELEPHONE ENCOUNTER
Patient's daughter called in stating that her mom needs a competency exam done and that she was told by her pcp that neurology will be the ones to perform that.      Judy states that mom is not able to make financial decisions anymore and are in need of the neurologist to fill out the questionnaire. Daughter is requesting a call back so that we may advise if Dr. Carroll may be able to fill  out or not. CB :[phone]

## 2025-01-13 NOTE — TELEPHONE ENCOUNTER
That is not something that neurology would do. I would recommend talking to her primary care doctor again and consider a formal neuropsychological evaluation. Geriatrics department may also be a good resource as I am sure that they have come across this type of situation in the past

## 2025-01-14 DIAGNOSIS — F03.B0 MODERATE DEMENTIA, UNSPECIFIED DEMENTIA TYPE, UNSPECIFIED WHETHER BEHAVIORAL, PSYCHOTIC, OR MOOD DISTURBANCE OR ANXIETY (HCC): Primary | ICD-10-CM

## 2025-02-10 DIAGNOSIS — R60.0 BILATERAL LOWER EXTREMITY EDEMA: ICD-10-CM

## 2025-02-11 RX ORDER — FUROSEMIDE 40 MG/1
40 TABLET ORAL DAILY
Qty: 90 TABLET | Refills: 1 | Status: SHIPPED | OUTPATIENT
Start: 2025-02-11

## 2025-02-18 DIAGNOSIS — G47.00 INSOMNIA, UNSPECIFIED TYPE: ICD-10-CM

## 2025-02-18 RX ORDER — ZOLPIDEM TARTRATE 5 MG/1
5 TABLET ORAL
Qty: 90 TABLET | Refills: 0 | OUTPATIENT
Start: 2025-02-18

## 2025-02-20 DIAGNOSIS — G47.00 INSOMNIA, UNSPECIFIED TYPE: ICD-10-CM

## 2025-02-20 RX ORDER — ZOLPIDEM TARTRATE 5 MG/1
5 TABLET ORAL
Qty: 90 TABLET | Refills: 0 | OUTPATIENT
Start: 2025-02-20

## 2025-02-20 NOTE — TELEPHONE ENCOUNTER
Refill must be reviewed and completed by the office or provider. The refill is unable to be approved or denied by the medication management team.      The original prescription was discontinued on 11/20/2024 by Philip Gregory PA-C. Renewing this prescription may not be appropriate.       Patient states that she has been taking this medication and was not aware that it was cancelled. Patient is unsure who Philip Gregory PA-C is. Patient is requesting a refill on the medication.

## 2025-02-25 LAB
ALBUMIN SERPL-MCNC: 4.4 G/DL (ref 3.5–5.7)
ALP SERPL-CCNC: 61 U/L (ref 35–120)
ALT SERPL-CCNC: 11 U/L
ANION GAP SERPL CALCULATED.3IONS-SCNC: 10 MMOL/L (ref 3–11)
AST SERPL-CCNC: 14 U/L
BASOPHILS # BLD AUTO: 0.1 THOU/CMM (ref 0–0.1)
BASOPHILS NFR BLD AUTO: 1 %
BILIRUB SERPL-MCNC: 0.8 MG/DL (ref 0.2–1)
BUN SERPL-MCNC: 11 MG/DL (ref 7–25)
CALCIUM SERPL-MCNC: 9.5 MG/DL (ref 8.5–10.5)
CHLORIDE SERPL-SCNC: 96 MMOL/L (ref 100–109)
CO2 SERPL-SCNC: 28 MMOL/L (ref 21–31)
CREAT SERPL-MCNC: 0.79 MG/DL (ref 0.4–1.1)
CYTOLOGY CMNT CVX/VAG CYTO-IMP: ABNORMAL
DIFFERENTIAL METHOD BLD: ABNORMAL
EOSINOPHIL # BLD AUTO: 0.1 THOU/CMM (ref 0–0.5)
EOSINOPHIL NFR BLD AUTO: 1 %
ERYTHROCYTE [DISTWIDTH] IN BLOOD BY AUTOMATED COUNT: 13.6 % (ref 12–16)
GFR/BSA.PRED SERPLBLD CYS-BASED-ARV: 72 ML/MIN/{1.73_M2}
GLUCOSE SERPL-MCNC: 117 MG/DL (ref 65–99)
HCT VFR BLD AUTO: 37.9 % (ref 35–43)
HGB BLD-MCNC: 12.8 G/DL (ref 11.5–14.5)
LYMPHOCYTES # BLD AUTO: 1.5 THOU/CMM (ref 1–3)
LYMPHOCYTES NFR BLD AUTO: 18 %
MCH RBC QN AUTO: 34.4 PG (ref 26–34)
MCHC RBC AUTO-ENTMCNC: 33.9 G/DL (ref 32–37)
MCV RBC AUTO: 102 FL (ref 80–100)
MONOCYTES # BLD AUTO: 0.6 THOU/CMM (ref 0.3–1)
MONOCYTES NFR BLD AUTO: 8 %
NEUTROPHILS # BLD AUTO: 5.9 THOU/CMM (ref 1.8–7.8)
NEUTROPHILS NFR BLD AUTO: 72 %
PLATELET # BLD AUTO: 258 THOU/CMM (ref 140–350)
PMV BLD REES-ECKER: 7.9 FL (ref 7.5–11.3)
POTASSIUM SERPL-SCNC: 4.1 MMOL/L (ref 3.5–5.2)
PROT SERPL-MCNC: 6.9 G/DL (ref 6.3–8.3)
RBC # BLD AUTO: 3.73 MILL/CMM (ref 3.7–4.7)
SODIUM SERPL-SCNC: 134 MMOL/L (ref 135–145)
TSH SERPL-ACNC: 4.44 UIU/ML (ref 0.45–5.33)
WBC # BLD AUTO: 8.1 THOU/CMM (ref 4–10)

## 2025-03-01 NOTE — ASSESSMENT & PLAN NOTE
Discussed use of alcohol can impact cognition, fall risk  Recommend decreasing usage and cessation if possible  Patient reports her intake is stable, she is not forgetting and drinking extra, and she is happy with her current usage Medication: ventolin HFA     Dose/Frequency: 2 puffs every 4 hours     Quantity: 90mcg    Pharmacy: [facility] [address]     Office:   [x] PCP/Provider -   [] Speciality/Provider -     Does the patient have enough for 3 days?   [] Yes   [x] No - Send as HP to POD

## 2025-03-04 ENCOUNTER — TELEPHONE (OUTPATIENT)
Age: 85
End: 2025-03-04

## 2025-03-04 NOTE — TELEPHONE ENCOUNTER
Pt's daughter, Trinidad, called.  She asked if Dr Fish had completed a form from [facility] for Trinidad to care for her mother.  I could not find the form, so Trinidad will have them fax the form again.  She needs to have it completed and faxed back as soon as possible please.  Please watch for form.

## 2025-03-10 ENCOUNTER — RA CDI HCC (OUTPATIENT)
Dept: OTHER | Facility: HOSPITAL | Age: 85
End: 2025-03-10

## 2025-03-17 ENCOUNTER — OFFICE VISIT (OUTPATIENT)
Dept: FAMILY MEDICINE CLINIC | Facility: CLINIC | Age: 85
End: 2025-03-17
Payer: COMMERCIAL

## 2025-03-17 VITALS
DIASTOLIC BLOOD PRESSURE: 84 MMHG | SYSTOLIC BLOOD PRESSURE: 126 MMHG | OXYGEN SATURATION: 65 % | WEIGHT: 158 LBS | BODY MASS INDEX: 26.33 KG/M2 | HEART RATE: 94 BPM | HEIGHT: 65 IN

## 2025-03-17 DIAGNOSIS — F03.90 DEMENTIA, UNSPECIFIED DEMENTIA SEVERITY, UNSPECIFIED DEMENTIA TYPE, UNSPECIFIED WHETHER BEHAVIORAL, PSYCHOTIC, OR MOOD DISTURBANCE OR ANXIETY (HCC): ICD-10-CM

## 2025-03-17 DIAGNOSIS — I10 ESSENTIAL HYPERTENSION: ICD-10-CM

## 2025-03-17 DIAGNOSIS — I87.2 STASIS DERMATITIS OF BOTH LEGS: ICD-10-CM

## 2025-03-17 DIAGNOSIS — L60.9 NAIL ABNORMALITIES: ICD-10-CM

## 2025-03-17 DIAGNOSIS — R54 FRAILTY SYNDROME IN GERIATRIC PATIENT: ICD-10-CM

## 2025-03-17 DIAGNOSIS — I49.1 PREMATURE ATRIAL CONTRACTIONS: ICD-10-CM

## 2025-03-17 DIAGNOSIS — F10.20 ALCOHOL DEPENDENCE, UNCOMPLICATED (HCC): ICD-10-CM

## 2025-03-17 DIAGNOSIS — E78.5 DYSLIPIDEMIA: ICD-10-CM

## 2025-03-17 DIAGNOSIS — E87.1 HYPONATREMIA: ICD-10-CM

## 2025-03-17 DIAGNOSIS — Z00.00 MEDICARE ANNUAL WELLNESS VISIT, SUBSEQUENT: Primary | ICD-10-CM

## 2025-03-17 DIAGNOSIS — L85.3 DRY SKIN: ICD-10-CM

## 2025-03-17 DIAGNOSIS — F10.10 ALCOHOL ABUSE: ICD-10-CM

## 2025-03-17 DIAGNOSIS — S06.5XAA SUBDURAL HEMATOMA (HCC): ICD-10-CM

## 2025-03-17 PROCEDURE — G2211 COMPLEX E/M VISIT ADD ON: HCPCS | Performed by: INTERNAL MEDICINE

## 2025-03-17 PROCEDURE — G0439 PPPS, SUBSEQ VISIT: HCPCS | Performed by: INTERNAL MEDICINE

## 2025-03-17 PROCEDURE — 99214 OFFICE O/P EST MOD 30 MIN: CPT | Performed by: INTERNAL MEDICINE

## 2025-03-17 RX ORDER — AMMONIUM LACTATE 12 G/100G
LOTION TOPICAL 2 TIMES DAILY PRN
Qty: 400 G | Refills: 3 | Status: SHIPPED | OUTPATIENT
Start: 2025-03-17

## 2025-03-17 RX ORDER — MEMANTINE HYDROCHLORIDE 10 MG/1
10 TABLET ORAL 2 TIMES DAILY
Qty: 180 TABLET | Refills: 3 | Status: SHIPPED | OUTPATIENT
Start: 2025-03-17

## 2025-03-17 NOTE — ASSESSMENT & PLAN NOTE
Recommend leg elevation, prn lasix-last echo was in 2023 which showed normal systolic and diastolic function-did show elevated RV pressure though-another echo is ordered

## 2025-03-17 NOTE — PROGRESS NOTES
"Name: Khushbu Gandara      : 1940      MRN: [id number]  Encounter Provider: Suly Fish MD  Encounter Date: 3/17/2025   Encounter department: [facility] MEDICINE    Assessment & Plan  Essential hypertension  On bp meds and well controlled       Stasis dermatitis of both legs  Recommend leg elevation, prn lasix-last echo was in  which showed normal systolic and diastolic function-did show elevated RV pressure though-another echo is ordered       Hyponatremia         Frailty syndrome in geriatric patient         Dyslipidemia         Medicare annual wellness visit, subsequent         Premature atrial contractions         Alcohol abuse  Still drinking wine       Subdural hematoma (HCC)         Dementia, unspecified dementia severity, unspecified dementia type, unspecified whether behavioral, psychotic, or mood disturbance or anxiety (HCC)  On Nam[place] and her daughter is helping her out a lot at home now       Alcohol dependence, uncomplicated (HCC)         Nail abnormalities  Refer to Podiatry so they can clip her nails  Orders:    Ambulatory Referral to Podiatry; Future       Preventive health issues were discussed with patient, and age appropriate screening tests were ordered as noted in patient's After Visit Summary. Personalized health advice and appropriate referrals for health education or preventive services given if needed, as noted in patient's After Visit Summary.    History of Present Illness     Khushbu here with her daughter for her MAWV, and to touch base on her hx of dementia, heavier alcohol use, edema, subdural hematoma, HTN-doing ok, here with her daughter today who is helping take care of Khushbu and her  Yo -says she has \"good days and bad days\"-daughter wants her toenails to be cut, and she can't do them herself-signed paperwork for home and community waiver form for her       Patient Care Team:  Suly Fish MD as PCP - General (Internal Medicine)    Review " of Systems   Constitutional: Negative.    HENT: Negative.     Cardiovascular:  Positive for leg swelling.   Psychiatric/Behavioral:  Positive for confusion. The patient is nervous/anxious.      Medical History Reviewed by provider this encounter:       Annual Wellness Visit Questionnaire   Khushbu is here for her Subsequent Wellness visit.     Health Risk Assessment:   Patient rates overall health as fair. Patient feels that their physical health rating is same. Patient is satisfied with their life. Eyesight was rated as slightly worse. Hearing was rated as slightly worse. Patient feels that their emotional and mental health rating is same. Patients states they are never, rarely angry. Patient states they are never, rarely unusually tired/fatigued. Pain experienced in the last 7 days has been none. Patient states that she has experienced no weight loss or gain in last 6 months.     Fall Risk Screening:   In the past year, patient has experienced: history of falling in past year    Number of falls: 2 or more  Injured during fall?: Yes    Feels unsteady when standing or walking?: Yes    Worried about falling?: Yes      Urinary Incontinence Screening:   Patient has leaked urine accidently in the last six months.     Home Safety:  Patient has trouble with stairs inside or outside of their home. Patient has working smoke alarms and has working carbon monoxide detector. Home safety hazards include: none.     Nutrition:   Current diet is Regular.     Medications:   Patient is currently taking over-the-counter supplements. OTC medications include: see medication list. Patient is able to manage medications.     Activities of Daily Living (ADLs)/Instrumental Activities of Daily Living (IADLs):   Walk and transfer into and out of bed and chair?: Yes  Dress and groom yourself?: Yes    Bathe or shower yourself?: Yes    Feed yourself? Yes  Do your laundry/housekeeping?: No  Manage your money, pay your bills and track your  expenses?: No  Make your own meals?: No    Do your own shopping?: No    Previous Hospitalizations:   Any hospitalizations or ED visits within the last 12 months?: Yes    How many hospitalizations have you had in the last year?: 1-2    Advance Care Planning:   Living will: Yes    Advanced directive: Yes    Advanced directive counseling given: Yes      PREVENTIVE SCREENINGS      Cardiovascular Screening:    General: Screening Current      Diabetes Screening:     General: Screening Current      Colorectal Cancer Screening:     General: Screening Not Indicated      Breast Cancer Screening:     General: Screening Not Indicated      Cervical Cancer Screening:    General: Screening Not Indicated      Osteoporosis Screening:    General: Screening Not Indicated      Abdominal Aortic Aneurysm (AAA) Screening:        General: Screening Not Indicated      Lung Cancer Screening:     General: Screening Not Indicated    Screening, Brief Intervention, and Referral to Treatment (SBIRT)     Screening  Typical number of drinks in a day: 1  Typical number of drinks in a week: 1  Interpretation: Low risk drinking behavior.    Single Item Drug Screening:  How often have you used an illegal drug (including marijuana) or a prescription medication for non-medical reasons in the past year? never    Single Item Drug Screen Score: 0  Interpretation: Negative screen for possible drug use disorder    Social Drivers of Health     Financial Resource Strain: Low Risk  (10/25/2023)    Overall Financial Resource Strain (CARDIA)     Difficulty of Paying Living Expenses: Not hard at all   Food Insecurity: No Food Insecurity (10/4/2023)    Hunger Vital Sign     Worried About Running Out of Food in the Last Year: Never true     Ran Out of Food in the Last Year: Never true   Transportation Needs: No Transportation Needs (12/17/2024)    OASIS : Transportation     Lack of Transportation (Medical): No     Lack of Transportation (Non-Medical): No      Patient Unable or Declines to Respond: No   Housing Stability: Unknown (10/4/2023)    Housing Stability Vital Sign     Unable to Pay for Housing in the Last Year: No     Homeless in the Last Year: No     No results found.    Objective   There were no vitals taken for this visit.    Physical Exam  Constitutional:       Appearance: Normal appearance.   HENT:      Head: Normocephalic and atraumatic.      Right Ear: External ear normal.      Left Ear: External ear normal.      Nose: Nose normal.      Mouth/Throat:      Mouth: Mucous membranes are moist.   Eyes:      Pupils: Pupils are equal, round, and reactive to light.   Cardiovascular:      Rate and Rhythm: Normal rate and regular rhythm.   Pulmonary:      Effort: Pulmonary effort is normal.      Breath sounds: Normal breath sounds.   Abdominal:      Palpations: Abdomen is soft.   Musculoskeletal:      Cervical back: Normal range of motion and neck supple.      Right lower leg: Edema present.      Left lower leg: Edema present.   Skin:     General: Skin is warm.   Neurological:      General: No focal deficit present.      Mental Status: She is alert.

## 2025-03-17 NOTE — PATIENT INSTRUCTIONS

## 2025-05-04 DIAGNOSIS — I10 ESSENTIAL HYPERTENSION: ICD-10-CM

## 2025-05-05 RX ORDER — LISINOPRIL 10 MG/1
10 TABLET ORAL DAILY
Qty: 90 TABLET | Refills: 1 | Status: SHIPPED | OUTPATIENT
Start: 2025-05-05

## 2025-06-02 DIAGNOSIS — I10 ESSENTIAL HYPERTENSION: ICD-10-CM

## 2025-06-02 RX ORDER — AMLODIPINE BESYLATE 2.5 MG/1
2.5 TABLET ORAL DAILY
Qty: 90 TABLET | Refills: 1 | Status: SHIPPED | OUTPATIENT
Start: 2025-06-02

## 2025-06-16 DIAGNOSIS — F41.9 ANXIETY: ICD-10-CM

## 2025-06-17 RX ORDER — ESCITALOPRAM OXALATE 10 MG/1
10 TABLET ORAL DAILY
Qty: 90 TABLET | Refills: 1 | Status: SHIPPED | OUTPATIENT
Start: 2025-06-17

## 2025-06-24 DIAGNOSIS — Z76.0 MEDICATION REFILL: ICD-10-CM

## 2025-06-25 RX ORDER — METOPROLOL TARTRATE 50 MG
50 TABLET ORAL 2 TIMES DAILY
Qty: 180 TABLET | Refills: 1 | Status: SHIPPED | OUTPATIENT
Start: 2025-06-25

## 2025-07-14 ENCOUNTER — PATIENT MESSAGE (OUTPATIENT)
Dept: FAMILY MEDICINE CLINIC | Facility: CLINIC | Age: 85
End: 2025-07-14

## 2025-07-15 DIAGNOSIS — R60.0 BILATERAL LOWER EXTREMITY EDEMA: ICD-10-CM

## 2025-07-16 ENCOUNTER — TELEPHONE (OUTPATIENT)
Age: 85
End: 2025-07-16

## 2025-07-16 LAB
ALBUMIN SERPL-MCNC: 4.4 G/DL (ref 3.5–5.7)
ALP SERPL-CCNC: 64 U/L (ref 35–120)
ALT SERPL-CCNC: 13 U/L
ANION GAP SERPL CALCULATED.3IONS-SCNC: 11 MMOL/L (ref 3–11)
AST SERPL-CCNC: 19 U/L
BASOPHILS # BLD AUTO: 0 THOU/CMM (ref 0–0.1)
BASOPHILS NFR BLD AUTO: 1 %
BILIRUB SERPL-MCNC: 1.1 MG/DL (ref 0.2–1)
BUN SERPL-MCNC: 7 MG/DL (ref 7–25)
CALCIUM SERPL-MCNC: 9.7 MG/DL (ref 8.5–10.5)
CHLORIDE SERPL-SCNC: 95 MMOL/L (ref 100–109)
CO2 SERPL-SCNC: 29 MMOL/L (ref 21–31)
CREAT SERPL-MCNC: 0.95 MG/DL (ref 0.4–1.1)
CYTOLOGY CMNT CVX/VAG CYTO-IMP: ABNORMAL
DIFFERENTIAL METHOD BLD: ABNORMAL
EOSINOPHIL # BLD AUTO: 0 THOU/CMM (ref 0–0.5)
EOSINOPHIL NFR BLD AUTO: 1 %
ERYTHROCYTE [DISTWIDTH] IN BLOOD BY AUTOMATED COUNT: 13.2 % (ref 12–16)
GFR/BSA.PRED SERPLBLD CYS-BASED-ARV: 58 ML/MIN/{1.73_M2}
GLUCOSE SERPL-MCNC: 116 MG/DL (ref 65–99)
HCT VFR BLD AUTO: 39.4 % (ref 35–43)
HGB BLD-MCNC: 13.7 G/DL (ref 11.5–14.5)
LYMPHOCYTES # BLD AUTO: 1.3 THOU/CMM (ref 1–3)
LYMPHOCYTES NFR BLD AUTO: 22 %
MCH RBC QN AUTO: 35.9 PG (ref 26–34)
MCHC RBC AUTO-ENTMCNC: 34.8 G/DL (ref 32–37)
MCV RBC AUTO: 103 FL (ref 80–100)
MONOCYTES # BLD AUTO: 0.4 THOU/CMM (ref 0.3–1)
MONOCYTES NFR BLD AUTO: 8 %
NEUTROPHILS # BLD AUTO: 3.9 THOU/CMM (ref 1.8–7.8)
NEUTROPHILS NFR BLD AUTO: 68 %
PLATELET # BLD AUTO: 251 THOU/CMM (ref 140–350)
PMV BLD REES-ECKER: 7.5 FL (ref 7.5–11.3)
POTASSIUM SERPL-SCNC: 4.1 MMOL/L (ref 3.5–5.2)
PROT SERPL-MCNC: 6.9 G/DL (ref 6.3–8.3)
RBC # BLD AUTO: 3.83 MILL/CMM (ref 3.7–4.7)
SODIUM SERPL-SCNC: 135 MMOL/L (ref 135–145)
TSH SERPL-ACNC: 3.67 UIU/ML (ref 0.45–5.33)
WBC # BLD AUTO: 5.7 THOU/CMM (ref 4–10)

## 2025-07-16 NOTE — TELEPHONE ENCOUNTER
Patients  calls in.  Patient is going for lab work today.  Lab orders faxed to [facility] in [place].

## 2025-07-17 RX ORDER — FUROSEMIDE 40 MG/1
40 TABLET ORAL DAILY
Qty: 90 TABLET | Refills: 1 | Status: SHIPPED | OUTPATIENT
Start: 2025-07-17

## 2025-07-31 ENCOUNTER — NURSE TRIAGE (OUTPATIENT)
Age: 85
End: 2025-07-31

## 2025-08-01 ENCOUNTER — OFFICE VISIT (OUTPATIENT)
Dept: FAMILY MEDICINE CLINIC | Facility: CLINIC | Age: 85
End: 2025-08-01
Payer: COMMERCIAL

## 2025-08-01 VITALS
SYSTOLIC BLOOD PRESSURE: 110 MMHG | HEART RATE: 94 BPM | TEMPERATURE: 99.7 F | DIASTOLIC BLOOD PRESSURE: 72 MMHG | OXYGEN SATURATION: 97 %

## 2025-08-01 DIAGNOSIS — I10 ESSENTIAL HYPERTENSION: Primary | ICD-10-CM

## 2025-08-01 DIAGNOSIS — R42 DIZZINESS: ICD-10-CM

## 2025-08-01 PROCEDURE — G2211 COMPLEX E/M VISIT ADD ON: HCPCS | Performed by: FAMILY MEDICINE

## 2025-08-01 PROCEDURE — 99213 OFFICE O/P EST LOW 20 MIN: CPT | Performed by: FAMILY MEDICINE

## 2025-08-01 RX ORDER — MECLIZINE HCL 12.5 MG 12.5 MG/1
12.5 TABLET ORAL EVERY 8 HOURS PRN
Qty: 90 TABLET | Refills: 0 | Status: SHIPPED | OUTPATIENT
Start: 2025-08-01

## 2025-08-08 ENCOUNTER — OFFICE VISIT (OUTPATIENT)
Dept: FAMILY MEDICINE CLINIC | Facility: CLINIC | Age: 85
End: 2025-08-08
Payer: COMMERCIAL

## 2025-08-08 VITALS — DIASTOLIC BLOOD PRESSURE: 70 MMHG | HEART RATE: 94 BPM | SYSTOLIC BLOOD PRESSURE: 110 MMHG | OXYGEN SATURATION: 96 %

## 2025-08-08 DIAGNOSIS — F10.20 ALCOHOL DEPENDENCE, UNCOMPLICATED (HCC): ICD-10-CM

## 2025-08-08 DIAGNOSIS — R54 FRAILTY SYNDROME IN GERIATRIC PATIENT: ICD-10-CM

## 2025-08-08 DIAGNOSIS — F41.9 ANXIETY: ICD-10-CM

## 2025-08-08 DIAGNOSIS — G31.9 CEREBRAL ATROPHY (HCC): ICD-10-CM

## 2025-08-08 DIAGNOSIS — I10 ESSENTIAL HYPERTENSION: Primary | ICD-10-CM

## 2025-08-08 DIAGNOSIS — S06.5XAA SUBDURAL HEMATOMA (HCC): ICD-10-CM

## 2025-08-08 DIAGNOSIS — F03.90 DEMENTIA, UNSPECIFIED DEMENTIA SEVERITY, UNSPECIFIED DEMENTIA TYPE, UNSPECIFIED WHETHER BEHAVIORAL, PSYCHOTIC, OR MOOD DISTURBANCE OR ANXIETY (HCC): ICD-10-CM

## 2025-08-08 DIAGNOSIS — E78.5 DYSLIPIDEMIA: ICD-10-CM

## 2025-08-08 DIAGNOSIS — F32.A DEPRESSION, UNSPECIFIED DEPRESSION TYPE: ICD-10-CM

## 2025-08-08 DIAGNOSIS — R60.9 EDEMA, UNSPECIFIED TYPE: ICD-10-CM

## 2025-08-08 DIAGNOSIS — I87.2 STASIS DERMATITIS OF BOTH LEGS: ICD-10-CM

## 2025-08-08 DIAGNOSIS — R26.2 AMBULATORY DYSFUNCTION: ICD-10-CM

## 2025-08-08 DIAGNOSIS — R53.83 OTHER FATIGUE: ICD-10-CM

## 2025-08-08 LAB
DME PARACHUTE DELIVERY DATE REQUESTED: NORMAL
DME PARACHUTE ITEM DESCRIPTION: NORMAL
DME PARACHUTE ORDER STATUS: NORMAL
DME PARACHUTE SUPPLIER NAME: NORMAL
DME PARACHUTE SUPPLIER PHONE: NORMAL

## 2025-08-08 PROCEDURE — G2211 COMPLEX E/M VISIT ADD ON: HCPCS | Performed by: INTERNAL MEDICINE

## 2025-08-08 PROCEDURE — 99214 OFFICE O/P EST MOD 30 MIN: CPT | Performed by: INTERNAL MEDICINE

## 2025-08-10 ENCOUNTER — APPOINTMENT (EMERGENCY)
Dept: RADIOLOGY | Facility: HOSPITAL | Age: 85
End: 2025-08-10
Payer: COMMERCIAL

## 2025-08-10 ENCOUNTER — APPOINTMENT (EMERGENCY)
Dept: CT IMAGING | Facility: HOSPITAL | Age: 85
End: 2025-08-10
Payer: COMMERCIAL

## 2025-08-10 ENCOUNTER — HOSPITAL ENCOUNTER (EMERGENCY)
Facility: HOSPITAL | Age: 85
Discharge: STILL ADMITTED OR ADMINISTRATIVE | End: 2025-08-11
Attending: EMERGENCY MEDICINE | Admitting: EMERGENCY MEDICINE
Payer: COMMERCIAL

## 2025-08-11 ENCOUNTER — TELEPHONE (OUTPATIENT)
Age: 85
End: 2025-08-11

## 2025-08-11 ENCOUNTER — HOSPITAL ENCOUNTER (INPATIENT)
Facility: HOSPITAL | Age: 85
LOS: 3 days | Discharge: HOSPICE | DRG: 083 | End: 2025-08-14
Attending: STUDENT IN AN ORGANIZED HEALTH CARE EDUCATION/TRAINING PROGRAM | Admitting: STUDENT IN AN ORGANIZED HEALTH CARE EDUCATION/TRAINING PROGRAM
Payer: COMMERCIAL

## 2025-08-11 ENCOUNTER — APPOINTMENT (EMERGENCY)
Dept: CT IMAGING | Facility: HOSPITAL | Age: 85
End: 2025-08-11
Payer: COMMERCIAL

## 2025-08-11 PROBLEM — I62.03 ACUTE ON CHRONIC INTRACRANIAL SUBDURAL HEMATOMA (HCC): Status: ACTIVE | Noted: 2025-08-11

## 2025-08-11 PROBLEM — I62.01 ACUTE ON CHRONIC INTRACRANIAL SUBDURAL HEMATOMA (HCC): Status: ACTIVE | Noted: 2025-08-11

## 2025-08-12 ENCOUNTER — APPOINTMENT (INPATIENT)
Dept: RADIOLOGY | Facility: HOSPITAL | Age: 85
DRG: 083 | End: 2025-08-12
Payer: COMMERCIAL

## 2025-08-13 PROBLEM — N39.0 UTI (URINARY TRACT INFECTION): Status: ACTIVE | Noted: 2025-08-12

## 2025-08-18 ENCOUNTER — TELEPHONE (OUTPATIENT)
Age: 85
End: 2025-08-18

## 2025-08-22 LAB
DME PARACHUTE DELIVERY DATE REQUESTED: NORMAL
DME PARACHUTE ITEM DESCRIPTION: NORMAL
DME PARACHUTE ORDER STATUS: NORMAL
DME PARACHUTE SUPPLIER NAME: NORMAL
DME PARACHUTE SUPPLIER PHONE: NORMAL

## (undated) DEVICE — NEEDLE PERIBULBAR 25G X 7/8 IN

## (undated) DEVICE — INTREPID® TRANSFORMER IA HP: Brand: INTREPID®

## (undated) DEVICE — GLOVE SRG BIOGEL 7

## (undated) DEVICE — B-H IRRIGATING CAN 19GA FLAT ANGLED 8MM: Brand: OPHTHALMIC CANNULA

## (undated) DEVICE — MICROSURGICAL INSTRUMENT IRR. CYSTITOME 25GA STRAIGHT-REVERSE CUTTING: Brand: ALCON

## (undated) DEVICE — CLEARCUT® SLIT KNIFE 2.75MM ANGLED: Brand: CLEARCUT®

## (undated) DEVICE — ACTIVE FMS W/ 0.9 MM INFUSION SLEEVES, 0.9MM 30° ABS* INTREPID* BALANCED TIP: Brand: ALCON

## (undated) DEVICE — TURBOSONICS MICROSMOOTH MICROTIP PARTS KIT: Brand: TURBOSONICS, MICROSMOOTH, MICROTIP, ALCON

## (undated) DEVICE — EYE PADS 1 5/8"X2 5/8": Brand: MCKESSON

## (undated) DEVICE — EYE PACK CUSTOM -FINNEGAN

## (undated) DEVICE — 3M™ TEGADERM™ TRANSPARENT FILM DRESSING FRAME STYLE, 1624W, 2-3/8 IN X 2-3/4 IN (6 CM X 7 CM), 100/CT 4CT/CASE: Brand: 3M™ TEGADERM™

## (undated) DEVICE — CANNULA HYDRODISSECTION NUCLEUS 25G X 7/8IN 35DEG 8MM FROM END SINGLE-USE VISITEC

## (undated) DEVICE — SUT VICRYL 10-0 CS140-6 4 IN V960G

## (undated) DEVICE — THE MONARCH® "D" CARTRIDGE IS A SINGLE-USE POLYPROPYLENE CARTRIDGE FOR POSTERIOR CHAMBER IOL DELIVERY: Brand: MONARCH® III

## (undated) DEVICE — AIR INJECT CANNULA 27GA: Brand: OPHTHALMIC CANNULA